# Patient Record
Sex: MALE | Race: WHITE | Employment: OTHER | URBAN - METROPOLITAN AREA
[De-identification: names, ages, dates, MRNs, and addresses within clinical notes are randomized per-mention and may not be internally consistent; named-entity substitution may affect disease eponyms.]

---

## 2017-01-03 ENCOUNTER — TELEPHONE (OUTPATIENT)
Dept: ORTHOPEDIC SURGERY | Age: 67
End: 2017-01-03

## 2017-01-03 DIAGNOSIS — M51.36 BULGE OF LUMBAR DISC WITHOUT MYELOPATHY: Primary | ICD-10-CM

## 2017-01-03 DIAGNOSIS — M79.2 NEURITIS: ICD-10-CM

## 2017-01-03 DIAGNOSIS — M47.816 LUMBAR FACET ARTHROPATHY: ICD-10-CM

## 2017-01-03 NOTE — TELEPHONE ENCOUNTER
Patient came by to get a new PT order to take to Dr. Herminia Yañez PT and order placed per Dr. Feliciana Spatz. Eval/treat Lumbar, modalities, therpuetic exercise as needed, electrical stim, massage. 2-3 x week for 8-12 treatments. Order placed and patient received a copy.

## 2017-01-10 ENCOUNTER — OFFICE VISIT (OUTPATIENT)
Dept: ORTHOPEDIC SURGERY | Age: 67
End: 2017-01-10

## 2017-01-10 VITALS
HEART RATE: 77 BPM | BODY MASS INDEX: 32.76 KG/M2 | DIASTOLIC BLOOD PRESSURE: 70 MMHG | SYSTOLIC BLOOD PRESSURE: 156 MMHG | WEIGHT: 234 LBS | HEIGHT: 71 IN | TEMPERATURE: 98.6 F | OXYGEN SATURATION: 98 % | RESPIRATION RATE: 18 BRPM

## 2017-01-10 DIAGNOSIS — Z98.1 S/P LUMBAR FUSION: ICD-10-CM

## 2017-01-10 DIAGNOSIS — R20.2 PARESTHESIA: ICD-10-CM

## 2017-01-10 DIAGNOSIS — M48.02 CERVICAL SPINAL STENOSIS: ICD-10-CM

## 2017-01-10 DIAGNOSIS — G89.4 CHRONIC PAIN SYNDROME: ICD-10-CM

## 2017-01-10 DIAGNOSIS — M48.061 LUMBAR SPINAL STENOSIS: Primary | ICD-10-CM

## 2017-01-10 DIAGNOSIS — M47.816 LUMBAR FACET ARTHROPATHY: ICD-10-CM

## 2017-01-10 DIAGNOSIS — R53.1 WEAKNESS: ICD-10-CM

## 2017-01-10 PROBLEM — M96.1 LUMBAR POST-LAMINECTOMY SYNDROME: Status: ACTIVE | Noted: 2017-01-10

## 2017-01-10 RX ORDER — OMEPRAZOLE 20 MG/1
20 CAPSULE, DELAYED RELEASE ORAL DAILY
Refills: 11 | COMMUNITY
Start: 2016-12-09 | End: 2017-08-28 | Stop reason: SDUPTHER

## 2017-01-10 NOTE — PROGRESS NOTES
[address]  [address], [address]  [place], [address] [place] [address] Street  Phone: [phone]  Fax: [phone]  PROGRESS NOTE  Patient: Tawnya Bonilla                MRN: [id number]       SSN: [id number]  YOB: 1950        AGE: 73 y.o. SEX: male  Body mass index is 32.64 kg/(m^2). PCP: Hawa Campos MD  01/10/17    Chief Complaint   Patient presents with    Back Pain     lower abck pain    Neck Pain    Referral / Consult     referred by Dr. Corey Garcia, RADIOGRAPHS, and PLAN:         HISTORY:  Mr. Prabhakar Koenig returns today. He is almost one year out from his lumbar fusion. He did fantastically well for about the first six months. About seven or eight months out form surgery, he began to have a return of leg pain, not the back pain he had prior to surgery, but leg pain. The leg pain began bilaterally, almost a sciatica. He also has had chronic neck pain, which is another issue, but he comes in today complaining of back pain and bilateral dysesthetic numbness and tingling. He says it is severe and unresponsive to interventions other than one set of selective blocks, which he said it has helped him for a few days. He comes in demanding something be done for it. RADIOGRAPHS:  He has had a repeat MRI, and this demonstrates an L4-5 decompression and instrumented fusion with ongoing foraminal lateral recess stenosis at the other unoperated levels and his L4-5 decompression and fusion. X-rays demonstrate what appears to be an L4-5 fusion without issue. There is no evidence of instability. There is possibly some haloing around the instrumentation but no motion on flexion or extension. He is using an assistive device. He says his back pain is pretty much gone since the surgery. He says the surgery helped him, but he is having this leg pain. He was a smoker prior to surgery.   He has not been smoking since surgery. I have explained to him that I am uncertain of the etiology of this pain. The instrumentation appears to be well placed. I do not see any new stenosis or instability. It does not quite make sense to me that he is already having junctional issues. The junctional stenosis he has appears to be stable from what it was prior to surgery, and it was not purely symptomatic prior to surgery. PLAN:  At this point, I would like to obtain a current EMG, vascular studies, and a CT scan to see the status of his fusion and see if he has any evidence of neuropathy, radiculopathy, or vascular issue. With that in hand, we will try to decide a treatment plan, whether it would make sense to consider revising his spine from a surgical standpoint or whether we have to consider this is something that requires another approach, such as spinal cord stimulation. He does appear to be in quite a bit of distress. With regards to his cervical spine, he does have cervical spondylosis with some relative stenosis, but that is a minor issue for him at this point. I will see him back following his further studies. cc: Judith Argueta. Konstantin Medley M.D.            Past Medical History   Diagnosis Date    Bilateral hip pain     Chronic pain      back    Depression     Diabetes (HCC)      borderline, no meds-trying to control with diet    DJD (degenerative joint disease)     GERD (gastroesophageal reflux disease)     Hepatitis C January, 2015    Hypertension     Kidney stones     Lymphadenopathy, generalized 12/05/2015     neg bx    Numbness of foot left     resolved per patient 1/29/16    S/P lumbar fusion 2/9/2016     L4/5 LAMINECTOMY/FUSION/TRANSFORAMINAL LUMBAR INTERBODY FUSION (TLIF) by Dr. Tasha Gallardo on 2/8/16     Unspecified sleep apnea      no cpap machine-pt denies       Family History   Problem Relation Age of Onset    Diabetes Sister     SLE Sister     Arthritis-osteo Sister     Heart Disease Sister Current Outpatient Prescriptions   Medication Sig Dispense Refill    omeprazole (PRILOSEC) 20 mg capsule Take 20 mg by mouth daily. 11    gabapentin (NEURONTIN) 300 mg capsule 1 in the am and 2 in the evening as directed 90 Cap 1    terbinafine HCl (LAMISIL) 250 mg tablet 250 mg.      triamcinolone acetonide (KENALOG) 0.1 % topical cream Use 1 Application to affected area Twice Daily.  amLODIPine (NORVASC) 10 mg tablet Take 1 Tab by mouth daily. 90 Tab 1    ketoconazole (NIZORAL) 2 % shampoo One application to scalp 552 mL 3    Omeprazole delayed release (PRILOSEC D/R) 20 mg tablet Take 1 Tab by mouth daily. 30 Tab 11    lisinopril (PRINIVIL, ZESTRIL) 20 mg tablet Take 1 Tab by mouth daily. 90 Tab 1    traMADol (ULTRAM) 50 mg tablet Take 1 Tab by mouth every six (6) hours as needed for Pain. Max Daily Amount: 200 mg. 10 Tab 0    cyclobenzaprine (FLEXERIL) 5 mg tablet Take 1 Tab by mouth three (3) times daily as needed for Muscle Spasm(s). 12 Tab 0    acetaminophen-codeine (TYLENOL #3) 300-30 mg per tablet 1 po bid prn severe pain  Indications: PAIN 60 Tab 0       Allergies   Allergen Reactions    Nicotine Contact Dermatitis     Nicotine Patch reaction - Contact dermatitis with numbness and tingling also occuring in the left arm and denuding of the skin.     Thimerosal Other (comments)     Contact lens solution, made eyes red and irrated       Past Surgical History   Procedure Laterality Date    Hx tonsillectomy  as a child    Hx appendectomy  as a child    Hx orthopaedic       denies    Pr remove groin lymph nodes superf Right 3-10-16     Dr. Roman Marshall Hx colonoscopy  2015     negative    Hx lumbar fusion  2/2016    Hx back surgery  07/2015       Past Medical History   Diagnosis Date    Bilateral hip pain     Chronic pain      back    Depression     Diabetes ([address]      borderline, no meds-trying to control with diet    DJD (degenerative joint disease)     GERD (gastroesophageal reflux disease)     Hepatitis C January, 2015    Hypertension     Kidney stones     Lymphadenopathy, generalized 12/05/2015     neg bx    Numbness of foot left     resolved per patient 1/29/16    S/P lumbar fusion 2/9/2016     L4/5 LAMINECTOMY/FUSION/TRANSFORAMINAL LUMBAR INTERBODY FUSION (TLIF) by Dr. Mckenna Malone on 2/8/16     Unspecified sleep apnea      no cpap machine-pt denies       Social History     Social History    Marital status:      Spouse name: N/A    Number of children: N/A    Years of education: N/A     Occupational History    Not on file. Social History Main Topics    Smoking status: Light Tobacco Smoker     Packs/day: 0.25     Years: 40.00     Types: Cigarettes    Smokeless tobacco: Never Used    Alcohol use No    Drug use: Yes     Special: Marijuana      Comment: 1/27/16    Sexual activity: No     Other Topics Concern    Not on file     Social History Narrative        REVIEW OF SYSTEMS:   CONSTITUTIONAL SYMPTOMS:  Negative. EYES:  Negative. EARS, NOSE, THROAT AND MOUTH:  Negative. CARDIOVASCULAR:  Negative. RESPIRATORY:  Negative. GENITOURINARY: Negative. GASTROINTESTINAL:  Negative. INTEGUMENTARY (SKIN AND/OR BREAST):  Negative. MUSCULOSKELETAL: Per HPI.   ENDOCRINE/RHEUMATOLOGIC:  Negative. NEUROLOGICAL:  Per HPI. HEMATOLOGIC/LYMPHATIC:  Negative. ALLERGIC/IMMUNOLOGIC:  Negative. PSYCHIATRIC:  Negative. PHYSICAL EXAMINATION:   Visit Vitals    /70    Pulse 77    Temp 98.6 °F (37 °C) (Oral)    Resp 18    Ht 5' 11\" (1.803 m)    Wt 234 lb (106.1 kg)    SpO2 98%    BMI 32.64 kg/m2    PAIN SCALE: 5/10    CONSTITUTIONAL: The patient is in no apparent distress and is alert and oriented x 3. HEENT: Normocephalic. Hearing grossly intact. NECK: Supple and symmetric. no tenderness, or masses were felt. RESPIRATORY: No labored breathing. CARDIOVASCULAR: The carotid pulses were normal. Peripheral pulses were 2+.   CHEST: Normal AP diameter and normal contour without any kyphoscoliosis. LYMPHATIC: No lymphadenopathy was appreciated in the neck, axillae or groin. SKIN:  Negative for scars, rashes, lesions, or ulcers on the right upper, right lower, left upper, left lower and trunk. NEUROLOGICAL: Alert and oriented x 3. Ambulation with single point cane. FWB. EXTREMITIES:  See musculoskeletal.  MUSCULOSKELETAL:   Head and Neck: Neck pain. Negative for misalignment, asymmetry, crepitation, defects, tenderness masses or effusions.  Left Upper Extremity: Inspection, percussion and palpation preformed. Reyess sign is negative.  Right Upper Extremity: Inspection, percussion and palpation preformed. Reyess sign is negative.  Spine, Ribs and Pelvis: Intermittent pain. Bladder incontinence. Inspection, percussion and palpation preformed. Negative for misalignment, asymmetry, crepitation, defects, tenderness masses or effusions.  Right Lower Extremity: Pain. Weakness. Inspection, percussion and palpation preformed. Negative straight leg raise.  Left Lower Extremity: Weakness. Inspection, percussion and palpation preformed. Negative straight leg raise. SPINE EXAM:     Cervical spine: Neck is midline. Normal muscle tone. No focal atrophy is noted. Lumbar spine: No rash, ecchymosis, or gross obliquity. No focal atrophy is noted. ASSESSMENT    ICD-10-CM ICD-9-CM    1. Lumbar spinal stenosis M48.06 724.02 LOWER EXT ART PVR MULT LEVEL SEG PRESSURES   2. S/P lumbar fusion Z98.1 V45.4 AMB POC XRAY, SPINE, LUMBOSACRAL; 4+      EMG TWO EXTREMITIES LOWER      LOWER EXT ART PVR MULT LEVEL SEG PRESSURES   3. Cervical spinal stenosis M48.02 723.0    4. Chronic pain syndrome G89.4 338.4 AMB POC XRAY, SPINE, LUMBOSACRAL; 4+      EMG TWO EXTREMITIES LOWER      LOWER EXT ART PVR MULT LEVEL SEG PRESSURES   5.  Lumbar facet arthropathy (HCC) M12.88 721.3 AMB POC XRAY, SPINE, LUMBOSACRAL; 4+      EMG TWO EXTREMITIES LOWER LOWER EXT ART PVR MULT LEVEL SEG PRESSURES   6. Paresthesia R20.2 782.0 EMG TWO EXTREMITIES LOWER      LOWER EXT ART PVR MULT LEVEL SEG PRESSURES   7.  Weakness R53.1 780.79 EMG TWO EXTREMITIES LOWER      LOWER EXT ART PVR MULT LEVEL SEG PRESSURES       Written by Homero Iavn, as dictated by Sherley Headley MD.

## 2017-01-10 NOTE — PATIENT INSTRUCTIONS
Learning About How to Have a Healthy Back  What causes back pain? Back pain is often caused by overuse, strain, or injury. For example, people often hurt their backs playing sports or working in the yard, being jolted in a car accident, or lifting something too heavy. Aging plays a part too. Your bones and muscles tend to lose strength as you age, which makes injury more likely. The spongy discs between the bones of the spine (vertebrae) may suffer from wear and tear and no longer provide enough cushion between the bones. A disc that bulges or breaks open (herniated disc) can press on nerves, causing back pain. In some people, back pain is the result of arthritis, broken vertebrae caused by bone loss (osteoporosis), illness, or a spine problem. Although most people have back pain at one time or another, there are steps you can take to make it less likely. How can you have a healthy back? Reduce stress on your back through good posture  Slumping or slouching alone may not cause low back pain. But after the back has been strained or injured, bad posture can make pain worse. · Sleep in a position that maintains your back's normal curves and on a mattress that feels comfortable. Sleep on your side with a pillow between your knees, or sleep on your back with a pillow under your knees. These positions can reduce strain on your back. · Stand and sit up straight. \"Good posture\" generally means your ears, shoulders, and hips are in a straight line. · If you must stand for a long time, put one foot on a stool, ledge, or box. Switch feet every now and then. · Sit in a chair that is low enough to let you place both feet flat on the floor with both knees nearly level with your hips. If your chair or desk is too high, use a footrest to raise your knees. Place a small pillow, a rolled-up towel, or a lumbar roll in the curve of your back if you need extra support.   · Try a kneeling chair, which helps tilt your hips forward. This takes pressure off your lower back. · Try sitting on an exercise ball. It can rock from side to side, which helps keep your back loose. · When driving, keep your knees nearly level with your hips. Sit straight, and drive with both hands on the steering wheel. Your arms should be in a slightly bent position. Reduce stress on your back through careful lifting  · Squat down, bending at the hips and knees only. If you need to, put one knee to the floor and extend your other knee in front of you, bent at a right angle (half kneeling). · Press your chest straight forward. This helps keep your upper back straight while keeping a slight arch in your low back. · Hold the load as close to your body as possible, at the level of your belly button (navel). · Use your feet to change direction, taking small steps. · Lead with your hips as you change direction. Keep your shoulders in line with your hips as you move. · Set down your load carefully, squatting with your knees and hips only. Exercise and stretch your back  · Do some exercise on most days of the week, if your doctor says it is okay. You can walk, run, swim, or cycle. · Stretch your back muscles. Here are a few exercises to try:  Almas Maw on your back, and gently pull one bent knee to your chest. Put that foot back on the floor, and then pull the other knee to your chest.  ¨ Do pelvic tilts. Lie on your back with your knees bent. Tighten your stomach muscles. Pull your belly button (navel) in and up toward your ribs. You should feel like your back is pressing to the floor and your hips and pelvis are slightly lifting off the floor. Hold for 6 seconds while breathing smoothly. ¨ Sit with your back flat against a wall. · Keep your core muscles strong. The muscles of your back, belly (abdomen), and buttocks support your spine. ¨ Pull in your belly and imagine pulling your navel toward your spine. Hold this for 6 seconds, then relax.  Remember to keep breathing normally as you tense your muscles. ¨ Do curl-ups. Always do them with your knees bent. Keep your low back on the floor, and curl your shoulders toward your knees using a smooth, slow motion. Keep your arms folded across your chest. If this bothers your neck, try putting your hands behind your neck (not your head), with your elbows spread apart. ¨ Lie on your back with your knees bent and your feet flat on the floor. Tighten your belly muscles, and then push with your feet and raise your buttocks up a few inches. Hold this position 6 seconds as you continue to breathe normally, then lower yourself slowly to the floor. Repeat 8 to 12 times. ¨ If you like group exercise, try Pilates or yoga. These classes have poses that strengthen the core muscles. Lead a healthy lifestyle  · Stay at a healthy weight to avoid strain on your back. · Do not smoke. Smoking increases the risk of osteoporosis, which weakens the spine. If you need help quitting, talk to your doctor about stop-smoking programs and medicines. These can increase your chances of quitting for good. Where can you learn more? Go to http://gomez-jarvis.info/. Enter L315 in the search box to learn more about \"Learning About How to Have a Healthy Back. \"  Current as of: May 23, 2016  Content Version: 11.1  © 4713-8406 [employer]. Care instructions adapted under license by [employer] (which disclaims liability or warranty for this information). If you have questions about a medical condition or this instruction, always ask your healthcare professional. [place] [address] any warranty or liability for your use of this information.

## 2017-01-10 NOTE — MR AVS SNAPSHOT
Visit Information Date & Time Provider Department Dept. Phone Encounter #  
 1/10/2017  2:00 PM Lauren Beverly MD [facility] [phone] [id number] Follow-up Instructions Return in about 4 weeks (around 2/7/2017). Follow-up and Disposition History Your Appointments 2/22/2017 10:30 AM  
Follow Up with Maury Tiwari NP [facility] [place] [facility] ([address]) Appt Note: HCV  
 [address] [address] [facility] [address]  
[phone]  
  
   
 [address] [address]  
  
    
 2/28/2017  8:15 AM  
Follow Up with Lashae Jordan MD  
[address] [place] [address] [address] [facility]) Appt Note: 6 mo  
 [address] [place], [address] [place] [address]  
[phone]  
  
   
 [address] [place] [place] [address]  
  
    
  
 6/9/2017 10:45 AM  
Any with Sebastián Muñoz MD  
[facility] ([address]) Appt Note: Return in about 6 months (around 6/14/2017). [address] [place] [address]  
[address] [facility] 301 [facility] [address] [place] [address] Upcoming Health Maintenance Date Due ZOSTER VACCINE AGE 60> 8/18/2011 INFLUENZA AGE 9 TO ADULT 8/1/2016 GLAUCOMA SCREENING Q2Y 8/18/2016 Pneumococcal 65+ Low/Medium Risk (1 of 2 - PCV13) 8/18/2016 MEDICARE YEARLY EXAM 8/18/2016 FOBT Q 1 YEAR AGE 50-75 12/8/2016 DTaP/Tdap/Td series (2 - Td) 12/9/2025 Allergies as of 1/10/2017  Review Complete On: 1/10/2017 By: Lauren Beverly MD  
  
 Severity Noted Reaction Type Reactions Nicotine Medium 06/11/2015    Contact Dermatitis Nicotine Patch reaction - Contact dermatitis with numbness and tingling also occuring in the left arm and denuding of the skin. Thimerosal  07/21/2015    Other (comments) Contact lens solution, made eyes red and irrated Current Immunizations  Reviewed on 12/9/2015 Name Date Influenza Vaccine 10/29/2015 Influenza Vaccine PF 10/29/2014 Tdap 12/9/2015 Not reviewed this visit You Were Diagnosed With   
  
 Codes Comments Lumbar spinal stenosis    -  Primary ICD-10-CM: M48.06 
ICD-9-CM: 724.02 S/P lumbar fusion     ICD-10-CM: Z98.1 ICD-9-CM: V45.4 Cervical spinal stenosis     ICD-10-CM: M48.02 
ICD-9-CM: 723.0 Chronic pain syndrome     ICD-10-CM: G89.4 ICD-9-CM: 338.4 Lumbar facet arthropathy (HCC)     ICD-10-CM: M12.88 ICD-9-CM: 721.3 Paresthesia     ICD-10-CM: R20.2 ICD-9-CM: 782.0 Weakness     ICD-10-CM: R53.1 ICD-9-CM: 780.79 Vitals BP Pulse Temp Resp Height(growth percentile) Weight(growth percentile) 156/70 77 98.6 °F (37 °C) (Oral) 18 5' 11\" (1.803 m) 234 lb (106.1 kg) SpO2 BMI Smoking Status 98% 32.64 kg/m2 Light Tobacco Smoker BMI and BSA Data Body Mass Index Body Surface Area  
 32.64 kg/m 2 2.31 m 2 Preferred Pharmacy Pharmacy Name Phone [address] [place] [address], [address] [place] [address] [phone] Your Updated Medication List  
  
   
This list is accurate as of: 1/10/17  3:32 PM.  Always use your most recent med list.  
  
  
  
  
 acetaminophen-codeine 300-30 mg per tablet Commonly known as:  TYLENOL #3  
1 po bid prn severe pain  Indications: PAIN  
  
 amLODIPine 10 mg tablet Commonly known as:  [place] Royals Take 1 Tab by mouth daily. cyclobenzaprine 5 mg tablet Commonly known as:  FLEXERIL Take 1 Tab by mouth three (3) times daily as needed for Muscle Spasm(s). gabapentin 300 mg capsule Commonly known as:  NEURONTIN  
1 in the am and 2 in the evening as directed  
  
 ketoconazole 2 % shampoo Commonly known as:  NIZORAL One application to scalp  
  
 lisinopril 20 mg tablet Commonly known as:  Nayely Dipti Take 1 Tab by mouth daily. * Omeprazole delayed release 20 mg tablet Commonly known as:  PRILOSEC D/R Take 1 Tab by mouth daily. * omeprazole 20 mg capsule Commonly known as:  PRILOSEC Take 20 mg by mouth daily. terbinafine HCl 250 mg tablet Commonly known as:  LAMISIL  
250 mg.  
  
 traMADol 50 mg tablet Commonly known as:  ULTRAM  
Take 1 Tab by mouth every six (6) hours as needed for Pain. Max Daily Amount: 200 mg.  
  
 triamcinolone acetonide 0.1 % topical cream  
Commonly known as:  KENALOG Use 1 Application to affected area Twice Daily. * Notice: This list has 2 medication(s) that are the same as other medications prescribed for you. Read the directions carefully, and ask your doctor or other care provider to review them with you. We Performed the Following AMB POC XRAY, SPINE, LUMBOSACRAL; 4+ [id number] CPT(R)] Follow-up Instructions Return in about 4 weeks (around 2/7/2017). To-Do List   
 01/10/2017 Imaging:  CT SPINE LUMB WO CONT   
  
 01/10/2017 Neurology:  EMG TWO EXTREMITIES LOWER   
  
 01/10/2017 Imaging:  LOWER EXT ART PVR MULT LEVEL SEG PRESSURES Patient Instructions Learning About How to Have a Healthy Back What causes back pain? Back pain is often caused by overuse, strain, or injury. For example, people often hurt their backs playing sports or working in the yard, being jolted in a car accident, or lifting something too heavy. Aging plays a part too. Your bones and muscles tend to lose strength as you age, which makes injury more likely. The spongy discs between the bones of the spine (vertebrae) may suffer from wear and tear and no longer provide enough cushion between the bones. A disc that bulges or breaks open (herniated disc) can press on nerves, causing back pain. In some people, back pain is the result of arthritis, broken vertebrae caused by bone loss (osteoporosis), illness, or a spine problem. Although most people have back pain at one time or another, there are steps you can take to make it less likely. How can you have a healthy back? Reduce stress on your back through good posture Slumping or slouching alone may not cause low back pain. But after the back has been strained or injured, bad posture can make pain worse. · Sleep in a position that maintains your back's normal curves and on a mattress that feels comfortable. Sleep on your side with a pillow between your knees, or sleep on your back with a pillow under your knees. These positions can reduce strain on your back. · Stand and sit up straight. \"Good posture\" generally means your ears, shoulders, and hips are in a straight line. · If you must stand for a long time, put one foot on a stool, ledge, or box. Switch feet every now and then. · Sit in a chair that is low enough to let you place both feet flat on the floor with both knees nearly level with your hips. If your chair or desk is too high, use a footrest to raise your knees. Place a small pillow, a rolled-up towel, or a lumbar roll in the curve of your back if you need extra support. · Try a kneeling chair, which helps tilt your hips forward. This takes pressure off your lower back. · Try sitting on an exercise ball. It can rock from side to side, which helps keep your back loose. · When driving, keep your knees nearly level with your hips. Sit straight, and drive with both hands on the steering wheel. Your arms should be in a slightly bent position. Reduce stress on your back through careful lifting · Squat down, bending at the hips and knees only. If you need to, put one knee to the floor and extend your other knee in front of you, bent at a right angle (half kneeling). · Press your chest straight forward. This helps keep your upper back straight while keeping a slight arch in your low back.  
· Hold the load as close to your body as possible, at the level of your belly button (navel). · Use your feet to change direction, taking small steps. · Lead with your hips as you change direction. Keep your shoulders in line with your hips as you move. · Set down your load carefully, squatting with your knees and hips only. Exercise and stretch your back · Do some exercise on most days of the week, if your doctor says it is okay. You can walk, run, swim, or cycle. · Stretch your back muscles. Here are a few exercises to try: ¨ Lie on your back, and gently pull one bent knee to your chest. Put that foot back on the floor, and then pull the other knee to your chest. 
¨ Do pelvic tilts. Lie on your back with your knees bent. Tighten your stomach muscles. Pull your belly button (navel) in and up toward your ribs. You should feel like your back is pressing to the floor and your hips and pelvis are slightly lifting off the floor. Hold for 6 seconds while breathing smoothly. ¨ Sit with your back flat against a wall. · Keep your core muscles strong. The muscles of your back, belly (abdomen), and buttocks support your spine. ¨ Pull in your belly and imagine pulling your navel toward your spine. Hold this for 6 seconds, then relax. Remember to keep breathing normally as you tense your muscles. ¨ Do curl-ups. Always do them with your knees bent. Keep your low back on the floor, and curl your shoulders toward your knees using a smooth, slow motion. Keep your arms folded across your chest. If this bothers your neck, try putting your hands behind your neck (not your head), with your elbows spread apart. ¨ Lie on your back with your knees bent and your feet flat on the floor. Tighten your belly muscles, and then push with your feet and raise your buttocks up a few inches. Hold this position 6 seconds as you continue to breathe normally, then lower yourself slowly to the floor. Repeat 8 to 12 times. ¨ If you like group exercise, try Pilates or yoga.  These classes have poses that strengthen the core muscles. Lead a healthy lifestyle · Stay at a healthy weight to avoid strain on your back. · Do not smoke. Smoking increases the risk of osteoporosis, which weakens the spine. If you need help quitting, talk to your doctor about stop-smoking programs and medicines. These can increase your chances of quitting for good. Where can you learn more? Go to http://gomez-jarvis.info/. Enter L315 in the search box to learn more about \"Learning About How to Have a Healthy Back. \" Current as of: May 23, 2016 Content Version: 11.1 © 4621-3284 Ofuz. Care instructions adapted under license by Foneshow (which disclaims liability or warranty for this information). If you have questions about a medical condition or this instruction, always ask your healthcare professional. [address] any warranty or liability for your use of this information. Patient Instructions History Introducing hospitals & HEALTH SERVICES! Dear Lawdutch Muñoz: Thank you for requesting a ZoomSafer account. Our records indicate that you already have an active ZoomSafer account. You can access your account anytime at [email or web address] Did you know that you can access your hospital and ER discharge instructions at any time in ZoomSafer? You can also review all of your test results from your hospital stay or ER visit. Additional Information If you have questions, please visit the Frequently Asked Questions section of the ZoomSafer website at [email or web address] Remember, ZoomSafer is NOT to be used for urgent needs. For medical emergencies, dial 911. Now available from your iPhone and Android! Please provide this summary of care documentation to your next provider. Your primary care clinician is listed as Carlos Henley. If you have any questions after today's visit, please call [phone].

## 2017-01-16 ENCOUNTER — OFFICE VISIT (OUTPATIENT)
Dept: INTERNAL MEDICINE CLINIC | Age: 67
End: 2017-01-16

## 2017-01-16 VITALS
HEART RATE: 90 BPM | OXYGEN SATURATION: 96 % | WEIGHT: 222 LBS | TEMPERATURE: 99.7 F | SYSTOLIC BLOOD PRESSURE: 138 MMHG | HEIGHT: 71 IN | RESPIRATION RATE: 16 BRPM | DIASTOLIC BLOOD PRESSURE: 80 MMHG | BODY MASS INDEX: 31.08 KG/M2

## 2017-01-16 DIAGNOSIS — R05.9 COUGH: ICD-10-CM

## 2017-01-16 DIAGNOSIS — J06.9 ACUTE URI: Primary | ICD-10-CM

## 2017-01-16 RX ORDER — PREDNISONE 10 MG/1
TABLET ORAL
Qty: 39 TAB | Refills: 0 | Status: SHIPPED | OUTPATIENT
Start: 2017-01-16 | End: 2017-02-28

## 2017-01-16 RX ORDER — AMOXICILLIN AND CLAVULANATE POTASSIUM 875; 125 MG/1; MG/1
TABLET, FILM COATED ORAL
Qty: 20 TAB | Refills: 0 | Status: SHIPPED | OUTPATIENT
Start: 2017-01-16 | End: 2017-02-28

## 2017-01-16 NOTE — MR AVS SNAPSHOT
Visit Information Date & Time Provider Department Dept. Phone Encounter #  
 1/16/2017  2:30 PM Asif Gallagher MD [facility] [phone] [id number] Follow-up Instructions Return if symptoms worsen or fail to improve. Follow-up and Disposition History Your Appointments 2/10/2017  3:00 PM  
DIAG TEST F/U with Bud Field MD  
[address] [facility], [address] [facility] Appt Note: emg dr. Eleuterio Qureshi  lower ext art pvr mult level  ct scan this time + date per [facility] [address] [facility] [address]  
[phone]  
  
   
 [address] [facility],[address]  
  
    
 2/22/2017 10:30 AM  
Follow Up with Alicia Osborne NP [facility] [place] [facility] ([facility]) Appt Note: HCV  
 [address] [address] [facility] [address]  
[phone]  
  
   
 [address] Vernell [address] [facility]  
  
    
 2/28/2017  8:15 AM  
Follow Up with Asif Gallagher MD  
[address] [facility]) Appt Note: 6 mo  
 [address] [facility], [address] [place] [address] [phone]  
  
   
 [address] [facility], [address] [place] [address]  
  
    
  
 6/9/2017 10:45 AM  
Any with Geno Pool MD  
[facility] ([facility]) Appt Note: Return in about 6 months (around 6/14/2017). [address] [place] [address]  
[address] Sisi Zhang [facility] [address] [place] [address] Upcoming Health Maintenance Date Due ZOSTER VACCINE AGE 60> 8/18/2011 GLAUCOMA SCREENING Q2Y 8/18/2016 Pneumococcal 65+ Low/Medium Risk (1 of 2 - PCV13) 8/18/2016 MEDICARE YEARLY EXAM 8/18/2016 FOBT Q 1 YEAR AGE 50-75 12/8/2016 DTaP/Tdap/Td series (2 - Td) 12/9/2025 Allergies as of 1/16/2017  Review Complete On: 1/16/2017 By: Asif Gallagher MD  
  
 Severity Noted Reaction Type Reactions Nicotine Medium 06/11/2015    Contact Dermatitis Nicotine Patch reaction - Contact dermatitis with numbness and tingling also occuring in the left arm and denuding of the skin. Thimerosal  07/21/2015    Other (comments) Contact lens solution, made eyes red and irrated Current Immunizations  Reviewed on 12/9/2015 Name Date Influenza Vaccine 10/1/2016, 10/29/2015 Influenza Vaccine PF 10/29/2014 Tdap 12/9/2015 Not reviewed this visit You Were Diagnosed With   
  
 Codes Comments Acute URI    -  Primary ICD-10-CM: J06.9 ICD-9-CM: 465.9 Cough     ICD-10-CM: R05 ICD-9-CM: 086. 2 Vitals BP Pulse Temp Resp Height(growth percentile) Weight(growth percentile) 138/80 90 99.7 °F (37.6 °C) (Tympanic) 16 5' 11\" (1.803 m) 222 lb (100.7 kg) SpO2 BMI Smoking Status 96% 30.96 kg/m2 Light Tobacco Smoker Vitals History BMI and BSA Data Body Mass Index Body Surface Area 30.96 kg/m 2 2.25 m 2 Preferred Pharmacy Pharmacy Name Phone [address] [place] [address], [address] [place] [address] [phone] Your Updated Medication List  
  
   
This list is accurate as of: 1/16/17  2:36 PM.  Always use your most recent med list. amLODIPine 10 mg tablet Commonly known as:  Elspeth Bakes Take 1 Tab by mouth daily. amoxicillin-clavulanate 875-125 mg per tablet Commonly known as:  AUGMENTIN  
1 bid with food for 10 days  
  
 ketoconazole 2 % shampoo Commonly known as:  NIZORAL One application to scalp  
  
 lisinopril 20 mg tablet Commonly known as:  Sharita Primmer Take 1 Tab by mouth daily. omeprazole 20 mg capsule Commonly known as:  PRILOSEC Take 20 mg by mouth daily. predniSONE 10 mg tablet Commonly known as:  DELTASONE  
60 mg po now and then decrease by 5 mg every day until done  
  
 terbinafine HCl 250 mg tablet Commonly known as:  LAMISIL  
250 mg.  
  
 triamcinolone acetonide 0.1 % topical cream  
Commonly known as:  KENALOG Use 1 Application to affected area Twice Daily. Prescriptions Sent to Pharmacy Refills  
 predniSONE (DELTASONE) 10 mg tablet 0 Si mg po now and then decrease by 5 mg every day until done Class: Normal  
 Pharmacy: [id number] [facility], [address] [facility] Ph #: [phone]  
 amoxicillin-clavulanate (AUGMENTIN) 875-125 mg per tablet 0 Si bid with food for 10 days Class: Normal  
 Pharmacy: [id number] [facility], [address] [facility] Ph #: [phone] Follow-up Instructions Return if symptoms worsen or fail to improve. To-Do List   
 2017 10:00 AM  
  Appointment with [facility] VAS TECH 1; [facility] VAS LAB RM 2 at [address] [place] [address] ([phone]) All patients under the age of 15 should be referred to [address] [place] [address]. There are no restrictions for this study. The patient can eat breakfast and take their medications. Patient may hand-carry an order or the physician can fax a written prescription to Central Scheduling @ [phone].  
  
 2017 11:30 AM  
  Appointment with [facility] CT RM 2 at [facility] RAD [address] ([phone]) GENERAL INSTRUCTIONS  This study does not require you to drink contrast prior to your study. RELATED STUDY INFORMATION  Bring any films, CDs, and reports related with you on the day of your exam.  This only includes studies done outside of [facility], [facility], and [facility]. QUESTIONS  Notify the CT Department if you have any questions concerning your study. [facility]a Golden - [phone] [facility] - [phone] Patient Instructions Upper Respiratory Infection (Cold): Care Instructions Your Care Instructions An upper respiratory infection, or URI, is an infection of the nose, sinuses, or throat. URIs are spread by coughs, sneezes, and direct contact. The common cold is the most frequent kind of URI. The flu and sinus infections are other kinds of URIs. Almost all URIs are caused by viruses. Antibiotics won't cure them. But you can treat most infections with home care. This may include drinking lots of fluids and taking over-the-counter pain medicine. You will probably feel better in 4 to 10 days. The doctor has checked you carefully, but problems can develop later. If you notice any problems or new symptoms, get medical treatment right away. Follow-up care is a key part of your treatment and safety. Be sure to make and go to all appointments, and call your doctor if you are having problems. It's also a good idea to know your test results and keep a list of the medicines you take. How can you care for yourself at home? · To prevent dehydration, drink plenty of fluids, enough so that your urine is light yellow or clear like water. Choose water and other caffeine-free clear liquids until you feel better. If you have kidney, heart, or liver disease and have to limit fluids, talk with your doctor before you increase the amount of fluids you drink. · Take an over-the-counter pain medicine, such as acetaminophen (Tylenol), ibuprofen (Advil, Motrin), or naproxen (Aleve). Read and follow all instructions on the label. · Before you use cough and cold medicines, check the label. These medicines may not be safe for young children or for people with certain health problems. · Be careful when taking over-the-counter cold or flu medicines and Tylenol at the same time. Many of these medicines have acetaminophen, which is Tylenol. Read the labels to make sure that you are not taking more than the recommended dose. Too much acetaminophen (Tylenol) can be harmful. · Get plenty of rest. 
· Do not smoke or allow others to smoke around you.  If you need help quitting, talk to your doctor about stop-smoking programs and medicines. These can increase your chances of quitting for good. When should you call for help? Call 911 anytime you think you may need emergency care. For example, call if: 
· You have severe trouble breathing. Call your doctor now or seek immediate medical care if: 
· You seem to be getting much sicker. · You have new or worse trouble breathing. · You have a new or higher fever. · You have a new rash. Watch closely for changes in your health, and be sure to contact your doctor if: 
· You have a new symptom, such as a sore throat, an earache, or sinus pain. · You cough more deeply or more often, especially if you notice more mucus or a change in the color of your mucus. · You do not get better as expected. Where can you learn more? Go to http://gomez-jarvis.info/. Enter L223 in the search box to learn more about \"Upper Respiratory Infection (Cold): Care Instructions. \" Current as of: June 30, 2016 Content Version: 11.1 © 3375-0522 [employer]. Care instructions adapted under license by Radius App (which disclaims liability or warranty for this information). If you have questions about a medical condition or this instruction, always ask your healthcare professional. [address] any warranty or liability for your use of this information. Introducing [facility] & HEALTH SERVICES! Dear Arnol Rodgers: Thank you for requesting a Are You a Human account. Our records indicate that you already have an active Are You a Human account. You can access your account anytime at [email or web address]. [email or web address] [email or web address]." Did you know that you can access your hospital and ER discharge instructions at any time in Are You a Human? You can also review all of your test results from your hospital stay or ER visit. Additional Information If you have questions, please visit the Frequently Asked Questions section of the Entreda website at [email or web address] [place] Self Representation Document Preparation. [email or web address] Remember, Entreda is NOT to be used for urgent needs. For medical emergencies, dial 911. Now available from your iPhone and Android! Please provide this summary of care documentation to your next provider. Your primary care clinician is listed as Ana Cerda. If you have any questions after today's visit, please call [phone].

## 2017-01-16 NOTE — PATIENT INSTRUCTIONS

## 2017-01-16 NOTE — PROGRESS NOTES
1. Have you been to the ER, urgent care clinic since your last visit? Hospitalized since your last visit? Yes When: dec Where: [facility] Reason for visit: back pain    2. Have you seen or consulted any other health care providers outside of the [address] [place] [address] since your last visit? Include any pap smears or colon screening.  No

## 2017-01-16 NOTE — PROGRESS NOTES
HPI:   7 days of head congestion, facial discomfort, NP cough w/o fever, dyspnea, CP, or N; +wheezing      ROS is otherwise negative. Past Medical History   Diagnosis Date    Bilateral hip pain     Chronic pain      back; hx of opiod addiction    Depression     Diabetes (HCC)      borderline, no meds-trying to control with diet    DJD (degenerative joint disease)     GERD (gastroesophageal reflux disease)     Hepatitis C January, 2015    Hypertension     Kidney stones     Lymphadenopathy, generalized 12/05/2015     neg bx    Numbness of foot left     resolved per patient 1/29/16    S/P lumbar fusion 2/9/2016     L4/5 LAMINECTOMY/FUSION/TRANSFORAMINAL LUMBAR INTERBODY FUSION (TLIF) by Dr. Aurea Dumont on 2/8/16     Unspecified sleep apnea      no cpap machine-pt denies       Past Surgical History   Procedure Laterality Date    Hx tonsillectomy  as a child    Hx appendectomy  as a child    Hx orthopaedic       denies    Pr remove groin lymph nodes superf Right 3-10-16     Dr. Hernesto Crawley Hx colonoscopy  2015     negative    Hx lumbar fusion  2/2016    Hx back surgery  07/2015       Social History     Social History    Marital status:      Spouse name: N/A    Number of children: N/A    Years of education: N/A     Occupational History    Not on file. Social History Main Topics    Smoking status: Light Tobacco Smoker     Packs/day: 0.25     Years: 40.00     Types: Cigarettes    Smokeless tobacco: Never Used    Alcohol use No    Drug use: Yes     Special: Marijuana      Comment: 1/27/16    Sexual activity: No     Other Topics Concern    Not on file     Social History Narrative       Allergies   Allergen Reactions    Nicotine Contact Dermatitis     Nicotine Patch reaction - Contact dermatitis with numbness and tingling also occuring in the left arm and denuding of the skin.     Thimerosal Other (comments)     Contact lens solution, made eyes red and irrated       Family History Problem Relation Age of Onset    Diabetes Sister     SLE Sister     Arthritis-osteo Sister     Heart Disease Sister        Current Outpatient Prescriptions   Medication Sig Dispense Refill    omeprazole (PRILOSEC) 20 mg capsule Take 20 mg by mouth daily. 11    terbinafine HCl (LAMISIL) 250 mg tablet 250 mg.      triamcinolone acetonide (KENALOG) 0.1 % topical cream Use 1 Application to affected area Twice Daily.  amLODIPine (NORVASC) 10 mg tablet Take 1 Tab by mouth daily. 90 Tab 1    ketoconazole (NIZORAL) 2 % shampoo One application to scalp 822 mL 3    lisinopril (PRINIVIL, ZESTRIL) 20 mg tablet Take 1 Tab by mouth daily. 90 Tab 1           Visit Vitals    /80    Pulse 90    Temp 99.7 °F (37.6 °C) (Tympanic)    Resp 16    Ht 5' 11\" (1.803 m)    Wt 222 lb (100.7 kg)    SpO2 96%    BMI 30.96 kg/m2       PE  Well nourished in NAD  HEENT:  OP: clear. TMS: clear  Neck: supple w/o mass or bruits. Chest: scattered expiratory wheezing  CV: RRR w/o m,r,g; pulses intact. Abd: soft, NT, w/o HSM or mass. Ext: w/o edema. Neuro: NF. Assessment and Plan    Encounter Diagnoses   Name Primary?  Acute URI Yes    Cough    Sinusitis/cough  augmentin 875 bid with food for 10 days  Prednisone taper  F/U worsening or unimproved 7days  The patient was counseled on the dangers of tobacco use, and was advised to quit. Reviewed strategies to maximize success, including removing cigarettes and smoking materials from environment. I have reviewed/discussed the above normal BMI with the patient. I have recommended the following interventions: dietary management education, guidance, and counseling . The plan is as follows: I have counseled this patient on diet and exercise regimens.  .    OV 6 mos or prn  I have explained plan to patient and the patient verbalizes understanding

## 2017-01-18 DIAGNOSIS — I10 ESSENTIAL HYPERTENSION WITH GOAL BLOOD PRESSURE LESS THAN 140/90: ICD-10-CM

## 2017-01-18 RX ORDER — AMLODIPINE BESYLATE 10 MG/1
10 TABLET ORAL DAILY
Qty: 90 TAB | Refills: 1 | Status: SHIPPED | OUTPATIENT
Start: 2017-01-18 | End: 2017-08-28 | Stop reason: SDUPTHER

## 2017-01-19 ENCOUNTER — HOSPITAL ENCOUNTER (OUTPATIENT)
Dept: VASCULAR SURGERY | Age: 67
Discharge: HOME | End: 2017-01-19
Attending: ORTHOPAEDIC SURGERY
Payer: MEDICARE

## 2017-01-19 ENCOUNTER — HOSPITAL ENCOUNTER (OUTPATIENT)
Dept: CT IMAGING | Age: 67
Discharge: HOME | End: 2017-01-19
Attending: ORTHOPAEDIC SURGERY
Payer: MEDICARE

## 2017-01-19 DIAGNOSIS — M48.061 LUMBAR SPINAL STENOSIS: ICD-10-CM

## 2017-01-19 DIAGNOSIS — M48.02 CERVICAL SPINAL STENOSIS: ICD-10-CM

## 2017-01-19 DIAGNOSIS — M47.816 LUMBAR FACET ARTHROPATHY: ICD-10-CM

## 2017-01-19 DIAGNOSIS — R20.2 PARESTHESIA: ICD-10-CM

## 2017-01-19 DIAGNOSIS — G89.4 CHRONIC PAIN SYNDROME: ICD-10-CM

## 2017-01-19 DIAGNOSIS — R53.1 WEAKNESS: ICD-10-CM

## 2017-01-19 DIAGNOSIS — Z98.1 S/P LUMBAR FUSION: ICD-10-CM

## 2017-01-19 PROCEDURE — 72131 CT LUMBAR SPINE W/O DYE: CPT

## 2017-01-19 PROCEDURE — 93923 UPR/LXTR ART STDY 3+ LVLS: CPT

## 2017-01-19 NOTE — PROCEDURES
[facility]  *** FINAL REPORT ***    Name: Isidra Guardado  MRN: [id number]    Outpatient  : 18 Aug 1950  HIS Order #: [id number]  [address] [facility] Visit #: [id number]  Date: 2017    TYPE OF TEST: Peripheral Arterial Testing    REASON FOR TEST    Right Leg  Segmentals: Normal                     mmHg  Brachial         146  High thigh  Low thigh        196  Calf             189  Posterior tibial 207  Dorsalis pedis   193  Peroneal  Metatarsal       159  Toe pressure     159  Doppler:  Ankle/Brachial: 1.42    Left Leg  Segmentals: Normal                     mmHg  Brachial         144  High thigh  Low thigh        211  Calf             189  Posterior tibial 197  Dorsalis pedis   185  Peroneal  Metatarsal       142  Toe pressure     142  Doppler:  Ankle/Brachial: 1.35    INTERPRETATION/FINDINGS  Physiologic testing was performed using continuous wave doppler and  segmental pressures. 1. By waveform analysis, there is no evidence of significant  peripheral arterial disease at rest in the right leg. 2.  By waveform analysis, there is no evidence of significant  peripheral arterial disease at rest in the left leg. 3 The bilateral ankle/brachial indicese are falsely elevated due to  vessel calcification, therefore, they are unreliable predictors of  distal arterial perfusion. 4. The right digital/brachial index is 1.09 and the left  digital/brachial index is 0.97. ADDITIONAL COMMENTS  No previous study for comparison. I have personally reviewed the data relevant to the interpretation of  this  study. TECHNOLOGIST: Navneet Guaman RVT  Signed: 2017 12:32 PM    PHYSICIAN: Estella Lundborg, D.O.   Signed: 2017 01:16 PM

## 2017-02-10 ENCOUNTER — OFFICE VISIT (OUTPATIENT)
Dept: ORTHOPEDIC SURGERY | Age: 67
End: 2017-02-10

## 2017-02-10 VITALS
WEIGHT: 230.6 LBS | OXYGEN SATURATION: 98 % | HEART RATE: 81 BPM | DIASTOLIC BLOOD PRESSURE: 80 MMHG | BODY MASS INDEX: 32.28 KG/M2 | TEMPERATURE: 98.4 F | RESPIRATION RATE: 18 BRPM | SYSTOLIC BLOOD PRESSURE: 123 MMHG | HEIGHT: 71 IN

## 2017-02-10 DIAGNOSIS — Z98.1 S/P LUMBAR FUSION: ICD-10-CM

## 2017-02-10 DIAGNOSIS — M48.02 CERVICAL SPINAL STENOSIS: ICD-10-CM

## 2017-02-10 DIAGNOSIS — M48.061 LUMBAR SPINAL STENOSIS: ICD-10-CM

## 2017-02-10 DIAGNOSIS — G89.4 CHRONIC PAIN SYNDROME: ICD-10-CM

## 2017-02-10 RX ORDER — RANITIDINE 150 MG/1
150 TABLET, FILM COATED ORAL
COMMUNITY
End: 2017-02-28

## 2017-02-10 RX ORDER — OXYCODONE AND ACETAMINOPHEN 5; 325 MG/1; MG/1
TABLET ORAL
COMMUNITY
End: 2017-02-28

## 2017-02-10 RX ORDER — HYDROXYZINE 25 MG/1
25 TABLET, FILM COATED ORAL
COMMUNITY
End: 2017-02-28

## 2017-02-10 RX ORDER — OXYCODONE HYDROCHLORIDE 5 MG/1
5 TABLET ORAL
Qty: 90 TAB | Refills: 0 | Status: CANCELLED | OUTPATIENT
Start: 2017-02-10

## 2017-02-10 RX ORDER — HYDROCODONE BITARTRATE AND ACETAMINOPHEN 10; 325 MG/1; MG/1
1 TABLET ORAL
Qty: 90 TAB | Refills: 0 | Status: SHIPPED | OUTPATIENT
Start: 2017-02-10 | End: 2017-04-04 | Stop reason: SDUPTHER

## 2017-02-10 RX ORDER — OXYCODONE AND ACETAMINOPHEN 5; 325 MG/1; MG/1
1 TABLET ORAL
Qty: 90 TAB | Refills: 0 | Status: CANCELLED | OUTPATIENT
Start: 2017-02-10

## 2017-02-10 RX ORDER — CITALOPRAM 10 MG/1
10 TABLET ORAL
COMMUNITY
End: 2017-02-28

## 2017-02-10 NOTE — MR AVS SNAPSHOT
Visit Information Date & Time Provider Department Dept. Phone Encounter #  
 2/10/2017  3:00 PM Rekha Rojas MD [facility] [phone] [id number] Follow-up Instructions Follow-up and Disposition History Your Appointments 2/22/2017 10:30 AM  
Follow Up with Tee Blevins NP [facility] ([facility]) Appt Note: HCV  
 [address] [facility] [address]  
[phone]  
  
   
 [address] [facility] [address] [place] [address]  
  
    
 2/28/2017  8:15 AM  
Follow Up with Makenna Davidson MD  
[facility]) Appt Note: 6 mo  
 [address], [address] [place] [address]  
[phone]  
  
   
 [address], [address] [place] [address]  
  
    
  
 6/9/2017 10:45 AM  
Any with Carlos Johnson MD  
[facility] ([facility]) Appt Note: Return in about 6 months (around 6/14/2017). [address] [place] [address]  
[address] [facility] [address] [facility] [address] [place] [address] Upcoming Health Maintenance Date Due ZOSTER VACCINE AGE 60> 8/18/2011 GLAUCOMA SCREENING Q2Y 8/18/2016 Pneumococcal 65+ Low/Medium Risk (1 of 2 - PCV13) 8/18/2016 MEDICARE YEARLY EXAM 8/18/2016 FOBT Q 1 YEAR AGE 50-75 12/8/2016 DTaP/Tdap/Td series (2 - Td) 12/9/2025 Allergies as of 2/10/2017  Review Complete On: 2/10/2017 By: Chucho Pimetnel Severity Noted Reaction Type Reactions Nicotine Medium 06/11/2015    Contact Dermatitis Nicotine Patch reaction - Contact dermatitis with numbness and tingling also occuring in the left arm and denuding of the skin. Thimerosal  07/21/2015    Other (comments) Contact lens solution, made eyes red and irrated Current Immunizations  Reviewed on 12/9/2015 Name Date Influenza Vaccine 10/1/2016, 10/29/2015 Influenza Vaccine PF 10/29/2014 Tdap 12/9/2015 Not reviewed this visit You Were Diagnosed With   
  
 Codes Comments Lumbar spinal stenosis     ICD-10-CM: M48.06 
ICD-9-CM: 724.02 Cervical spinal stenosis     ICD-10-CM: M48.02 
ICD-9-CM: 723.0 S/P lumbar fusion     ICD-10-CM: Z98.1 ICD-9-CM: V45.4 Chronic pain syndrome     ICD-10-CM: G89.4 ICD-9-CM: 338. 4 Vitals BP Pulse Temp Resp Height(growth percentile) Weight(growth percentile) 123/80 81 98.4 °F (36.9 °C) (Oral) 18 5' 11\" (1.803 m) 230 lb 9.6 oz (104.6 kg) SpO2 BMI Smoking Status 98% 32.16 kg/m2 Light Tobacco Smoker BMI and BSA Data Body Mass Index Body Surface Area  
 32.16 kg/m 2 2.29 m 2 Preferred Pharmacy Pharmacy Name Phone [address] [place] [address], [address] [place] [address] [phone] Your Updated Medication List  
  
   
This list is accurate as of: 2/10/17 11:59 PM.  Always use your most recent med list. amLODIPine 10 mg tablet Commonly known as:  Elyn Coffer Take 1 Tab by mouth daily. amoxicillin-clavulanate 875-125 mg per tablet Commonly known as:  AUGMENTIN  
1 bid with food for 10 days  
  
 citalopram 10 mg tablet Commonly known as:  Terence Flatter 10 mg. HYDROcodone-acetaminophen  mg tablet Commonly known as:  Birda Colusa Take 1 Tab by mouth every eight (8) hours as needed for Pain. Max Daily Amount: 3 Tabs. hydrOXYzine HCl 25 mg tablet Commonly known as:  ATARAX 25 mg.  
  
 ketoconazole 2 % shampoo Commonly known as:  NIZORAL One application to scalp  
  
 lisinopril 20 mg tablet Commonly known as:  Robertha Roup Take 1 Tab by mouth daily. omeprazole 20 mg capsule Commonly known as:  PRILOSEC Take 20 mg by mouth daily. oxyCODONE-acetaminophen 5-325 mg per tablet Commonly known as:  PERCOCET Take 1 Tab by Mouth Every 4 Hours As Needed. predniSONE 10 mg tablet Commonly known as:  DELTASONE  
60 mg po now and then decrease by 5 mg every day until done  
  
 raNITIdine 150 mg tablet Commonly known as:  ZANTAC  
150 mg.  
  
 terbinafine HCl 250 mg tablet Commonly known as:  LAMISIL  
250 mg.  
  
 triamcinolone acetonide 0.1 % topical cream  
Commonly known as:  KENALOG Use 1 Application to affected area Twice Daily. Prescriptions Printed Refills HYDROcodone-acetaminophen (NORCO)  mg tablet 0 Sig: Take 1 Tab by mouth every eight (8) hours as needed for Pain. Max Daily Amount: 3 Tabs. Class: Print Route: Oral  
  
Introducing [facility] SERVICES! Dear Esau Virk: Thank you for requesting a [employer] account. Our records indicate that you already have an active [employer] account. You can access your account anytime at [email or web address] Did you know that you can access your hospital and ER discharge instructions at any time in [employer]? You can also review all of your test results from your hospital stay or ER visit. Additional Information If you have questions, please visit the Frequently Asked Questions section of the [employer] website at [email or web address] Remember, [employer] is NOT to be used for urgent needs. For medical emergencies, dial 911. Now available from your iPhone and Android! Please provide this summary of care documentation to your next provider. Your primary care clinician is listed as Dilia Bragg. If you have any questions after today's visit, please call [phone].

## 2017-02-10 NOTE — PROGRESS NOTES
[address], [address]  [place]  Phone: [phone]  Fax: [phone]  PROGRESS NOTE  Patient: Tanika Kimball                MRN: [id number]       SSN: [id number]  YOB: 1950        AGE: 73 y.o. SEX: male  Body mass index is 32.16 kg/(m^2). PCP: Estephania Bang MD  02/10/17    Chief Complaint   Patient presents with    Back Pain     EMG follow up       [facility], RADIOGRAPHS, and PLAN:     HISTORY:  Mr. Molly Leal returns today. He is still having severe pain, primarily neuropathic pain in his buttock and legs with occasional sharp back pain. I reviewed his studies. His vascular studies came back normal.  His CT scan demonstrates what appears to be a fusion of his lumbar spine with stable instrumentation, and no specific stenosis. 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 ASSESSMENT/PLAN: At this point, I see nothing I would call surgically amenable issues. His back pain is dramatically improved since his surgery. We resolved his spinal stenotic symptoms. What he has left is significant neuropathy in his lower extremities that has progressed over the past several months. I have no surgical intervention to offer him. I see no evidence of any psychiatric issue or psychological overlay. He has been to a pain seminar where they discussed with him spinal cord stimulation. He inquired about it, and I think he would actually be a reasonable candidate for it. He is desperate to do something. He would like to get back into gainful employment and does not feel he can work or function with his current levels of pain and wishes to avoid medication utilization because of its side effects and because of its lack of effectiveness.   We will be in the process of having him evaluated for a trial stimulator. I believe he is a good candidate. We will see what his insurer requires to allow us to proceed with a trial.     cc: Gage Centeno M.D. Past Medical History   Diagnosis Date    Bilateral hip pain     Chronic pain      back; hx of opiod addiction    Depression     Diabetes (HCC)      borderline, no meds-trying to control with diet    DJD (degenerative joint disease)     GERD (gastroesophageal reflux disease)     Hepatitis C January, 2015    Hypertension     Kidney stones     Lymphadenopathy, generalized 12/05/2015     neg bx    Numbness of foot left     resolved per patient 1/29/16    S/P lumbar fusion 2/9/2016     L4/5 LAMINECTOMY/FUSION/TRANSFORAMINAL LUMBAR INTERBODY FUSION (TLIF) by Dr. Godfrey Peña on 2/8/16     Unspecified sleep apnea      no cpap machine-pt denies       Family History   Problem Relation Age of Onset    Diabetes Sister     SLE Sister     Arthritis-osteo Sister     Heart Disease Sister        Current Outpatient Prescriptions   Medication Sig Dispense Refill    amLODIPine (NORVASC) 10 mg tablet Take 1 Tab by mouth daily. 90 Tab 1    omeprazole (PRILOSEC) 20 mg capsule Take 20 mg by mouth daily. 11    lisinopril (PRINIVIL, ZESTRIL) 20 mg tablet Take 1 Tab by mouth daily. 90 Tab 1    citalopram (CELEXA) 10 mg tablet 10 mg.      hydrOXYzine HCl (ATARAX) 25 mg tablet 25 mg.      oxyCODONE-acetaminophen (PERCOCET) 5-325 mg per tablet Take 1 Tab by Mouth Every 4 Hours As Needed.  raNITIdine (ZANTAC) 150 mg tablet 150 mg.  predniSONE (DELTASONE) 10 mg tablet 60 mg po now and then decrease by 5 mg every day until done 39 Tab 0    amoxicillin-clavulanate (AUGMENTIN) 875-125 mg per tablet 1 bid with food for 10 days 20 Tab 0    terbinafine HCl (LAMISIL) 250 mg tablet 250 mg.      triamcinolone acetonide (KENALOG) 0.1 % topical cream Use 1 Application to affected area Twice Daily.       ketoconazole (NIZORAL) 2 % shampoo One application to scalp 120 mL 3       Allergies   Allergen Reactions    Nicotine Contact Dermatitis     Nicotine Patch reaction - Contact dermatitis with numbness and tingling also occuring in the left arm and denuding of the skin.  Thimerosal Other (comments)     Contact lens solution, made eyes red and irrated       Past Surgical History   Procedure Laterality Date    Hx tonsillectomy  as a child    Hx appendectomy  as a child    Hx orthopaedic       denies    Pr remove groin lymph nodes superf Right 3-10-16     Dr. Jennifer Andrew Hx colonoscopy  2015     negative    Hx lumbar fusion  2/2016    Hx back surgery  07/2015       Past Medical History   Diagnosis Date    Bilateral hip pain     Chronic pain      back; hx of opiod addiction    Depression     Diabetes ([facility] [address]      borderline, no meds-trying to control with diet    DJD (degenerative joint disease)     GERD (gastroesophageal reflux disease)     Hepatitis C January, 2015    Hypertension     Kidney stones     Lymphadenopathy, generalized 12/05/2015     neg bx    Numbness of foot left     resolved per patient 1/29/16    S/P lumbar fusion 2/9/2016     L4/5 LAMINECTOMY/FUSION/TRANSFORAMINAL LUMBAR INTERBODY FUSION (TLIF) by Dr. Arline Aquino on 2/8/16     Unspecified sleep apnea      no cpap machine-pt denies       Social History     Social History    Marital status:      Spouse name: N/A    Number of children: N/A    Years of education: N/A     Occupational History    Not on file. Social History Main Topics    Smoking status: Light Tobacco Smoker     Packs/day: 0.25     Years: 40.00     Types: Cigarettes    Smokeless tobacco: Never Used    Alcohol use No    Drug use: Yes     Special: Marijuana      Comment: 1/27/16    Sexual activity: No     Other Topics Concern    Not on file     Social History Narrative       REVIEW OF SYSTEMS:   CONSTITUTIONAL SYMPTOMS:  Negative. EYES:  Negative. EARS, NOSE, THROAT AND MOUTH:  Negative.    CARDIOVASCULAR: Negative. RESPIRATORY:  Negative. GENITOURINARY: Negative. GASTROINTESTINAL:  Negative. INTEGUMENTARY (SKIN AND/OR BREAST):  Negative. MUSCULOSKELETAL: Per HPI.   ENDOCRINE/RHEUMATOLOGIC:  Negative. NEUROLOGICAL:  Per HPI. HEMATOLOGIC/LYMPHATIC:  Negative. ALLERGIC/IMMUNOLOGIC:  Negative. PSYCHIATRIC:  Negative. PHYSICAL EXAMINATION:   Visit Vitals    /80    Pulse 81    Temp 98.4 °F (36.9 °C) (Oral)    Resp 18    Ht 5' 11\" (1.803 m)    Wt 230 lb 9.6 oz (104.6 kg)    SpO2 98%    BMI 32.16 kg/m2    PAIN SCALE: 5/10    CONSTITUTIONAL: The patient is in no apparent distress and is alert and oriented x 3. HEENT: Normocephalic. Hearing grossly intact. NECK: Supple and symmetric. no tenderness, or masses were felt. RESPIRATORY: No labored breathing. CARDIOVASCULAR: The carotid pulses were normal. Peripheral pulses were 2+. CHEST: Normal AP diameter and normal contour without any kyphoscoliosis. LYMPHATIC: No lymphadenopathy was appreciated in the neck, axillae or groin. SKIN:  Negative for scars, rashes, lesions, or ulcers on the right upper, right lower, left upper, left lower and trunk. NEUROLOGICAL: Alert and oriented x 3. Ambulation without assistive device. FWB. EXTREMITIES: See musculoskeletal:  MUSCULOSKELETAL:  · Head and Neck: Neck pain. Negative for misalignment, asymmetry, crepitation, defects, tenderness masses or effusions. · Left Upper Extremity: Inspection, percussion and palpation preformed. Reyess sign is negative. · Right Upper Extremity: Inspection, percussion and palpation preformed. Reyess sign is negative. · Spine, Ribs and Pelvis: Sharp stabbing pain across back that goes into leg. Inspection, percussion and palpation preformed. Negative for misalignment, asymmetry, crepitation, defects, tenderness masses or effusions. · Right Lower Extremity: Pain. Weakness. Inspection, percussion and palpation preformed.  Negative straight leg raise.  · Left Lower Extremity: Weakness. Inspection, percussion and palpation preformed. Negative straight leg raise. SPINE EXAM:     Cervical spine: Neck is midline. Normal muscle tone. No focal atrophy is noted. Lumbar spine: No rash, ecchymosis, or gross obliquity. No focal atrophy is noted. ASSESSMENT    ICD-10-CM ICD-9-CM    1. Lumbar spinal stenosis M48.06 724.02    2. Cervical spinal stenosis M48.02 723.0    3. S/P lumbar fusion Z98.1 V45.4    4.  Chronic pain syndrome G89.4 338.4        Written by Marie Espinal, as dictated by Shai Kern MD.

## 2017-02-10 NOTE — PROGRESS NOTES
[address] [facility]   Pre-Surgical Worksheet    Patient: Michael Rohades                         MRN: [id number]     Age:  73 y.o.,      Sex: male    YOB: 1950           RODOLFO: February 10, 2017  PCP: Zakiya Walker MD    Allergies   Allergen Reactions    Nicotine Contact Dermatitis     Nicotine Patch reaction - Contact dermatitis with numbness and tingling also occuring in the left arm and denuding of the skin.  Thimerosal Other (comments)     Contact lens solution, made eyes red and irrated         ICD-10-CM ICD-9-CM    1. Lumbar spinal stenosis M48.06 724.02    2. Cervical spinal stenosis M48.02 723.0    3. S/P lumbar fusion Z98.1 V45.4    4. Chronic pain syndrome G89.4 338.4        Surgery: SCS trial    Pain Assessment   Pain Assessment  2/10/2017   Location of Pain Back   Location Modifiers -   Severity of Pain 5   Quality of Pain Dull;Aching   Quality of Pain Comment numbness, tingling   Duration of Pain -   Frequency of Pain Constant   Aggravating Factors Standing   Aggravating Factors Comment sitting too long   Limiting Behavior -   Relieving Factors Nothing   Relieving Factors Comment -   Result of Injury -       Visit Vitals    /80    Pulse 81    Temp 98.4 °F (36.9 °C) (Oral)    Resp 18    Ht 5' 11\" (1.803 m)    Wt 230 lb 9.6 oz (104.6 kg)    SpO2 98%    BMI 32.16 kg/m2       ADL Limits: Bathing and Dressing:  Patient has difficulty with walking, standing, bending. Spine Surgery?: Yes When 2016. Where [place]. Spinal Injections?: No:   When . Where. Physical Therapy?: Yes  When 2016/2017. [facility] [place]. NSAID's?: Yes    Pain Medications?: Yes  Type: Flexeril, tramadol, norco.    In Pain Management: NO, Where:   Pain level at its worse is a 10/10. Current Outpatient Prescriptions   Medication Sig    amLODIPine (NORVASC) 10 mg tablet Take 1 Tab by mouth daily.     omeprazole (PRILOSEC) 20 mg capsule Take 20 mg by mouth daily.    lisinopril (PRINIVIL, ZESTRIL) 20 mg tablet Take 1 Tab by mouth daily.  citalopram (CELEXA) 10 mg tablet 10 mg.    hydrOXYzine HCl (ATARAX) 25 mg tablet 25 mg.    oxyCODONE-acetaminophen (PERCOCET) 5-325 mg per tablet Take 1 Tab by Mouth Every 4 Hours As Needed.  raNITIdine (ZANTAC) 150 mg tablet 150 mg.  predniSONE (DELTASONE) 10 mg tablet 60 mg po now and then decrease by 5 mg every day until done    amoxicillin-clavulanate (AUGMENTIN) 875-125 mg per tablet 1 bid with food for 10 days    terbinafine HCl (LAMISIL) 250 mg tablet 250 mg.    triamcinolone acetonide (KENALOG) 0.1 % topical cream Use 1 Application to affected area Twice Daily.  ketoconazole (NIZORAL) 2 % shampoo One application to scalp     No current facility-administered medications for this visit.         Past Medical History   Diagnosis Date    Bilateral hip pain     Chronic pain      back; hx of opiod addiction    Depression     Diabetes (HCC)      borderline, no meds-trying to control with diet    DJD (degenerative joint disease)     GERD (gastroesophageal reflux disease)     Hepatitis C January, 2015    Hypertension     Kidney stones     Lymphadenopathy, generalized 12/05/2015     neg bx    Numbness of foot left     resolved per patient 1/29/16    S/P lumbar fusion 2/9/2016     L4/5 LAMINECTOMY/FUSION/TRANSFORAMINAL LUMBAR INTERBODY FUSION (TLIF) by Dr. Godfrey Peña on 2/8/16     Unspecified sleep apnea      no cpap machine-pt denies       Past Surgical History   Procedure Laterality Date    Hx tonsillectomy  as a child    Hx appendectomy  as a child    Hx orthopaedic       denies    Pr remove groin lymph nodes superf Right 3-10-16     Dr. Ghanshyam Meyers Hx colonoscopy  2015     negative    Hx lumbar fusion  2/2016    Hx back surgery  07/2015       Social History     Social History    Marital status:      Spouse name: N/A    Number of children: N/A    Years of education: N/A     Social History Main Topics    Smoking status: Light Tobacco Smoker     Packs/day: 0.25     Years: 40.00     Types: Cigarettes    Smokeless tobacco: Never Used    Alcohol use No    Drug use: Yes     Special: Marijuana      Comment: 1/27/16    Sexual activity: No     Other Topics Concern    Not on file     Social History Narrative

## 2017-02-22 ENCOUNTER — HOSPITAL ENCOUNTER (OUTPATIENT)
Dept: LAB | Age: 67
Discharge: HOME | End: 2017-02-22

## 2017-02-22 ENCOUNTER — OFFICE VISIT (OUTPATIENT)
Dept: HEMATOLOGY | Age: 67
End: 2017-02-22

## 2017-02-22 VITALS
HEART RATE: 87 BPM | OXYGEN SATURATION: 96 % | BODY MASS INDEX: 31.36 KG/M2 | DIASTOLIC BLOOD PRESSURE: 76 MMHG | WEIGHT: 224 LBS | SYSTOLIC BLOOD PRESSURE: 140 MMHG | HEIGHT: 71 IN | TEMPERATURE: 98.8 F | RESPIRATION RATE: 18 BRPM

## 2017-02-22 DIAGNOSIS — K74.69 COMPENSATED HCV CIRRHOSIS (HCC): Primary | ICD-10-CM

## 2017-02-22 DIAGNOSIS — B19.20 COMPENSATED HCV CIRRHOSIS (HCC): Primary | ICD-10-CM

## 2017-02-22 PROCEDURE — 99001 SPECIMEN HANDLING PT-LAB: CPT | Performed by: NURSE PRACTITIONER

## 2017-02-22 NOTE — PROGRESS NOTES
[address], MD, FACP, [facility]     April HAILEY Fajardo PA-C Keller Richmond, MD, Alroy Pew, MD Francoise Patter, NP Delphine Felty, NP        [address] [facility], [address] [address]     [phone]     FAX: [address] [facility], [address] [place] [address], [address] [facility] [address]     [phone]     FAX: [phone]         Patient Care Team:  Gretta Monet MD as PCP - General (Internal Medicine)  Tiffanie Asencio MD as Physician (Urology)  Anthony Matson MD (General Surgery)      Problem List  Date Reviewed: 1/16/2017          Codes Class Noted    Cervical spinal stenosis ICD-10-CM: M48.02  ICD-9-CM: 723.0  11/29/2016        Bulge of lumbar disc without myelopathy ICD-10-CM: M51.26  ICD-9-CM: 722.10  11/29/2016        Lumbar facet arthropathy ([facility] [address] ICD-10-CM: M12.88  ICD-9-CM: 721.3  11/15/2016        Chronic pain ICD-10-CM: G89.29  ICD-9-CM: 338.29  11/15/2016        Neuritis ICD-10-CM: M79.2  ICD-9-CM: 729.2  11/15/2016        Kidney stones ICD-10-CM: N20.0  ICD-9-CM: 592.0  5/10/2016        S/P lumbar fusion ICD-10-CM: Z98.1  ICD-9-CM: V45.4  2/8/2016    Overview Signed 2/9/2016  1:55 PM by Deshawn Quiñones     L4/5 LAMINECTOMY/FUSION/TRANSFORAMINAL LUMBAR INTERBODY FUSION (TLIF) by Dr. Phyllis Vogel on 2/8/16.               Onychomycosis of toenail ICD-10-CM: B35.1  ICD-9-CM: 110.1  11/25/2015        GERD (gastroesophageal reflux disease) ICD-10-CM: K21.9  ICD-9-CM: 530.81  5/1/2014        Chronic hepatitis C without hepatic coma (HCC) ICD-10-CM: B18.2  ICD-9-CM: 070.54  4/3/2014        HTN (hypertension) ICD-10-CM: I10  ICD-9-CM: 401.9  4/2/2014        Back pain ICD-10-CM: M54.9  ICD-9-CM: 724.5  4/2/2014        Prediabetes ICD-10-CM: R73.03  ICD-9-CM: 790.29  4/2/2014                Mr. Tanya Dumont returns to the [facility] [place] [facility] [place] regarding chronic HCV and its management. His active problem list, all pertinent past medical history, medications, liver histology, endoscopic studies, radiologic findings and laboratory findings related to the liver disorder were reviewed with him. The patient is a 73 y.o.  male who was noted to have abnormalities in liver chemistries and subsequently tested positive for chronic HCV in 1/2016. Risk factors for acquiring HCV are IV drug use in 1970s. There was no history of acute incteric hepatitis at the time of these risk factors. Ultrasound and CT scan of the liver was performed in 5/2016. The results of the imaging suggested chronic liver disease. Ultrasound with electrography was performed in May 2016 and was consistent with F3 and possibly F4 disease. A liver biopsy has not been performed. The patient was found to have an inguinal lymph node. This was surgically removed. There was no evidence of malignancy. The most recent laboratory studies indicate that the liver transaminases are normal, tests of hepatic synthetic and metabolic function are normal, and the platelet count is normal.      The patient has no symptoms which can be attributed to the liver disorder. He completed 12 weeks of treatment with Constanza Penta with good toleration three months ago. He tolerated treatment well and denies missing any medication. The patient has not experienced problems concentrating, swelling of the abdomen, swelling of the lower extremities, hematemesis, hematochezia. The patient has limitations in functional activities which can be attributed to other medical problems that are not related to the liver disease. ALLERGIES  Allergies   Allergen Reactions    Nicotine Contact Dermatitis     Nicotine Patch reaction - Contact dermatitis with numbness and tingling also occuring in the left arm and denuding of the skin.     Thimerosal Other (comments)     Contact lens solution, made eyes red and irrated       MEDICATIONS  Current Outpatient Prescriptions   Medication Sig    oxyCODONE-acetaminophen (PERCOCET) 5-325 mg per tablet Take 1 Tab by Mouth Every 4 Hours As Needed.  HYDROcodone-acetaminophen (NORCO)  mg tablet Take 1 Tab by mouth every eight (8) hours as needed for Pain. Max Daily Amount: 3 Tabs.  amLODIPine (NORVASC) 10 mg tablet Take 1 Tab by mouth daily.  omeprazole (PRILOSEC) 20 mg capsule Take 20 mg by mouth daily.  ketoconazole (NIZORAL) 2 % shampoo One application to scalp    lisinopril (PRINIVIL, ZESTRIL) 20 mg tablet Take 1 Tab by mouth daily.  citalopram (CELEXA) 10 mg tablet 10 mg.    hydrOXYzine HCl (ATARAX) 25 mg tablet 25 mg.    raNITIdine (ZANTAC) 150 mg tablet 150 mg.  predniSONE (DELTASONE) 10 mg tablet 60 mg po now and then decrease by 5 mg every day until done    amoxicillin-clavulanate (AUGMENTIN) 875-125 mg per tablet 1 bid with food for 10 days    terbinafine HCl (LAMISIL) 250 mg tablet 250 mg.    triamcinolone acetonide (KENALOG) 0.1 % topical cream Use 1 Application to affected area Twice Daily. No current facility-administered medications for this visit. SYSTEM REVIEW NOT RELATED TO LIVER DISEASE OR REVIEWED ABOVE:  Constitution systems: Negative for fever, chills, weight gain, weight loss. Eyes: Negative for visual changes. ENT: Negative for sore throat, painful swallowing. Respiratory: Negative for cough, hemoptysis, SOB. Cardiology: Negative for chest pain, palpitations. GI:  Negative for constipation or diarrhea. : Negative for urinary frequency, dysuria, hematuria, nocturia. Skin: Negative for rash. Hematology: Negative for easy bruising, blood clots. Musculo-skelatal: Chronic back pain. Neurologic: Negative for headaches, dizziness, vertigo, memory problems not related to HE. Psychology: Negative for anxiety, depression.      FAMILY HISTORY:  The father  of MVA. The mother  of unknown cause in her [de-identified]. There is no family history of liver disease. SOCIAL HISTORY:  The patient is . The patient has no children. The patient stopped using tobacco products in 2016. The patient has never consumed significant amounts of alcohol. The patient used to work as a . The patient has not worked since . PHYSICAL EXAMINATION:  Visit Vitals    /76 (BP 1 Location: Left arm, BP Patient Position: Sitting)    Pulse 87    Temp 98.8 °F (37.1 °C) (Tympanic)    Resp 18    Ht 5' 11\" (1.803 m)    Wt 224 lb (101.6 kg)    SpO2 96%    BMI 31.24 kg/m2     General: No acute distress. Eyes: Sclera anicteric. ENT: No oral lesions. Thyroid normal.  Nodes: No adenopathy. Skin: No spider angiomata. No jaundice. No palmar erythema. Respiratory: Lungs clear to auscultation. Cardiovascular: Regular heart rate. No murmurs. No JVD. Abdomen: Soft non-tender. Liver size normal to percussion/palpation. Spleen not palpable. No obvious ascites. Extremities: No edema. No muscle wasting. No gross arthritic changes. Neurologic: Alert and oriented. Cranial nerves grossly intact. No asterixis.     LABORATORY STUDIES:    [facility] Madeline [address] 2016 10/27/2016   WBC 3.4 - 10.8 x10E3/uL 7.4    ANC 1.4 - 7.0 x10E3/uL 6.1    HGB 12.6 - 17.7 g/dL 15.8     - 379 x10E3/uL 145 (L)    INR 0.8 - 1.2     AST 0 - 40 IU/L 16    ALT 0 - 44 IU/L 16    Alk Phos 39 - 117 IU/L 68    Bili, Total 0.0 - 1.2 mg/dL 0.4    Bili, Direct 0.00 - 0.40 mg/dL 0.12    Albumin 3.6 - 4.8 g/dL 4.4    BUN 8 - 27 mg/dL 22    Creat 0.76 - 1.27 mg/dL 0.97    Creat (iSTAT) 0.6 - 1.3 MG/DL  1.1   Na 136 - 144 mmol/L 136    K 3.5 - 5.2 mmol/L 4.4    Cl 97 - 106 mmol/L 97    CO2 18 - 29 mmol/L 23    Glucose 65 - 99 mg/dL 130 (H)      [facility] Latest Ref Rng & Units 2016   WBC 3.4 - 10.8 x10E3/uL 7.6 6.7   ANC 1.4 - 7.0 x10E3/uL 3.6 4.0   HGB 12.6 - 17.7 g/dL 16.1 15.6    - 379 x10E3/uL 166 132 (L)   INR 0.8 - 1.2 1.0 1.0   AST 0 - 40 IU/L 21 62 (H)   ALT 0 - 44 IU/L 17 88 (H)   Alk Phos 39 - 117 IU/L 71 84   Bili, Total 0.0 - 1.2 mg/dL 0.5 0.5   Bili, Direct 0.00 - 0.40 mg/dL 0.15 0.20   Albumin 3.6 - 4.8 g/dL 4.2 3.9   BUN 8 - 27 mg/dL 17 18   Creat 0.76 - 1.27 mg/dL 1.08 0.92   Creat (iSTAT) 0.6 - 1.3 MG/DL     Na 136 - 144 mmol/L 138 136   K 3.5 - 5.2 mmol/L 4.4 4.6   Cl 97 - 106 mmol/L 99 100   CO2 18 - 29 mmol/L 23 19   Glucose 65 - 99 mg/dL 97 131 (H)       SEROLOGIES:  1/2016. Anti-HCV positive, EMILIA negative  Serologies Latest Ref Rng 5/10/2016   Hep A Ab, Total Negative Negative   Hep B Surface Ag Negative Negative   Hep B Core Ab, Total Negative Negative   Hep B Surface AB QL  Non Reactive   Hep C Ab 0.0 - 0.9 s/co ratio    Hep C Genotype  1a   HCV RT-PCR, Quant  [id number]   Ferritin 30 - 400 ng/mL 201   Iron % Saturation 15 - 55 % 28       LIVER HISTOLOGY:  Not available or performed    ENDOSCOPIC PROCEDURES:  Not available or performed    RADIOLOGY:  10/2015. Ultrasound of liver. Echogenic consistent with chronic liver disease. No liver mass lesions. No dilated bile ducts. No ascites. 12/2015. CT scan abdomen without IV contrast.  Normal appearing liver. No liver mass lesions. Normal spleen. No ascites. 5/2016: Ultrasound of the liver. Liver is borderline in size, largest transverse dimension of the right  hepatic lobe measuring 18.3 cm.  Diffusely heterogeneous echotexture noted.  No focal mass.  Normal direction of blood flow in the portal vein.  Portal vein measures 1.2 cm.  11/2016:  Ultrasound of liver. Echogenic consistent with chronic liver disease. No liver mass lesions. No dilated bile ducts. No ascites. OTHER TESTING:  Not available or performed    ASSESSMENT AND PLAN:    1. Chronic HCV, genotype 1 A.   May have advanced liver disease or cirrhosis based on recent imaging and lab. 2.  HCV treatment. He completed 12 weeks of treatment with Nancie Unger with good toleration three months ago. CBC, BMP, hepatic panel and HCV RNA ordered today. 3. The patient was directed to continue all current medications at the current dosages. There are no contraindications for the patient to take any medications that are necessary for treatment of other medical issues. He may now take Lamisil. 4. The patient was counseled regarding alcohol consumption. 5. Vaccination for viral hepatitis A and B is recommended since the patient has no serologic evidence of previous exposure or vaccination with immunity. 6. [address] screening has recently been performed and does not suggest [address] The next liver imaging study will be performed in 5/2017. AFP and repeat abdominal ultrasound ordered today. 7. Thrombocytopenia is probably secondary to cirrhosis from chronic HCV. No treatment necessary. Platelet count is adequate for patient to be treated for HCV. 8. All of the above issues were discussed with the patient. All questions were answered. The patient expressed a clear understanding of the above. [address] [facility] 87 in 6 months.      [address],   [facility] [place] [facility], [address]  [address]  [phone]

## 2017-02-22 NOTE — MR AVS SNAPSHOT
Visit Information Date & Time Provider Department Dept. Phone Encounter #  
 2/22/2017 10:30 AM Radha Field NP [facility] [place] [facility] [id number] Follow-up Instructions Return in about 6 months (around 8/22/2017). Follow-up and Disposition History Your Appointments 2/28/2017  8:15 AM  
Follow Up with Hari Valdez MD  
[address] [place] [address] [address]) Appt Note: 6 mo  
 [address], [address] [facility] [address]  
[phone]  
  
   
 [address], [address] [facility] [address]  
  
    
  
 6/9/2017 10:45 AM  
Any with Andrey Rodriguez MD  
[facility] ([address]) Appt Note: Return in about 6 months (around 6/14/2017). [address] [place] [id number]  
[address] [facility] [address] [facility] [address] Upcoming Health Maintenance Date Due ZOSTER VACCINE AGE 60> 8/18/2011 GLAUCOMA SCREENING Q2Y 8/18/2016 Pneumococcal 65+ Low/Medium Risk (1 of 2 - PCV13) 8/18/2016 MEDICARE YEARLY EXAM 8/18/2016 FOBT Q 1 YEAR AGE 50-75 12/8/2016 DTaP/Tdap/Td series (2 - Td) 12/9/2025 Allergies as of 2/22/2017  Review Complete On: 2/22/2017 By: Charleen Vaughn NP Severity Noted Reaction Type Reactions Nicotine Medium 06/11/2015    Contact Dermatitis Nicotine Patch reaction - Contact dermatitis with numbness and tingling also occuring in the left arm and denuding of the skin. Thimerosal  07/21/2015    Other (comments) Contact lens solution, made eyes red and irrated Current Immunizations  Reviewed on 12/9/2015 Name Date Influenza Vaccine 10/1/2016, 10/29/2015 Influenza Vaccine PF 10/29/2014 Tdap 12/9/2015 Not reviewed this visit You Were Diagnosed With   
  
 Codes Comments Compensated HCV cirrhosis ([facility] [address]    -  Primary ICD-10-CM: B19.20, F12.11 ICD-9-CM: 070.54, 571.5 Vitals BP  
  
  
  
  
  
 140/76 (BP 1 Location: Left arm, BP Patient Position: Sitting) Vitals History BMI and BSA Data Body Mass Index Body Surface Area  
 31.24 kg/m 2 2.26 m 2 Preferred Pharmacy Pharmacy Name Phone [address] [place], [facility] [phone] Your Updated Medication List  
  
   
This list is accurate as of: 2/22/17 10:42 AM.  Always use your most recent med list. amLODIPine 10 mg tablet Commonly known as:  Dav Presser Take 1 Tab by mouth daily. amoxicillin-clavulanate 875-125 mg per tablet Commonly known as:  AUGMENTIN  
1 bid with food for 10 days  
  
 citalopram 10 mg tablet Commonly known as:  eNeta Everette 10 mg. HYDROcodone-acetaminophen  mg tablet Commonly known as:  Lynnetta Rudd Take 1 Tab by mouth every eight (8) hours as needed for Pain. Max Daily Amount: 3 Tabs. hydrOXYzine HCl 25 mg tablet Commonly known as:  ATARAX 25 mg.  
  
 ketoconazole 2 % shampoo Commonly known as:  NIZORAL One application to scalp  
  
 lisinopril 20 mg tablet Commonly known as:  Jacksultana Pares Take 1 Tab by mouth daily. omeprazole 20 mg capsule Commonly known as:  PRILOSEC Take 20 mg by mouth daily. oxyCODONE-acetaminophen 5-325 mg per tablet Commonly known as:  PERCOCET Take 1 Tab by Mouth Every 4 Hours As Needed. predniSONE 10 mg tablet Commonly known as:  DELTASONE  
60 mg po now and then decrease by 5 mg every day until done  
  
 raNITIdine 150 mg tablet Commonly known as:  ZANTAC  
150 mg.  
  
 terbinafine HCl 250 mg tablet Commonly known as:  LAMISIL  
250 mg.  
  
 triamcinolone acetonide 0.1 % topical cream  
Commonly known as:  KENALOG Use 1 Application to affected area Twice Daily. Follow-up Instructions Return in about 6 months (around 8/22/2017). To-Do List   
 02/22/2017 Lab:  CBC WITH AUTOMATED DIFF   
  
 02/22/2017 Lab:  HCV RNA BY AMIRA QL,RFLX TO QT   
  
 02/22/2017 Lab:  HEPATIC FUNCTION PANEL   
  
 02/22/2017 Lab:  METABOLIC PANEL, COMPREHENSIVE   
  
 02/22/2017 Lab:  PROTHROMBIN TIME + INR   
  
 02/22/2017 Imaging:  [facility] SERVICES! Dear Sabrina Serrano: Thank you for requesting a [employer] account. Our records indicate that you already have an active [employer] account. You can access your account anytime at [email or web address] Did you know that you can access your hospital and ER discharge instructions at any time in [employer]? You can also review all of your test results from your hospital stay or ER visit. Additional Information If you have questions, please visit the Frequently Asked Questions section of the [employer] website at [email or web address] Remember, [employer] is NOT to be used for urgent needs. For medical emergencies, dial 911. Now available from your iPhone and Android! Please provide this summary of care documentation to your next provider. Your primary care clinician is listed as Richard Khan. If you have any questions after today's visit, please call [phone].

## 2017-02-24 LAB
ALBUMIN SERPL-MCNC: 4.8 G/DL (ref 3.6–4.8)
ALBUMIN/GLOB SERPL: 1.9 {RATIO} (ref 1.1–2.5)
ALP SERPL-CCNC: 85 IU/L (ref 39–117)
ALT SERPL-CCNC: 10 IU/L (ref 0–44)
AST SERPL-CCNC: 19 IU/L (ref 0–40)
BASOPHILS # BLD AUTO: 0 X10E3/UL (ref 0–0.2)
BASOPHILS NFR BLD AUTO: 1 %
BILIRUB DIRECT SERPL-MCNC: 0.12 MG/DL (ref 0–0.4)
BILIRUB SERPL-MCNC: 0.6 MG/DL (ref 0–1.2)
BUN SERPL-MCNC: 12 MG/DL (ref 8–27)
BUN/CREAT SERPL: 12 (ref 10–22)
CALCIUM SERPL-MCNC: 9.3 MG/DL (ref 8.6–10.2)
CHLORIDE SERPL-SCNC: 96 MMOL/L (ref 96–106)
CO2 SERPL-SCNC: 19 MMOL/L (ref 18–29)
CREAT SERPL-MCNC: 1.03 MG/DL (ref 0.76–1.27)
EOSINOPHIL # BLD AUTO: 0.1 X10E3/UL (ref 0–0.4)
EOSINOPHIL NFR BLD AUTO: 1 %
ERYTHROCYTE [DISTWIDTH] IN BLOOD BY AUTOMATED COUNT: 13.9 % (ref 12.3–15.4)
GLOBULIN SER CALC-MCNC: 2.5 G/DL (ref 1.5–4.5)
GLUCOSE SERPL-MCNC: 111 MG/DL (ref 65–99)
HCT VFR BLD AUTO: 48.5 % (ref 37.5–51)
HCV RNA SERPL QL NAA+PROBE: NEGATIVE
HGB BLD-MCNC: 17 G/DL (ref 12.6–17.7)
IMM GRANULOCYTES # BLD: 0 X10E3/UL (ref 0–0.1)
IMM GRANULOCYTES NFR BLD: 0 %
INR PPP: 1 (ref 0.8–1.2)
LYMPHOCYTES # BLD AUTO: 2.1 X10E3/UL (ref 0.7–3.1)
LYMPHOCYTES NFR BLD AUTO: 32 %
MCH RBC QN AUTO: 32.7 PG (ref 26.6–33)
MCHC RBC AUTO-ENTMCNC: 35.1 G/DL (ref 31.5–35.7)
MCV RBC AUTO: 93 FL (ref 79–97)
MONOCYTES # BLD AUTO: 0.9 X10E3/UL (ref 0.1–0.9)
MONOCYTES NFR BLD AUTO: 14 %
NEUTROPHILS # BLD AUTO: 3.3 X10E3/UL (ref 1.4–7)
NEUTROPHILS NFR BLD AUTO: 52 %
PLATELET # BLD AUTO: 139 X10E3/UL (ref 150–379)
POTASSIUM SERPL-SCNC: 4.1 MMOL/L (ref 3.5–5.2)
PROT SERPL-MCNC: 7.3 G/DL (ref 6–8.5)
PROTHROMBIN TIME: 10.7 SEC (ref 9.1–12)
RBC # BLD AUTO: 5.2 X10E6/UL (ref 4.14–5.8)
SODIUM SERPL-SCNC: 137 MMOL/L (ref 134–144)
WBC # BLD AUTO: 6.4 X10E3/UL (ref 3.4–10.8)

## 2017-02-28 ENCOUNTER — OFFICE VISIT (OUTPATIENT)
Dept: INTERNAL MEDICINE CLINIC | Age: 67
End: 2017-02-28

## 2017-02-28 VITALS
DIASTOLIC BLOOD PRESSURE: 80 MMHG | SYSTOLIC BLOOD PRESSURE: 138 MMHG | BODY MASS INDEX: 33.18 KG/M2 | RESPIRATION RATE: 16 BRPM | TEMPERATURE: 97.7 F | OXYGEN SATURATION: 97 % | WEIGHT: 237 LBS | HEART RATE: 83 BPM | HEIGHT: 71 IN

## 2017-02-28 DIAGNOSIS — I10 ESSENTIAL HYPERTENSION: Primary | ICD-10-CM

## 2017-02-28 DIAGNOSIS — R73.03 PREDIABETES: ICD-10-CM

## 2017-02-28 NOTE — PATIENT INSTRUCTIONS

## 2017-02-28 NOTE — MR AVS SNAPSHOT
Visit Information Date & Time Provider Department Dept. Phone Encounter #  
 2/28/2017  8:15 AM Jn Mosquera MD [facility] [place] [phone] [id number] Follow-up Instructions Return if symptoms worsen or fail to improve. Your Appointments 8/22/2017 10:30 AM  
Follow Up with [address],  [place] [facility] ([address]) Appt Note: HCV  
 [id number] [place] [address] [place] [address]  
  
   
 [address] [address]  
  
    
 8/28/2017  8:15 AM  
Follow Up with Jn Mosquera MD  
[facility] OF [place] [address] [facility]) Appt Note: 6 mos [address] [facility] [facility] [place] [address]  
[phone]  
  
   
 [address] [facility], [address] [place] [address]  
  
    
  
 6/9/2017 10:45 AM  
Any with Radha Martin MD  
[facility] ([address]) Appt Note: Return in about 6 months (around 6/14/2017). [address] [place] [address]  
[address] [facility] [address] [facility] [address] [place] [address] Upcoming Health Maintenance Date Due ZOSTER VACCINE AGE 60> 8/18/2011 GLAUCOMA SCREENING Q2Y 8/18/2016 Pneumococcal 65+ Low/Medium Risk (1 of 2 - PCV13) 8/18/2016 MEDICARE YEARLY EXAM 8/18/2016 FOBT Q 1 YEAR AGE 50-75 12/8/2016 DTaP/Tdap/Td series (2 - Td) 12/9/2025 Allergies as of 2/28/2017  Review Complete On: 2/28/2017 By: Jn Mosquera MD  
  
 Severity Noted Reaction Type Reactions Nicotine Medium 06/11/2015    Contact Dermatitis Nicotine Patch reaction - Contact dermatitis with numbness and tingling also occuring in the left arm and denuding of the skin. Thimerosal  07/21/2015    Other (comments) Contact lens solution, made eyes red and irrated Current Immunizations  Reviewed on 12/9/2015 Name Date Influenza Vaccine 10/1/2016, 10/29/2015 Influenza Vaccine PF 10/29/2014 Tdap 12/9/2015 Not reviewed this visit You Were Diagnosed With   
  
 Codes Comments Essential hypertension    -  Primary ICD-10-CM: I10 
ICD-9-CM: 401.9 Prediabetes     ICD-10-CM: R73.03 
ICD-9-CM: 790.29 Vitals BP  
  
  
  
  
  
 138/80 (BP 1 Location: Left arm, BP Patient Position: Sitting) BMI and BSA Data Body Mass Index Body Surface Area 33.05 kg/m 2 2.32 m 2 Preferred Pharmacy Pharmacy Name Phone [address] [place] [address], [address] [place] [address] [phone] Your Updated Medication List  
  
   
This list is accurate as of: 2/28/17  8:20 AM.  Always use your most recent med list. amLODIPine 10 mg tablet Commonly known as:  Aldrich Del Take 1 Tab by mouth daily. HYDROcodone-acetaminophen  mg tablet Commonly known as:  Juanetta Rasp Take 1 Tab by mouth every eight (8) hours as needed for Pain. Max Daily Amount: 3 Tabs.  
  
 ketoconazole 2 % shampoo Commonly known as:  NIZORAL One application to scalp  
  
 lisinopril 20 mg tablet Commonly known as:  Rosa Kaylyn Take 1 Tab by mouth daily. omeprazole 20 mg capsule Commonly known as:  PRILOSEC Take 20 mg by mouth daily. terbinafine HCl 250 mg tablet Commonly known as:  LAMISIL  
250 mg.  
  
 triamcinolone acetonide 0.1 % topical cream  
Commonly known as:  KENALOG Use 1 Application to affected area Twice Daily. Follow-up Instructions Return if symptoms worsen or fail to improve. To-Do List   
 02/28/2017 Lab:  LIPID PANEL   
  
 02/28/2017 Lab:  METABOLIC PANEL, BASIC   
  
 04/25/2017 7:30 AM  
  Appointment with [address] [place] [address] 1 at [address] [place] [address] ([phone]) OUTSIDE FILMS  - Any outside films related to the study being scheduled should be brought with you on the day of the exam.  If this cannot be done there may be a delay in the reading of the study. NPO -Patient must be NPO (no food or drink) 8 hours prior to the exam.  MEDICATIONS  - Patient must bring a complete list of all medications currently taking to include prescriptions, over-the-counter meds, herbals, vitamins & any dietary supplements Patient Instructions DASH Diet: Care Instructions Your Care Instructions The DASH diet is an eating plan that can help lower your blood pressure. DASH stands for Dietary Approaches to Stop Hypertension. Hypertension is high blood pressure. The DASH diet focuses on eating foods that are high in calcium, potassium, and magnesium. These nutrients can lower blood pressure. The foods that are highest in these nutrients are fruits, vegetables, low-fat dairy products, nuts, seeds, and legumes. But taking calcium, potassium, and magnesium supplements instead of eating foods that are high in those nutrients does not have the same effect. The DASH diet also includes whole grains, fish, and poultry. The DASH diet is one of several lifestyle changes your doctor may recommend to lower your high blood pressure. Your doctor may also want you to decrease the amount of sodium in your diet. Lowering sodium while following the DASH diet can lower blood pressure even further than just the DASH diet alone. Follow-up care is a key part of your treatment and safety. Be sure to make and go to all appointments, and call your doctor if you are having problems. It's also a good idea to know your test results and keep a list of the medicines you take. How can you care for yourself at home? Following the DASH diet · Eat 4 to 5 servings of fruit each day. A serving is 1 medium-sized piece of fruit, ½ cup chopped or canned fruit, 1/4 cup dried fruit, or 4 ounces (½ cup) of fruit juice. Choose fruit more often than fruit juice. · Eat 4 to 5 servings of vegetables each day.  A serving is 1 cup of lettuce or raw leafy vegetables, ½ cup of chopped or cooked vegetables, or 4 ounces (½ cup) of vegetable juice. Choose vegetables more often than vegetable juice. · Get 2 to 3 servings of low-fat and fat-free dairy each day. A serving is 8 ounces of milk, 1 cup of yogurt, or 1 ½ ounces of cheese. · Eat 6 to 8 servings of grains each day. A serving is 1 slice of bread, 1 ounce of dry cereal, or ½ cup of cooked rice, pasta, or cooked cereal. Try to choose whole-grain products as much as possible. · Limit lean meat, poultry, and fish to 2 servings each day. A serving is 3 ounces, about the size of a deck of cards. · Eat 4 to 5 servings of nuts, seeds, and legumes (cooked dried beans, lentils, and split peas) each week. A serving is 1/3 cup of nuts, 2 tablespoons of seeds, or ½ cup of cooked beans or peas. · Limit fats and oils to 2 to 3 servings each day. A serving is 1 teaspoon of vegetable oil or 2 tablespoons of salad dressing. · Limit sweets and added sugars to 5 servings or less a week. A serving is 1 tablespoon jelly or jam, ½ cup sorbet, or 1 cup of lemonade. · Eat less than 2,300 milligrams (mg) of sodium a day. If you limit your sodium to 1,500 mg a day, you can lower your blood pressure even more. Tips for success · Start small. Do not try to make dramatic changes to your diet all at once. You might feel that you are missing out on your favorite foods and then be more likely to not follow the plan. Make small changes, and stick with them. Once those changes become habit, add a few more changes. · Try some of the following: ¨ Make it a goal to eat a fruit or vegetable at every meal and at snacks. This will make it easy to get the recommended amount of fruits and vegetables each day. ¨ Try yogurt topped with fruit and nuts for a snack or healthy dessert. ¨ Add lettuce, tomato, cucumber, and onion to sandwiches.  
¨ Combine a ready-made pizza crust with low-fat mozzarella cheese and lots of vegetable toppings. Try using tomatoes, squash, spinach, broccoli, carrots, cauliflower, and onions. ¨ Have a variety of cut-up vegetables with a low-fat dip as an appetizer instead of chips and dip. ¨ Sprinkle sunflower seeds or chopped almonds over salads. Or try adding chopped walnuts or almonds to cooked vegetables. ¨ Try some vegetarian meals using beans and peas. Add garbanzo or kidney beans to salads. Make burritos and tacos with mashed le beans or black beans. Where can you learn more? Go to http://gomez-jarvis.info/. Enter F912 in the search box to learn more about \"DASH Diet: Care Instructions. \" Current as of: March 23, 2016 Content Version: 11.1 © 3527-1071 [employer]. Care instructions adapted under license by [employer] (which disclaims liability or warranty for this information). If you have questions about a medical condition or this instruction, always ask your healthcare professional. [address] any warranty or liability for your use of this information. Introducing [facility]! Dear Juan Bradley: Thank you for requesting a Trapster account. Our records indicate that you already have an active Trapster account. You can access your account anytime at [email or web address] Did you know that you can access your hospital and ER discharge instructions at any time in Trapster? You can also review all of your test results from your hospital stay or ER visit. Additional Information If you have questions, please visit the Frequently Asked Questions section of the Trapster website at [email or web address] Remember, Trapster is NOT to be used for urgent needs. For medical emergencies, dial 911. Now available from your iPhone and Android! Please provide this summary of care documentation to your next provider. Your primary care clinician is listed as Richy Arthur. If you have any questions after today's visit, please call [phone].

## 2017-02-28 NOTE — PROGRESS NOTES
1. Have you been to the ER, urgent care clinic since your last visit? Hospitalized since your last visit? No    2. Have you seen or consulted any other health care providers outside of the [address] [place] [address] since your last visit? Include any pap smears or colon screening.  No

## 2017-02-28 NOTE — PROGRESS NOTES
HPI:   Routine f/u of HTN, IGT  w/o chest pain/abd. discomfort; no dyspnea, cough or pedal edema; denies constitutional complaints of fever, night sweats or wt loss; no evidence of GI/ hemorrhage; no polyuria/polydipsia. Activity is sedentary d/t chronic back pain    ROS is otherwise negative. Past Medical History:   Diagnosis Date    Bilateral hip pain     Chronic pain     back; hx of opiod addiction    Depression     Diabetes (HCC)     borderline, no meds-trying to control with diet    DJD (degenerative joint disease)     GERD (gastroesophageal reflux disease)     Hepatitis C January, 2015    Hypertension     Kidney stones     Lymphadenopathy, generalized 12/05/2015    neg bx    Numbness of foot left    resolved per patient 1/29/16    S/P lumbar fusion 2/9/2016    L4/5 LAMINECTOMY/FUSION/TRANSFORAMINAL LUMBAR INTERBODY FUSION (TLIF) by Dr. Toño Rosales on 2/8/16     Unspecified sleep apnea     no cpap machine-pt denies       Past Surgical History:   Procedure Laterality Date    HX APPENDECTOMY  as a child    HX BACK SURGERY  07/2015    HX COLONOSCOPY  2015    negative    HX LUMBAR FUSION  2/2016    HX ORTHOPAEDIC      denies    HX TONSILLECTOMY  as a child    [facility]    Dr. Santos Hazard History     Social History    Marital status:      Spouse name: N/A    Number of children: N/A    Years of education: N/A     Occupational History    Not on file.      Social History Main Topics    Smoking status: Light Tobacco Smoker     Packs/day: 0.25     Years: 40.00     Types: Cigarettes    Smokeless tobacco: Never Used    Alcohol use No    Drug use: Yes     Special: Marijuana      Comment: 1/27/16    Sexual activity: No     Other Topics Concern    Not on file     Social History Narrative       Allergies   Allergen Reactions    Nicotine Contact Dermatitis     Nicotine Patch reaction - Contact dermatitis with numbness and tingling also occuring in the left arm and denuding of the skin.  Thimerosal Other (comments)     Contact lens solution, made eyes red and irrated       Family History   Problem Relation Age of Onset    Diabetes Sister     SLE Sister     Arthritis-osteo Sister     Heart Disease Sister        Current Outpatient Prescriptions   Medication Sig Dispense Refill    HYDROcodone-acetaminophen (NORCO)  mg tablet Take 1 Tab by mouth every eight (8) hours as needed for Pain. Max Daily Amount: 3 Tabs. 90 Tab 0    amLODIPine (NORVASC) 10 mg tablet Take 1 Tab by mouth daily. 90 Tab 1    omeprazole (PRILOSEC) 20 mg capsule Take 20 mg by mouth daily. 11    terbinafine HCl (LAMISIL) 250 mg tablet 250 mg.      triamcinolone acetonide (KENALOG) 0.1 % topical cream Use 1 Application to affected area Twice Daily.  ketoconazole (NIZORAL) 2 % shampoo One application to scalp 788 mL 3    lisinopril (PRINIVIL, ZESTRIL) 20 mg tablet Take 1 Tab by mouth daily. 90 Tab 1           Visit Vitals    /80 (BP 1 Location: Left arm, BP Patient Position: Sitting)    Pulse 83    Temp 97.7 °F (36.5 °C) (Tympanic)    Resp 16    Ht 5' 11\" (1.803 m)    Wt 237 lb (107.5 kg)    SpO2 97%    BMI 33.05 kg/m2       PE  Well nourished in NAD  HEENT:  OP: clear. Neck: supple w/o mass or bruits. Chest: clear. CV: RRR w/o m,r,g; pulses intact. Abd: soft, NT, w/o HSM or mass. Ext: w/o edema. Neuro: NF. Assessment and Plan    Encounter Diagnoses   Name Primary?  Essential hypertension Yes    Prediabetes    HTN - controlled  IGT: recent   prevnar advised  I have reviewed/discussed the above normal BMI with the patient. I have recommended the following interventions: dietary management education, guidance, and counseling . The plan is as follows: I have counseled this patient on diet and exercise regimens. .   The patient was counseled on the dangers of tobacco use, and was advised to quit.   Reviewed strategies to maximize success, including removing cigarettes and smoking materials from environment.   No change in rx  OV 6 mos or prn  I have explained plan to patient and the patient verbalizes understanding

## 2017-03-24 ENCOUNTER — HOSPITAL ENCOUNTER (OUTPATIENT)
Dept: MRI IMAGING | Age: 67
Discharge: HOME | End: 2017-03-24
Attending: ORTHOPAEDIC SURGERY
Payer: MEDICARE

## 2017-03-24 DIAGNOSIS — M48.02 CERVICAL SPINAL STENOSIS: ICD-10-CM

## 2017-03-24 PROCEDURE — 72141 MRI NECK SPINE W/O DYE: CPT

## 2017-04-04 ENCOUNTER — OFFICE VISIT (OUTPATIENT)
Dept: ORTHOPEDIC SURGERY | Age: 67
End: 2017-04-04

## 2017-04-04 DIAGNOSIS — M48.02 CERVICAL SPINAL STENOSIS: Primary | ICD-10-CM

## 2017-04-04 DIAGNOSIS — G89.4 CHRONIC PAIN SYNDROME: ICD-10-CM

## 2017-04-04 DIAGNOSIS — M25.511 ACUTE PAIN OF RIGHT SHOULDER: ICD-10-CM

## 2017-04-04 RX ORDER — BETAMETHASONE SODIUM PHOSPHATE AND BETAMETHASONE ACETATE 3; 3 MG/ML; MG/ML
12 INJECTION, SUSPENSION INTRA-ARTICULAR; INTRALESIONAL; INTRAMUSCULAR; SOFT TISSUE ONCE
Qty: 2 ML | Refills: 0
Start: 2017-04-04 | End: 2017-04-04

## 2017-04-04 RX ORDER — BUPIVACAINE HYDROCHLORIDE 2.5 MG/ML
25 INJECTION, SOLUTION INFILTRATION; PERINEURAL ONCE
Qty: 10 ML | Refills: 0
Start: 2017-04-04 | End: 2017-04-04

## 2017-04-04 RX ORDER — HYDROCODONE BITARTRATE AND ACETAMINOPHEN 10; 325 MG/1; MG/1
1 TABLET ORAL
Qty: 90 TAB | Refills: 0 | Status: SHIPPED | OUTPATIENT
Start: 2017-04-04 | End: 2017-05-16

## 2017-04-04 RX ORDER — TRAMADOL HYDROCHLORIDE 50 MG/1
TABLET ORAL
COMMUNITY
Start: 2016-12-29 | End: 2017-05-16

## 2017-04-04 RX ORDER — LIDOCAINE HYDROCHLORIDE 20 MG/ML
4 INJECTION, SOLUTION EPIDURAL; INFILTRATION; INTRACAUDAL; PERINEURAL ONCE
Qty: 4 ML | Refills: 0
Start: 2017-04-04 | End: 2017-04-04

## 2017-04-04 RX ORDER — PREDNISONE 10 MG/1
TABLET ORAL
Refills: 0 | COMMUNITY
Start: 2017-01-16 | End: 2017-05-16

## 2017-04-04 RX ORDER — AMOXICILLIN AND CLAVULANATE POTASSIUM 875; 125 MG/1; MG/1
TABLET, FILM COATED ORAL
Refills: 0 | COMMUNITY
Start: 2017-01-16 | End: 2017-05-16

## 2017-04-04 NOTE — MR AVS SNAPSHOT
Visit Information Date & Time Provider Department Dept. Phone Encounter #  
 4/4/2017  7:50 AM Oh Santoyo  [address], [address] [facility] [phone] [id number] Your Appointments 8/22/2017 10:30 AM  
Follow Up with Doreen Ferrera NP [facility] ([facility]) Appt Note: HCV  
 [address] [place] [address] [facility]  
  
   
 [address] [address] [facility]  
  
    
 8/28/2017  8:15 AM  
Follow Up with Johnnie Devine MD  
[facility]) Appt Note: 6 mos [address] [facility] [address] [place] [id number] [phone]  
  
   
 [address] [facility], [address] [place] [address]  
  
    
  
 6/9/2017 10:45 AM  
Any with Erlin Yousif MD  
[facility] ([facility]) Appt Note: Return in about 6 months (around 6/14/2017). [address] [place] [address]  
[address] [facility] [address] [place] [address] Upcoming Health Maintenance Date Due ZOSTER VACCINE AGE 60> 8/18/2011 GLAUCOMA SCREENING Q2Y 8/18/2016 Pneumococcal 65+ Low/Medium Risk (1 of 2 - PCV13) 8/18/2016 MEDICARE YEARLY EXAM 8/18/2016 FOBT Q 1 YEAR AGE 50-75 12/8/2016 DTaP/Tdap/Td series (2 - Td) 12/9/2025 Allergies as of 4/4/2017  Review Complete On: 4/4/2017 By: Oh Santoyo MD  
  
 Severity Noted Reaction Type Reactions Nicotine Medium 06/11/2015    Contact Dermatitis Nicotine Patch reaction - Contact dermatitis with numbness and tingling also occuring in the left arm and denuding of the skin. Thimerosal  07/21/2015    Other (comments) Contact lens solution, made eyes red and irrated Current Immunizations  Reviewed on 12/9/2015 Name Date Influenza Vaccine 10/1/2016, 10/29/2015 Influenza Vaccine PF 10/29/2014 Tdap 12/9/2015 Not reviewed this visit You Were Diagnosed With   
  
 Codes Comments Cervical spinal stenosis    -  Primary ICD-10-CM: M48.02 
ICD-9-CM: 723.0 Acute pain of right shoulder     ICD-10-CM: M25.511 ICD-9-CM: 719.41 Chronic pain syndrome     ICD-10-CM: G89.4 ICD-9-CM: 338. 4 Vitals Smoking Status Light Tobacco Smoker Preferred Pharmacy Pharmacy Name Phone [address], [address] [place] [address] [phone] Your Updated Medication List  
  
   
This list is accurate as of: 4/4/17  8:46 AM.  Always use your most recent med list. amLODIPine 10 mg tablet Commonly known as:  Renée Pratt Take 1 Tab by mouth daily. amoxicillin-clavulanate 875-125 mg per tablet Commonly known as:  AUGMENTIN  
  
 betamethasone 6 mg/mL injection Commonly known as:  CELESTONE SOLUSPAN  
2 mL by IntraMUSCular route once for 1 dose. bupivacaine 0.25 % (2.5 mg/mL) Soln injection Commonly known as:  MARCAINE 10 mL by SubCUTAneous route once for 1 dose. HYDROcodone-acetaminophen  mg tablet Commonly known as:  Celeste Rook Take 1 Tab by mouth every eight (8) hours as needed for Pain. Max Daily Amount: 3 Tabs.  
  
 ketoconazole 2 % shampoo Commonly known as:  NIZORAL One application to scalp  
  
 lidocaine (PF) 20 mg/mL (2 %) injection Commonly known as:  XYLOCAINE  
4 mL by Other route once for 1 dose. lisinopril 20 mg tablet Commonly known as:  Irineo Daft Take 1 Tab by mouth daily. omeprazole 20 mg capsule Commonly known as:  PRILOSEC Take 20 mg by mouth daily. predniSONE 10 mg tablet Commonly known as:  DELTASONE  
  
 terbinafine HCl 250 mg tablet Commonly known as:  LAMISIL  
250 mg.  
  
 traMADol 50 mg tablet Commonly known as:  ULTRAM  
  
 triamcinolone acetonide 0.1 % topical cream  
Commonly known as:  KENALOG Use 1 Application to affected area Twice Daily. Prescriptions Printed Refills HYDROcodone-acetaminophen (NORCO)  mg tablet 0 Sig: Take 1 Tab by mouth every eight (8) hours as needed for Pain. Max Daily Amount: 3 Tabs. Class: Print Route: Oral  
  
We Performed the Following BETAMETHASONE ACETATE & SODIUM PHOSPHATE INJECTION 3 MG EA. [ [facility]] INJECTION, BUPIVICAINE HYDRO [ [facility]] LIDOCAINE INJECTION [ [facility]] MT DRAIN/INJECT LARGE JOINT/BURSA [id number] CPT(R)] To-Do List   
 04/04/2017 Imaging:  MRI SHOULDER RT WO CONT   
  
 04/25/2017 7:30 AM  
  Appointment with [address] [facility] [address] at [address] [place] [address] ([phone]) OUTSIDE FILMS  - Any outside films related to the study being scheduled should be brought with you on the day of the exam.  If this cannot be done there may be a delay in the reading of the study. NPO -Patient must be NPO (no food or drink) 8 hours prior to the exam.  MEDICATIONS  - Patient must bring a complete list of all medications currently taking to include prescriptions, over-the-counter meds, herbals, vitamins & any dietary supplements Patient Instructions Spinal Stenosis: Care Instructions Your Care Instructions Spinal stenosis is a narrowing of the canal that surrounds the spinal cord and nerve roots. The spine is made up of bones, or vertebrae. The spinal cord runs through an opening in the bones called the spinal canal. Sometimes, bone and ligament and disc tissue grow into this canal and press on the nerves that branch out from the spinal cord. This causes pain, numbness, or weaknessmost often in the arms, legs, feet, and rear end (buttocks). Spinal stenosis can happen as you age. It can occur in the lower back or the neck. You may be able to treat spinal stenosis with pain medicine and exercises to keep your spine strong and flexible.  Your doctor may suggest physical therapy. Some people try cortisone (corticosteroid) shots to reduce swelling. However, you may need surgery if your pain and numbness are so bad that you cannot do normal activities. Follow-up care is a key part of your treatment and safety. Be sure to make and go to all appointments, and call your doctor if you are having problems. It's also a good idea to know your test results and keep a list of the medicines you take. How can you care for yourself at home? · Ask your doctor if you can take an over-the-counter pain medicine, such as acetaminophen (Tylenol), ibuprofen (Advil, Motrin), or naproxen (Aleve). Be safe with medicines. Read and follow all instructions on the label. · Reach and stay at a healthy weight. Too much weight is hard on your spine. · Change positions when sitting to ease pain. For example, lean forward. This may reduce pressure on the spinal cord and its nerves. · Stretch your back muscles as your doctor or physical therapist recommends. Here are a few exercises to try if your doctor says it is okay. ¨ Lie on your back and gently pull one bent knee to your chest. Put that foot back on the floor and then pull the other knee to your chest. 
¨ Do pelvic tilts. Lie on your back with your knees bent. Tighten your stomach muscles. Pull your belly button (navel) in and up toward your ribs. You should feel like your back is pressing to the floor and your hips and pelvis are slightly lifting off the floor. Hold for 6 seconds while breathing smoothly. ¨ Press your back flat against a wall and slide down into a half squat. Hold for 6 seconds while breathing normally. When should you call for help? Call 911 anytime you think you may need emergency care. For example, call if: 
· You are unable to move a leg at all. Call your doctor now or seek immediate medical care if: 
· You have new or worse symptoms in your arms, legs, chest, belly, or buttocks. Symptoms may include: ¨ Numbness or tingling. ¨ Weakness. ¨ Pain. · You lose bladder or bowel control. Watch closely for changes in your health, and be sure to contact your doctor if: 
· You are not getting better as expected. Where can you learn more? Go to http://gomez-jarvis.info/. Enter  in the search box to learn more about \"Spinal Stenosis: Care Instructions. \" Current as of: May 23, 2016 Content Version: 11.2 © 2673-2232 [employer]. Care instructions adapted under license by B[employer]e (which disclaims liability or warranty for this information). If you have questions about a medical condition or this instruction, always ask your healthcare professional. [place] [address] any warranty or liability for your use of this information. Stopping Smoking: Care Instructions Your Care Instructions Cigarette smokers crave the nicotine in cigarettes. Giving it up is much harder than simply changing a habit. Your body has to stop craving the nicotine. It is hard to quit, but you can do it. There are many tools that people use to quit smoking. You may find that combining tools works best for you. There are several steps to quitting. First you get ready to quit. Then you get support to help you. After that, you learn new skills and behaviors to become a nonsmoker. For many people, a necessary step is getting and using medicine. Your doctor will help you set up the plan that best meets your needs. You may want to attend a smoking cessation program to help you quit smoking. When you choose a program, look for one that has proven success. Ask your doctor for ideas. You will greatly increase your chances of success if you take medicine as well as get counseling or join a cessation program. 
Some of the changes you feel when you first quit tobacco are uncomfortable.  Your body will miss the nicotine at first, and you may feel short-tempered and grumpy. You may have trouble sleeping or concentrating. Medicine can help you deal with these symptoms. You may struggle with changing your smoking habits and rituals. The last step is the tricky one: Be prepared for the smoking urge to continue for a time. This is a lot to deal with, but keep at it. You will feel better. Follow-up care is a key part of your treatment and safety. Be sure to make and go to all appointments, and call your doctor if you are having problems. Its also a good idea to know your test results and keep a list of the medicines you take. How can you care for yourself at home? · Ask your family, friends, and coworkers for support. You have a better chance of quitting if you have help and support. · Join a support group, such as Nicotine Anonymous, for people who are trying to quit smoking. · Consider signing up for a smoking cessation program, such as the American Lung Association's Freedom from Smoking program. 
· Set a quit date. Pick your date carefully so that it is not right in the middle of a big deadline or stressful time. Once you quit, do not even take a puff. Get rid of all ashtrays and lighters after your last cigarette. Clean your house and your clothes so that they do not smell of smoke. · Learn how to be a nonsmoker. Think about ways you can avoid those things that make you reach for a cigarette. ¨ Avoid situations that put you at greatest risk for smoking. For some people, it is hard to have a drink with friends without smoking. For others, they might skip a coffee break with coworkers who smoke. ¨ Change your daily routine. Take a different route to work or eat a meal in a different place. · Cut down on stress. Calm yourself or release tension by doing an activity you enjoy, such as reading a book, taking a hot bath, or gardening.  
· Talk to your doctor or pharmacist about nicotine replacement therapy, which replaces the nicotine in your body. You still get nicotine but you do not use tobacco. Nicotine replacement products help you slowly reduce the amount of nicotine you need. These products come in several forms, many of them available over-the-counter: ¨ Nicotine patches ¨ Nicotine gum and lozenges ¨ Nicotine inhaler · Ask your doctor about bupropion (Wellbutrin) or varenicline (Chantix), which are prescription medicines. They do not contain nicotine. They help you by reducing withdrawal symptoms, such as stress and anxiety. · Some people find hypnosis, acupuncture, and massage helpful for ending the smoking habit. · Eat a healthy diet and get regular exercise. Having healthy habits will help your body move past its craving for nicotine. · Be prepared to keep trying. Most people are not successful the first few times they try to quit. Do not get mad at yourself if you smoke again. Make a list of things you learned and think about when you want to try again, such as next week, next month, or next year. Where can you learn more? Go to http://gomez-jarvis.info/. Enter D501 in the search box to learn more about \"Stopping Smoking: Care Instructions. \" Current as of: May 26, 2016 Content Version: 11.2 © 4001-8172 AFIN[employer], TensorComm. Care instructions adapted under license by [employer]ova (which disclaims liability or warranty for this information). If you have questions about a medical condition or this instruction, always ask your healthcare professional. [address] any warranty or liability for your use of this information. Introducing [facility]! Dear Clemente Rai: Thank you for requesting a GuÃ­a Local account. Our records indicate that you already have an active GuÃ­a Local account. You can access your account anytime at [email or web address]. [email or web address] Did you know that you can access your hospital and ER discharge instructions at any time in Optensity? You can also review all of your test results from your hospital stay or ER visit. Additional Information If you have questions, please visit the Frequently Asked Questions section of the Optensity website at [email or web address] Remember, Optensity is NOT to be used for urgent needs. For medical emergencies, dial 911. Now available from your iPhone and Android! Please provide this summary of care documentation to your next provider. Your primary care clinician is listed as Jocelyn Inman. If you have any questions after today's visit, please call [phone].

## 2017-04-04 NOTE — PROGRESS NOTES
[address]  [facility], [address]  Phone: [phone]  Fax: [phone]  PROGRESS NOTE  Patient: Rei Benjamin                MRN: [id number]       SSN: [id number]  YOB: 1950        AGE: 73 y.o. SEX: male  There is no height or weight on file to calculate BMI. PCP: Amisha Gordon MD  04/04/17    No chief complaint on file. HISTORY OF PRESENT ILLNESS, RADIOGRAPHS, and PLAN:     HISTORY:  Mr. Michael Rodgers returns today. He had a progression of some chronic neck and right arm pain that he wanted to have evaluated prior to consideration of spinal cord stimulation. We obtained an MRI of his neck. This demonstrates some significant stenosis at C5-6. It has been a shoulder problem that we have investigated previously with Dr. Chiara Mendez. It is a combination of subacromial pathology, as well as radiculopathy in the right arm. He has had injections previously that were not particularly effective. He says for the past month or two, the pain has become more persistent in his right arm with tingling in a C6 distribution. It goes down to his thumb on the right. It makes it worse with neck motion. His exam is also significantly positive for a positive impingement sign. It is difficult for me to state which aspect of his pain is more dramatic, the impingement pain with his deltoid and upper arm pain or the dysesthetic pain going down his arm. RADIOGRAPHS:  The MRI as being read by radiology is significant for foraminal stenosis, but I think C5-6 is global stenosis with no space available to the cord, no CSF around the cord, no global stenosis at C5-6. The canal at that level measures 7 mm. The radiologist describes it as severe central stenosis. There is no signal change.      ASSESSMENT/PLAN:   At this point, I would certainly would put placing a spinal cord stimulator for his lumbar disease on hiatus until we resolve his neck issues. He has no long tract signs on his exam, no gait disturbance. This is a combination of radicular pain and shoulder problems. I would start off by obtaining a subacromial injection on him. We will order an MRI of his right shoulder and try a subacromial injection. Depending on the results of those studies, I may have him see a shoulder specialist.  I think it is important to resolve the anatomic issues with his shoulder and decide whether or not we need to address his neck surgically or his shoulder surgically before proceeding with spinal cord stimulation for his known peripheral neuropathy in his legs and chronic back pain. I discussed the matter at length with him, and he wishes to proceed with this plan. cc: Marya Chavez M.D. Past Medical History:   Diagnosis Date    Bilateral hip pain     Chronic pain     back; hx of opiod addiction    Depression     Diabetes (HCC)     borderline, no meds-trying to control with diet    DJD (degenerative joint disease)     GERD (gastroesophageal reflux disease)     Hepatitis C January, 2015    Hypertension     Kidney stones     Lymphadenopathy, generalized 12/05/2015    neg bx    Numbness of foot left    resolved per patient 1/29/16    S/P lumbar fusion 2/9/2016    L4/5 LAMINECTOMY/FUSION/TRANSFORAMINAL LUMBAR INTERBODY FUSION (TLIF) by Dr. Jazmin Mabry on 2/8/16     Unspecified sleep apnea     no cpap machine-pt denies       Family History   Problem Relation Age of Onset    Diabetes Sister     SLE Sister     Arthritis-osteo Sister     Heart Disease Sister        Current Outpatient Prescriptions   Medication Sig Dispense Refill    bupivacaine (MARCAINE) 0.25 % (2.5 mg/mL) soln injection 10 mL by SubCUTAneous route once for 1 dose. 10 mL 0    lidocaine, PF, (XYLOCAINE) 20 mg/mL (2 %) injection 4 mL by Other route once for 1 dose.  4 mL 0    betamethasone (CELESTONE SOLUSPAN) 6 mg/mL injection 2 mL by IntraMUSCular route once for 1 dose. 2 mL 0    amLODIPine (NORVASC) 10 mg tablet Take 1 Tab by mouth daily. 90 Tab 1    omeprazole (PRILOSEC) 20 mg capsule Take 20 mg by mouth daily. 11    terbinafine HCl (LAMISIL) 250 mg tablet 250 mg.      triamcinolone acetonide (KENALOG) 0.1 % topical cream Use 1 Application to affected area Twice Daily.  lisinopril (PRINIVIL, ZESTRIL) 20 mg tablet Take 1 Tab by mouth daily. 90 Tab 1    amoxicillin-clavulanate (AUGMENTIN) 875-125 mg per tablet   0    predniSONE (DELTASONE) 10 mg tablet   0    traMADol (ULTRAM) 50 mg tablet       HYDROcodone-acetaminophen (NORCO)  mg tablet Take 1 Tab by mouth every eight (8) hours as needed for Pain. Max Daily Amount: 3 Tabs. 90 Tab 0    ketoconazole (NIZORAL) 2 % shampoo One application to scalp 746 mL 3       Allergies   Allergen Reactions    Nicotine Contact Dermatitis     Nicotine Patch reaction - Contact dermatitis with numbness and tingling also occuring in the left arm and denuding of the skin.     Thimerosal Other (comments)     Contact lens solution, made eyes red and irrated       Past Surgical History:   Procedure Laterality Date    HX APPENDECTOMY  as a child    HX BACK SURGERY  07/2015    HX COLONOSCOPY  2015    negative    HX LUMBAR FUSION  2/2016    HX ORTHOPAEDIC      denies    HX TONSILLECTOMY  as a child   [facility] 3-10-16    Dr. Darlene Vaughn       Past Medical History:   Diagnosis Date    Bilateral hip pain     Chronic pain     back; hx of opiod addiction    Depression     Diabetes ([address]     borderline, no meds-trying to control with diet    DJD (degenerative joint disease)     GERD (gastroesophageal reflux disease)     Hepatitis C January, 2015    Hypertension     Kidney stones     Lymphadenopathy, generalized 12/05/2015    neg bx    Numbness of foot left    resolved per patient 1/29/16    S/P lumbar fusion 2/9/2016    L4/5 LAMINECTOMY/FUSION/TRANSFORAMINAL LUMBAR INTERBODY FUSION (TLIF) by Dr. Ilene Pagan on 2/8/16     Unspecified sleep apnea     no cpap machine-pt denies       Social History     Social History    Marital status:      Spouse name: N/A    Number of children: N/A    Years of education: N/A     Occupational History    Not on file. Social History Main Topics    Smoking status: Light Tobacco Smoker     Packs/day: 0.25     Years: 40.00     Types: Cigarettes    Smokeless tobacco: Never Used    Alcohol use No    Drug use: Yes     Special: Marijuana      Comment: 1/27/16    Sexual activity: No     Other Topics Concern    Not on file     Social History Narrative       REVIEW OF SYSTEMS:   CONSTITUTIONAL SYMPTOMS:  Negative. EYES:  Negative. EARS, NOSE, THROAT AND MOUTH:  Negative. CARDIOVASCULAR:  Negative. RESPIRATORY:  Negative. GENITOURINARY: Negative. GASTROINTESTINAL:  Negative. INTEGUMENTARY (SKIN AND/OR BREAST):  Negative. MUSCULOSKELETAL: Per HPI.   ENDOCRINE/RHEUMATOLOGIC:  Negative. NEUROLOGICAL:  Per HPI. HEMATOLOGIC/LYMPHATIC:  Negative. ALLERGIC/IMMUNOLOGIC:  Negative. PSYCHIATRIC:  Negative. PHYSICAL EXAMINATION:   There were no vitals taken for this visit. CONSTITUTIONAL: The patient is in no apparent distress and is alert and oriented x 3. HEENT: Normocephalic. Hearing grossly intact. NECK: Supple and symmetric. no tenderness, or masses were felt. RESPIRATORY: No labored breathing. CARDIOVASCULAR: The carotid pulses were normal. Peripheral pulses were 2+. CHEST: Normal AP diameter and normal contour without any kyphoscoliosis. LYMPHATIC: No lymphadenopathy was appreciated in the neck, axillae or groin. SKIN:  Negative for scars, rashes, lesions, or ulcers on the right upper, right lower, left upper, left lower and trunk. NEUROLOGICAL: Alert and oriented x 3. Ambulation without assistive device. FWB. EXTREMITIES: See musculoskeletal.  MUSCULOSKELETAL:   Head and Neck: Neck pain. Headaches. Antalgic ROM. Negative for misalignment, asymmetry, crepitation, defects, tenderness masses or effusions.  Left Upper Extremity: Inspection, percussion and palpation preformed. Reyess sign is negative.  Right Upper Extremity: Paresthesia. Radiating Pain. Painful shoulder ROM. Inspection, percussion and palpation preformed. Reyess sign is negative.  Spine, Ribs and Pelvis: Inspection, percussion and palpation preformed. Negative for misalignment, asymmetry, crepitation, defects, tenderness masses or effusions.  Left Lower Extremity: Pain. Weakness. Inspection, percussion and palpation preformed. Negative straight leg raise.  Right Lower Extremity: Pain. Weakness. Inspection, percussion and palpation preformed. Negative straight leg raise. SPINE EXAM:     Cervical spine: Neck is midline. Normal muscle tone. No focal atrophy is noted. ASSESSMENT    ICD-10-CM ICD-9-CM    1. Cervical spinal stenosis M48.02 723.0 bupivacaine (MARCAINE) 0.25 % (2.5 mg/mL) soln injection      INJECTION, BUPIVICAINE HYDRO      LIDOCAINE INJECTION      lidocaine, PF, (XYLOCAINE) 20 mg/mL (2 %) injection      betamethasone (CELESTONE SOLUSPAN) 6 mg/mL injection      BETAMETHASONE ACETATE & SODIUM PHOSPHATE INJECTION 3 MG EA.      LA DRAIN/INJECT LARGE JOINT/BURSA   2. Acute pain of right shoulder M25.511 719.41 bupivacaine (MARCAINE) 0.25 % (2.5 mg/mL) soln injection      INJECTION, BUPIVICAINE HYDRO      LIDOCAINE INJECTION      lidocaine, PF, (XYLOCAINE) 20 mg/mL (2 %) injection      betamethasone (CELESTONE SOLUSPAN) 6 mg/mL injection      BETAMETHASONE ACETATE & SODIUM PHOSPHATE INJECTION 3 MG EA.      LA DRAIN/INJECT LARGE JOINT/BURSA      MRI SHOULDER RT WO CONT   3.  Chronic pain syndrome G89.4 338.4 bupivacaine (MARCAINE) 0.25 % (2.5 mg/mL) soln injection      INJECTION, BUPIVICAINE HYDRO      LIDOCAINE INJECTION      lidocaine, PF, (XYLOCAINE) 20 mg/mL (2 %) injection      betamethasone (CELESTONE SOLUSPAN) 6 mg/mL injection      BETAMETHASONE ACETATE & SODIUM PHOSPHATE INJECTION 3 MG EA.      LA DRAIN/INJECT LARGE JOINT/BURSA       Written by Len Maddox, as dictated by Gareth Lantigua MD.    I, Dr. Gareth Lantigua MD, confirm that all documentation is accurate.

## 2017-04-04 NOTE — PATIENT INSTRUCTIONS
Spinal Stenosis: Care Instructions  Your Care Instructions    Spinal stenosis is a narrowing of the canal that surrounds the spinal cord and nerve roots. The spine is made up of bones, or vertebrae. The spinal cord runs through an opening in the bones called the spinal canal. Sometimes, bone and ligament and disc tissue grow into this canal and press on the nerves that branch out from the spinal cord. This causes pain, numbness, or weaknessmost often in the arms, legs, feet, and rear end (buttocks). Spinal stenosis can happen as you age. It can occur in the lower back or the neck. You may be able to treat spinal stenosis with pain medicine and exercises to keep your spine strong and flexible. Your doctor may suggest physical therapy. Some people try cortisone (corticosteroid) shots to reduce swelling. However, you may need surgery if your pain and numbness are so bad that you cannot do normal activities. Follow-up care is a key part of your treatment and safety. Be sure to make and go to all appointments, and call your doctor if you are having problems. It's also a good idea to know your test results and keep a list of the medicines you take. How can you care for yourself at home? · Ask your doctor if you can take an over-the-counter pain medicine, such as acetaminophen (Tylenol), ibuprofen (Advil, Motrin), or naproxen (Aleve). Be safe with medicines. Read and follow all instructions on the label. · Reach and stay at a healthy weight. Too much weight is hard on your spine. · Change positions when sitting to ease pain. For example, lean forward. This may reduce pressure on the spinal cord and its nerves. · Stretch your back muscles as your doctor or physical therapist recommends. Here are a few exercises to try if your doctor says it is okay. ¨ Lie on your back and gently pull one bent knee to your chest. Put that foot back on the floor and then pull the other knee to your chest.  ¨ Do pelvic tilts.  Gerald Prado on your back with your knees bent. Tighten your stomach muscles. Pull your belly button (navel) in and up toward your ribs. You should feel like your back is pressing to the floor and your hips and pelvis are slightly lifting off the floor. Hold for 6 seconds while breathing smoothly. ¨ Press your back flat against a wall and slide down into a half squat. Hold for 6 seconds while breathing normally. When should you call for help? Call 911 anytime you think you may need emergency care. For example, call if:  · You are unable to move a leg at all. Call your doctor now or seek immediate medical care if:  · You have new or worse symptoms in your arms, legs, chest, belly, or buttocks. Symptoms may include:  ¨ Numbness or tingling. ¨ Weakness. ¨ Pain. · You lose bladder or bowel control. Watch closely for changes in your health, and be sure to contact your doctor if:  · You are not getting better as expected. Where can you learn more? Go to http://gomez-jarvis.info/. Enter  in the search box to learn more about \"Spinal Stenosis: Care Instructions. \"  Current as of: May 23, 2016  Content Version: 11.2  © 0856-6249 [employer]. Care instructions adapted under license by [employer] (which disclaims liability or warranty for this information). If you have questions about a medical condition or this instruction, always ask your healthcare professional. [place] [address] any warranty or liability for your use of this information. Stopping Smoking: Care Instructions  Your Care Instructions  Cigarette smokers crave the nicotine in cigarettes. Giving it up is much harder than simply changing a habit. Your body has to stop craving the nicotine. It is hard to quit, but you can do it. There are many tools that people use to quit smoking. You may find that combining tools works best for you. There are several steps to quitting.  First you get ready to quit. Then you get support to help you. After that, you learn new skills and behaviors to become a nonsmoker. For many people, a necessary step is getting and using medicine. Your doctor will help you set up the plan that best meets your needs. You may want to attend a smoking cessation program to help you quit smoking. When you choose a program, look for one that has proven success. Ask your doctor for ideas. You will greatly increase your chances of success if you take medicine as well as get counseling or join a cessation program.  Some of the changes you feel when you first quit tobacco are uncomfortable. Your body will miss the nicotine at first, and you may feel short-tempered and grumpy. You may have trouble sleeping or concentrating. Medicine can help you deal with these symptoms. You may struggle with changing your smoking habits and rituals. The last step is the tricky one: Be prepared for the smoking urge to continue for a time. This is a lot to deal with, but keep at it. You will feel better. Follow-up care is a key part of your treatment and safety. Be sure to make and go to all appointments, and call your doctor if you are having problems. Its also a good idea to know your test results and keep a list of the medicines you take. How can you care for yourself at home? · Ask your family, friends, and coworkers for support. You have a better chance of quitting if you have help and support. · Join a support group, such as Nicotine Anonymous, for people who are trying to quit smoking. · Consider signing up for a smoking cessation program, such as the American Lung Association's Freedom from Smoking program.  · Set a quit date. Pick your date carefully so that it is not right in the middle of a big deadline or stressful time. Once you quit, do not even take a puff. Get rid of all ashtrays and lighters after your last cigarette.  Clean your house and your clothes so that they do not smell of smoke.  · Learn how to be a nonsmoker. Think about ways you can avoid those things that make you reach for a cigarette. ¨ Avoid situations that put you at greatest risk for smoking. For some people, it is hard to have a drink with friends without smoking. For others, they might skip a coffee break with coworkers who smoke. ¨ Change your daily routine. Take a different route to work or eat a meal in a different place. · Cut down on stress. Calm yourself or release tension by doing an activity you enjoy, such as reading a book, taking a hot bath, or gardening. · Talk to your doctor or pharmacist about nicotine replacement therapy, which replaces the nicotine in your body. You still get nicotine but you do not use tobacco. Nicotine replacement products help you slowly reduce the amount of nicotine you need. These products come in several forms, many of them available over-the-counter:  ¨ Nicotine patches  ¨ Nicotine gum and lozenges  ¨ Nicotine inhaler  · Ask your doctor about bupropion (Wellbutrin) or varenicline (Chantix), which are prescription medicines. They do not contain nicotine. They help you by reducing withdrawal symptoms, such as stress and anxiety. · Some people find hypnosis, acupuncture, and massage helpful for ending the smoking habit. · Eat a healthy diet and get regular exercise. Having healthy habits will help your body move past its craving for nicotine. · Be prepared to keep trying. Most people are not successful the first few times they try to quit. Do not get mad at yourself if you smoke again. Make a list of things you learned and think about when you want to try again, such as next week, next month, or next year. Where can you learn more? Go to http://gomez-jarvis.info/. Enter T067 in the search box to learn more about \"Stopping Smoking: Care Instructions. \"  Current as of: May 26, 2016  Content Version: 11.2  © 0249-2307 [employer].  Care instructions adapted under license by [employer] (which disclaims liability or warranty for this information). If you have questions about a medical condition or this instruction, always ask your healthcare professional. [address] any warranty or liability for your use of this information.

## 2017-04-25 ENCOUNTER — HOSPITAL ENCOUNTER (OUTPATIENT)
Dept: MRI IMAGING | Age: 67
Discharge: HOME | End: 2017-04-25
Attending: ORTHOPAEDIC SURGERY
Payer: MEDICARE

## 2017-04-25 ENCOUNTER — HOSPITAL ENCOUNTER (OUTPATIENT)
Dept: ULTRASOUND IMAGING | Age: 67
Discharge: HOME | End: 2017-04-25
Attending: NURSE PRACTITIONER
Payer: MEDICARE

## 2017-04-25 DIAGNOSIS — B19.20 COMPENSATED HCV CIRRHOSIS (HCC): ICD-10-CM

## 2017-04-25 DIAGNOSIS — K74.69 COMPENSATED HCV CIRRHOSIS (HCC): ICD-10-CM

## 2017-04-25 DIAGNOSIS — M25.511 ACUTE PAIN OF RIGHT SHOULDER: ICD-10-CM

## 2017-04-25 PROCEDURE — 73221 MRI JOINT UPR EXTREM W/O DYE: CPT

## 2017-05-09 ENCOUNTER — OFFICE VISIT (OUTPATIENT)
Dept: ORTHOPEDIC SURGERY | Age: 67
End: 2017-05-09

## 2017-05-09 VITALS
OXYGEN SATURATION: 95 % | WEIGHT: 229 LBS | RESPIRATION RATE: 18 BRPM | BODY MASS INDEX: 32.06 KG/M2 | DIASTOLIC BLOOD PRESSURE: 79 MMHG | TEMPERATURE: 99 F | SYSTOLIC BLOOD PRESSURE: 147 MMHG | HEIGHT: 71 IN | HEART RATE: 89 BPM

## 2017-05-09 DIAGNOSIS — M25.511 ACUTE PAIN OF RIGHT SHOULDER: ICD-10-CM

## 2017-05-09 DIAGNOSIS — M48.02 CERVICAL SPINAL STENOSIS: Primary | ICD-10-CM

## 2017-05-09 NOTE — PROGRESS NOTES
[facility] Specialist   Pre-Surgical Worksheet    Patient: Jenny Gar                         MRN: [id number]     Age:  73 y.o.,      Sex: male    YOB: 1950           RODOLFO: May 9, 2017  PCP: Uvaldo Hilario MD    Allergies   Allergen Reactions    Nicotine Contact Dermatitis     Nicotine Patch reaction - Contact dermatitis with numbness and tingling also occuring in the left arm and denuding of the skin.  Thimerosal Other (comments)     Contact lens solution, made eyes red and irrated         ICD-10-CM ICD-9-CM    1. Cervical spinal stenosis M48.02 723.0    2. Acute pain of right shoulder M25.511 719.41        Surgery: ACDF C5/6 C6/7. Pain Assessment   Pain Assessment  5/9/2017   Location of Pain Back   Severity of Pain 8   Quality of Pain Aching   Quality of Pain Comment numbness, tingling   Duration of Pain -   Frequency of Pain Constant   Aggravating Factors Walking;Standing;Squatting;Kneeling;Exercise;Straightening;Stretching;Bending   Aggravating Factors Comment -   Limiting Behavior -   Relieving Factors (No Data)   Relieving Factors Comment pain med   Result of Injury -       Visit Vitals    /79    Pulse 89    Temp 99 °F (37.2 °C) (Oral)    Resp 18    Ht 5' 11\" (1.803 m)    Wt 229 lb (103.9 kg)    SpO2 95%    BMI 31.94 kg/m2       ADL Limits: Patient states that pain at its worse is a 10. And it makes it very difficult and almost impossible to do his job    Spine Surgery?: Yes When 2015, 2016. Where [place]. Spinal Injections?: Yes  When 2017. Where? .    Physical Therapy?: Yes  When ? [facility] Where? .    NSAID's?: Yes    Pain Medications?: Yes  Type: Norco 10/325mg. In Pain Management: NO, Where: ?    Current Outpatient Prescriptions   Medication Sig    amoxicillin-clavulanate (AUGMENTIN) 875-125 mg per tablet     HYDROcodone-acetaminophen (NORCO)  mg tablet Take 1 Tab by mouth every eight (8) hours as needed for Pain.  Max Daily Amount: 3 Tabs.  amLODIPine (NORVASC) 10 mg tablet Take 1 Tab by mouth daily.  omeprazole (PRILOSEC) 20 mg capsule Take 20 mg by mouth daily.  terbinafine HCl (LAMISIL) 250 mg tablet 250 mg.    lisinopril (PRINIVIL, ZESTRIL) 20 mg tablet Take 1 Tab by mouth daily.  predniSONE (DELTASONE) 10 mg tablet     traMADol (ULTRAM) 50 mg tablet     triamcinolone acetonide (KENALOG) 0.1 % topical cream Use 1 Application to affected area Twice Daily.  ketoconazole (NIZORAL) 2 % shampoo One application to scalp     No current facility-administered medications for this visit.         Past Medical History:   Diagnosis Date    Bilateral hip pain     Chronic pain     back; hx of opiod addiction    Depression     Diabetes (HCC)     borderline, no meds-trying to control with diet    DJD (degenerative joint disease)     GERD (gastroesophageal reflux disease)     Hepatitis C January, 2015    Hypertension     Kidney stones     Lymphadenopathy, generalized 12/05/2015    neg bx    Numbness of foot left    resolved per patient 1/29/16    S/P lumbar fusion 2/9/2016    L4/5 LAMINECTOMY/FUSION/TRANSFORAMINAL LUMBAR INTERBODY FUSION (TLIF) by Dr. Teresa Maier on 2/8/16     Unspecified sleep apnea     no cpap machine-pt denies       Past Surgical History:   Procedure Laterality Date    HX APPENDECTOMY  as a child    HX BACK SURGERY  07/2015    HX COLONOSCOPY  2015    negative    HX LUMBAR FUSION  2/2016    HX ORTHOPAEDIC      denies    HX TONSILLECTOMY  as a child    REMOVE [facility] Right 3-10-16    Dr. Elin Spring History     Social History    Marital status:      Spouse name: N/A    Number of children: N/A    Years of education: N/A     Social History Main Topics    Smoking status: Light Tobacco Smoker     Packs/day: 0.25     Years: 40.00     Types: Cigarettes    Smokeless tobacco: Never Used    Alcohol use No    Drug use: Yes     Special: Marijuana      Comment: 1/27/16    Sexual activity: No     Other Topics Concern    None     Social History Narrative

## 2017-05-09 NOTE — PROGRESS NOTES
[facility] [address]  [address]  Phone: [phone]  Fax: [phone]  PROGRESS NOTE  Patient: Cherylene Mimes                MRN: [id number]       SSN: [id number]  YOB: 1950        AGE: 73 y.o. SEX: male  Body mass index is 31.94 kg/(m^2). PCP: Carlos Jimenez MD  05/09/17    Chief Complaint   Patient presents with    Back Pain     MRI follow up       HISTORY OF PRESENT ILLNESS, RADIOGRAPHS, and PLAN:     HISTORY:  Mr. Crenshaw Query returns today. The injection I gave him subacromially helped him for a day and his pain returned. Now he is complaining of neck pain, shoulder pain, and bilateral arm pain, numbness, and tingling. We have a gentleman who has low back issues. An MRI of his shoulder demonstrates subacromial pathology with multiple rotator cuff tendinitis, as well as some impingement, but he is having neck pain, as well as bilateral arm numbness and tingling and some gait disturbance. In the realm of things, he is most concerned about his neck. Given he does have significant stenosis at C5-6 and C6-7, I think we have to focus on his neck first and then see how that does and then concern ourselves with his right shoulder and other issues. ASSESSMENT/PLAN: I discussed the matter at length with him. He would like to address things surgically at this time and try to be more aggressive in our treatment actions. We have been through injections, medications, and therapy. Surgery for his neck would be a cervical decompression and fusion of C5-6 and C6-7. We discussed the risks, benefits, complications, and alternatives to surgery. He would like to proceed. Following that, we will just see how he does in the recovery period. It is not unlikely, in my opinion, he will need to be evaluated for possible subacromial decompression of his right shoulder.       We will proceed with surgery once the appropriate approvals and clearances take place. cc: Melecio Sandoval M.D. Past Medical History:   Diagnosis Date    Bilateral hip pain     Chronic pain     back; hx of opiod addiction    Depression     Diabetes (HCC)     borderline, no meds-trying to control with diet    DJD (degenerative joint disease)     GERD (gastroesophageal reflux disease)     Hepatitis C January, 2015    Hypertension     Kidney stones     Lymphadenopathy, generalized 12/05/2015    neg bx    Numbness of foot left    resolved per patient 1/29/16    S/P lumbar fusion 2/9/2016    L4/5 LAMINECTOMY/FUSION/TRANSFORAMINAL LUMBAR INTERBODY FUSION (TLIF) by Dr. Barb English on 2/8/16     Unspecified sleep apnea     no cpap machine-pt denies       Family History   Problem Relation Age of Onset    Diabetes Sister     SLE Sister     Arthritis-osteo Sister     Heart Disease Sister        Current Outpatient Prescriptions   Medication Sig Dispense Refill    amoxicillin-clavulanate (AUGMENTIN) 875-125 mg per tablet   0    HYDROcodone-acetaminophen (NORCO)  mg tablet Take 1 Tab by mouth every eight (8) hours as needed for Pain. Max Daily Amount: 3 Tabs. 90 Tab 0    amLODIPine (NORVASC) 10 mg tablet Take 1 Tab by mouth daily. 90 Tab 1    omeprazole (PRILOSEC) 20 mg capsule Take 20 mg by mouth daily. 11    terbinafine HCl (LAMISIL) 250 mg tablet 250 mg.      lisinopril (PRINIVIL, ZESTRIL) 20 mg tablet Take 1 Tab by mouth daily. 90 Tab 1    predniSONE (DELTASONE) 10 mg tablet   0    traMADol (ULTRAM) 50 mg tablet       triamcinolone acetonide (KENALOG) 0.1 % topical cream Use 1 Application to affected area Twice Daily.  ketoconazole (NIZORAL) 2 % shampoo One application to scalp 482 mL 3       Allergies   Allergen Reactions    Nicotine Contact Dermatitis     Nicotine Patch reaction - Contact dermatitis with numbness and tingling also occuring in the left arm and denuding of the skin.     Thimerosal Other (comments) Contact lens solution, made eyes red and irrated       Past Surgical History:   Procedure Laterality Date    HX APPENDECTOMY  as a child    HX BACK SURGERY  07/2015    HX COLONOSCOPY  2015    negative    HX LUMBAR FUSION  2/2016    HX ORTHOPAEDIC      denies    HX TONSILLECTOMY  as a child   [facility] Right 3-10-16    Dr. Lakshmi Stern       Past Medical History:   Diagnosis Date    Bilateral hip pain     Chronic pain     back; hx of opiod addiction    Depression     Diabetes ([address]     borderline, no meds-trying to control with diet    DJD (degenerative joint disease)     GERD (gastroesophageal reflux disease)     Hepatitis C January, 2015    Hypertension     Kidney stones     Lymphadenopathy, generalized 12/05/2015    neg bx    Numbness of foot left    resolved per patient 1/29/16    S/P lumbar fusion 2/9/2016    L4/5 LAMINECTOMY/FUSION/TRANSFORAMINAL LUMBAR INTERBODY FUSION (TLIF) by Dr. London Reagan on 2/8/16     Unspecified sleep apnea     no cpap machine-pt denies       Social History     Social History    Marital status:      Spouse name: N/A    Number of children: N/A    Years of education: N/A     Occupational History    Not on file. Social History Main Topics    Smoking status: Light Tobacco Smoker     Packs/day: 0.25     Years: 40.00     Types: Cigarettes    Smokeless tobacco: Never Used    Alcohol use No    Drug use: Yes     Special: Marijuana      Comment: 1/27/16    Sexual activity: No     Other Topics Concern    Not on file     Social History Narrative       REVIEW OF SYSTEMS:   CONSTITUTIONAL SYMPTOMS:  Negative. EYES:  Negative. EARS, NOSE, THROAT AND MOUTH:  Negative. CARDIOVASCULAR:  Negative. RESPIRATORY:  Negative. GENITOURINARY: Negative. GASTROINTESTINAL:  Negative. INTEGUMENTARY (SKIN AND/OR BREAST):  Negative. MUSCULOSKELETAL: Per HPI.   ENDOCRINE/RHEUMATOLOGIC:  Negative.    NEUROLOGICAL:  Per HPI.   HEMATOLOGIC/LYMPHATIC:  Negative. ALLERGIC/IMMUNOLOGIC:  Negative. PSYCHIATRIC:  Negative. PHYSICAL EXAMINATION:   Visit Vitals    /79    Pulse 89    Temp 99 °F (37.2 °C) (Oral)    Resp 18    Ht 5' 11\" (1.803 m)    Wt 229 lb (103.9 kg)    SpO2 95%    BMI 31.94 kg/m2    PAIN SCALE: 7/10    CONSTITUTIONAL: The patient is in no apparent distress and is alert and oriented x 3. HEENT: Normocephalic. Hearing grossly intact. NECK: Supple and symmetric. no tenderness, or masses were felt. RESPIRATORY: No labored breathing. CARDIOVASCULAR: The carotid pulses were normal. Peripheral pulses were 2+. CHEST: Normal AP diameter and normal contour without any kyphoscoliosis. LYMPHATIC: No lymphadenopathy was appreciated in the neck, axillae or groin. SKIN: Negative for scars, rashes, lesions, or ulcers on the right upper, right lower, left upper, left lower and trunk. NEUROLOGICAL: Alert and oriented x 3. Ambulation without assistive device. FWB. EXTREMITIES: See musculoskeletal.  MUSCULOSKELETAL:   Head and Neck: Neck pain that radiates into LT upper trapezius. Negative for misalignment, asymmetry, crepitation, defects, tenderness masses or effusions.  Left Upper Extremity: Paresthesia. Inspection, percussion and palpation preformed. Reyess sign is negative.  Right Upper Extremity: Shoulder pain. Paresthesia. Inspection, percussion and palpation preformed. Reyess sign is negative.  Spine, Ribs and Pelvis: Inspection, percussion and palpation preformed. Negative for misalignment, asymmetry, crepitation, defects, tenderness masses or effusions.  Left Lower Extremity: Inspection, percussion and palpation preformed. Negative straight leg raise.  Right Lower Extremity: Inspection, percussion and palpation preformed. Negative straight leg raise. SPINE EXAM:     Cervical spine: Neck is midline. Normal muscle tone. No focal atrophy is noted.         ASSESSMENT ICD-10-CM ICD-9-CM    1. Cervical spinal stenosis M48.02 723.0    2. Acute pain of right shoulder M25.511 719.41        Written by Codey Paiz, as dictated by Teresa Maier MD.    I, Dr. Teresa Maier MD, confirm that all documentation is accurate.

## 2017-05-16 ENCOUNTER — HOSPITAL ENCOUNTER (OUTPATIENT)
Dept: PREADMISSION TESTING | Age: 67
Discharge: HOME | DRG: 473 | End: 2017-05-16
Payer: MEDICARE

## 2017-05-16 DIAGNOSIS — M48.02 CERVICAL SPINAL STENOSIS: ICD-10-CM

## 2017-05-16 LAB
ALBUMIN SERPL BCP-MCNC: 4 G/DL (ref 3.4–5)
ALBUMIN/GLOB SERPL: 1.3 {RATIO} (ref 0.8–1.7)
ALP SERPL-CCNC: 91 U/L (ref 45–117)
ALT SERPL-CCNC: 21 U/L (ref 16–61)
ANION GAP BLD CALC-SCNC: 8 MMOL/L (ref 3–18)
AST SERPL W P-5'-P-CCNC: 16 U/L (ref 15–37)
BILIRUB SERPL-MCNC: 0.7 MG/DL (ref 0.2–1)
BUN SERPL-MCNC: 19 MG/DL (ref 7–18)
BUN/CREAT SERPL: 17 (ref 12–20)
CALCIUM SERPL-MCNC: 9 MG/DL (ref 8.5–10.1)
CHLORIDE SERPL-SCNC: 103 MMOL/L (ref 100–108)
CO2 SERPL-SCNC: 26 MMOL/L (ref 21–32)
CREAT SERPL-MCNC: 1.12 MG/DL (ref 0.6–1.3)
ERYTHROCYTE [DISTWIDTH] IN BLOOD BY AUTOMATED COUNT: 13.2 % (ref 11.6–14.5)
GLOBULIN SER CALC-MCNC: 3.1 G/DL (ref 2–4)
GLUCOSE SERPL-MCNC: 132 MG/DL (ref 74–99)
HCT VFR BLD AUTO: 46.7 % (ref 36–48)
HGB BLD-MCNC: 16.5 G/DL (ref 13–16)
MCH RBC QN AUTO: 32.9 PG (ref 24–34)
MCHC RBC AUTO-ENTMCNC: 35.3 G/DL (ref 31–37)
MCV RBC AUTO: 93.2 FL (ref 74–97)
PLATELET # BLD AUTO: 147 K/UL (ref 135–420)
PMV BLD AUTO: 10.8 FL (ref 9.2–11.8)
POTASSIUM SERPL-SCNC: 4.1 MMOL/L (ref 3.5–5.5)
PROT SERPL-MCNC: 7.1 G/DL (ref 6.4–8.2)
RBC # BLD AUTO: 5.01 M/UL (ref 4.7–5.5)
SODIUM SERPL-SCNC: 137 MMOL/L (ref 136–145)
WBC # BLD AUTO: 7 K/UL (ref 4.6–13.2)

## 2017-05-16 NOTE — H&P
Pre-Admission History and Physical    Patient: Jamar Reyes   MRN: [id number]   SSN: [id number]   YOB: 1950   Age: 73 y.o. Sex: male     Patient scheduled for: ACDF C5/6 C 6/7. Date of surgery: 5/18/2017 . [facility] Surgeon: Karen Esquivel MD    HPI:  Jamar Reyes is a 73 y.o. male with neck pain, as well as bilateral arm numbness and tingling and some gait disturbance. He reports a pain level of 8/10. MRI demonstrates severe central stenosis. This patient has failed the presurgical conservative treatments  including physical therapy, spinal block injections and medications. Pain has impacted the patient's functional ability to perform daily activities and his job. He is being admitted for surgical intervention.          Past Medical History:   Diagnosis Date    Bilateral hip pain     Chronic pain     back; hx of opiod addiction    Depression     Diabetes (HCC)     borderline, no meds-trying to control with diet    DJD (degenerative joint disease)     GERD (gastroesophageal reflux disease)     Hepatitis C January, 2015    Hypertension     Kidney stones     Lymphadenopathy, generalized 12/05/2015    neg bx    Numbness of foot left    resolved per patient 1/29/16    S/P lumbar fusion 2/9/2016    L4/5 LAMINECTOMY/FUSION/TRANSFORAMINAL LUMBAR INTERBODY FUSION (TLIF) by Dr. Malik Demarco on 2/8/16     Unspecified sleep apnea     no cpap machine-pt denies     Social History     Social History    Marital status:      Spouse name: N/A    Number of children: N/A    Years of education: N/A     Social History Main Topics    Smoking status: Former Smoker     Packs/day: 0.25     Years: 40.00     Types: Cigarettes     Quit date: 5/13/2017    Smokeless tobacco: Never Used    Alcohol use No    Drug use: Yes     Special: Marijuana, Cocaine      Comment: stopped cocaine 2007, marijuana 5/14/17    Sexual activity: No Other Topics Concern    None     Social History Narrative     Past Surgical History:   Procedure Laterality Date    HX APPENDECTOMY  as a child    HX BACK SURGERY  07/2015    HX COLONOSCOPY  2015    negative    HX LUMBAR FUSION  2/2016    HX ORTHOPAEDIC      denies    HX TONSILLECTOMY  as a child    REMOVE [facility] Right 3-10-16    Dr. Prieto Lowery     Family History   Problem Relation Age of Onset    Diabetes Sister     SLE Sister    [facility] Arthritis-osteo Sister     Heart Disease Sister      Allergies   Allergen Reactions    Nicotine Contact Dermatitis     Nicotine Patch reaction - Contact dermatitis with numbness and tingling also occuring in the left arm and denuding of the skin.  Thimerosal Other (comments)     Contact lens solution, made eyes red and irrated     Current Outpatient Prescriptions   Medication Sig Dispense Refill    amLODIPine (NORVASC) 10 mg tablet Take 1 Tab by mouth daily. 90 Tab 1    omeprazole (PRILOSEC) 20 mg capsule Take 20 mg by mouth daily. 11    terbinafine HCl (LAMISIL) 250 mg tablet 250 mg. Takes 7 pills every other month      lisinopril (PRINIVIL, ZESTRIL) 20 mg tablet Take 1 Tab by mouth daily. 90 Tab 1       ROS:  Denies chills, fever,night sweats,  bowel or bladder dysfunction, unexplained weight loss/weight gain, chest pain, sob or anxiety. Physical Examination    Gen: Well developed, well nourished 73 y.o. male   Visit Vitals    /79    Pulse 89    Temp 99 °F (37.2 °C) (Oral)    Resp 18    Ht 5' 11\" (1.803 m)    Wt 229 lb (103.9 kg)    SpO2 95%    BMI 31.94 kg/m2    PAIN SCALE: 7/10     CONSTITUTIONAL: The patient is in no apparent distress and is alert and oriented x 3. HEENT: Normocephalic. Hearing grossly intact. NECK: Supple and symmetric. no tenderness, or masses were felt. RESPIRATORY: No labored breathing. CARDIOVASCULAR: The carotid pulses were normal. Peripheral pulses were 2+.   CHEST: Normal AP diameter and normal contour without any kyphoscoliosis. LYMPHATIC: No lymphadenopathy was appreciated in the neck, axillae or groin. SKIN: Negative for scars, rashes, lesions, or ulcers on the right upper, right lower, left upper, left lower and trunk. NEUROLOGICAL: Alert and oriented x 3. Ambulation without assistive device. FWB. EXTREMITIES: See musculoskeletal.  MUSCULOSKELETAL:  · Head and Neck: Neck pain that radiates into LT upper trapezius. Negative for misalignment, asymmetry, crepitation, defects, tenderness masses or effusions. · Left Upper Extremity: Paresthesia. Inspection, percussion and palpation preformed. Reyess sign is negative. · Right Upper Extremity: Shoulder pain. Paresthesia. Inspection, percussion and palpation preformed. Reyess sign is negative. · Spine, Ribs and Pelvis: Inspection, percussion and palpation preformed. Negative for misalignment, asymmetry, crepitation, defects, tenderness masses or effusions. · Left Lower Extremity: Inspection, percussion and palpation preformed. Negative straight leg raise. · Right Lower Extremity: Inspection, percussion and palpation preformed. Negative straight leg raise.           SPINE EXAM:      Cervical spine: Neck is midline. Normal muscle tone. No focal atrophy is noted.           Assessment and Plan    Due to the pt's persistent symptoms unrelieved by conservative measure Ronald Hendrix is being admitted to [facility] to undergo surgical intervention. The post-operative plan of care consists of physical therapy, home health and a 2 week f/u office visit. We are pending medical clearance by Dr. Talita Matos. The risks, benefits, complications and alternatives to surgery have been discussed in detail with the patient. The patient understands and agrees to proceed.      Tommy Masters NP-C for John Huber MD

## 2017-05-17 ENCOUNTER — OFFICE VISIT (OUTPATIENT)
Dept: INTERNAL MEDICINE CLINIC | Age: 67
End: 2017-05-17

## 2017-05-17 ENCOUNTER — ANESTHESIA EVENT (OUTPATIENT)
Dept: SURGERY | Age: 67
DRG: 473 | End: 2017-05-17
Payer: MEDICARE

## 2017-05-17 VITALS
WEIGHT: 222 LBS | BODY MASS INDEX: 31.08 KG/M2 | TEMPERATURE: 98.7 F | OXYGEN SATURATION: 98 % | HEIGHT: 71 IN | SYSTOLIC BLOOD PRESSURE: 120 MMHG | DIASTOLIC BLOOD PRESSURE: 70 MMHG | RESPIRATION RATE: 16 BRPM | HEART RATE: 92 BPM

## 2017-05-17 DIAGNOSIS — R73.02 IGT (IMPAIRED GLUCOSE TOLERANCE): ICD-10-CM

## 2017-05-17 DIAGNOSIS — Z01.818 PREOPERATIVE CLEARANCE: Primary | ICD-10-CM

## 2017-05-17 DIAGNOSIS — I10 ESSENTIAL HYPERTENSION: ICD-10-CM

## 2017-05-17 DIAGNOSIS — M48.02 SPINAL STENOSIS OF CERVICAL REGION: ICD-10-CM

## 2017-05-17 NOTE — PROGRESS NOTES
1. Have you been to the ER, urgent care clinic since your last visit? Hospitalized since your last visit? No    2. Have you seen or consulted any other health care providers outside of the [address] [place] [address] since your last visit? Include any pap smears or colon screening.  No

## 2017-05-17 NOTE — PATIENT INSTRUCTIONS

## 2017-05-17 NOTE — PROGRESS NOTES
HPI:   Needs medical clearance for spinal surgery (cervical spinal stenosis)  Hx notable for HTN, IGT  w/o chest pain/abd. discomfort; no dyspnea, cough or pedal edema; denies constitutional complaints of fever, night sweats or wt loss; no evidence of GI/ hemorrhage; no polyuria/polydipsia. ROS is otherwise negative. Past Medical History:   Diagnosis Date    Bilateral hip pain     Chronic pain     back; hx of opiod addiction    Depression     Diabetes (HCC)     borderline, no meds-trying to control with diet    DJD (degenerative joint disease)     GERD (gastroesophageal reflux disease)     Hepatitis C January, 2015    Hypertension     Kidney stones     Lymphadenopathy, generalized 12/05/2015    neg bx    Numbness of foot left    resolved per patient 1/29/16    S/P lumbar fusion 2/9/2016    L4/5 LAMINECTOMY/FUSION/TRANSFORAMINAL LUMBAR INTERBODY FUSION (TLIF) by Dr. Miko Page on 2/8/16     Unspecified sleep apnea     no cpap machine-pt denies       Past Surgical History:   Procedure Laterality Date    HX APPENDECTOMY  as a child    HX BACK SURGERY  07/2015    HX COLONOSCOPY  2015    negative    HX LUMBAR FUSION  2/2016    HX ORTHOPAEDIC      denies    HX TONSILLECTOMY  as a child    REMOVE [facility] Right 3-10-16    Dr. Emmanuel Harris History     Social History    Marital status:      Spouse name: N/A    Number of children: N/A    Years of education: N/A     Occupational History    Not on file.      Social History Main Topics    Smoking status: Former Smoker     Packs/day: 0.25     Years: 40.00     Types: Cigarettes     Quit date: 5/13/2017    Smokeless tobacco: Never Used    Alcohol use No    Drug use: Yes     Special: Marijuana, Cocaine      Comment: stopped cocaine 2007, marijuana 5/14/17    Sexual activity: No     Other Topics Concern    Not on file     Social History Narrative       Allergies   Allergen Reactions    Nicotine Contact Dermatitis     Nicotine Patch reaction - Contact dermatitis with numbness and tingling also occuring in the left arm and denuding of the skin.  Thimerosal Other (comments)     Contact lens solution, made eyes red and irrated       Family History   Problem Relation Age of Onset    Diabetes Sister     SLE Sister     Arthritis-osteo Sister     Heart Disease Sister        Current Outpatient Prescriptions   Medication Sig Dispense Refill    amLODIPine (NORVASC) 10 mg tablet Take 1 Tab by mouth daily. 90 Tab 1    omeprazole (PRILOSEC) 20 mg capsule Take 20 mg by mouth daily. 11    terbinafine HCl (LAMISIL) 250 mg tablet 250 mg. Takes 7 pills every other month      lisinopril (PRINIVIL, ZESTRIL) 20 mg tablet Take 1 Tab by mouth daily. 90 Tab 1           Visit Vitals    /70 (BP 1 Location: Left arm, BP Patient Position: Sitting)    Pulse 92    Temp 98.7 °F (37.1 °C) (Tympanic)    Resp 16    Ht 5' 11\" (1.803 m)    Wt 222 lb (100.7 kg)    SpO2 98%    BMI 30.96 kg/m2       PE  Well nourished in NAD  HEENT:   OP: clear. Neck: supple w/o mass or bruits. Chest: clear. CV: RRR w/o m,r,g; pulses intact. Abd: soft, NT, w/o HSM or mass. Ext: w/o edema. Neuro: NF.  Labs:   ECG: neg    Assessment and Plan    Encounter Diagnoses   Name Primary?  Preoperative clearance Yes    Spinal stenosis of cervical region     Essential hypertension     IGT (impaired glucose tolerance)    cervical spinal stenosis  No contraindications to planned spinal surgery  Medically cleared  HTN controlled  IGT - monitor BSs during perioperative period  I have reviewed/discussed the above normal BMI with the patient. I have recommended the following interventions: dietary management education, guidance, and counseling .  .    OV 3 mos or prn  I have explained plan to patient and the patient verbalizes understanding

## 2017-05-18 ENCOUNTER — APPOINTMENT (OUTPATIENT)
Dept: GENERAL RADIOLOGY | Age: 67
DRG: 473 | End: 2017-05-18
Attending: ORTHOPAEDIC SURGERY
Payer: MEDICARE

## 2017-05-18 ENCOUNTER — HOSPITAL ENCOUNTER (INPATIENT)
Age: 67
LOS: 1 days | Discharge: HOME HEALTH | DRG: 473 | End: 2017-05-19
Attending: ORTHOPAEDIC SURGERY | Admitting: ORTHOPAEDIC SURGERY
Payer: MEDICARE

## 2017-05-18 ENCOUNTER — ANESTHESIA (OUTPATIENT)
Dept: SURGERY | Age: 67
DRG: 473 | End: 2017-05-18
Payer: MEDICARE

## 2017-05-18 PROBLEM — G95.9 CERVICAL MYELOPATHY (HCC): Status: ACTIVE | Noted: 2017-05-18

## 2017-05-18 LAB
AMPHET UR QL SCN: NEGATIVE
BARBITURATES UR QL SCN: NEGATIVE
BENZODIAZ UR QL: NEGATIVE
CANNABINOIDS UR QL SCN: POSITIVE
COCAINE UR QL SCN: NEGATIVE
GLUCOSE BLD STRIP.AUTO-MCNC: 110 MG/DL (ref 70–110)
GLUCOSE BLD STRIP.AUTO-MCNC: 113 MG/DL (ref 70–110)
GLUCOSE BLD STRIP.AUTO-MCNC: 140 MG/DL (ref 70–110)
GLUCOSE BLD STRIP.AUTO-MCNC: 160 MG/DL (ref 70–110)
HDSCOM,HDSCOM: ABNORMAL
METHADONE UR QL: NEGATIVE
OPIATES UR QL: NEGATIVE
PCP UR QL: NEGATIVE

## 2017-05-18 PROCEDURE — 0RT30ZZ RESECTION OF CERVICAL VERTEBRAL DISC, OPEN APPROACH: ICD-10-PCS | Performed by: ORTHOPAEDIC SURGERY

## 2017-05-18 PROCEDURE — 74011250637 HC RX REV CODE- 250/637: Performed by: NURSE ANESTHETIST, CERTIFIED REGISTERED

## 2017-05-18 PROCEDURE — 74011000272 HC RX REV CODE- 272: Performed by: ORTHOPAEDIC SURGERY

## 2017-05-18 PROCEDURE — 77030012406 HC DRN WND PENRS BARD -A: Performed by: ORTHOPAEDIC SURGERY

## 2017-05-18 PROCEDURE — 74011250636 HC RX REV CODE- 250/636: Performed by: NURSE ANESTHETIST, CERTIFIED REGISTERED

## 2017-05-18 PROCEDURE — 74011000250 HC RX REV CODE- 250: Performed by: ORTHOPAEDIC SURGERY

## 2017-05-18 PROCEDURE — 77030034966 HC GRFT DBM PLS PST ALLOFUS 1CC ALLO- C: Performed by: ORTHOPAEDIC SURGERY

## 2017-05-18 PROCEDURE — 77030013567 HC DRN WND RESERV BARD -A: Performed by: ORTHOPAEDIC SURGERY

## 2017-05-18 PROCEDURE — 76210000016 HC OR PH I REC 1 TO 1.5 HR: Performed by: ORTHOPAEDIC SURGERY

## 2017-05-18 PROCEDURE — 77030002933 HC SUT MCRYL J&J -A: Performed by: ORTHOPAEDIC SURGERY

## 2017-05-18 PROCEDURE — 77030010507 HC ADH SKN DERMBND J&J -B: Performed by: ORTHOPAEDIC SURGERY

## 2017-05-18 PROCEDURE — 74011250636 HC RX REV CODE- 250/636

## 2017-05-18 PROCEDURE — 76060000034 HC ANESTHESIA 1.5 TO 2 HR: Performed by: ORTHOPAEDIC SURGERY

## 2017-05-18 PROCEDURE — 77030032490 HC SLV COMPR SCD KNE COVD -B: Performed by: ORTHOPAEDIC SURGERY

## 2017-05-18 PROCEDURE — 74011250636 HC RX REV CODE- 250/636: Performed by: NURSE PRACTITIONER

## 2017-05-18 PROCEDURE — 0RG20A0 FUSION OF 2 OR MORE CERVICAL VERTEBRAL JOINTS WITH INTERBODY FUSION DEVICE, ANTERIOR APPROACH, ANTERIOR COLUMN, OPEN APPROACH: ICD-10-PCS | Performed by: ORTHOPAEDIC SURGERY

## 2017-05-18 PROCEDURE — 77030016293 HC SPCR SPN ZEROP SYNT -I2: Performed by: ORTHOPAEDIC SURGERY

## 2017-05-18 PROCEDURE — 80307 DRUG TEST PRSMV CHEM ANLYZR: CPT | Performed by: NURSE ANESTHETIST, CERTIFIED REGISTERED

## 2017-05-18 PROCEDURE — 77030018836 HC SOL IRR NACL ICUM -A: Performed by: ORTHOPAEDIC SURGERY

## 2017-05-18 PROCEDURE — 77030019908 HC STETH ESOPH SIMS -A: Performed by: ANESTHESIOLOGY

## 2017-05-18 PROCEDURE — 77030011267 HC ELECTRD BLD COVD -A: Performed by: ORTHOPAEDIC SURGERY

## 2017-05-18 PROCEDURE — 77030011256 HC DRSG MEPILEX <16IN NO BORD MOLN -A: Performed by: ORTHOPAEDIC SURGERY

## 2017-05-18 PROCEDURE — 77030020782 HC GWN BAIR PAWS FLX 3M -B: Performed by: ORTHOPAEDIC SURGERY

## 2017-05-18 PROCEDURE — 77010033678 HC OXYGEN DAILY

## 2017-05-18 PROCEDURE — 76010000153 HC OR TIME 1.5 TO 2 HR: Performed by: ORTHOPAEDIC SURGERY

## 2017-05-18 PROCEDURE — L0120 CERV FLEX N/ADJ FOAM PRE OTS: HCPCS | Performed by: ORTHOPAEDIC SURGERY

## 2017-05-18 PROCEDURE — 77030004402 HC BUR NEUR STRY -C: Performed by: ORTHOPAEDIC SURGERY

## 2017-05-18 PROCEDURE — 77030013079 HC BLNKT BAIR HGGR 3M -A: Performed by: ANESTHESIOLOGY

## 2017-05-18 PROCEDURE — 77030027138 HC INCENT SPIROMETER -A

## 2017-05-18 PROCEDURE — 77030031139 HC SUT VCRL2 J&J -A: Performed by: ORTHOPAEDIC SURGERY

## 2017-05-18 PROCEDURE — 77030008683 HC TU ET CUF COVD -A: Performed by: ANESTHESIOLOGY

## 2017-05-18 PROCEDURE — 74011250637 HC RX REV CODE- 250/637: Performed by: ORTHOPAEDIC SURGERY

## 2017-05-18 PROCEDURE — C1713 ANCHOR/SCREW BN/BN,TIS/BN: HCPCS | Performed by: ORTHOPAEDIC SURGERY

## 2017-05-18 PROCEDURE — 82962 GLUCOSE BLOOD TEST: CPT

## 2017-05-18 PROCEDURE — 77030012893: Performed by: ORTHOPAEDIC SURGERY

## 2017-05-18 PROCEDURE — 65270000029 HC RM PRIVATE

## 2017-05-18 PROCEDURE — 77030031588 HC SPCR SPN ZEROP VA SYNT -I: Performed by: ORTHOPAEDIC SURGERY

## 2017-05-18 PROCEDURE — 77030011640 HC PAD GRND REM COVD -A: Performed by: ORTHOPAEDIC SURGERY

## 2017-05-18 PROCEDURE — 74011250636 HC RX REV CODE- 250/636: Performed by: ORTHOPAEDIC SURGERY

## 2017-05-18 PROCEDURE — 74011000250 HC RX REV CODE- 250

## 2017-05-18 DEVICE — SPACER SPNL ZERO-P VA IMPL 8MM LORDOTIC STERILE: Type: IMPLANTABLE DEVICE | Site: ANTERIOR CERVICAL | Status: FUNCTIONAL

## 2017-05-18 DEVICE — SPACER SPNL ZERO-P VA IMPL 9MM LORDOTIC STERILE: Type: IMPLANTABLE DEVICE | Site: ANTERIOR CERVICAL | Status: FUNCTIONAL

## 2017-05-18 DEVICE — SCREW SPNL L14MM DIA3.7MM G ANT CERV TI SELF DRL ZERO-P VA: Type: IMPLANTABLE DEVICE | Status: FUNCTIONAL

## 2017-05-18 DEVICE — GRAFT BONE PASTE DEMINERLIZED BONE MTRX 1CC ALLOFUSE +: Type: IMPLANTABLE DEVICE | Site: ANTERIOR CERVICAL | Status: FUNCTIONAL

## 2017-05-18 RX ORDER — CEFAZOLIN SODIUM 2 G/50ML
2 SOLUTION INTRAVENOUS ONCE
Status: COMPLETED | OUTPATIENT
Start: 2017-05-18 | End: 2017-05-18

## 2017-05-18 RX ORDER — SODIUM CHLORIDE 0.9 % (FLUSH) 0.9 %
5-10 SYRINGE (ML) INJECTION AS NEEDED
Status: DISCONTINUED | OUTPATIENT
Start: 2017-05-18 | End: 2017-05-18 | Stop reason: HOSPADM

## 2017-05-18 RX ORDER — HYDROMORPHONE HYDROCHLORIDE 1 MG/ML
INJECTION, SOLUTION INTRAMUSCULAR; INTRAVENOUS; SUBCUTANEOUS AS NEEDED
Status: DISCONTINUED | OUTPATIENT
Start: 2017-05-18 | End: 2017-05-18 | Stop reason: HOSPADM

## 2017-05-18 RX ORDER — ROCURONIUM BROMIDE 10 MG/ML
INJECTION, SOLUTION INTRAVENOUS AS NEEDED
Status: DISCONTINUED | OUTPATIENT
Start: 2017-05-18 | End: 2017-05-18 | Stop reason: HOSPADM

## 2017-05-18 RX ORDER — PANTOPRAZOLE SODIUM 40 MG/1
40 TABLET, DELAYED RELEASE ORAL
Status: DISCONTINUED | OUTPATIENT
Start: 2017-05-19 | End: 2017-05-19 | Stop reason: HOSPADM

## 2017-05-18 RX ORDER — DIPHENHYDRAMINE HYDROCHLORIDE 50 MG/ML
12.5 INJECTION, SOLUTION INTRAMUSCULAR; INTRAVENOUS
Status: DISCONTINUED | OUTPATIENT
Start: 2017-05-18 | End: 2017-05-18 | Stop reason: HOSPADM

## 2017-05-18 RX ORDER — ONDANSETRON 2 MG/ML
INJECTION INTRAMUSCULAR; INTRAVENOUS AS NEEDED
Status: DISCONTINUED | OUTPATIENT
Start: 2017-05-18 | End: 2017-05-18 | Stop reason: HOSPADM

## 2017-05-18 RX ORDER — DEXTROSE, SODIUM CHLORIDE, AND POTASSIUM CHLORIDE 5; .45; .15 G/100ML; G/100ML; G/100ML
25 INJECTION INTRAVENOUS CONTINUOUS
Status: DISCONTINUED | OUTPATIENT
Start: 2017-05-18 | End: 2017-05-19 | Stop reason: HOSPADM

## 2017-05-18 RX ORDER — DEXAMETHASONE SODIUM PHOSPHATE 4 MG/ML
INJECTION, SOLUTION INTRA-ARTICULAR; INTRALESIONAL; INTRAMUSCULAR; INTRAVENOUS; SOFT TISSUE AS NEEDED
Status: DISCONTINUED | OUTPATIENT
Start: 2017-05-18 | End: 2017-05-18 | Stop reason: HOSPADM

## 2017-05-18 RX ORDER — NALOXONE HYDROCHLORIDE 0.4 MG/ML
0.4 INJECTION, SOLUTION INTRAMUSCULAR; INTRAVENOUS; SUBCUTANEOUS AS NEEDED
Status: DISCONTINUED | OUTPATIENT
Start: 2017-05-18 | End: 2017-05-19 | Stop reason: HOSPADM

## 2017-05-18 RX ORDER — CEFAZOLIN SODIUM 2 G/50ML
2 SOLUTION INTRAVENOUS EVERY 8 HOURS
Status: DISCONTINUED | OUTPATIENT
Start: 2017-05-18 | End: 2017-05-19 | Stop reason: HOSPADM

## 2017-05-18 RX ORDER — LORAZEPAM 2 MG/ML
1 INJECTION INTRAMUSCULAR
Status: DISCONTINUED | OUTPATIENT
Start: 2017-05-18 | End: 2017-05-19 | Stop reason: HOSPADM

## 2017-05-18 RX ORDER — DIPHENHYDRAMINE HCL 25 MG
25 CAPSULE ORAL
Status: DISCONTINUED | OUTPATIENT
Start: 2017-05-18 | End: 2017-05-19 | Stop reason: HOSPADM

## 2017-05-18 RX ORDER — NALOXONE HYDROCHLORIDE 0.4 MG/ML
0.4 INJECTION, SOLUTION INTRAMUSCULAR; INTRAVENOUS; SUBCUTANEOUS AS NEEDED
Status: DISCONTINUED | OUTPATIENT
Start: 2017-05-18 | End: 2017-05-18 | Stop reason: HOSPADM

## 2017-05-18 RX ORDER — SODIUM CHLORIDE, SODIUM LACTATE, POTASSIUM CHLORIDE, CALCIUM CHLORIDE 600; 310; 30; 20 MG/100ML; MG/100ML; MG/100ML; MG/100ML
75 INJECTION, SOLUTION INTRAVENOUS CONTINUOUS
Status: DISCONTINUED | OUTPATIENT
Start: 2017-05-18 | End: 2017-05-18 | Stop reason: HOSPADM

## 2017-05-18 RX ORDER — INSULIN LISPRO 100 [IU]/ML
INJECTION, SOLUTION INTRAVENOUS; SUBCUTANEOUS ONCE
Status: DISCONTINUED | OUTPATIENT
Start: 2017-05-18 | End: 2017-05-18 | Stop reason: HOSPADM

## 2017-05-18 RX ORDER — LABETALOL HYDROCHLORIDE 5 MG/ML
INJECTION, SOLUTION INTRAVENOUS AS NEEDED
Status: DISCONTINUED | OUTPATIENT
Start: 2017-05-18 | End: 2017-05-18 | Stop reason: HOSPADM

## 2017-05-18 RX ORDER — LISINOPRIL 20 MG/1
20 TABLET ORAL DAILY
Status: DISCONTINUED | OUTPATIENT
Start: 2017-05-19 | End: 2017-05-19 | Stop reason: HOSPADM

## 2017-05-18 RX ORDER — SODIUM CHLORIDE 9 MG/ML
INJECTION, SOLUTION INTRAVENOUS
Status: DISCONTINUED | OUTPATIENT
Start: 2017-05-18 | End: 2017-05-18 | Stop reason: HOSPADM

## 2017-05-18 RX ORDER — DIAZEPAM 5 MG/1
5 TABLET ORAL
Status: DISCONTINUED | OUTPATIENT
Start: 2017-05-18 | End: 2017-05-19 | Stop reason: HOSPADM

## 2017-05-18 RX ORDER — MAGNESIUM SULFATE 100 %
4 CRYSTALS MISCELLANEOUS AS NEEDED
Status: DISCONTINUED | OUTPATIENT
Start: 2017-05-18 | End: 2017-05-18 | Stop reason: HOSPADM

## 2017-05-18 RX ORDER — MIDAZOLAM HYDROCHLORIDE 1 MG/ML
INJECTION, SOLUTION INTRAMUSCULAR; INTRAVENOUS AS NEEDED
Status: DISCONTINUED | OUTPATIENT
Start: 2017-05-18 | End: 2017-05-18 | Stop reason: HOSPADM

## 2017-05-18 RX ORDER — SODIUM CHLORIDE 0.9 % (FLUSH) 0.9 %
5-10 SYRINGE (ML) INJECTION EVERY 8 HOURS
Status: DISCONTINUED | OUTPATIENT
Start: 2017-05-18 | End: 2017-05-18 | Stop reason: HOSPADM

## 2017-05-18 RX ORDER — ALBUTEROL SULFATE 0.83 MG/ML
2.5 SOLUTION RESPIRATORY (INHALATION) AS NEEDED
Status: DISCONTINUED | OUTPATIENT
Start: 2017-05-18 | End: 2017-05-18 | Stop reason: HOSPADM

## 2017-05-18 RX ORDER — KETOROLAC TROMETHAMINE 30 MG/ML
INJECTION, SOLUTION INTRAMUSCULAR; INTRAVENOUS AS NEEDED
Status: DISCONTINUED | OUTPATIENT
Start: 2017-05-18 | End: 2017-05-18 | Stop reason: HOSPADM

## 2017-05-18 RX ORDER — SODIUM CHLORIDE 0.9 % (FLUSH) 0.9 %
5-10 SYRINGE (ML) INJECTION EVERY 8 HOURS
Status: DISCONTINUED | OUTPATIENT
Start: 2017-05-18 | End: 2017-05-19 | Stop reason: HOSPADM

## 2017-05-18 RX ORDER — SODIUM CHLORIDE, SODIUM LACTATE, POTASSIUM CHLORIDE, CALCIUM CHLORIDE 600; 310; 30; 20 MG/100ML; MG/100ML; MG/100ML; MG/100ML
50 INJECTION, SOLUTION INTRAVENOUS CONTINUOUS
Status: DISCONTINUED | OUTPATIENT
Start: 2017-05-18 | End: 2017-05-18 | Stop reason: HOSPADM

## 2017-05-18 RX ORDER — DIPHENHYDRAMINE HYDROCHLORIDE 50 MG/ML
12.5 INJECTION, SOLUTION INTRAMUSCULAR; INTRAVENOUS
Status: DISCONTINUED | OUTPATIENT
Start: 2017-05-18 | End: 2017-05-19 | Stop reason: HOSPADM

## 2017-05-18 RX ORDER — SUCCINYLCHOLINE CHLORIDE 20 MG/ML
INJECTION INTRAMUSCULAR; INTRAVENOUS AS NEEDED
Status: DISCONTINUED | OUTPATIENT
Start: 2017-05-18 | End: 2017-05-18 | Stop reason: HOSPADM

## 2017-05-18 RX ORDER — VANCOMYCIN HYDROCHLORIDE 1 G/20ML
INJECTION, POWDER, LYOPHILIZED, FOR SOLUTION INTRAVENOUS AS NEEDED
Status: DISCONTINUED | OUTPATIENT
Start: 2017-05-18 | End: 2017-05-18 | Stop reason: HOSPADM

## 2017-05-18 RX ORDER — NEOSTIGMINE METHYLSULFATE 1 MG/ML
INJECTION INTRAVENOUS AS NEEDED
Status: DISCONTINUED | OUTPATIENT
Start: 2017-05-18 | End: 2017-05-18 | Stop reason: HOSPADM

## 2017-05-18 RX ORDER — PROPOFOL 10 MG/ML
INJECTION, EMULSION INTRAVENOUS AS NEEDED
Status: DISCONTINUED | OUTPATIENT
Start: 2017-05-18 | End: 2017-05-18 | Stop reason: HOSPADM

## 2017-05-18 RX ORDER — GLYCOPYRROLATE 0.2 MG/ML
INJECTION INTRAMUSCULAR; INTRAVENOUS AS NEEDED
Status: DISCONTINUED | OUTPATIENT
Start: 2017-05-18 | End: 2017-05-18 | Stop reason: HOSPADM

## 2017-05-18 RX ORDER — PREGABALIN 50 MG/1
50 CAPSULE ORAL
Status: DISCONTINUED | OUTPATIENT
Start: 2017-05-18 | End: 2017-05-18 | Stop reason: HOSPADM

## 2017-05-18 RX ORDER — DEXTROSE 50 % IN WATER (D50W) INTRAVENOUS SYRINGE
25-50 AS NEEDED
Status: DISCONTINUED | OUTPATIENT
Start: 2017-05-18 | End: 2017-05-18 | Stop reason: HOSPADM

## 2017-05-18 RX ORDER — HYDROMORPHONE HYDROCHLORIDE 1 MG/ML
1 INJECTION, SOLUTION INTRAMUSCULAR; INTRAVENOUS; SUBCUTANEOUS
Status: DISCONTINUED | OUTPATIENT
Start: 2017-05-18 | End: 2017-05-19 | Stop reason: HOSPADM

## 2017-05-18 RX ORDER — CELECOXIB 400 MG/1
400 CAPSULE ORAL
Status: COMPLETED | OUTPATIENT
Start: 2017-05-18 | End: 2017-05-18

## 2017-05-18 RX ORDER — AMLODIPINE BESYLATE 10 MG/1
10 TABLET ORAL DAILY
Status: DISCONTINUED | OUTPATIENT
Start: 2017-05-19 | End: 2017-05-19 | Stop reason: HOSPADM

## 2017-05-18 RX ORDER — FENTANYL CITRATE 50 UG/ML
INJECTION, SOLUTION INTRAMUSCULAR; INTRAVENOUS AS NEEDED
Status: DISCONTINUED | OUTPATIENT
Start: 2017-05-18 | End: 2017-05-18 | Stop reason: HOSPADM

## 2017-05-18 RX ORDER — OXYCODONE AND ACETAMINOPHEN 10; 325 MG/1; MG/1
1 TABLET ORAL
Status: DISCONTINUED | OUTPATIENT
Start: 2017-05-18 | End: 2017-05-19 | Stop reason: HOSPADM

## 2017-05-18 RX ORDER — ONDANSETRON 2 MG/ML
4 INJECTION INTRAMUSCULAR; INTRAVENOUS ONCE
Status: COMPLETED | OUTPATIENT
Start: 2017-05-18 | End: 2017-05-18

## 2017-05-18 RX ORDER — ONDANSETRON 2 MG/ML
4 INJECTION INTRAMUSCULAR; INTRAVENOUS
Status: DISCONTINUED | OUTPATIENT
Start: 2017-05-18 | End: 2017-05-19 | Stop reason: HOSPADM

## 2017-05-18 RX ORDER — FAMOTIDINE 20 MG/1
20 TABLET, FILM COATED ORAL ONCE
Status: COMPLETED | OUTPATIENT
Start: 2017-05-18 | End: 2017-05-18

## 2017-05-18 RX ORDER — HYDROMORPHONE HYDROCHLORIDE 2 MG/ML
0.5 INJECTION, SOLUTION INTRAMUSCULAR; INTRAVENOUS; SUBCUTANEOUS
Status: DISCONTINUED | OUTPATIENT
Start: 2017-05-18 | End: 2017-05-18 | Stop reason: ALTCHOICE

## 2017-05-18 RX ORDER — LIDOCAINE HYDROCHLORIDE 20 MG/ML
INJECTION, SOLUTION EPIDURAL; INFILTRATION; INTRACAUDAL; PERINEURAL AS NEEDED
Status: DISCONTINUED | OUTPATIENT
Start: 2017-05-18 | End: 2017-05-18 | Stop reason: HOSPADM

## 2017-05-18 RX ORDER — FAMOTIDINE 10 MG/ML
20 INJECTION INTRAVENOUS EVERY 12 HOURS
Status: DISCONTINUED | OUTPATIENT
Start: 2017-05-18 | End: 2017-05-19 | Stop reason: HOSPADM

## 2017-05-18 RX ORDER — SODIUM CHLORIDE 0.9 % (FLUSH) 0.9 %
5-10 SYRINGE (ML) INJECTION AS NEEDED
Status: DISCONTINUED | OUTPATIENT
Start: 2017-05-18 | End: 2017-05-19 | Stop reason: HOSPADM

## 2017-05-18 RX ADMIN — MIDAZOLAM HYDROCHLORIDE 2 MG: 1 INJECTION, SOLUTION INTRAMUSCULAR; INTRAVENOUS at 10:49

## 2017-05-18 RX ADMIN — ROCURONIUM BROMIDE 5 MG: 10 INJECTION, SOLUTION INTRAVENOUS at 10:54

## 2017-05-18 RX ADMIN — DEXAMETHASONE SODIUM PHOSPHATE 10 MG: 4 INJECTION, SOLUTION INTRA-ARTICULAR; INTRALESIONAL; INTRAMUSCULAR; INTRAVENOUS; SOFT TISSUE at 10:54

## 2017-05-18 RX ADMIN — FENTANYL CITRATE 50 MCG: 50 INJECTION, SOLUTION INTRAMUSCULAR; INTRAVENOUS at 12:12

## 2017-05-18 RX ADMIN — PROPOFOL 150 MG: 10 INJECTION, EMULSION INTRAVENOUS at 10:54

## 2017-05-18 RX ADMIN — FENTANYL CITRATE 150 MCG: 50 INJECTION, SOLUTION INTRAMUSCULAR; INTRAVENOUS at 10:54

## 2017-05-18 RX ADMIN — ONDANSETRON 4 MG: 2 INJECTION INTRAMUSCULAR; INTRAVENOUS at 12:14

## 2017-05-18 RX ADMIN — NEOSTIGMINE METHYLSULFATE 4 MG: 1 INJECTION INTRAVENOUS at 12:20

## 2017-05-18 RX ADMIN — FENTANYL CITRATE 50 MCG: 50 INJECTION, SOLUTION INTRAMUSCULAR; INTRAVENOUS at 11:14

## 2017-05-18 RX ADMIN — SODIUM CHLORIDE, SODIUM LACTATE, POTASSIUM CHLORIDE, AND CALCIUM CHLORIDE 75 ML/HR: 600; 310; 30; 20 INJECTION, SOLUTION INTRAVENOUS at 09:35

## 2017-05-18 RX ADMIN — ONDANSETRON 4 MG: 2 INJECTION INTRAMUSCULAR; INTRAVENOUS at 10:49

## 2017-05-18 RX ADMIN — HYDROMORPHONE HYDROCHLORIDE 0.5 MG: 2 INJECTION, SOLUTION INTRAMUSCULAR; INTRAVENOUS; SUBCUTANEOUS at 13:23

## 2017-05-18 RX ADMIN — SUCCINYLCHOLINE CHLORIDE 140 MG: 20 INJECTION INTRAMUSCULAR; INTRAVENOUS at 10:54

## 2017-05-18 RX ADMIN — FENTANYL CITRATE 50 MCG: 50 INJECTION, SOLUTION INTRAMUSCULAR; INTRAVENOUS at 12:28

## 2017-05-18 RX ADMIN — LABETALOL HYDROCHLORIDE 5 MG: 5 INJECTION, SOLUTION INTRAVENOUS at 12:12

## 2017-05-18 RX ADMIN — FAMOTIDINE 20 MG: 10 INJECTION, SOLUTION INTRAVENOUS at 15:59

## 2017-05-18 RX ADMIN — ONDANSETRON 4 MG: 2 INJECTION INTRAMUSCULAR; INTRAVENOUS at 13:09

## 2017-05-18 RX ADMIN — LIDOCAINE HYDROCHLORIDE 60 MG: 20 INJECTION, SOLUTION EPIDURAL; INFILTRATION; INTRACAUDAL; PERINEURAL at 10:54

## 2017-05-18 RX ADMIN — SODIUM CHLORIDE, SODIUM LACTATE, POTASSIUM CHLORIDE, AND CALCIUM CHLORIDE: 600; 310; 30; 20 INJECTION, SOLUTION INTRAVENOUS at 10:49

## 2017-05-18 RX ADMIN — HYDROMORPHONE HYDROCHLORIDE 1 MG: 1 INJECTION, SOLUTION INTRAMUSCULAR; INTRAVENOUS; SUBCUTANEOUS at 11:14

## 2017-05-18 RX ADMIN — CELECOXIB 400 MG: 400 CAPSULE ORAL at 09:47

## 2017-05-18 RX ADMIN — Medication 10 ML: at 15:59

## 2017-05-18 RX ADMIN — OXYCODONE HYDROCHLORIDE AND ACETAMINOPHEN 1 TABLET: 10; 325 TABLET ORAL at 22:08

## 2017-05-18 RX ADMIN — FENTANYL CITRATE 50 MCG: 50 INJECTION, SOLUTION INTRAMUSCULAR; INTRAVENOUS at 12:37

## 2017-05-18 RX ADMIN — SODIUM CHLORIDE: 9 INJECTION, SOLUTION INTRAVENOUS at 11:00

## 2017-05-18 RX ADMIN — PREGABALIN 50 MG: 50 CAPSULE ORAL at 09:47

## 2017-05-18 RX ADMIN — GLYCOPYRROLATE 0.6 MG: 0.2 INJECTION INTRAMUSCULAR; INTRAVENOUS at 12:20

## 2017-05-18 RX ADMIN — KETOROLAC TROMETHAMINE 30 MG: 30 INJECTION, SOLUTION INTRAMUSCULAR; INTRAVENOUS at 12:14

## 2017-05-18 RX ADMIN — ROCURONIUM BROMIDE 25 MG: 10 INJECTION, SOLUTION INTRAVENOUS at 10:59

## 2017-05-18 RX ADMIN — GLYCOPYRROLATE 0.2 MG: 0.2 INJECTION INTRAMUSCULAR; INTRAVENOUS at 10:49

## 2017-05-18 RX ADMIN — CEFAZOLIN SODIUM 2 G: 2 SOLUTION INTRAVENOUS at 17:44

## 2017-05-18 RX ADMIN — CEFAZOLIN SODIUM 2 G: 2 SOLUTION INTRAVENOUS at 10:49

## 2017-05-18 RX ADMIN — HYDROMORPHONE HYDROCHLORIDE 0.5 MG: 2 INJECTION, SOLUTION INTRAMUSCULAR; INTRAVENOUS; SUBCUTANEOUS at 13:08

## 2017-05-18 RX ADMIN — FAMOTIDINE 20 MG: 20 TABLET ORAL at 09:47

## 2017-05-18 RX ADMIN — SODIUM CHLORIDE: 9 INJECTION, SOLUTION INTRAVENOUS at 12:00

## 2017-05-18 RX ADMIN — DEXTROSE MONOHYDRATE, SODIUM CHLORIDE, AND POTASSIUM CHLORIDE 25 ML/HR: 50; 4.5; 1.49 INJECTION, SOLUTION INTRAVENOUS at 15:56

## 2017-05-18 NOTE — ANESTHESIA POSTPROCEDURE EVALUATION
Post-Anesthesia Evaluation and Assessment    Patient: Bassem Cedillo MRN: [id number]  SSN: [id number]    YOB: 1950  Age: 73 y.o. Sex: male       Cardiovascular Function/Vital Signs  Visit Vitals    /72    Pulse 61    Temp 36.5 °C (97.7 °F)    Resp 14    Ht 5' 11\" (1.803 m)    Wt 100.9 kg (222 lb 6 oz)    SpO2 97%    BMI 31.02 kg/m2       Patient is status post general anesthesia for Procedure(s):  ANTERIOR CERVICAL DISCECTOMY WITH FUSION C5/6 C6/7. Nausea/Vomiting: None    Postoperative hydration reviewed and adequate. Pain:  Pain Scale 1: Numeric (0 - 10) (05/18/17 1327)  Pain Intensity 1: 3 (05/18/17 1327)   Managed    Neurological Status:   Neuro (WDL): Within Defined Limits (05/18/17 1238)  Neuro  LUE Motor Response: Purposeful (05/18/17 1238)  LLE Motor Response: Purposeful (05/18/17 1238)  RUE Motor Response: Purposeful (05/18/17 1238)  RLE Motor Response: Purposeful (05/18/17 1238)   At baseline    Mental Status and Level of Consciousness: Arousable    Pulmonary Status:   O2 Device: Nasal cannula (05/18/17 1257)   Adequate oxygenation and airway patent    Complications related to anesthesia: None    Post-anesthesia assessment completed.  No concerns    Signed By: Todd Hanna MD     May 18, 2017

## 2017-05-18 NOTE — ROUTINE PROCESS
Bedside and Verbal shift change report given to [address] [place] [address] (oncoming nurse) by Sosa Mcdonough (offgoing nurse). Report included the following information SBAR, Kardex, Procedure Summary, Intake/Output, MAR, Recent Results and Med Rec Status.

## 2017-05-18 NOTE — BRIEF OP NOTE
BRIEF OPERATIVE NOTE    Date of Procedure: 5/18/2017   Preoperative Diagnosis: Cervical stenosis of spine [M48.02]  Postoperative Diagnosis: Cervical stenosis of spine [M48.02]    Procedure(s):  ANTERIOR CERVICAL DISCECTOMY WITH FUSION C5/6 C6/7  Surgeon(s) and Role:     * Katelynn Newman MD - Primary         Assistant Staff:       Surgical Staff:  Circ-1: Tiffany Longo RN  Circ-Relief: Mickey Kim RN  Radiology Technician: Murali Arcos  Scrub Tech-1: Felipa Sink  Surg Asst-1: Johnathon Blair  Event Time In   Incision Start 1116   Incision Close 1220     Anesthesia: General   Estimated Blood Loss: 25  Specimens: * No specimens in log *   Findings: stenosis   Complications: 0  Implants:   Implant Name Type Inv.  Item Serial No.  Lot No. LRB No. Used Action   SPACER 0-P VA ACIF 8MM --  - [id number]  SPACER 0-P VA ACIF 8MM --   SYNTHES [place] [id number] N/A 1 Implanted   GRAFT DBM PLUS PASTE 1ML -- ALLOFUSE - [id number]  GRAFT DBM PLUS PASTE 1ML -- ALLOFUSE [id number] ALLO SOURCE [id number] N/A 1 Implanted   SPACER SPNE V-A LORDTC 9MM -- STRL ZERO-P VA - [id number]   SPACER SPNE V-A LORDTC 9MM -- STRL ZERO-P VA   SYNTHES [place] [id number] N/A 1 Implanted

## 2017-05-18 NOTE — ADDENDUM NOTE
Addendum  created 05/18/17 1417 by Valentin Parker CRNA    Anesthesia Intra Meds edited, Orders acknowledged in Narrator

## 2017-05-18 NOTE — ANESTHESIA PREPROCEDURE EVALUATION
Anesthetic History               Review of Systems / Medical History  Patient summary reviewed, nursing notes reviewed and pertinent labs reviewed    Pulmonary              Pertinent negatives: No sleep apnea     Neuro/Psych         Psychiatric history     Cardiovascular    Hypertension: well controlled              Exercise tolerance: >4 METS     GI/Hepatic/Renal     GERD: well controlled      Liver disease     Endo/Other    Diabetes: well controlled, type 2    Arthritis     Other Findings   Comments:   Risk Factors for Postoperative nausea/vomiting:       History of postoperative nausea/vomiting? NO       Female? NO       Motion sickness? NO       Intended opioid administration for postoperative analgesia? YES      Smoking Abstinence  Current Smoker? YES  Elective Surgery? YES  Seen preoperatively by anesthesiologist or proxy prior to day of surgery? YES  Pt abstained from smoking 24 hours prior to anesthesia?  YES           Physical Exam    Airway  Mallampati: III  TM Distance: > 6 cm  Neck ROM: normal range of motion   Mouth opening: Normal     Cardiovascular    Rhythm: regular  Rate: normal         Dental    Dentition: Edentulous     Pulmonary  Breath sounds clear to auscultation               Abdominal  GI exam deferred       Other Findings            Anesthetic Plan    ASA: 3  Anesthesia type: general          Induction: Intravenous  Anesthetic plan and risks discussed with: Patient

## 2017-05-18 NOTE — PROGRESS NOTES
Date of Surgery:5/18/17  Surgery: Anterior Cervical Discectomy with Fusion C5/6 C6/7  VSS  Dressing clean, dry and intact. Neck collar in place. ANGEL: no drainage  2 L NC  Swallowing ok  Neuro intact. Moving all extremities. 4/5 strength to BUE. Pre op pain: neck pain and bilateral arm numbness and weakness. Patient states this has resolved.      Adriana Norris NP

## 2017-05-18 NOTE — PERIOP NOTES
TRANSFER - OUT REPORT:    Verbal report given to Ruma RN(name) on Kaleigh Paci  being transferred to [address] [place] [address](unit) for routine post - op       Report consisted of patients Situation, Background, Assessment and   Recommendations(SBAR). Information from the following report(s) SBAR, OR Summary, Procedure Summary, Intake/Output and MAR was reviewed with the receiving nurse. Lines:   Peripheral IV 05/18/17 Left Arm (Active)   Site Assessment Clean, dry, & intact 5/18/2017 12:38 PM   Phlebitis Assessment 0 5/18/2017 12:38 PM   Infiltration Assessment 0 5/18/2017 12:38 PM   Dressing Status Clean, dry, & intact 5/18/2017 12:38 PM   Dressing Type Transparent;Tape 5/18/2017 12:38 PM   Hub Color/Line Status Pink; Infusing 5/18/2017 12:38 PM       Peripheral IV 05/18/17 Right Arm (Active)   Site Assessment Clean, dry, & intact 5/18/2017 12:38 PM   Phlebitis Assessment 0 5/18/2017 12:38 PM   Infiltration Assessment 0 5/18/2017 12:38 PM   Dressing Status Clean, dry, & intact 5/18/2017 12:38 PM   Dressing Type Transparent;Tape 5/18/2017 12:38 PM   Hub Color/Line Status Pink;Capped 5/18/2017 12:38 PM        Opportunity for questions and clarification was provided.       Patient transported with:   O2 @ 3 liters  Tech

## 2017-05-18 NOTE — OP NOTES
[address]    Name:  Soila Dancer  MR#:  [id number]  :  1950  Account #:  [de-identified]  Date of Adm:  2017  Date of Surgery:  2017      SURGEON: Flower Amin MD.    PREOPERATIVE DIAGNOSIS: Cervical spondylitic myelopathy. POSTOPERATIVE DIAGNOSIS: Cervical spondylitic myelopathy. PROCEDURES PERFORMED: Anterior cervical diskectomy and  fusion C5-6, anterior cervical diskectomy and fusion C6-7, Zero-P  interbody device x2. Anterior cervical plate fixation L3-T9, C6-C7 with  Zero-P plate and screws. ANESTHESIA:      FINDINGS: The patient had spondylitic stenosis at both levels with  uncinate spurring and disk protrusion. Surface anatomy made it best to  use of Zero-P device as opposed to an anterior cervical plate confluent  to both segments. DESCRIPTION OF PROCEDURE: Following induction of endotracheal  anesthesia, the patient placed with head in neutral position in Joya  headrest. The patient was prepped and draped in the usual fashion. Right-sided approach was utilized. Sternocleidomastoid and great  vessels mobilized laterally. The esophagus and trachea were  mobilized medially with blunt finger dissection. Prevertebral fascia was  entered and C-arm image verified our surgical level. Beginning  inferiorly at C6-7, Bladen pins placed in segments above and below. Cloward self-retaining retractor was placed to cover longus coli  musculature bilaterally  tissue incised and a radical diskectomy done  back to the posterior margin. Posterior marginal osteophytes were  thinned with a high-speed bur and resected with 4-0 Stephanie curet and  sella punch. Posterior longitudinal ligament was taken and a thorough  decompression done of the epidural space. Endplates repaired. Zero-P  interbody device sized, filled with demineralized bone matrix and  tamped firmly into place with excellent bony apposition.  The interval  plate was utilized with screw fixation C6 and C7 with the locking device  engaged. Procedure was repeated at C5-6. The wound was copiously  irrigated then, there was no active bleeding. The segments were  stable. A deep drain utilized. The neck was then closed in layers and  the skin closed with subcuticular suture and Dermabond. Sterile  occlusive dressing was placed upon the wound. All counts were  correct. ESTIMATED BLOOD LOSS: 10-25 mL. There were no complications. SPECIMENS REMOVED: No specimens. The patient went to the recovery room in good and stable condition.         Collette Ormond, MD Johana Arnold / Shawn.Mode  D:  05/18/2017   12:37  T:  05/18/2017   14:47  Job #:  [id number]

## 2017-05-19 ENCOUNTER — HOME HEALTH ADMISSION (OUTPATIENT)
Dept: HOME HEALTH SERVICES | Facility: HOME HEALTH | Age: 67
End: 2017-05-19
Payer: MEDICARE

## 2017-05-19 VITALS
DIASTOLIC BLOOD PRESSURE: 80 MMHG | HEIGHT: 71 IN | HEART RATE: 79 BPM | SYSTOLIC BLOOD PRESSURE: 154 MMHG | BODY MASS INDEX: 31.13 KG/M2 | OXYGEN SATURATION: 97 % | TEMPERATURE: 97.3 F | RESPIRATION RATE: 18 BRPM | WEIGHT: 222.38 LBS

## 2017-05-19 PROCEDURE — 97162 PT EVAL MOD COMPLEX 30 MIN: CPT

## 2017-05-19 PROCEDURE — 74011250636 HC RX REV CODE- 250/636: Performed by: ORTHOPAEDIC SURGERY

## 2017-05-19 PROCEDURE — 74011000250 HC RX REV CODE- 250: Performed by: ORTHOPAEDIC SURGERY

## 2017-05-19 PROCEDURE — 74011250637 HC RX REV CODE- 250/637: Performed by: ORTHOPAEDIC SURGERY

## 2017-05-19 PROCEDURE — 97165 OT EVAL LOW COMPLEX 30 MIN: CPT

## 2017-05-19 PROCEDURE — 77030012935 HC DRSG AQUACEL BMS -B

## 2017-05-19 RX ORDER — OXYCODONE AND ACETAMINOPHEN 10; 325 MG/1; MG/1
1 TABLET ORAL
Qty: 30 TAB | Refills: 0 | Status: SHIPPED | OUTPATIENT
Start: 2017-05-19 | End: 2017-08-28

## 2017-05-19 RX ADMIN — Medication 10 ML: at 10:26

## 2017-05-19 RX ADMIN — PANTOPRAZOLE SODIUM 40 MG: 40 TABLET, DELAYED RELEASE ORAL at 10:26

## 2017-05-19 RX ADMIN — CEFAZOLIN SODIUM 2 G: 2 SOLUTION INTRAVENOUS at 10:26

## 2017-05-19 RX ADMIN — AMLODIPINE BESYLATE 10 MG: 10 TABLET ORAL at 10:26

## 2017-05-19 RX ADMIN — FAMOTIDINE 20 MG: 10 INJECTION, SOLUTION INTRAVENOUS at 03:44

## 2017-05-19 RX ADMIN — CEFAZOLIN SODIUM 2 G: 2 SOLUTION INTRAVENOUS at 03:44

## 2017-05-19 RX ADMIN — OXYCODONE HYDROCHLORIDE AND ACETAMINOPHEN 1 TABLET: 10; 325 TABLET ORAL at 03:49

## 2017-05-19 RX ADMIN — LISINOPRIL 20 MG: 20 TABLET ORAL at 10:26

## 2017-05-19 NOTE — PROGRESS NOTES
Rounded on patient. Discussed with patient the importance of ambulating with assistance. Reinforced using incentive spirometer and doing ankle pumps when up in chair. Encouraged patient to take pain medication so that they can get OOB and ambulate to help recovery. Reviewed the importance of eating a healthy diet and not skipping meals for healing. Patient verbalizing understanding. Dressing dry and intact. Call light in reach. Patient asked if there is anything they need or questions they have.       Orthopedic

## 2017-05-19 NOTE — ROUTINE PROCESS
Patient AOX4, states he is comfortable and there is no pain. denies SOB, pedal pulses palpable, cap refill < 3 sec, sensation present, denies tingling, some numbness on the LEs. ANGEL Drain removed as ordered. Small drainage came out of the site after the drain removal. The incision site is  clean , dry intact and well approximated, no redness or swelling noticed. . 4 x 4 and transparent tape applied on both sites. Pt tolerated the procedure well. Pt  resting in bed in low position. No sign and symptoms of distress. Call bell in reach.

## 2017-05-19 NOTE — PROGRESS NOTES
conducted an initial consultation and Spiritual Assessment for Jelena Ying, who is a 73 y. o.,male. Patients Primary Language is: [place]. According to the patients EMR Adventism Affiliation is: [place]. The reason the Patient came to the hospital is:   Patient Active Problem List    Diagnosis Date Noted    [facility] 05/18/2017    Acute pain of right shoulder 04/04/2017    Cervical spinal stenosis 11/29/2016    Bulge of lumbar disc without myelopathy 11/29/2016    Lumbar facet arthropathy ([facility] 11/15/2016    Chronic pain 11/15/2016    Neuritis 11/15/2016    Kidney stones 05/10/2016    S/P lumbar fusion 02/08/2016    Onychomycosis of toenail 11/25/2015    GERD (gastroesophageal reflux disease) 05/01/2014    Chronic hepatitis C without hepatic coma ([facility] 04/03/2014    HTN (hypertension) 04/02/2014    Back pain 04/02/2014    Prediabetes 04/02/2014        The  provided the following Interventions:  Initiated a relationship of care and support. Explored issues of manuel, belief, spirituality and Latter day/ritual needs while hospitalized. Listened empathically as patient shared. Provided chaplaincy education. Provided information about Spiritual Care Services. Offered prayer and assurance of continued prayers on patient's behalf. Chart reviewed. The following outcomes where achieved:  Patient shared limited information about both their medical narrative and spiritual journey/beliefs.  confirmed Patient's Adventism Affiliation. Patient processed feeling about current hospitalization. Patient shared how much he appreciates the [employer]. Patient believes he has new lease on life. Patient expressed gratitude for 's visit. Plan:  Chaplains will continue to follow and will provide pastoral care on an as needed/requested basis.    recommends bedside caregivers page  on duty if patient shows signs of acute spiritual or emotional distress.       Brittney May  [address] [place] [address]  [phone]

## 2017-05-19 NOTE — HOME CARE
Received [facility] referral , Discharge noted for today, [facility] will follow pt for PT/ Katarzyna protocol; pt states he already has a RW and cane. BJ GILLETTE.

## 2017-05-19 NOTE — PROGRESS NOTES
Problem: Self Care Deficits Care Plan (Adult)  Goal: *Acute Goals and Plan of Care (Insert Text)  OCCUPATIONAL THERAPY EVALUATION/DISCHARGE     Patient: Ramon Whitaker (17 y.o. male)  Date: 5/19/2017  Primary Diagnosis: Cervical stenosis of spine [M48.02]  Procedure(s) (LRB):  ANTERIOR CERVICAL DISCECTOMY WITH FUSION C5/6 C6/7 (N/A) 1 Day Post-Op   Precautions: C-spine precautions         ASSESSMENT AND RECOMMENDATIONS:  Based on the objective data described below, the patient presents with decreased ADL status secondary to his C-spine precautions. This training begin and he reports he has made changes in his home setting. He reports an understanding of his precautions and associating them with his self care routine although is minimally receptive to specific changes/modifications stating: \"I'm not doing nothing for a few weeks\" he was instructed and reminded several times to avoid shaking his head and how to hold reading material, washing his hair and UB self care without flexing his shoulders past 90' flexion. He can simulate and reports an understanding and is working on his long time habit of nodding his head and may wear his soft collar longer to remind him of this. Feel he is at his max level of independence at this time (he has a sock aid and a reacher for dressing secondary to his lower back issues and he uses them); therefore, skilled occupational therapy is not indicated at this time. Discharge Recommendations: None  Further Equipment Recommendations for Discharge: N/A       COMPLEXITY      Eval Complexity: History: LOW Complexity : Brief history review ; Examination: LOW Complexity : 1-3 performance deficits relating to physical, cognitive , or psychosocial skils that result in activity limitations and / or participation restrictions ;  Decision Making:LOW Complexity : No comorbidities that affect functional and no verbal or physical assistance needed to complete eval tasks  Assessment: LOW Complexity          G-CODES:      Self Care  Current  CI= 1-19%   Goal  CI= 1-19%   D/C  CI= 1-19%. The severity rating is based on the Level of Assistance required for Functional Mobility and ADLs. SUBJECTIVE:   Patient stated I don't read or use a computer much.       OBJECTIVE DATA SUMMARY:       Past Medical History:   Diagnosis Date    Bilateral hip pain      Chronic pain       back; hx of opiod addiction    Depression      Diabetes ([address]       borderline, no meds-trying to control with diet    DJD (degenerative joint disease)      GERD (gastroesophageal reflux disease)      Hepatitis C     Hypertension      Kidney stones      Lymphadenopathy, generalized 2015     neg bx    Numbness of foot left     resolved per patient 16    S/P lumbar fusion 2016     L4/5 LAMINECTOMY/FUSION/TRANSFORAMINAL LUMBAR INTERBODY FUSION (TLIF) by Dr. Tommye Merlin on 16     Unspecified sleep apnea       no cpap machine-pt denies     Past Surgical History:   Procedure Laterality Date    HX APPENDECTOMY   as a child    HX BACK SURGERY   2015    HX COLONOSCOPY        negative    HX LUMBAR FUSION   2016    HX ORTHOPAEDIC         denies    HX TONSILLECTOMY   as a child    Ivin Apgar Right 3-10-16     Dr. Alberto Cox     Prior Level of Function/Home Situation:   Home Situation  Home Environment: Trailer/mobile home  # Steps to Enter: 4  One/Two Story Residence: One story  Living Alone: No  Support Systems: Family member(s), Friends \ neighbors  Patient Expects to be Discharged to[de-identified] Trailer/mobile home  Current DME Used/Available at Home: Norm Richters, straight  [X]     Right hand dominant       [ ]     Left hand dominant  Cognitive/Behavioral Status:   he is alert, oriented X 4   Skin: no skin integrity issues noted during OT evaluation; surgical site not observed  Edema: no extremity edema noted  Vision/Perceptual:      [facility] Coordination:   BUE's WFL (within constraints of C-spine precautions)   Balance:   independent standing for LB clothes management  Strength:  Bilateral  is 4+/5   Tone & Sensation:  BUE's WFL   Range of Motion:  BUE's AROM WFL within the constraints of C-spine precautions   Functional Mobility and Transfers for ADLs:  Bed Mobility:   supine to sit is modified independent as he is using log roll   Transfers:   sit to stand is independent and independent side stepping and stepping forward and back   ADL Assessment:   self feeding: Independent (simulated)  Grooming: independent (simulated)  UB bathing/dressing: modified independent (secondary to C-spine precautions)   LB bathing/dressing: modified independent (he uses a sock aid and a reacher at home)  ADL Intervention:   as noted above  Pain:  Pt reports 0/10 pain or discomfort prior to treatment. Pt reports 0/10 pain or discomfort post treatment. Activity Tolerance:   No SOB noted and no c/o fatigue  Please refer to the flowsheet for vital signs taken during this treatment. After treatment:   [ ]  Patient left in no apparent distress sitting up in chair  [X]  Patient left in no apparent distress sitting on the edge of the bed  [ ]  Call bell left within reach  [ ]  Nursing notified  [X]  Hand off to PT  [ ]  Bed alarm activated      COMMUNICATION/EDUCATION:   Communication/Collaboration:  [ ]      Home safety education was provided and the patient/caregiver indicated understanding. [X]      Patient/family have participated as able and agree with findings and recommendations. [ ]      Patient is unable to participate in plan of care at this time.      Kimberly Mcdonald OTR/L  Time Calculation: 11 mins

## 2017-05-19 NOTE — DISCHARGE INSTRUCTIONS
DISCHARGE SUMMARY from Nurse    The following personal items are in your possession at time of discharge:    Dental Appliances: None  Visual Aid: Glasses     Home Medications: None  Jewelry: None  Clothing: Footwear, Shirt, Pants, Undergarments  Other Valuables:  (drivers license)             PATIENT INSTRUCTIONS:    After general anesthesia or intravenous sedation, for 24 hours or while taking prescription Narcotics:  · Limit your activities  · Do not drive and operate hazardous machinery  · Do not make important personal or business decisions  · Do  not drink alcoholic beverages  · If you have not urinated within 8 hours after discharge, please contact your surgeon on call. Report the following to your surgeon:  · Excessive pain, swelling, redness or odor of or around the surgical area  · Temperature over 100.5  · Nausea and vomiting lasting longer than 4 hours or if unable to take medications  · Any signs of decreased circulation or nerve impairment to extremity: change in color, persistent  numbness, tingling, coldness or increase pain  · Any questions        What to do at Home:  Recommended activity: follow Anterior cervical discharge instuction    If you experience any of the following symptoms Temp > 100.5,uncontrollable pain, drainage from incision, Pain not controlled by pain medication, please follow up with Dr. Ara Cochran. *  Please give a list of your current medications to your Primary Care Provider. *  Please update this list whenever your medications are discontinued, doses are      changed, or new medications (including over-the-counter products) are added. *  Please carry medication information at all times in case of emergency situations. These are general instructions for a healthy lifestyle:    No smoking/ No tobacco products/ Avoid exposure to second hand smoke    Surgeon General's Warning:  Quitting smoking now greatly reduces serious risk to your health.     Obesity, smoking, and sedentary lifestyle greatly increases your risk for illness    A healthy diet, regular physical exercise & weight monitoring are important for maintaining a healthy lifestyle    You may be retaining fluid if you have a history of heart failure or if you experience any of the following symptoms:  Weight gain of 3 pounds or more overnight or 5 pounds in a week, increased swelling in our hands or feet or shortness of breath while lying flat in bed. Please call your doctor as soon as you notice any of these symptoms; do not wait until your next office visit. Recognize signs and symptoms of STROKE:    F-face looks uneven    A-arms unable to move or move unevenly    S-speech slurred or non-existent    T-time-call 911 as soon as signs and symptoms begin-DO NOT go       Back to bed or wait to see if you get better-TIME IS BRAIN. Warning Signs of HEART ATTACK     Call 911 if you have these symptoms:   Chest discomfort. Most heart attacks involve discomfort in the center of the chest that lasts more than a few minutes, or that goes away and comes back. It can feel like uncomfortable pressure, squeezing, fullness, or pain.  Discomfort in other areas of the upper body. Symptoms can include pain or discomfort in one or both arms, the back, neck, jaw, or stomach.  Shortness of breath with or without chest discomfort.  Other signs may include breaking out in a cold sweat, nausea, or lightheadedness. Don't wait more than five minutes to call 911 - MINUTES MATTER! Fast action can save your life. Calling 911 is almost always the fastest way to get lifesaving treatment. Emergency Medical Services staff can begin treatment when they arrive -- up to an hour sooner than if someone gets to the hospital by car. The discharge information has been reviewed with the patient. The patient verbalized understanding.     Discharge medications reviewed with the patient and appropriate educational materials and side effects teaching were provided. Patient armband removed and shredded  MyChart Activation    Thank you for requesting access to [facility]. Please follow the instructions below to securely access and download your online medical record. Smart[facility] allows you to send messages to your doctor, view your test results, renew your prescriptions, schedule appointments, and more. How Do I Sign Up? 1. In your internet browser, go to [email or web address]  2. Click on the First Time User? Click Here link in the Sign In box. You will be redirect to the New Member Sign Up page. 3. Enter your SmartHub Access Code exactly as it appears below. You will not need to use this code after youve completed the sign-up process. If you do not sign up before the expiration date, you must request a new code. SmartHub Access Code: Activation code not generated  Current SmartHub Status: Active (This is the date your SmartHub access code will )    4. Enter the last four digits of your Social Security Number (xxxx) and Date of Birth (mm/dd/yyyy) as indicated and click Submit. You will be taken to the next sign-up page. 5. Create a SmartHub ID. This will be your SmartHub login ID and cannot be changed, so think of one that is secure and easy to remember. 6. Create a SmartHub password. You can change your password at any time. 7. Enter your Password Reset Question and Answer. This can be used at a later time if you forget your password. 8. Enter your e-mail address. You will receive e-mail notification when new information is available in [address] 9. Click Sign Up. You can now view and download portions of your medical record. 10. Click the Download Summary menu link to download a portable copy of your medical information. Additional Information    If you have questions, please visit the Frequently Asked Questions section of the SmartHub website at [email or web address] Remember, SmartHub is NOT to be used for urgent needs. For medical emergencies, dial 911.

## 2017-05-19 NOTE — PROGRESS NOTES
Care Management Interventions  PCP Verified by CM: Yes  Mode of Transport at Discharge: BLS  Transition of Care Consult (CM Consult): [address]: Yes  MyChart Signup: No  Discharge Durable Medical Equipment: No  Physical Therapy Consult: Yes  Occupational Therapy Consult: Yes  Speech Therapy Consult: No  Current Support Network: Other (Lives with a friend. )  Confirm Follow Up Transport: Family  Plan discussed with Pt/Family/Caregiver: Yes  Freedom of Choice Offered: Yes  Discharge Location  Discharge Placement: Home with home health     72 y/o male admitted with cervical stenosis of spine. Pt states he already has an Advance Directive. He states he has a rolling walker and a cane. He states he has had [address] in the past. Pt states he lives with a friend and plans on going back home with [place] at discharge. FOC given and signed by pt and he chose [address]. [address] notified.

## 2017-05-19 NOTE — PROGRESS NOTES
Assessment completed. Patient alert and oriented x 4. Patient resting with eyes open. Patient denies SOB and chest pain. No respiratory distress noted. Bowel sounds present, but hypoactive in all four quadrants. Incision to neck. Dressing is clean, dry and intact. Call bell and phone within reach.   No further concerns at this time

## 2017-05-22 ENCOUNTER — HOME CARE VISIT (OUTPATIENT)
Dept: SCHEDULING | Facility: HOME HEALTH | Age: 67
End: 2017-05-22
Payer: MEDICARE

## 2017-05-22 ENCOUNTER — HOME CARE VISIT (OUTPATIENT)
Dept: SCHEDULING | Facility: HOME HEALTH | Age: 67
End: 2017-05-22

## 2017-05-22 ENCOUNTER — HOME CARE VISIT (OUTPATIENT)
Dept: HOME HEALTH SERVICES | Facility: HOME HEALTH | Age: 67
End: 2017-05-22
Payer: MEDICARE

## 2017-05-22 PROCEDURE — 3331090001 HH PPS REVENUE CREDIT

## 2017-05-22 PROCEDURE — 3331090003 HH PPS REVENUE ADJ

## 2017-05-22 PROCEDURE — 3331090002 HH PPS REVENUE DEBIT

## 2017-05-22 PROCEDURE — G0151 HHCP-SERV OF PT,EA 15 MIN: HCPCS

## 2017-05-22 PROCEDURE — 400013 HH SOC

## 2017-05-23 PROCEDURE — 3331090001 HH PPS REVENUE CREDIT

## 2017-05-23 PROCEDURE — 3331090002 HH PPS REVENUE DEBIT

## 2017-05-24 ENCOUNTER — OFFICE VISIT (OUTPATIENT)
Dept: INTERNAL MEDICINE CLINIC | Age: 67
End: 2017-05-24

## 2017-05-24 VITALS
OXYGEN SATURATION: 97 % | HEART RATE: 91 BPM | WEIGHT: 223 LBS | BODY MASS INDEX: 31.22 KG/M2 | SYSTOLIC BLOOD PRESSURE: 136 MMHG | RESPIRATION RATE: 16 BRPM | HEIGHT: 71 IN | DIASTOLIC BLOOD PRESSURE: 68 MMHG | TEMPERATURE: 98 F

## 2017-05-24 DIAGNOSIS — R73.03 PREDIABETES: ICD-10-CM

## 2017-05-24 DIAGNOSIS — I10 ESSENTIAL HYPERTENSION: ICD-10-CM

## 2017-05-24 DIAGNOSIS — G95.9 CERVICAL MYELOPATHY (HCC): Primary | ICD-10-CM

## 2017-05-24 PROCEDURE — 3331090001 HH PPS REVENUE CREDIT

## 2017-05-24 PROCEDURE — 3331090002 HH PPS REVENUE DEBIT

## 2017-05-24 NOTE — PROGRESS NOTES
HPI:   Hospital f/u post spinal surgery for cervical spinal stenosis  no post op complications  States limb numbness and neck pain have substantially improved  Hx notable for HTN/IGT  w/o chest pain/abd. discomfort; no dyspnea, cough or pedal edema; denies constitutional complaints of fever, night sweats or wt loss; no evidence of GI/ hemorrhage; no polyuria/polydipsia. Activity is slowly increasing post op    ROS is otherwise negative. Past Medical History:   Diagnosis Date    Bilateral hip pain     Chronic pain     back; hx of opiod addiction    Depression     Diabetes (HCC)     borderline, no meds-trying to control with diet    DJD (degenerative joint disease)     GERD (gastroesophageal reflux disease)     Hepatitis C January, 2015    Hypertension     Kidney stones     Lymphadenopathy, generalized 12/05/2015    neg bx    Numbness of foot left    resolved per patient 1/29/16    S/P lumbar fusion 2/9/2016    L4/5 LAMINECTOMY/FUSION/TRANSFORAMINAL LUMBAR INTERBODY FUSION (TLIF) by Dr. Jazmin Mabry on 2/8/16     Unspecified sleep apnea     no cpap machine-pt denies       Past Surgical History:   Procedure Laterality Date    HX APPENDECTOMY  as a child    HX BACK SURGERY  07/2015    HX COLONOSCOPY  2015    negative    HX LUMBAR FUSION  2/2016    HX ORTHOPAEDIC      denies    HX OTHER SURGICAL  05/2017    cervical fusion    HX TONSILLECTOMY  as a child    REMOVE [facility] Right 3-10-16    Dr. Angela Robertson History     Social History    Marital status:      Spouse name: N/A    Number of children: N/A    Years of education: N/A     Occupational History    Not on file.      Social History Main Topics    Smoking status: Former Smoker     Packs/day: 0.25     Years: 40.00     Types: Cigarettes     Quit date: 5/12/2017    Smokeless tobacco: Never Used    Alcohol use No    Drug use: Yes     Special: Marijuana, Cocaine      Comment: stopped cocaine 2007, marijuana 5/14/17    Sexual activity: No     Other Topics Concern    Not on file     Social History Narrative       Allergies   Allergen Reactions    Nicotine Contact Dermatitis     Nicotine Patch reaction - Contact dermatitis with numbness and tingling also occuring in the left arm and denuding of the skin.  Thimerosal Other (comments)     Contact lens solution, made eyes red and irrated       Family History   Problem Relation Age of Onset    Diabetes Sister     SLE Sister     Arthritis-osteo Sister     Heart Disease Sister        Current Outpatient Prescriptions   Medication Sig Dispense Refill    oxyCODONE-acetaminophen (PERCOCET 10)  mg per tablet Take 1 Tab by mouth every six (6) hours as needed. Max Daily Amount: 4 Tabs. 30 Tab 0    amLODIPine (NORVASC) 10 mg tablet Take 1 Tab by mouth daily. 90 Tab 1    omeprazole (PRILOSEC) 20 mg capsule Take 20 mg by mouth daily. 11    terbinafine HCl (LAMISIL) 250 mg tablet 250 mg. Takes 7 pills every other month      lisinopril (PRINIVIL, ZESTRIL) 20 mg tablet Take 1 Tab by mouth daily. 90 Tab 1           Visit Vitals    /68 (BP 1 Location: Left arm, BP Patient Position: Sitting)    Pulse 91    Temp 98 °F (36.7 °C) (Tympanic)    Resp 16    Ht 5' 11\" (1.803 m)    Wt 223 lb (101.2 kg)    SpO2 97%    BMI 31.1 kg/m2       PE  Well nourished in NAD  HEENT:  OP: clear. Neck: supple w/o  or bruits. Mild induration w/o fluctuance/drainage low anterior (R) neck  Chest: clear. CV: RRR w/o m,r,g; pulses intact. Abd: soft, NT, w/o HSM or mass. Ext: w/o edema. Neuro: NF. Assessment and Plan    Encounter Diagnoses   Name Primary?     Cervical myelopathy (HCC) Yes    Essential hypertension     Prediabetes    Recent cervical spinal surgery for stenosis/myelopathy - uneventful recovery with substantial benefit of pain/paresthesia reduction  HTN - controlled  IGT - continue dietary/exercise efforts  I have reviewed/discussed the above normal BMI with the patient. I have recommended the following interventions: dietary management education, guidance, and counseling .  .    OV 4 mos or prn  I have explained plan to patient and the patient verbalizes understanding

## 2017-05-24 NOTE — PATIENT INSTRUCTIONS

## 2017-05-24 NOTE — PROGRESS NOTES
1. Have you been to the ER, urgent care clinic since your last visit? Hospitalized since your last visit? Yes When: may 18 Where: [facility] Reason for visit: back surgery    2. Have you seen or consulted any other health care providers outside of the [address] [place] [address] since your last visit? Include any pap smears or colon screening.  No

## 2017-05-25 PROCEDURE — 3331090001 HH PPS REVENUE CREDIT

## 2017-05-25 PROCEDURE — 3331090002 HH PPS REVENUE DEBIT

## 2017-05-26 PROCEDURE — 3331090001 HH PPS REVENUE CREDIT

## 2017-05-26 PROCEDURE — 3331090002 HH PPS REVENUE DEBIT

## 2017-05-27 PROCEDURE — 3331090001 HH PPS REVENUE CREDIT

## 2017-05-27 PROCEDURE — 3331090002 HH PPS REVENUE DEBIT

## 2017-05-28 PROCEDURE — 3331090002 HH PPS REVENUE DEBIT

## 2017-05-28 PROCEDURE — 3331090001 HH PPS REVENUE CREDIT

## 2017-05-29 PROCEDURE — 3331090001 HH PPS REVENUE CREDIT

## 2017-05-29 PROCEDURE — 3331090002 HH PPS REVENUE DEBIT

## 2017-05-30 PROCEDURE — 3331090001 HH PPS REVENUE CREDIT

## 2017-05-30 PROCEDURE — 3331090002 HH PPS REVENUE DEBIT

## 2017-05-30 NOTE — DISCHARGE SUMMARY
Discharge  Summary     Patient: Yuni Davila MRN: [id number]  SSN: [id number]    YOB: 1950  Age: 73 y.o. Sex: male       Admit Date: 5/18/2017    Discharge Date: 5/30/2017      Admission Diagnoses: Cervical stenosis of spine [M48.02]    Discharge Diagnoses:   Problem List as of 5/19/2017  Date Reviewed: 5/18/2017          Codes Class Noted - Resolved    Cervical myelopathy ([facility] [address] ICD-10-CM: G95.9  ICD-9-CM: 721.1  5/18/2017 - Present        Acute pain of right shoulder ICD-10-CM: M25.511  ICD-9-CM: 719.41  4/4/2017 - Present        Cervical spinal stenosis ICD-10-CM: M48.02  ICD-9-CM: 723.0  11/29/2016 - Present        Bulge of lumbar disc without myelopathy ICD-10-CM: M51.26  ICD-9-CM: 722.10  11/29/2016 - Present        Lumbar facet arthropathy (HCC) ICD-10-CM: M12.88  ICD-9-CM: 721.3  11/15/2016 - Present        Chronic pain ICD-10-CM: G89.29  ICD-9-CM: 338.29  11/15/2016 - Present        Neuritis ICD-10-CM: M79.2  ICD-9-CM: 729.2  11/15/2016 - Present        Kidney stones ICD-10-CM: N20.0  ICD-9-CM: 592.0  5/10/2016 - Present        S/P lumbar fusion ICD-10-CM: Z98.1  ICD-9-CM: V45.4  2/8/2016 - Present    Overview Signed 2/9/2016  1:55 PM by Master Talyor     L4/5 LAMINECTOMY/FUSION/TRANSFORAMINAL LUMBAR INTERBODY FUSION (TLIF) by Dr. Tommye Merlin on 2/8/16.               Onychomycosis of toenail ICD-10-CM: B35.1  ICD-9-CM: 110.1  11/25/2015 - Present        GERD (gastroesophageal reflux disease) ICD-10-CM: K21.9  ICD-9-CM: 530.81  5/1/2014 - Present        Chronic hepatitis C without hepatic coma (HCC) ICD-10-CM: B18.2  ICD-9-CM: 070.54  4/3/2014 - Present        HTN (hypertension) ICD-10-CM: I10  ICD-9-CM: 401.9  4/2/2014 - Present        Back pain ICD-10-CM: M54.9  ICD-9-CM: 724.5  4/2/2014 - Present        Prediabetes ICD-10-CM: R73.03  ICD-9-CM: 790.29  4/2/2014 - Present        RESOLVED: Advanced directives, counseling/discussion ICD-10-CM: Z71.89  ICD-9-CM: V65.49 11/25/2015 - 5/10/2016        RESOLVED: Steatosis, liver ICD-10-CM: K76.0  ICD-9-CM: 571.8  10/29/2015 - 5/10/2016        RESOLVED: H/O lumbosacral spine surgery ICD-10-CM: Z98.890  ICD-9-CM: V15.29  7/22/2015 - 5/10/2016        RESOLVED: Sciatica ICD-10-CM: M54.30  ICD-9-CM: 724.3  9/17/2014 - 5/10/2016        RESOLVED: MEI (acute kidney injury) ([facility] ICD-10-CM: N17.9  ICD-9-CM: 584.9  9/13/2014 - 5/10/2016        RESOLVED: Early satiety ICD-10-CM: R68.81  ICD-9-CM: 780.94  9/13/2014 - 5/10/2016        RESOLVED: Dehydration ICD-10-CM: E86.0  ICD-9-CM: 276.51  9/2/2014 - 5/10/2016        RESOLVED: Acute renal failure ([facility] ICD-10-CM: N17.9  ICD-9-CM: 584.9  9/2/2014 - 5/10/2016        RESOLVED: UTI (urinary tract infection) ICD-10-CM: N39.0  ICD-9-CM: 599.0  9/2/2014 - 5/10/2016        RESOLVED: Tobacco abuse ICD-10-CM: Z72.0  ICD-9-CM: 305.1  8/21/2014 - 5/10/2016        RESOLVED: Heel pain ICD-10-CM: M79.673  ICD-9-CM: 729.5  4/2/2014 - 5/10/2016        RESOLVED: Plantar fasciitis ICD-10-CM: M72.2  ICD-9-CM: 728.71  4/2/2014 - 5/10/2016               Discharge Condition: Good    Procedure: Anterior cervical diskectomy and  fusion C5-6, anterior cervical diskectomy and fusion C6-7, Zero-P  interbody device x2. Anterior cervical plate fixation J6-K7, C6-C7 with  Zero-P plate and screws. Hospital Course: Normal hospital course for this procedure. Tolerated surgical intervention well. VSS Throughout. Neuro intact. Incision dry and intact, tolerating PO intake, voiding adequately. Disposition: home    Discharge Medications:      Current Discharge Medication List      START taking these medications       Dose & Instructions Dispensing Information Comments   Morning Noon Evening Bedtime    oxyCODONE-acetaminophen  mg per tablet   Commonly known as:  PERCOCET 10       Your last dose was: Your next dose is:              Dose:  1 Tab   Take 1 Tab by mouth every six (6) hours as needed.  Max Daily Amount: 4 Tabs. Quantity:  30 Tab   Refills:  0                           CONTINUE these medications which have NOT CHANGED       Dose & Instructions Dispensing Information Comments   Morning Noon Evening Bedtime    amLODIPine 10 mg tablet   Commonly known as:  NORVASC       Your last dose was: Your next dose is:              Dose:  10 mg   Take 1 Tab by mouth daily. Quantity:  90 Tab   Refills:  1                         lisinopril 20 mg tablet   Commonly known as:  PRINIVIL, ZESTRIL       Your last dose was: Your next dose is:              Dose:  20 mg   Take 1 Tab by mouth daily. Quantity:  90 Tab   Refills:  1                         omeprazole 20 mg capsule   Commonly known as:  PRILOSEC       Your last dose was: Your next dose is:              Dose:  20 mg   Take 20 mg by mouth daily. Refills:  11                         terbinafine HCl 250 mg tablet   Commonly known as:  LAMISIL       Your last dose was: Your next dose is:              Dose:  250 mg   250 mg. Takes 7 pills every other month    Refills:  0                              Where to Get Your Medications      Information on where to get these meds will be given to you by the nurse or doctor. ! Ask your nurse or doctor about these medications     oxyCODONE-acetaminophen  mg per tablet                     Follow-up Appointments   Procedures    FOLLOW UP VISIT Appointment in: Two Weeks     Standing Status:   Standing     Number of Occurrences:   1     Order Specific Question:   Appointment in     Answer:    Two Weeks       Signed By: Verner Pun, NP     May 30, 2017

## 2017-05-31 PROCEDURE — 3331090001 HH PPS REVENUE CREDIT

## 2017-05-31 PROCEDURE — 3331090002 HH PPS REVENUE DEBIT

## 2017-06-01 ENCOUNTER — OFFICE VISIT (OUTPATIENT)
Dept: ORTHOPEDIC SURGERY | Age: 67
End: 2017-06-01

## 2017-06-01 VITALS
TEMPERATURE: 99.1 F | SYSTOLIC BLOOD PRESSURE: 130 MMHG | BODY MASS INDEX: 31.5 KG/M2 | HEIGHT: 71 IN | OXYGEN SATURATION: 98 % | DIASTOLIC BLOOD PRESSURE: 70 MMHG | HEART RATE: 90 BPM | WEIGHT: 225 LBS

## 2017-06-01 VITALS
RESPIRATION RATE: 18 BRPM | BODY MASS INDEX: 31.64 KG/M2 | TEMPERATURE: 98.3 F | OXYGEN SATURATION: 97 % | HEIGHT: 71 IN | SYSTOLIC BLOOD PRESSURE: 154 MMHG | WEIGHT: 226 LBS | HEART RATE: 83 BPM | DIASTOLIC BLOOD PRESSURE: 83 MMHG

## 2017-06-01 DIAGNOSIS — Z98.1 S/P CERVICAL SPINAL FUSION: Primary | ICD-10-CM

## 2017-06-01 PROCEDURE — 3331090001 HH PPS REVENUE CREDIT

## 2017-06-01 PROCEDURE — 3331090002 HH PPS REVENUE DEBIT

## 2017-06-01 NOTE — PATIENT INSTRUCTIONS
Cervical Spinal Fusion: What to Expect at Home  Your Recovery    After surgery, you can expect your neck to feel stiff and sore. This should improve in the weeks after surgery. You may have trouble sitting or standing in one position for very long and may need pain medicine in the weeks after your surgery. You may need to wear a neck brace for a while. It may take 4 to 6 weeks to get back to your usual activities, but it may depend on what kind of surgery you had. Your doctor may advise you to work with a physical therapist to strengthen the muscles around your neck and back. This care sheet gives you a general idea about how long it will take for you to recover. But each person recovers at a different pace. Follow the steps below to get better as quickly as possible. How can you care for yourself at home? Activity  · Rest when you feel tired. Getting enough sleep will help you recover. · Try to walk each day. Start by walking a little more than you did the day before. Bit by bit, increase the amount you walk. Walking boosts blood flow and helps prevent pneumonia and constipation. Walking may also decrease your muscle soreness after surgery. · Follow your doctor's directions about not lifting anything that would strain your neck and back. This may include a child, heavy grocery bags and milk containers, a heavy briefcase or backpack, cat litter or dog food bags, or a vacuum . · Avoid strenuous activities, such as bicycle riding, jogging, weightlifting, or aerobic exercise, until your doctor says it is okay. · Do not drive for 2 to 4 weeks after your surgery or until your doctor says it is okay. · Avoid taking long car trips for 2 to 4 weeks after surgery. Your neck may become tired and painful from sitting too long in one position. · You will probably need to take 4 to 6 weeks off from work. It depends on the type of work you do and how you feel.   · You may have sex as soon as you feel able, but avoid positions that put stress on your neck or cause pain. Diet  · You can eat your normal diet. If your stomach is upset, try bland, low-fat foods like plain rice, broiled chicken, toast, and yogurt. · Drink plenty of fluids. If you have kidney, heart, or liver disease and have to limit fluids, talk with your doctor before you increase the amount of fluids you drink. · You may notice that your bowel movements are not regular right after your surgery. This is common. Try to avoid constipation and straining with bowel movements. You may want to take a fiber supplement every day. If you have not had a bowel movement after a couple of days, ask your doctor about taking a mild laxative. Medicines  · Your doctor will tell you if and when you can restart your medicines. He or she will also give you instructions about taking any new medicines. · If you take blood thinners, such as warfarin (Coumadin), clopidogrel (Plavix), or aspirin, be sure to talk to your doctor. He or she will tell you if and when to start taking those medicines again. Make sure that you understand exactly what your doctor wants you to do. · Be safe with medicines. Take pain medicines exactly as directed. ¨ If the doctor gave you a prescription medicine for pain, take it as prescribed. ¨ If you are not taking a prescription pain medicine, ask your doctor if you can take an over-the-counter medicine. · If your doctor prescribed antibiotics, take them as directed. Do not stop taking them just because you feel better. You need to take the full course of antibiotics. · If you think your pain pill is making you sick to your stomach:  ¨ Take your pills after meals (unless your doctor has told you not to). ¨ Ask your doctor for a different pain pill. Incision care  · You will be given specific instructions about how to care for the cut (incision) the doctor made.  The instructions will depend on the type of materials used to close the cut.  Exercise  · Do exercises as instructed by your doctor or physical therapist to improve your strength and flexibility. Other instructions  · To reduce stiffness and help sore muscles, use a warm water bottle, a heating pad set on low, or a warm cloth on your neck. Do not put heat right over the incision. Do not go to sleep with a heating pad on your skin. Follow-up care is a key part of your treatment and safety. Be sure to make and go to all appointments, and call your doctor if you are having problems. It's also a good idea to know your test results and keep a list of the medicines you take. When should you call for help? Call 911 anytime you think you may need emergency care. For example, call if:  · You passed out (lost consciousness). · You have sudden chest pain and shortness of breath, or you cough up blood. · You cannot swallow. · You have severe pain in your neck or back. · You are unable to move an arm or a leg at all. Call your doctor now or seek immediate medical care if:  · You have pain that does not get better after you take pain pills. · You have signs of infection, such as:  ¨ Increased pain, swelling, warmth, or redness. ¨ Red streaks leading from the site. ¨ Pus draining from the site. ¨ A fever. · You have new or worse symptoms in your arms, legs, chest, belly, or buttocks. Symptoms may include:  ¨ Numbness or tingling. ¨ Weakness. ¨ Pain. · You lose bladder or bowel control. · You have loose stitches, or your incision comes open. · You have blood or fluid draining from the incision. Watch closely for changes in your health, and be sure to contact your doctor if:  · You do not have a bowel movement after taking a laxative. · You are not getting better as expected. Where can you learn more? Go to http://gomez-jarvis.info/. Enter C201 in the search box to learn more about \"Cervical Spinal Fusion: What to Expect at Home. \"  Current as of: May 23, 2016  Content Version: 11.2  © 1338-5851 [employer]. Care instructions adapted under license by [employer] (which disclaims liability or warranty for this information). If you have questions about a medical condition or this instruction, always ask your healthcare professional. [address] any warranty or liability for your use of this information.

## 2017-06-01 NOTE — PROGRESS NOTES
Chief complaint/History of Present Illness:  Chief Complaint   Patient presents with    Neck Pain     post op     HPI  Ramon Whitaker is a  73 y.o.  male      HISTORY OF PRESENT ILLNESS:  The patient comes in today two weeks status post his ACDF of C5-6 and C6-7. He is doing well. He is swallowing okay. He states his bilateral arm numbness and tingling has resolved and the pain has greatly improved. He is having less headaches. He still has some right shoulder pain, which might be intrinsic to his shoulder. He is taking Percocet 10/325 mg twice a day. He is going to start weaning off of that. He states he is taking it for posterior neck pain. He also reports that his hips and leg pain is improved too. He is not sure why that would happen, however, he is able to walk without a limp now. He is very happy about that. He does not work. He is 71-year-old on Social Security. He quit smoking one week before the surgery. He was a one-pack-a-day smoker. He is hoping not to resume that habit. PHYSICAL EXAM:  Mr. Coy Houser is a 71-year-old male. He is alert and oriented. He has a full weight bearing, non-antalgic gait. He does have a poor tandem gait. He has 5/5 strength of the bilateral lower extremities, negative Reyess. ASSESSENT/PLAN:  This is a patient two weeks out from his ACDF of C5-6 and C6-7. He is doing well. We went over wound care and activity level. He is going to wean off the Percocet now. He may take Tylenol for any discomfort. He knows not to take any anti-inflammatories for three months. We will see him back in four weeks with Dr. hSira Argueta, at which time we will get a cervical AP and lateral x-ray.         Review of systems:    Past Medical History:   Diagnosis Date    Bilateral hip pain     Chronic pain     back; hx of opiod addiction    Depression     Diabetes ([address]     borderline, no meds-trying to control with diet    DJD (degenerative joint disease)     GERD (gastroesophageal reflux disease)     Hepatitis C January, 2015    Hypertension     Kidney stones     Lymphadenopathy, generalized 12/05/2015    neg bx    Numbness of foot left    resolved per patient 1/29/16    S/P lumbar fusion 2/9/2016    L4/5 LAMINECTOMY/FUSION/TRANSFORAMINAL LUMBAR INTERBODY FUSION (TLIF) by Dr. Funmilayo Chung on 2/8/16     Unspecified sleep apnea     no cpap machine-pt denies     Past Surgical History:   Procedure Laterality Date    HX APPENDECTOMY  as a child    HX BACK SURGERY  07/2015    HX CERVICAL FUSION  05/18/2017    ACDF C5/6 C6/7    HX COLONOSCOPY  2015    negative    HX LUMBAR FUSION  2/2016    HX ORTHOPAEDIC      denies    HX OTHER SURGICAL  05/2017    cervical fusion    HX TONSILLECTOMY  as a child    REMOVE Josette Right 3-10-16    Dr. Radha Cartagena History     Social History    Marital status:      Spouse name: N/A    Number of children: N/A    Years of education: N/A     Occupational History    Not on file. Social History Main Topics    Smoking status: Former Smoker     Packs/day: 0.25     Years: 40.00     Types: Cigarettes     Quit date: 5/12/2017    Smokeless tobacco: Never Used    Alcohol use No    Drug use: Yes     Special: Marijuana, Cocaine      Comment: stopped cocaine 2007, marijuana 5/14/17    Sexual activity: No     Other Topics Concern    Not on file     Social History Narrative     Family History   Problem Relation Age of Onset    Diabetes Sister     SLE Sister     Arthritis-osteo Sister     Heart Disease Sister        Physical Exam:  Visit Vitals    /83    Pulse 83    Temp 98.3 °F (36.8 °C) (Oral)    Resp 18    Ht 5' 11\" (1.803 m)    Wt 226 lb (102.5 kg)    SpO2 97%    BMI 31.52 kg/m2     Pain Scale: 0 - No pain/10     has been . reviewed and is appropriate        Lisset Carlton was seen today for neck pain.     Diagnoses and all orders for this visit:    S/P cervical spinal fusion            Follow-up Disposition:  Return in about 4 weeks (around 6/29/2017) for with Dr. Josee Ruelas.         We have informed Cherylene Mimes to notify us for immediate appointment if he has any worsening neurogical symptoms or if an emergency situation presents, then call [phone]

## 2017-06-02 PROCEDURE — 3331090001 HH PPS REVENUE CREDIT

## 2017-06-02 PROCEDURE — 3331090002 HH PPS REVENUE DEBIT

## 2017-06-03 PROCEDURE — 3331090001 HH PPS REVENUE CREDIT

## 2017-06-03 PROCEDURE — 3331090002 HH PPS REVENUE DEBIT

## 2017-06-04 PROCEDURE — 3331090001 HH PPS REVENUE CREDIT

## 2017-06-04 PROCEDURE — 3331090002 HH PPS REVENUE DEBIT

## 2017-06-05 PROCEDURE — 3331090002 HH PPS REVENUE DEBIT

## 2017-06-05 PROCEDURE — 3331090001 HH PPS REVENUE CREDIT

## 2017-06-06 PROCEDURE — 3331090001 HH PPS REVENUE CREDIT

## 2017-06-06 PROCEDURE — 3331090002 HH PPS REVENUE DEBIT

## 2017-06-07 PROCEDURE — 3331090002 HH PPS REVENUE DEBIT

## 2017-06-07 PROCEDURE — 3331090001 HH PPS REVENUE CREDIT

## 2017-06-08 PROCEDURE — 3331090001 HH PPS REVENUE CREDIT

## 2017-06-08 PROCEDURE — 3331090002 HH PPS REVENUE DEBIT

## 2017-06-09 PROCEDURE — 3331090002 HH PPS REVENUE DEBIT

## 2017-06-09 PROCEDURE — 3331090001 HH PPS REVENUE CREDIT

## 2017-06-10 PROCEDURE — 3331090002 HH PPS REVENUE DEBIT

## 2017-06-10 PROCEDURE — 3331090001 HH PPS REVENUE CREDIT

## 2017-06-11 PROCEDURE — 3331090001 HH PPS REVENUE CREDIT

## 2017-06-11 PROCEDURE — 3331090002 HH PPS REVENUE DEBIT

## 2017-06-30 ENCOUNTER — OFFICE VISIT (OUTPATIENT)
Dept: ORTHOPEDIC SURGERY | Age: 67
End: 2017-06-30

## 2017-06-30 VITALS
WEIGHT: 225 LBS | OXYGEN SATURATION: 97 % | RESPIRATION RATE: 18 BRPM | BODY MASS INDEX: 31.5 KG/M2 | HEART RATE: 88 BPM | HEIGHT: 71 IN | SYSTOLIC BLOOD PRESSURE: 133 MMHG | DIASTOLIC BLOOD PRESSURE: 72 MMHG | TEMPERATURE: 98.7 F

## 2017-06-30 DIAGNOSIS — M48.02 CERVICAL SPINAL STENOSIS: ICD-10-CM

## 2017-06-30 DIAGNOSIS — Z98.1 S/P CERVICAL SPINAL FUSION: Primary | ICD-10-CM

## 2017-06-30 NOTE — PROGRESS NOTES
[address]  [address], [address]  [place], [address] [place] [address] Street  Phone: [phone]  Fax: [phone]  PROGRESS NOTE  Patient: Milton Dias                MRN: [id number]       SSN: [id number]  YOB: 1950        AGE: 73 y.o. SEX: male  Body mass index is 31.38 kg/(m^2). PCP: Yvette Chang MD  06/30/17    Chief Complaint   Patient presents with    Neck Pain     6 week post op       HISTORY OF PRESENT ILLNESS, RADIOGRAPHS, and PLAN:     HISTORY:  Mr. Martell Samples returns today. He is six weeks out from his two-level cervical decompression and fusion. He is doing great. He rates his pain as a 2/10. His neck pain is resolved and other than some trapezius stiffness, he is quite pleased. ASSESSMENT/PLAN: I am thrilled by his progress. I will have him continue an exercise program.  The only issue that we are dealing with to some degree is his tandem gait, which is slowly improving. I will see him back in six weeks time. cc: Marya Chavez M.D.          Past Medical History:   Diagnosis Date    Bilateral hip pain     Chronic pain     back; hx of opiod addiction    Depression     Diabetes (HCC)     borderline, no meds-trying to control with diet    DJD (degenerative joint disease)     GERD (gastroesophageal reflux disease)     Hepatitis C January, 2015    Hypertension     Kidney stones     Lymphadenopathy, generalized 12/05/2015    neg bx    Numbness of foot left    resolved per patient 1/29/16    S/P lumbar fusion 2/9/2016    L4/5 LAMINECTOMY/FUSION/TRANSFORAMINAL LUMBAR INTERBODY FUSION (TLIF) by Dr. Jazmin Mabry on 2/8/16     Unspecified sleep apnea     no cpap machine-pt denies       Family History   Problem Relation Age of Onset    Diabetes Sister     SLE Sister     Arthritis-osteo Sister     Heart Disease Sister        Current Outpatient Prescriptions   Medication Sig Dispense Refill    amLODIPine (NORVASC) 10 mg tablet Take 1 Tab by mouth daily. 90 Tab 1    omeprazole (PRILOSEC) 20 mg capsule Take 20 mg by mouth daily. 11    lisinopril (PRINIVIL, ZESTRIL) 20 mg tablet Take 1 Tab by mouth daily. 90 Tab 1    oxyCODONE-acetaminophen (PERCOCET 10)  mg per tablet Take 1 Tab by mouth every six (6) hours as needed. Max Daily Amount: 4 Tabs. 30 Tab 0    terbinafine HCl (LAMISIL) 250 mg tablet 250 mg. Takes 7 pills every other month         Allergies   Allergen Reactions    Nicotine Contact Dermatitis     Nicotine Patch reaction - Contact dermatitis with numbness and tingling also occuring in the left arm and denuding of the skin.     Thimerosal Other (comments)     Contact lens solution, made eyes red and irrated       Past Surgical History:   Procedure Laterality Date    HX APPENDECTOMY  as a child    HX BACK SURGERY  07/2015    HX CERVICAL FUSION  05/18/2017    ACDF C5/6 C6/7    HX COLONOSCOPY  2015    negative    HX LUMBAR FUSION  2/2016    HX ORTHOPAEDIC      denies    HX OTHER SURGICAL  05/2017    cervical fusion    HX TONSILLECTOMY  as a child    REMOVE Josette Right 3-10-16    Dr. Amberly James       Past Medical History:   Diagnosis Date    Bilateral hip pain     Chronic pain     back; hx of opiod addiction    Depression     Diabetes ([address]     borderline, no meds-trying to control with diet    DJD (degenerative joint disease)     GERD (gastroesophageal reflux disease)     Hepatitis C January, 2015    Hypertension     Kidney stones     Lymphadenopathy, generalized 12/05/2015    neg bx    Numbness of foot left    resolved per patient 1/29/16    S/P lumbar fusion 2/9/2016    L4/5 LAMINECTOMY/FUSION/TRANSFORAMINAL LUMBAR INTERBODY FUSION (TLIF) by Dr. Mine Null on 2/8/16     Unspecified sleep apnea     no cpap machine-pt denies       Social History     Social History    Marital status:      Spouse name: N/A    Number of children: N/A    Years of education: N/A Occupational History    Not on file. Social History Main Topics    Smoking status: Former Smoker     Packs/day: 0.25     Years: 40.00     Types: Cigarettes     Quit date: 5/12/2017    Smokeless tobacco: Never Used    Alcohol use No    Drug use: Yes     Special: Marijuana, Cocaine      Comment: stopped cocaine 2007, marijuana 5/14/17    Sexual activity: No     Other Topics Concern    Not on file     Social History Narrative         REVIEW OF SYSTEMS:   CONSTITUTIONAL SYMPTOMS:  Negative. EYES:  Negative. EARS, NOSE, THROAT AND MOUTH:  Negative. CARDIOVASCULAR:  Negative. RESPIRATORY:  Negative. GENITOURINARY: Negative. GASTROINTESTINAL:  Negative. INTEGUMENTARY (SKIN AND/OR BREAST):  Negative. MUSCULOSKELETAL: Per HPI.   ENDOCRINE/RHEUMATOLOGIC:  Negative. NEUROLOGICAL:  Per HPI. HEMATOLOGIC/LYMPHATIC:  Negative. ALLERGIC/IMMUNOLOGIC:  Negative. PSYCHIATRIC:  Negative. PHYSICAL EXAMINATION:   Visit Vitals    /72    Pulse 88    Temp 98.7 °F (37.1 °C) (Oral)    Resp 18    Ht 5' 11\" (1.803 m)    Wt 225 lb (102.1 kg)    SpO2 97%    BMI 31.38 kg/m2    PAIN SCALE: 2/10    CONSTITUTIONAL: The patient is in no apparent distress and is alert and oriented x 3. HEENT: Normocephalic. Hearing grossly intact. NECK: Supple and symmetric. no tenderness, or masses were felt. RESPIRATORY: No labored breathing. CARDIOVASCULAR: The carotid pulses were normal. Peripheral pulses were 2+. CHEST: Normal AP diameter and normal contour without any kyphoscoliosis. LYMPHATIC: No lymphadenopathy was appreciated in the neck, axillae or groin. SKIN:  Incision healing well, no drainage, no erythema, no hernia, no seroma, no swelling, no dehiscence, incision well approximated. Negative for scars, rashes, lesions, or ulcers on the right upper, right lower, left upper, left lower and trunk. NEUROLOGICAL: Alert and oriented x 3. Ambulation without assistive device. FWB.   EXTREMITIES: See musculoskeletal.  MUSCULOSKELETAL:   Head and Neck:  Negative for misalignment, asymmetry, crepitation, defects, tenderness masses or effusions.  Left Upper Extremity: Inspection, percussion and palpation preformed. Reyess sign is negative.  Right Upper Extremity: Inspection, percussion and palpation preformed. Reyess sign is negative.  Spine, Ribs and Pelvis: Inspection, percussion and palpation preformed. Negative for misalignment, asymmetry, crepitation, defects, tenderness masses or effusions.  Left Lower Extremity: Inspection, percussion and palpation preformed. Negative straight leg raise.  Right Lower Extremity: Inspection, percussion and palpation preformed. Negative straight leg raise. SPINE EXAM:     Cervical spine: Neck is midline. Normal muscle tone. No focal atrophy is noted. ASSESSMENT    ICD-10-CM ICD-9-CM    1. S/P cervical spinal fusion Z98.1 V45.4 AMB POC XRAY, SPINE, CERVICAL; 2 OR 3   2. Cervical spinal stenosis M48.02 723.0 AMB POC XRAY, SPINE, CERVICAL; 2 OR 3       Written by Jese Laird, as dictated by Daniella Mccullough MD.    I, Dr. Daniella Mccullough MD, confirm that all documentation is accurate.

## 2017-06-30 NOTE — MR AVS SNAPSHOT
Visit Information Date & Time Provider Department Dept. Phone Encounter #  
 6/30/2017 12:50 PM Shena Stone MD [facility] [phone] [id number] Follow-up Instructions Return in about 6 weeks (around 8/11/2017) for w NP. Your Appointments 8/22/2017 11:00 AM  
Follow Up with Gage Doherty NP [facility] Appt Note: HCV; same One [address]  
  
   
 [id number] [facility] [address]  
  
    
 8/28/2017  8:15 AM  
Follow Up with Owen Vinson MD  
[facility] Appt Note: 6 mos [address], [address] [place] [address]  
  
   
 [address], [address] [place] [address] Upcoming Health Maintenance Date Due ZOSTER VACCINE AGE 60> 8/18/2011 GLAUCOMA SCREENING Q2Y 8/18/2016 Pneumococcal 65+ Low/Medium Risk (1 of 2 - PCV13) 8/18/2016 MEDICARE YEARLY EXAM 8/18/2016 FOBT Q 1 YEAR AGE 50-75 12/8/2016 INFLUENZA AGE 9 TO ADULT 8/1/2017 DTaP/Tdap/Td series (2 - Td) 12/9/2025 Allergies as of 6/30/2017  Review Complete On: 6/30/2017 By: Derian Napoles LPN Severity Noted Reaction Type Reactions Nicotine Medium 06/11/2015    Contact Dermatitis Nicotine Patch reaction - Contact dermatitis with numbness and tingling also occuring in the left arm and denuding of the skin. Thimerosal  07/21/2015    Other (comments) Contact lens solution, made eyes red and irrated Current Immunizations  Reviewed on 12/9/2015 Name Date Influenza Vaccine 10/1/2016, 10/29/2015 Influenza Vaccine PF 10/29/2014 Tdap 12/9/2015 Not reviewed this visit You Were Diagnosed With   
  
 Codes Comments S/P cervical spinal fusion    -  Primary ICD-10-CM: Z98.1 ICD-9-CM: V45.4 Vitals BP Pulse Temp Resp Height(growth percentile) Weight(growth percentile) 133/72 88 98.7 °F (37.1 °C) (Oral) 18 5' 11\" (1.803 m) 225 lb (102.1 kg) SpO2 BMI Smoking Status 97% 31.38 kg/m2 Former Smoker BMI and BSA Data Body Mass Index Body Surface Area  
 31.38 kg/m 2 2.26 m 2 Preferred Pharmacy Pharmacy Name Phone [address], [address] [place] [address] [phone] Your Updated Medication List  
  
   
This list is accurate as of: 6/30/17  1:23 PM.  Always use your most recent med list. amLODIPine 10 mg tablet Commonly known as:  Oanh Medici Take 1 Tab by mouth daily. lisinopril 20 mg tablet Commonly known as:  Sydna Lies Take 1 Tab by mouth daily. omeprazole 20 mg capsule Commonly known as:  PRILOSEC Take 20 mg by mouth daily. oxyCODONE-acetaminophen  mg per tablet Commonly known as:  PERCOCET 10 Take 1 Tab by mouth every six (6) hours as needed. Max Daily Amount: 4 Tabs. terbinafine HCl 250 mg tablet Commonly known as:  LAMISIL  
250 mg. Takes 7 pills every other month We Performed the Following AMB POC XRAY, SPINE, CERVICAL; 2 OR 3 [91287 CPT(R)] Follow-up Instructions Return in about 6 weeks (around 8/11/2017) for w NP. Introducing [facility]! Dear Gaye Gibbons: Thank you for requesting a [employer] account. Our records indicate that you already have an active [employer] account. You can access your account anytime at [email or web address] Did you know that you can access your hospital and ER discharge instructions at any time in [employer]? You can also review all of your test results from your hospital stay or ER visit. Additional Information If you have questions, please visit the Frequently Asked Questions section of the [employer] Point website at [email or web address] Remember, MyChart is NOT to be used for urgent needs. For medical emergencies, dial 911. Now available from your iPhone and Android! Please provide this summary of care documentation to your next provider. Your primary care clinician is listed as Hari Pena. If you have any questions after today's visit, please call [phone].

## 2017-08-09 ENCOUNTER — OFFICE VISIT (OUTPATIENT)
Dept: ORTHOPEDIC SURGERY | Age: 67
End: 2017-08-09

## 2017-08-09 VITALS
OXYGEN SATURATION: 97 % | DIASTOLIC BLOOD PRESSURE: 73 MMHG | HEART RATE: 89 BPM | BODY MASS INDEX: 31.64 KG/M2 | RESPIRATION RATE: 18 BRPM | HEIGHT: 71 IN | SYSTOLIC BLOOD PRESSURE: 136 MMHG | TEMPERATURE: 98.2 F | WEIGHT: 226 LBS

## 2017-08-09 DIAGNOSIS — M47.816 LUMBAR FACET ARTHROPATHY: ICD-10-CM

## 2017-08-09 DIAGNOSIS — M79.2 NEURITIS: ICD-10-CM

## 2017-08-09 DIAGNOSIS — M54.42 CHRONIC BILATERAL LOW BACK PAIN WITH BILATERAL SCIATICA: Primary | ICD-10-CM

## 2017-08-09 DIAGNOSIS — Z98.1 S/P LUMBAR FUSION: ICD-10-CM

## 2017-08-09 DIAGNOSIS — M54.41 CHRONIC BILATERAL LOW BACK PAIN WITH BILATERAL SCIATICA: Primary | ICD-10-CM

## 2017-08-09 DIAGNOSIS — G89.29 CHRONIC BILATERAL LOW BACK PAIN WITH BILATERAL SCIATICA: Primary | ICD-10-CM

## 2017-08-09 DIAGNOSIS — Z98.1 S/P CERVICAL SPINAL FUSION: ICD-10-CM

## 2017-08-09 RX ORDER — GABAPENTIN 100 MG/1
100 CAPSULE ORAL 3 TIMES DAILY
Qty: 90 CAP | Refills: 0 | Status: SHIPPED | OUTPATIENT
Start: 2017-08-09 | End: 2017-09-06 | Stop reason: DRUGHIGH

## 2017-08-09 NOTE — PATIENT INSTRUCTIONS
Back Pain: Care Instructions  Your Care Instructions    Back pain has many possible causes. It is often related to problems with muscles and ligaments of the back. It may also be related to problems with the nerves, discs, or bones of the back. Moving, lifting, standing, sitting, or sleeping in an awkward way can strain the back. Sometimes you don't notice the injury until later. Arthritis is another common cause of back pain. Although it may hurt a lot, back pain usually improves on its own within several weeks. Most people recover in 12 weeks or less. Using good home treatment and being careful not to stress your back can help you feel better sooner. Follow-up care is a key part of your treatment and safety. Be sure to make and go to all appointments, and call your doctor if you are having problems. Its also a good idea to know your test results and keep a list of the medicines you take. How can you care for yourself at home? · Sit or lie in positions that are most comfortable and reduce your pain. Try one of these positions when you lie down:  ¨ Lie on your back with your knees bent and supported by large pillows. ¨ Lie on the floor with your legs on the seat of a sofa or chair. Terressa Gutter on your side with your knees and hips bent and a pillow between your legs. ¨ Lie on your stomach if it does not make pain worse. · Do not sit up in bed, and avoid soft couches and twisted positions. Bed rest can help relieve pain at first, but it delays healing. Avoid bed rest after the first day of back pain. · Change positions every 30 minutes. If you must sit for long periods of time, take breaks from sitting. Get up and walk around, or lie in a comfortable position. · Try using a heating pad on a low or medium setting for 15 to 20 minutes every 2 or 3 hours. Try a warm shower in place of one session with the heating pad. · You can also try an ice pack for 10 to 15 minutes every 2 to 3 hours.  Put a thin cloth between the ice pack and your skin. · Take pain medicines exactly as directed. ¨ If the doctor gave you a prescription medicine for pain, take it as prescribed. ¨ If you are not taking a prescription pain medicine, ask your doctor if you can take an over-the-counter medicine. · Take short walks several times a day. You can start with 5 to 10 minutes, 3 or 4 times a day, and work up to longer walks. Walk on level surfaces and avoid hills and stairs until your back is better. · Return to work and other activities as soon as you can. Continued rest without activity is usually not good for your back. · To prevent future back pain, do exercises to stretch and strengthen your back and stomach. Learn how to use good posture, safe lifting techniques, and proper body mechanics. When should you call for help? Call your doctor now or seek immediate medical care if:  · You have new or worsening numbness in your legs. · You have new or worsening weakness in your legs. (This could make it hard to stand up.)  · You lose control of your bladder or bowels. Watch closely for changes in your health, and be sure to contact your doctor if:  · Your pain gets worse. · You are not getting better after 2 weeks. Where can you learn more? Go to http://gomez-jarvis.info/. Enter O699 in the search box to learn more about \"Back Pain: Care Instructions. \"  Current as of: March 21, 2017  Content Version: 11.3  © 5909-4224 [employer]. Care instructions adapted under license by [employer] (which disclaims liability or warranty for this information). If you have questions about a medical condition or this instruction, always ask your healthcare professional. [address] any warranty or liability for your use of this information.

## 2017-08-09 NOTE — MR AVS SNAPSHOT
Visit Information Date & Time Provider Department Dept. Phone Encounter #  
 8/9/2017  8:30 AM Chelly Huber NP [facility] [phone] [id number] Follow-up Instructions Return in about 4 weeks (around 9/6/2017) for w/ NP buttery. Follow-up and Disposition History Your Appointments 8/22/2017 11:00 AM  
Follow Up with Prince Jorge NP [facility] [place] [facility] ([facility]) Appt Note: HCV; same [facility] [address] [facility] [address]  
  
   
 [address] [address]  
  
    
 8/28/2017  8:15 AM  
Follow Up with Nguyen Bazzi MD  
[facility] Appt Note: 6 mos [address], [address]  
  
   
 [address], [address] [facility] [address] Upcoming Health Maintenance Date Due ZOSTER VACCINE AGE 60> 6/18/2011 GLAUCOMA SCREENING Q2Y 8/18/2016 Pneumococcal 65+ Low/Medium Risk (1 of 2 - PCV13) 8/18/2016 MEDICARE YEARLY EXAM 8/18/2016 FOBT Q 1 YEAR AGE 50-75 12/8/2016 INFLUENZA AGE 9 TO ADULT 8/1/2017 DTaP/Tdap/Td series (2 - Td) 12/9/2025 Allergies as of 8/9/2017  Review Complete On: 8/9/2017 By: Yelitza Maddox LPN Severity Noted Reaction Type Reactions Nicotine Medium 06/11/2015    Contact Dermatitis Nicotine Patch reaction - Contact dermatitis with numbness and tingling also occuring in the left arm and denuding of the skin. Thimerosal  07/21/2015    Other (comments) Contact lens solution, made eyes red and irrated Current Immunizations  Reviewed on 12/9/2015 Name Date Influenza Vaccine 10/1/2016, 10/29/2015 Influenza Vaccine PF 10/29/2014 Tdap 12/9/2015 Not reviewed this visit You Were Diagnosed With   
  
 Codes Comments Chronic bilateral low back pain with bilateral sciatica    -  Primary ICD-10-CM: M54.42, M54.41, G89.29 ICD-9-CM: 724.2, 724.3, 338.29 Lumbar facet arthropathy     ICD-10-CM: M12.88 ICD-9-CM: 721.3 S/P lumbar fusion     ICD-10-CM: Z98.1 ICD-9-CM: V45.4 S/P cervical spinal fusion     ICD-10-CM: Z98.1 ICD-9-CM: V45.4 Neuritis     ICD-10-CM: M79.2 ICD-9-CM: 729.2 Vitals BP Pulse Temp Resp Height(growth percentile) Weight(growth percentile) 136/73 89 98.2 °F (36.8 °C) (Oral) 18 5' 11\" (1.803 m) 226 lb (102.5 kg) SpO2 BMI Smoking Status 97% 31.52 kg/m2 Former Smoker BMI and BSA Data Body Mass Index Body Surface Area  
 31.52 kg/m 2 2.27 m 2 Preferred Pharmacy Pharmacy Name Phone [address] [place] [address], [address] [place] [address] [phone] Your Updated Medication List  
  
   
This list is accurate as of: 8/9/17  9:05 AM.  Always use your most recent med list. amLODIPine 10 mg tablet Commonly known as:  Orval Kristin Take 1 Tab by mouth daily. gabapentin 100 mg capsule Commonly known as:  NEURONTIN Take 1 Cap by mouth three (3) times daily. Taper up: 1 tab nightly x 4 days, 1 tab am and pm X 4 day, then 1 tab TID  
  
 lisinopril 20 mg tablet Commonly known as:  Gianluca Handing Take 1 Tab by mouth daily. omeprazole 20 mg capsule Commonly known as:  PRILOSEC Take 20 mg by mouth daily. oxyCODONE-acetaminophen  mg per tablet Commonly known as:  PERCOCET 10 Take 1 Tab by mouth every six (6) hours as needed. Max Daily Amount: 4 Tabs. terbinafine HCl 250 mg tablet Commonly known as:  LAMISIL  
250 mg. Takes 7 pills every other month Prescriptions Sent to Pharmacy Refills  
 gabapentin (NEURONTIN) 100 mg capsule 0 Sig: Take 1 Cap by mouth three (3) times daily. Taper up: 1 tab nightly x 4 days, 1 tab am and pm X 4 day, then 1 tab TID Class: Normal  
 Pharmacy: [id number] [facility], [address] [facility] #: [phone] Route: Oral  
  
We Performed the Following REFERRAL TO PHYSICAL THERAPY [OKB37 Custom] Comments:  
 Cervical and lumbar (assitance with HEP) Follow-up Instructions Return in about 4 weeks (around 9/6/2017) for w/ NP ron. Referral Information Referral ID Referred By Referred To  
  
 [id number] Radha ZHONG Not Available Visits Status Start Date End Date 1 New Request 8/9/17 8/9/18 If your referral has a status of pending review or denied, additional information will be sent to support the outcome of this decision. Patient Instructions Back Pain: Care Instructions Your Care Instructions Back pain has many possible causes. It is often related to problems with muscles and ligaments of the back. It may also be related to problems with the nerves, discs, or bones of the back. Moving, lifting, standing, sitting, or sleeping in an awkward way can strain the back. Sometimes you don't notice the injury until later. Arthritis is another common cause of back pain. Although it may hurt a lot, back pain usually improves on its own within several weeks. Most people recover in 12 weeks or less. Using good home treatment and being careful not to stress your back can help you feel better sooner. Follow-up care is a key part of your treatment and safety. Be sure to make and go to all appointments, and call your doctor if you are having problems. Its also a good idea to know your test results and keep a list of the medicines you take. How can you care for yourself at home? · Sit or lie in positions that are most comfortable and reduce your pain. Try one of these positions when you lie down: ¨ Lie on your back with your knees bent and supported by large pillows. ¨ Lie on the floor with your legs on the seat of a sofa or chair. Sherle Plants on your side with your knees and hips bent and a pillow between your legs. ¨ Lie on your stomach if it does not make pain worse. · Do not sit up in bed, and avoid soft couches and twisted positions. Bed rest can help relieve pain at first, but it delays healing. Avoid bed rest after the first day of back pain. · Change positions every 30 minutes. If you must sit for long periods of time, take breaks from sitting. Get up and walk around, or lie in a comfortable position. · Try using a heating pad on a low or medium setting for 15 to 20 minutes every 2 or 3 hours. Try a warm shower in place of one session with the heating pad. · You can also try an ice pack for 10 to 15 minutes every 2 to 3 hours. Put a thin cloth between the ice pack and your skin. · Take pain medicines exactly as directed. ¨ If the doctor gave you a prescription medicine for pain, take it as prescribed. ¨ If you are not taking a prescription pain medicine, ask your doctor if you can take an over-the-counter medicine. · Take short walks several times a day. You can start with 5 to 10 minutes, 3 or 4 times a day, and work up to longer walks. Walk on level surfaces and avoid hills and stairs until your back is better. · Return to work and other activities as soon as you can. Continued rest without activity is usually not good for your back. · To prevent future back pain, do exercises to stretch and strengthen your back and stomach. Learn how to use good posture, safe lifting techniques, and proper body mechanics. When should you call for help? Call your doctor now or seek immediate medical care if: 
· You have new or worsening numbness in your legs. · You have new or worsening weakness in your legs. (This could make it hard to stand up.) · You lose control of your bladder or bowels. Watch closely for changes in your health, and be sure to contact your doctor if: 
· Your pain gets worse. · You are not getting better after 2 weeks. Where can you learn more? Go to http://gomez-jarvis.info/. Enter Y784 in the search box to learn more about \"Back Pain: Care Instructions. \" Current as of: March 21, 2017 Content Version: 11.3 © 1210-8000 "[employer] a [employer]". Care instructions adapted under license by [employer] (which disclaims liability or warranty for this information). If you have questions about a medical condition or this instruction, always ask your healthcare professional. [address] any warranty or liability for your use of this information. Introducing [facility] [place] [facility]! Dear Janice Owen: Thank you for requesting a [employer] account. Our records indicate that you already have an active [employer] account. You can access your account anytime at [email or web address] Did you know that you can access your hospital and ER discharge instructions at any time in InfoBionic? You can also review all of your test results from your hospital stay or ER visit. Additional Information If you have questions, please visit the Frequently Asked Questions section of the Info[employer] website at [email or web address] Remember, InfoBionic is NOT to be used for urgent needs. For medical emergencies, dial 911. Now available from your iPhone and Android! Please provide this summary of care documentation to your next provider. Your primary care clinician is listed as David Yadav. If you have any questions after today's visit, please call [phone].

## 2017-08-09 NOTE — PROGRESS NOTES
[address]  [address], [address]  [place]  Phone: [phone]  Fax: [phone]    Brea Hurtado  : 1950  PCP: Valdez Rose MD    PROGRESS NOTE    HISTORY OF PRESENT ILLNESS:  Chief Complaint   Patient presents with    Neck Pain     follow up     Conrad Arango is a 73 y.o.  male with who had a 2 level cervical decompression and fusion about 3 months ago. His pain has improved since surgery. He complains of intermittent posterior neck pain and trapezius pian and muscle tension. He is requesting PT. He also complaints of some leg pain today. He has a shorter standing/ walking tolerance (he states he can only work about 3 hours). He states the pain radiates from his bilateral buttocks to his anterior thighs down to his shins. This is a burning and aching sensation. It is intermittent. He has a hx of a lumbar fusion. He is also complaining of his eyes going crossed a couple times a day. He has not seen opthamology in about 5 years and he wears glasses. Denies bladder/bowel dysfunction, saddle paresthesia, weakness, gait disturbance, or other neurological deficit. Pt at this time desires to continue with current care/proceed with medication evaluation. ASSESSMENT  73 y.o. male with neck and back pain. Diagnoses and all orders for this visit:    1. Chronic bilateral low back pain with bilateral sciatica    2. Lumbar facet arthropathy    3. S/P lumbar fusion  -     REFERRAL TO PHYSICAL THERAPY    4. S/P cervical spinal fusion  -     REFERRAL TO PHYSICAL THERAPY    5. Neuritis  -     gabapentin (NEURONTIN) 100 mg capsule; Take 1 Cap by mouth three (3) times daily. Taper up: 1 tab nightly x 4 days, 1 tab am and pm X 4 day, then 1 tab TID       IMPRESSION/PLAN    1) Pt was given information on back care exercises. 2) PT referral today. 3) Gabapentin 100 TID. Will taper up.    4) He will go see opthalmology for his glasses. 4) Mr. Ugo Aguila has a reminder for a \"due or due soon\" health maintenance. I have asked that he contact his primary care provider, Ariana Delgado MD, for follow-up on this health maintenance. 5) We have informed patient to notify us for immediate appointment if he has any worsening neurogical symptoms or if an emergency situation presents, then call 911  5) Pt will follow-up in one month with NP. 6) Review EMG at next visit, being faxed from Neuro today. Risks and benefits of ongoing opiate therapy have been reviewed with the patient.  is appropriate. No pain behaviors. Denies thoughts of harming self or others. Pt has a good risk to benefit ratio which allows the pt to function in a home environment without side effects. PAST MEDICAL HISTORY  Past Medical History:   Diagnosis Date    Bilateral hip pain     Chronic pain     back; hx of opiod addiction    Depression     Diabetes ([address]     borderline, no meds-trying to control with diet    DJD (degenerative joint disease)     GERD (gastroesophageal reflux disease)     Hepatitis C January, 2015    Hypertension     Kidney stones     Lymphadenopathy, generalized 12/05/2015    neg bx    Numbness of foot left    resolved per patient 1/29/16    S/P lumbar fusion 2/9/2016    L4/5 LAMINECTOMY/FUSION/TRANSFORAMINAL LUMBAR INTERBODY FUSION (TLIF) by Dr. Philip Boyer on 2/8/16     Unspecified sleep apnea     no cpap machine-pt denies        MEDICATIONS  Current Outpatient Prescriptions   Medication Sig Dispense Refill    gabapentin (NEURONTIN) 100 mg capsule Take 1 Cap by mouth three (3) times daily. Taper up: 1 tab nightly x 4 days, 1 tab am and pm X 4 day, then 1 tab TID 90 Cap 0    amLODIPine (NORVASC) 10 mg tablet Take 1 Tab by mouth daily. 90 Tab 1    omeprazole (PRILOSEC) 20 mg capsule Take 20 mg by mouth daily. 11    terbinafine HCl (LAMISIL) 250 mg tablet 250 mg.  Takes 7 pills every other month      lisinopril (PRINIVIL, ZESTRIL) 20 mg tablet Take 1 Tab by mouth daily. 90 Tab 1    oxyCODONE-acetaminophen (PERCOCET 10)  mg per tablet Take 1 Tab by mouth every six (6) hours as needed. Max Daily Amount: 4 Tabs. 30 Tab 0       ALLERGIES  Allergies   Allergen Reactions    Nicotine Contact Dermatitis     Nicotine Patch reaction - Contact dermatitis with numbness and tingling also occuring in the left arm and denuding of the skin.  Thimerosal Other (comments)     Contact lens solution, made eyes red and irrated       SOCIAL HISTORY    Social History     Social History    Marital status:      Spouse name: N/A    Number of children: N/A    Years of education: N/A     Occupational History    Not on file. Social History Main Topics    Smoking status: Former Smoker     Packs/day: 0.25     Years: 40.00     Types: Cigarettes     Quit date: 5/12/2017    Smokeless tobacco: Never Used    Alcohol use No    Drug use: Yes     Special: Marijuana, Cocaine      Comment: stopped cocaine 2007, marijuana 5/14/17    Sexual activity: No     Other Topics Concern    Not on file     Social History Narrative       SUBJECTIVE    Work:     Smoking Status: non    Pain Scale: 2/10    Pain Assessment  8/9/2017   Location of Pain Neck; Other (comment)   Pain Location Comment -   Location Modifiers -   Severity of Pain 3   Quality of Pain Aching; Other (Comment)   Quality of Pain Comment eyes cross and dizziness   Duration of Pain -   Frequency of Pain Constant   Date Pain First Started -   Aggravating Factors Other (Comment)   Aggravating Factors Comment excessive activity, and later in the day   Limiting Behavior -   Relieving Factors Rest   Relieving Factors Comment -   Result of Injury -       Accompanied by self. REVIEW OF SYSTEMS  ROS    Constitutional: Negative for fever, chills, or weight change. Respiratory: Negative for cough or shortness of breath.     Cardiovascular: Negative for chest pain or palpitations. Gastrointestinal: Negative for acid reflux, change in bowel habits, or constipation. Genitourinary: Negative for incontinence, dysuria and flank pain. Musculoskeletal: Positive for low back and trapezious pain. Skin: Negative for rash. Neurological: Negative for headaches, dizziness, or numbness. Endo/Heme/Allergies: Negative . Psychiatric/Behavioral: Negative. PHYSICAL EXAMINATION  Visit Vitals    /73    Pulse 89    Temp 98.2 °F (36.8 °C) (Oral)    Resp 18    Ht 5' 11\" (1.803 m)    Wt 226 lb (102.5 kg)    SpO2 97%    BMI 31.52 kg/m2       Constitutional: Well developed,  well nourished,  awake, alert, and in no acute distress. Neurological:  Sensation to light touch is intact. Psychiatric: Affect and mood are appropriate. Integumentary: No rashes or abrasions noted on exposed areas,  warm, dry and intact. Cardiovascular/Peripheral Vascular:  No peripheral edema is noted. Lymphatic:  No evidence of lymphedema. No cervical lymphadenopathy. SPINE/MUSCULOSKELETAL EXAM    Cervical spine:  Neck is midline. Normal muscle tone. No focal atrophy is noted. Shoulder ROM intact. No Tenderness to palpation . Negative Spurling's sign. Negative Tinel's sign. Negative Reyes's sign. Lumbar spine:  No rash, ecchymosis, or gross obliquity. No fasciculations. No focal atrophy is noted. Range of motion is intact. No Tenderness to palpation. SI joints non-tender. Trochanters non tender. Musculoskeletal: minimial pain with extension, axial loading, or forward flexion. No pain with internal or external rotation of his hips. MOTOR    Biceps  Triceps Deltoids Wrist Ext Wrist Flex Hand Intrin   Right +4/5 +4/5 +4/5 +4/5 +4/5 +4/5   Left +4/5 +4/5 +4/5 +4/5 +4/5 +4/5      Hip Flex  Quads Hamstrings Ankle DF EHL Ankle PF   Right +4/5 +4/5 +4/5 +4/5 +4/5 +4/5   Left +4/5 +4/5 +4/5 +4/5 +4/5 +4/5       Straight Leg raise positive left.      normal gait and station    Ambulation without assistive device. Full- weight bearing, non-antalgic gait.     Percerakesh Eaton, NP

## 2017-08-16 ENCOUNTER — HOSPITAL ENCOUNTER (OUTPATIENT)
Dept: PHYSICAL THERAPY | Age: 67
Discharge: HOME | End: 2017-08-16
Payer: MEDICARE

## 2017-08-16 PROCEDURE — G8979 MOBILITY GOAL STATUS: HCPCS

## 2017-08-16 PROCEDURE — 97110 THERAPEUTIC EXERCISES: CPT

## 2017-08-16 PROCEDURE — G8978 MOBILITY CURRENT STATUS: HCPCS

## 2017-08-16 PROCEDURE — 97162 PT EVAL MOD COMPLEX 30 MIN: CPT

## 2017-08-16 NOTE — PROGRESS NOTES
[facility]  [place] [facility] [facility]  [address] [place] [address]  [phone] fax    Plan of Care/ Statement of Necessity for Physical Therapy Services    Patient name: Beverli Meckel Start of Care: 2017   Referral source: Lauren Burroughs NP : 1950    Medical Diagnosis: Back pain [M54.9]  Neck pain [M54.2]   Onset Date:C/S Fusion , L/S Fusion     Treatment Diagnosis: L/S and C/S Fusion   Prior Hospitalization: see medical history Provider#: [id number]   Medications: Verified on Patient summary List    Comorbidities: Depression, Arthritis, Hep C, GERD, Dizziness. Prior Level of Function: Previous Edgar/. Walked 6 miles/day. 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 Assessment/ key information: Pt is a 73 yr old male S/P  Cervical Decompression and Fusion 17 and L/S Fusion 16. He reports residual pain to his hip and buttock area as well as upper and mid back stiffness. Pt exhibits poor core activation and strength. He denies any tingling or numbness to his extremities and is most concerned about his poor  Endurance,  and multiple near falls. LE strength, left H/S is 4/5, hips and quad =4+/5. Pt has limited LE flexibility and muscle tightness. His standing balance is poor with EO/EC. He has palpable tightness to his paraspinals, H/S, UT and Rhomboids. Pt ambulates with a guarded gait and is aware of his balance deficits. He reports he is much improved since his surgeries and want to return to walking as well as improve strength and endurance. Pt will benefit from skilled therapy to improve QOL and functional limitations to return to light activities.     Evaluation Complexity History MEDIUM  Complexity : 1-2 comorbidities / personal factors will impact the outcome/ POC ; Examination MEDIUM Complexity : 3 Standardized tests and measures addressing body structure, function, activity limitation and / or participation in recreation  ;Presentation MEDIUM Complexity : Evolving with changing characteristics  ; Clinical Decision Making MEDIUM Complexity : FOTO score of 26-74  Overall Complexity Rating: MEDIUM  Problem List: pain affecting function, decrease ROM, decrease strength, impaired gait/ balance, decrease ADL/ functional abilitiies, decrease activity tolerance, decrease flexibility/ joint mobility and decrease transfer abilities   Treatment Plan may include any combination of the following: Therapeutic exercise, Therapeutic activities, Neuromuscular re-education, Physical agent/modality, Gait/balance training, Manual therapy, Patient education, Self Care training, Functional mobility training and Home safety training  Patient / Family readiness to learn indicated by: asking questions, trying to perform skills and interest  Persons(s) to be included in education: patient (P)  Barriers to Learning/Limitations: None  Patient Goal (s): Less Pain and better Range of Motion  Patient Self Reported Health Status: fair  Rehabilitation Potential: good    Short Term Goals: To be accomplished in 1 weeks:   1. Pt will be compliant with a HEP to improve walking tolerance. Long Term Goals: To be accomplished in 6 weeks:   1. Pt will increase L/S FOTO score by 8 pts to improve with ambulation endurance. 2. Pt will tolerate ambulation for approx 1 hr to improve with return to daily walking activities. 3. Pt will perform EC on foam x 30 sec to improve static balance and decrease falls. 4. Pt will increase C/S Rotation to >50 deg to ease with driving. Frequency / Duration: Patient to be seen 2-3 times per week for 6 weeks.     Patient/ Caregiver education and instruction: Diagnosis, prognosis, self care and exercises   [x]  Plan of care has been reviewed with CHIKA    G-Codes (GP)  Mobility   Current  CK= 40-59%   Goal  CJ= 20-39%    The severity rating is based on clinical judgment and the FOTO score. Certification Period: 8/16/17-11/15/17  Khoikana Maria Antonia, PT 8/16/2017 2:19 PM    ________________________________________________________________________    I certify that the above Therapy Services are being furnished while the patient is under my care. I agree with the treatment plan and certify that this therapy is necessary.     Physician's Signature:____________________  Date:____________Time: _________    Please sign and return to [facility]  [address] [facility] OF David Huang  [phone] fax

## 2017-08-16 NOTE — PROGRESS NOTES
PT DAILY TREATMENT NOTE - [facility]     Patient Name: Barbra Chavez  Date:2017  : 1950  [x]  Patient  Verified  Payor: BLUE CROSS MEDICARE / Plan: [facility] / Product Type: Managed Care Medicare /    In time:1100  Out time:1150  Total Treatment Time (min): 50  Total Timed Codes (min): 25  1:1 Treatment Time ( [address] only): 50   Visit #: 1 of 12    Treatment Area: Back pain [M54.9]  Neck pain [M54.2]    SUBJECTIVE  Pain Level (0-10 scale): 5/10  Any medication changes, allergies to medications, adverse drug reactions, diagnosis change, or new procedure performed?: [x] No    [] Yes (see summary sheet for update)  Subjective functional status/changes:   [] No changes reported  See eval    OBJECTIVE    25 min []Eval                  []Re-Eval       25 min Therapeutic Exercise:  [] See flow sheet :   Rationale: increase ROM and increase strength to improve the patients ability to ease with ADL's. With   [] TE   [] TA   [] neuro   [] other: Patient Education: [x] Review HEP    [] Progressed/Changed HEP based on:   [] positioning   [] body mechanics   [] transfers   [] heat/ice application    [] other:      Other Objective/Functional Measures: see eval     Pain Level (0-10 scale) post treatment: 10    ASSESSMENT/Changes in Function: see eval    Patient will continue to benefit from skilled PT services to modify and progress therapeutic interventions, address functional mobility deficits, address ROM deficits, address strength deficits, analyze and address soft tissue restrictions, analyze and cue movement patterns, analyze and modify body mechanics/ergonomics and assess and modify postural abnormalities to attain remaining goals.      [x]  See Plan of Care  []  See progress note/recertification  []  See Discharge Summary         Progress towards goals / Updated goals:  See POC    PLAN  [x]  Upgrade activities as tolerated     [x]  Continue plan of care  []  Update interventions per flow sheet       []  Discharge due to:_  []  Other:_      Antonina Araujo, PT 8/16/2017  11:01 AM    Future Appointments  Date Time Provider Jaime Reeder   8/22/2017 11:00 AM [place] Ditto, NP [facility] KATHY SCHED   8/28/2017 8:15 AM Sujatha Unger MD [facility] KATHY SCHED   9/6/2017 11:00 AM Nabor Stewart  [address]

## 2017-08-21 ENCOUNTER — HOSPITAL ENCOUNTER (EMERGENCY)
Age: 67
Discharge: HOME | End: 2017-08-21
Attending: EMERGENCY MEDICINE
Payer: MEDICARE

## 2017-08-21 VITALS
DIASTOLIC BLOOD PRESSURE: 75 MMHG | BODY MASS INDEX: 31.5 KG/M2 | RESPIRATION RATE: 20 BRPM | TEMPERATURE: 98.4 F | OXYGEN SATURATION: 96 % | SYSTOLIC BLOOD PRESSURE: 117 MMHG | HEART RATE: 93 BPM | HEIGHT: 71 IN | WEIGHT: 225 LBS

## 2017-08-21 DIAGNOSIS — S05.02XA LEFT CORNEAL ABRASION, INITIAL ENCOUNTER: Primary | ICD-10-CM

## 2017-08-21 PROCEDURE — 99283 EMERGENCY DEPT VISIT LOW MDM: CPT

## 2017-08-21 PROCEDURE — 74011000250 HC RX REV CODE- 250: Performed by: EMERGENCY MEDICINE

## 2017-08-21 RX ORDER — HYDROCODONE BITARTRATE AND ACETAMINOPHEN 5; 325 MG/1; MG/1
1 TABLET ORAL
Qty: 10 TAB | Refills: 0 | Status: SHIPPED | OUTPATIENT
Start: 2017-08-21 | End: 2018-01-02

## 2017-08-21 RX ORDER — PROPARACAINE HYDROCHLORIDE 5 MG/ML
1 SOLUTION/ DROPS OPHTHALMIC
Status: COMPLETED | OUTPATIENT
Start: 2017-08-21 | End: 2017-08-21

## 2017-08-21 RX ORDER — ERYTHROMYCIN 5 MG/G
OINTMENT OPHTHALMIC
Qty: 1 G | Refills: 0 | Status: SHIPPED | OUTPATIENT
Start: 2017-08-21 | End: 2017-08-28

## 2017-08-21 RX ADMIN — PROPARACAINE HYDROCHLORIDE 1 DROP: 5 SOLUTION/ DROPS OPHTHALMIC at 18:13

## 2017-08-21 RX ADMIN — FLUORESCEIN SODIUM 1 STRIP: 1 STRIP OPHTHALMIC at 18:13

## 2017-08-21 NOTE — ED PROVIDER NOTES
HPI Comments: 72yo m c/o left eye pain. Started when he was mowing grass and felt something get in his eye. He was rubbing it and tried rinsing it with water but the pain has worsened. He has blurred vision. His eye is red. Drainage from the eye. He wears glasses not contacts. He does not have an eye doctor. Patient is a 78 y.o. male presenting with foreign body in eye. Foreign Body in [address] [place] [address] symptoms include discharge and eye redness. Pertinent negatives include no fever and no dizziness. Past Medical History:   Diagnosis Date    Bilateral hip pain     Chronic pain     back; hx of opiod addiction    Depression     Diabetes ([address]     borderline, no meds-trying to control with diet    DJD (degenerative joint disease)     GERD (gastroesophageal reflux disease)     Hepatitis C January, 2015    Hypertension     Kidney stones     Lymphadenopathy, generalized 12/05/2015    neg bx    Numbness of foot left    resolved per patient 1/29/16    S/P lumbar fusion 2/9/2016    L4/5 LAMINECTOMY/FUSION/TRANSFORAMINAL LUMBAR INTERBODY FUSION (TLIF) by Dr. Emily Myers on 2/8/16     Unspecified sleep apnea     no cpap machine-pt denies       Past Surgical History:   Procedure Laterality Date    HX APPENDECTOMY  as a child    HX BACK SURGERY  07/2015    HX CERVICAL FUSION  05/18/2017    ACDF C5/6 C6/7    HX COLONOSCOPY  2015    negative    HX LUMBAR FUSION  2/2016    HX ORTHOPAEDIC      denies    HX OTHER SURGICAL  05/2017    cervical fusion    HX TONSILLECTOMY  as a child    [facility]    Dr. Nikko Byrne         Family History:   Problem Relation Age of Onset    Diabetes Sister     SLE Sister     Arthritis-osteo Sister     Heart Disease Sister        Social History     Social History    Marital status:      Spouse name: N/A    Number of children: N/A    Years of education: N/A     Occupational History    Not on file.      Social History Main Topics    Smoking status: Former Smoker     Packs/day: 0.25     Years: 40.00     Types: Cigarettes     Quit date: 5/12/2017    Smokeless tobacco: Never Used    Alcohol use No    Drug use: Yes     Special: Marijuana, Cocaine      Comment: stopped cocaine 2007, marijuana 5/14/17    Sexual activity: No     Other Topics Concern    Not on file     Social History Narrative         ALLERGIES: Nicotine and Thimerosal    Review of Systems   Constitutional: Negative for fever. HENT: Negative for facial swelling. Eyes: Positive for discharge, redness and visual disturbance. Respiratory: Negative for shortness of breath. Cardiovascular: Negative for chest pain. Gastrointestinal: Negative for abdominal pain. Genitourinary: Negative for dysuria. Musculoskeletal: Negative for neck pain. Skin: Negative for rash. Neurological: Negative for dizziness. Psychiatric/Behavioral: Negative for confusion. All other systems reviewed and are negative. Vitals:    08/21/17 1748   BP: 117/75   Pulse: 93   Resp: 20   Temp: 98.4 °F (36.9 °C)   SpO2: 96%   Weight: 102.1 kg (225 lb)   Height: 5' 11\" (1.803 m)            Physical Exam   Constitutional: He appears well-developed and well-nourished. No distress. HENT:   Head: Normocephalic and atraumatic. Eyes: Left conjunctiva is injected. Slit lamp exam:       The left eye shows corneal abrasion. Corneal abrasion encompassing 10-20% of the cornea at the center. Neck: Normal range of motion. Neck supple. Cardiovascular: Normal rate. Pulmonary/Chest: Effort normal.   Abdominal: Soft. Musculoskeletal: Normal range of motion. Neurological: He is alert. Skin: Skin is warm and dry. He is not diaphoretic. Psychiatric: He has a normal mood and affect. Nursing note and vitals reviewed.        MDM  Number of Diagnoses or Management Options  Left corneal abrasion, initial encounter: new and does not require workup  Diagnosis management comments: Left eye corneal abrasion. Instructed patient that it is very important that he follows up with ophthalmology TOMORROW. R/x for erythro. Return if vision or pain worsens. Discussed treatment plan, return precautions, symptomatic relief, and expected time to improvement. All questions answered. Patient is stable for discharge and outpatient management. ED Course       Eye Stain    Date/Time: 8/21/2017 6:36 PM    Performed by: PA  Supervising provider: Dr. Cali Ackerman         Corneal abrasion was present on eyelid eversion. Left eye location: 12 o'clock      Anterior chamber is clear. Patient tolerance: Patient tolerated the procedure well with no immediate complications  My total time at bedside, performing this procedure was 16-30 minutes. Diagnosis:   1. Left corneal abrasion, initial encounter          Disposition: home    Follow-up Information     Follow up With Details Comments Contact Info    [facility] In 1 day  [address] [facility]  [phone]    [facility]  [facility] [address] [place] [address] W    [address] [facility] EMERGENCY DEPT  Immediately if symptoms worsen [address] [place] [address]  [phone]          Patient's Medications   Start Taking    ERYTHROMYCIN (ILOTYCIN) OPHTHALMIC OINTMENT    Take as directed    HYDROCODONE-ACETAMINOPHEN (NORCO) 5-325 MG PER TABLET    Take 1 Tab by mouth every four (4) hours as needed for Pain. Max Daily Amount: 6 Tabs. Continue Taking    AMLODIPINE (NORVASC) 10 MG TABLET    Take 1 Tab by mouth daily. GABAPENTIN (NEURONTIN) 100 MG CAPSULE    Take 1 Cap by mouth three (3) times daily. Taper up: 1 tab nightly x 4 days, 1 tab am and pm X 4 day, then 1 tab TID    LISINOPRIL (PRINIVIL, ZESTRIL) 20 MG TABLET    Take 1 Tab by mouth daily. OMEPRAZOLE (PRILOSEC) 20 MG CAPSULE    Take 20 mg by mouth daily.     OXYCODONE-ACETAMINOPHEN (PERCOCET 10)  MG PER TABLET    Take 1 Tab by mouth every six (6) hours as needed. Max Daily Amount: 4 Tabs. TERBINAFINE HCL (LAMISIL) 250 MG TABLET    250 mg.  Takes 7 pills every other month   These Medications have changed    No medications on file   Stop Taking    No medications on file     Vlad No PA-C

## 2017-08-21 NOTE — DISCHARGE INSTRUCTIONS
Corneal Scratches: Care Instructions  Your Care Instructions    The cornea is the clear surface that covers the front of the eye. When a speck of dirt, a wood chip, an insect, or another object flies into your eye, it can cause a painful scratch on the cornea. Wearing contact lenses too long or rubbing your eyes can also scratch the cornea. Small scratches usually heal in a day or two. Deeper scratches may take longer. If you have had a foreign object removed from your eye or you have a corneal scratch, you will need to watch for infection and vision problems while your eye heals. Follow-up care is a key part of your treatment and safety. Be sure to make and go to all appointments, and call your doctor if you are having problems. It's also a good idea to know your test results and keep a list of the medicines you take. How can you care for yourself at home? · The doctor probably used a medicine during your exam to numb your eye. When it wears off in 30 to 60 minutes, your eye pain may come back. Take pain medicines exactly as directed. ¨ If the doctor gave you a prescription medicine for pain, take it as prescribed. ¨ If you are not taking a prescription pain medicine, ask your doctor if you can take an over-the-counter medicine. ¨ Do not take two or more pain medicines at the same time unless the doctor told you to. Many pain medicines have acetaminophen, which is Tylenol. Too much acetaminophen (Tylenol) can be harmful. · Do not rub your injured eye. Rubbing can make it worse. · Use the prescribed eyedrops or ointment as directed. Be sure the dropper or bottle tip is clean. To put in eyedrops or ointment:  ¨ Tilt your head back, and pull your lower eyelid down with one finger. ¨ Drop or squirt the medicine inside the lower lid. ¨ Close your eye for 30 to 60 seconds to let the drops or ointment move around. ¨ Do not touch the ointment or dropper tip to your eyelashes or any other surface.   · Do not use your contact lens in your hurt eye until your doctor says you can. Also, do not wear eye makeup until your eye has healed. · Do not drive if you have blurred vision. · Bright light may hurt. Sunglasses can help. · To prevent eye injuries in the future, wear safety glasses or goggles when you work with machines or tools, mow the lawn, or ride a bike or motorcycle. When should you call for help? Call your doctor now or seek immediate medical care if:  · You have signs of an eye infection, such as:  ¨ Pus or thick discharge coming from the eye. ¨ Redness or swelling around the eye. ¨ A fever. · You have new or worse eye pain. · You have vision changes. · It feels like there is something in your eye. · Light hurts your eye. Watch closely for changes in your health, and be sure to contact your doctor if:  · You do not get better as expected. Where can you learn more? Go to http://gomez-jarvis.info/. Enter B030 in the search box to learn more about \"Corneal Scratches: Care Instructions. \"  Current as of: March 20, 2017  Content Version: 11.3  © 4362-2682 [employer]. Care instructions adapted under license by [employer] (which disclaims liability or warranty for this information). If you have questions about a medical condition or this instruction, always ask your healthcare professional. [place] [address] any warranty or liability for your use of this information.

## 2017-08-28 ENCOUNTER — HOSPITAL ENCOUNTER (OUTPATIENT)
Dept: LAB | Age: 67
Discharge: HOME | End: 2017-08-28

## 2017-08-28 ENCOUNTER — OFFICE VISIT (OUTPATIENT)
Dept: INTERNAL MEDICINE CLINIC | Age: 67
End: 2017-08-28

## 2017-08-28 ENCOUNTER — HOSPITAL ENCOUNTER (OUTPATIENT)
Dept: PHYSICAL THERAPY | Age: 67
Discharge: HOME | End: 2017-08-28
Payer: MEDICARE

## 2017-08-28 VITALS
DIASTOLIC BLOOD PRESSURE: 74 MMHG | RESPIRATION RATE: 16 BRPM | BODY MASS INDEX: 30.8 KG/M2 | TEMPERATURE: 97.9 F | WEIGHT: 220 LBS | OXYGEN SATURATION: 97 % | HEART RATE: 91 BPM | HEIGHT: 71 IN | SYSTOLIC BLOOD PRESSURE: 130 MMHG

## 2017-08-28 DIAGNOSIS — I10 ESSENTIAL HYPERTENSION: Primary | ICD-10-CM

## 2017-08-28 DIAGNOSIS — R73.02 IGT (IMPAIRED GLUCOSE TOLERANCE): ICD-10-CM

## 2017-08-28 DIAGNOSIS — F32.A DEPRESSION, UNSPECIFIED DEPRESSION TYPE: ICD-10-CM

## 2017-08-28 DIAGNOSIS — Z23 ENCOUNTER FOR IMMUNIZATION: ICD-10-CM

## 2017-08-28 DIAGNOSIS — G89.4 CHRONIC PAIN SYNDROME: ICD-10-CM

## 2017-08-28 DIAGNOSIS — I10 ESSENTIAL HYPERTENSION WITH GOAL BLOOD PRESSURE LESS THAN 140/90: ICD-10-CM

## 2017-08-28 PROCEDURE — 97110 THERAPEUTIC EXERCISES: CPT

## 2017-08-28 PROCEDURE — 99001 SPECIMEN HANDLING PT-LAB: CPT | Performed by: INTERNAL MEDICINE

## 2017-08-28 RX ORDER — LISINOPRIL 20 MG/1
20 TABLET ORAL DAILY
Qty: 90 TAB | Refills: 1 | Status: SHIPPED | OUTPATIENT
Start: 2017-08-28 | End: 2017-11-21 | Stop reason: SDUPTHER

## 2017-08-28 RX ORDER — OMEPRAZOLE 20 MG/1
20 CAPSULE, DELAYED RELEASE ORAL DAILY
Qty: 90 CAP | Refills: 3 | Status: SHIPPED | OUTPATIENT
Start: 2017-08-28 | End: 2018-08-08 | Stop reason: SDUPTHER

## 2017-08-28 RX ORDER — AMLODIPINE BESYLATE 10 MG/1
10 TABLET ORAL DAILY
Qty: 90 TAB | Refills: 1 | Status: SHIPPED | OUTPATIENT
Start: 2017-08-28 | End: 2018-02-20 | Stop reason: SDUPTHER

## 2017-08-28 NOTE — PROGRESS NOTES
1. Have you been to the ER, urgent care clinic since your last visit? Hospitalized since your last visit? Yes When: aug  Where: [facility] Reason for visit: eye accident    2. Have you seen or consulted any other health care providers outside of the [address] [place] [address] since your last visit? Include any pap smears or colon screening.  No

## 2017-08-28 NOTE — PROGRESS NOTES
HPI:   Routine f/u of HTN, IGT; ortho manages chronic pain; hx also is remarkable for chronic but mild depression  Recent corneal abrasion (seen at ER); saw opthal as well  w/o chest pain/abd. discomfort; no dyspnea, cough or pedal edema; denies constitutional complaints of fever, night sweats or wt loss; no evidence of GI/ hemorrhage; no polyuria/polydipsia. ROS is otherwise negative. Past Medical History:   Diagnosis Date    Bilateral hip pain     Chronic pain     back; hx of opiod addiction    Depression     Diabetes (HCC)     borderline, no meds-trying to control with diet    DJD (degenerative joint disease)     GERD (gastroesophageal reflux disease)     Hepatitis C January, 2015    Hypertension     Kidney stones     Lymphadenopathy, generalized 12/05/2015    neg bx    Numbness of foot left    resolved per patient 1/29/16    S/P lumbar fusion 2/9/2016    L4/5 LAMINECTOMY/FUSION/TRANSFORAMINAL LUMBAR INTERBODY FUSION (TLIF) by Dr. Uriel Mauricio on 2/8/16     Unspecified sleep apnea     no cpap machine-pt denies       Past Surgical History:   Procedure Laterality Date    HX APPENDECTOMY  as a child    HX BACK SURGERY  07/2015    HX CERVICAL FUSION  05/18/2017    ACDF C5/6 C6/7    HX COLONOSCOPY  2015    negative    HX LUMBAR FUSION  2/2016    HX ORTHOPAEDIC      denies    HX OTHER SURGICAL  05/2017    cervical fusion    HX TONSILLECTOMY  as a child    [facility]    Dr. Niesha Davidson History     Social History    Marital status:      Spouse name: N/A    Number of children: N/A    Years of education: N/A     Occupational History    Not on file.      Social History Main Topics    Smoking status: Current Every Day Smoker     Packs/day: 0.25     Years: 40.00     Types: Cigarettes     Last attempt to quit: 5/12/2017    Smokeless tobacco: Never Used    Alcohol use No    Drug use: Yes     Special: Marijuana, Cocaine      Comment: stopped cocaine 2007, marijuana 5/14/17    Sexual activity: No     Other Topics Concern    Not on file     Social History Narrative       Allergies   Allergen Reactions    Nicotine Contact Dermatitis     Nicotine Patch reaction - Contact dermatitis with numbness and tingling also occuring in the left arm and denuding of the skin.  Thimerosal Other (comments)     Contact lens solution, made eyes red and irrated       Family History   Problem Relation Age of Onset    Diabetes Sister     SLE Sister     Arthritis-osteo Sister     Heart Disease Sister        Current Outpatient Prescriptions   Medication Sig Dispense Refill    erythromycin (ILOTYCIN) ophthalmic ointment Take as directed 1 g 0    HYDROcodone-acetaminophen (NORCO) 5-325 mg per tablet Take 1 Tab by mouth every four (4) hours as needed for Pain. Max Daily Amount: 6 Tabs. 10 Tab 0    gabapentin (NEURONTIN) 100 mg capsule Take 1 Cap by mouth three (3) times daily. Taper up: 1 tab nightly x 4 days, 1 tab am and pm X 4 day, then 1 tab TID 90 Cap 0    amLODIPine (NORVASC) 10 mg tablet Take 1 Tab by mouth daily. 90 Tab 1    omeprazole (PRILOSEC) 20 mg capsule Take 20 mg by mouth daily. 11    terbinafine HCl (LAMISIL) 250 mg tablet 250 mg. Takes 7 pills every other month      lisinopril (PRINIVIL, ZESTRIL) 20 mg tablet Take 1 Tab by mouth daily. 90 Tab 1           Visit Vitals    /74 (BP 1 Location: Left arm, BP Patient Position: Sitting)    Pulse 91    Temp 97.9 °F (36.6 °C) (Tympanic)    Resp 16    Ht 5' 11\" (1.803 m)    Wt 220 lb (99.8 kg)    SpO2 97%    BMI 30.68 kg/m2       PE  Well nourished in NAD  HEENT:  OP: clear. Neck: supple w/o mass or bruits. Chest: clear. CV: RRR w/o m,r,g; pulses intact. Abd: soft, NT, w/o HSM or mass. Ext: w/o edema. Neuro: NF. Assessment and Plan    Encounter Diagnoses   Name Primary?     Essential hypertension Yes    Chronic pain syndrome     IGT (impaired glucose tolerance)     Depression, unspecified depression type     Encounter for immunization    HTN - controlled  IGT - continue dietary/exercise efforts  Chronic pain d/t spinal arthritis - continue ortho f/u  Depression - chronic/mild; has been on antidepressant in past but does not want pharmacologic rx at this time  The patient was counseled on the dangers of tobacco use, and was advised to quit. Reviewed strategies to maximize success, including removing cigarettes and smoking materials from environment. I have reviewed/discussed the above normal BMI with the patient. I have recommended the following interventions: dietary management education, guidance, and counseling . Sena Mariano     Prevnar/flu vaccines given  No change in rx  OV 4-6 mos or prn  I have explained plan to patient and the patient verbalizes understanding

## 2017-08-28 NOTE — PROGRESS NOTES
PT DAILY TREATMENT NOTE - [facility]     Patient Name: Dilia Coulter  Date:2017  : 1950  [x]  Patient  Verified  Payor: BLUE CROSS MEDICARE / Plan: [facility] / Product Type: Managed Care Medicare /    In time:933  Out time:1015  Total Treatment Time (min): 42  Total Timed Codes (min): 32  1:1 Treatment Time ( [address] only): 32   Visit #: 2 of 12    Treatment Area: Back pain [M54.9]  Neck pain [M54.2]    SUBJECTIVE  Pain Level (0-10 scale): 6-710  Any medication changes, allergies to medications, adverse drug reactions, diagnosis change, or new procedure performed?: [x] No    [] Yes (see summary sheet for update)  Subjective functional status/changes:   [] No changes reported  Reports he has been doing his HEP. He worked 3 hrs of light work this weekend and has been hurting since then. He had an eye accident and is being followed by his MD.    OBJECTIVE    Modality rationale: decrease pain to improve the patients ability to ease with adl's.    Min Type Additional Details    [] Estim:  []Unatt       []IFC  []Premod                        []Other:  []w/ice   []w/heat  Position:  Location:    [] Estim: []Att    []TENS instruct  []NMES                    []Other:  []w/US   []w/ice   []w/heat  Position:  Location:    []  Traction: [] Cervical       []Lumbar                       [] Prone          []Supine                       []Intermittent   []Continuous Lbs:  [] before manual  [] after manual    []  Ultrasound: []Continuous   [] Pulsed                           []1MHz   []3MHz W/cm2:  Location:    []  Iontophoresis with dexamethasone         Location: [] Take home patch   [] In clinic   10 []  Ice     [x]  heat  []  Ice massage  []  Laser   []  Anodyne Position:seated  Location:bilateral shoulders    []  Laser with stim  []  Other:  Position:  Location:    []  Vasopneumatic Device Pressure:       [] lo [] med [] hi   Temperature: [] lo [] med [] hi   [] Skin assessment post-treatment:  []intact []redness- no adverse reaction      32 min Therapeutic Exercise:  [] See flow sheet :   Rationale: increase ROM, increase strength and improve balance to improve the patients ability to ease with ADl's            With   [] TE   [] TA   [] neuro   [] other: Patient Education: [x] Review HEP    [] Progressed/Changed HEP based on:   [] positioning   [] body mechanics   [] transfers   [] heat/ice application    [] other:      Other Objective/Functional Measures: Initiated all ex's w/o increased pain today. Unsteady on foam with EC. Decreased H/S flexibility bilateraly   UT massage/TPR x 5 mins    Pain Level (0-10 scale) post treatment: 5/10    ASSESSMENT/Changes in Function: Progressing well . Pt prefered the moist heat today. He does have palpable trigger points to his right > left UT. He has a thera cane at home that he uses for TPR. Pt reported he did 3 hrs of \"light\" work on Saturday and required help to get into his truck due to [address]. Patient will continue to benefit from skilled PT services to modify and progress therapeutic interventions, address functional mobility deficits, address ROM deficits, address strength deficits, analyze and address soft tissue restrictions, analyze and cue movement patterns, analyze and modify body mechanics/ergonomics, assess and modify postural abnormalities and address imbalance/dizziness to attain remaining goals.      [x]  See Plan of Care  []  See progress note/recertification  []  See Discharge Summary         Progress towards goals / Updated goals:      PLAN  []  Upgrade activities as tolerated     []  Continue plan of care  []  Update interventions per flow sheet       []  Discharge due to:_  []  Other:_      Dionne Ulloa PT 8/28/2017  10:03 AM    Future Appointments  Date Time Provider Jaime Reeder   8/29/2017 3:00 PM Roxane Adams PT [id number] [facility]   9/1/2017 10:30 AM Roxane Adams PT MMCPTPB [facility]   9/6/2017 11:00 AM Erin ZHONG Karolyn Leon,  [address]   9/8/2017 11:30 AM Helga Dhillon, PT MMCPTPB [facility]   9/11/2017 10:00 AM Sangeeta Sosa MMCPTPB [facility]   9/14/2017 10:30 AM Aide Ferguson, PT [id number] [facility]   9/15/2017 11:00 AM Helga Dhillon, PT [id number] [facility]   2/20/2018 8:15 AM Jaz Castrejon  [address], [address] [place]

## 2017-08-28 NOTE — PATIENT INSTRUCTIONS

## 2017-08-29 ENCOUNTER — HOSPITAL ENCOUNTER (OUTPATIENT)
Dept: PHYSICAL THERAPY | Age: 67
Discharge: HOME | End: 2017-08-29
Payer: MEDICARE

## 2017-08-29 PROCEDURE — 97110 THERAPEUTIC EXERCISES: CPT

## 2017-09-01 ENCOUNTER — HOSPITAL ENCOUNTER (OUTPATIENT)
Dept: PHYSICAL THERAPY | Age: 67
Discharge: HOME | End: 2017-09-01
Payer: MEDICARE

## 2017-09-01 PROCEDURE — 97140 MANUAL THERAPY 1/> REGIONS: CPT

## 2017-09-01 PROCEDURE — 97110 THERAPEUTIC EXERCISES: CPT

## 2017-09-01 NOTE — PROGRESS NOTES
PT DAILY TREATMENT NOTE - [facility]     Patient Name: Katherine Talley  Date:2017  : 1950  [x]  Patient  Verified  Payor: BLUE CROSS MEDICARE / Plan: [facility] / Product Type: Managed Care Medicare /    In time:10:23  Out time: 11:11  Total Treatment Time (min): 48  Total Timed Codes (min): 48  1:1 Treatment Time ( [address] only): 48   Visit #: 4 of 12    Treatment Area: Back pain [M54.9]  Neck pain [M54.2]    SUBJECTIVE  Pain Level (0-10 scale): 3/10  Any medication changes, allergies to medications, adverse drug reactions, diagnosis change, or new procedure performed?: [x] No    [] Yes (see summary sheet for update)  Subjective functional status/changes:   [] No changes reported  Pt reported pain decreased today but his neck is still quite stiff.  Pt reported performing nodding with head roasted to one side and instructed by MD.    OBJECTIVE  Modality rationale: decrease pain and increase tissue extensibility to improve the patients ability to reduce pain and improve ease of head movements with ADLs   Min Type Additional Details     [] Estim:  []Unatt       []IFC  []Premod                        []Other:  []w/ice   []w/heat  Position:  Location:     [] Estim: []Att    []TENS instruct  []NMES                    []Other:  []w/US   []w/ice   []w/heat  Position:  Location:     []  Traction: [] Cervical       []Lumbar                       [] Prone          []Supine                       []Intermittent   []Continuous Lbs:  [] before manual  [] after manual     []  Ultrasound: []Continuous   [] Pulsed                           []1MHz   []3MHz W/cm2:  Location:     []  Iontophoresis with dexamethasone         Location: [] Take home patch   [] In clinic   10  During Jud []  Ice     [x]  heat  []  Ice massage  []  Laser   []  Anodyne Position: seated   Location: c/s     []  Laser with stim  []  Other:  Position:  Location:     []  Vasopneumatic Device Pressure:       [] lo [] med [] hi   Temperature: [] lo [] med [] hi   [x] Skin assessment post-treatment:  [x]intact []redness- no adverse reaction    []redness  adverse reaction:         38 min Therapeutic Exercise:  [x] See flow sheet :   Rationale: increase ROM and increase strength to improve the patients ability to improve ease of ambulation and daily tasks      10 min Manual:  [] See flow sheet : DTM for B UT,/s & upper t/s muscles, SOR   Rationale: increase ROM and increase tissue extensibility, improve trigger points to improve the patients ability to improve ease of daily tasks performance         With   [] TE   [] TA   [] neuro   [] other: Patient Education: [x] Review HEP    [] Progressed/Changed HEP based on:   [] positioning   [] body mechanics   [] transfers   [] heat/ice application    [] other:      Other Objective/Functional Measures:    Performed more Jud for c/s    Tolerated PT session with no increased pain   Cont to demonstrated poor core strength   Mod hypertonicity of B UT, max hypertonicity of suboccipital mucles    Mod challenged with MT Jud, min shoulders hiking, able to self correct after verbal cues    Pain Level (0-10 scale) post treatment: 2/10    ASSESSMENT/Changes in Function: pt progressing steady towards goals. Review self STM/STM with tennis ball/cane, pt demonstrated good verbal understanding. Will progress with more core and paraspinal muscle    Patient will continue to benefit from skilled PT services to modify and progress therapeutic interventions, address ROM deficits, address strength deficits, analyze and address soft tissue restrictions, analyze and cue movement patterns, assess and modify postural abnormalities and instruct in home and community integration to attain remaining goals.      Progress towards goals / Updated goals:  Short Term Goals: To be accomplished in 1 weeks:    1. Pt will be compliant with a HEP to improve walking tolerance. Goal met.  8/29/17  Long Term Goals: To be accomplished in 6 weeks:   1. Pt will increase L/S FOTO score by 8 pts to improve with ambulation endurance. 2. Pt will tolerate ambulation for approx 1 hr to improve with return to daily walking activities. 3. Pt will perform EC on foam x 30 sec to improve static balance and decrease falls.    4. Pt will increase C/S Rotation to >50 deg to ease with driving.      PLAN  []  Upgrade activities as tolerated     [x]  Continue plan of care  []  Update interventions per flow sheet       []  Discharge due to:_  []  Other:_      Aram Ramsay, PT 9/1/2017  8:43 AM    Future Appointments  Date Time Provider Jaime Lilli   9/1/2017 10:30 AM Renata Gomez MMCPTPB [facility]   9/6/2017 11:00 AM Marlon Gilbert,  [address]   9/8/2017 11:30 AM Nannette Sanders, PT MMCPTPB [facility]   9/11/2017 10:00 AM Graeme Moreira [facility]CPTP[facility]   9/14/2017 10:30 AM Claudette Jensen, PT MMCPTPB [facility]   9/15/2017 11:00 AM Helga Gonzales, PT METXRID [facility]   2/20/2018 8:15 AM Janet Burciaga  [address], [address] [place]

## 2017-09-06 ENCOUNTER — OFFICE VISIT (OUTPATIENT)
Dept: ORTHOPEDIC SURGERY | Age: 67
End: 2017-09-06

## 2017-09-06 VITALS
SYSTOLIC BLOOD PRESSURE: 123 MMHG | BODY MASS INDEX: 30.6 KG/M2 | OXYGEN SATURATION: 97 % | DIASTOLIC BLOOD PRESSURE: 69 MMHG | TEMPERATURE: 98.3 F | WEIGHT: 218.6 LBS | HEART RATE: 86 BPM | HEIGHT: 71 IN | RESPIRATION RATE: 18 BRPM

## 2017-09-06 DIAGNOSIS — M54.50 NONSPECIFIC PAIN LUMBAR REGION: ICD-10-CM

## 2017-09-06 DIAGNOSIS — M54.16 RADICULOPATHY OF LUMBAR REGION: ICD-10-CM

## 2017-09-06 DIAGNOSIS — M47.816 LUMBAR FACET ARTHROPATHY: ICD-10-CM

## 2017-09-06 DIAGNOSIS — Z98.1 S/P LUMBAR FUSION: ICD-10-CM

## 2017-09-06 DIAGNOSIS — M48.02 CERVICAL SPINAL STENOSIS: Primary | ICD-10-CM

## 2017-09-06 RX ORDER — GABAPENTIN 300 MG/1
300 CAPSULE ORAL 3 TIMES DAILY
Qty: 90 CAP | Refills: 1 | Status: SHIPPED | OUTPATIENT
Start: 2017-09-06 | End: 2018-01-02 | Stop reason: HOSPADM

## 2017-09-06 NOTE — MR AVS SNAPSHOT
Visit Information Date & Time Provider Department Dept. Phone Encounter #  
 9/6/2017 11:00 AM Sunday HAILEY John [facility] [phone] [id number] Follow-up Instructions Return in about 1 month (around 10/6/2017) for w/ Dr. Bela Guzman for MRIFU. Follow-up and Disposition History Your Appointments 2/20/2018  8:15 AM  
Follow Up with Chava Flor MD  
[address] (Catrina Prado) Appt Note: 6mo  
 [address], [address] [place] [address]  
  
   
 [address], [address] [place] [address] Upcoming Health Maintenance Date Due ZOSTER VACCINE AGE 60> 6/18/2011 GLAUCOMA SCREENING Q2Y 8/18/2016 MEDICARE YEARLY EXAM 8/18/2016 FOBT Q 1 YEAR AGE 50-75 12/8/2016 Pneumococcal 65+ Low/Medium Risk (2 of 2 - PPSV23) 8/28/2018 DTaP/Tdap/Td series (2 - Td) 12/9/2025 Allergies as of 9/6/2017  Review Complete On: 9/6/2017 By: Bhargavi Rayo LPN Severity Noted Reaction Type Reactions Nicotine Medium 06/11/2015    Contact Dermatitis Nicotine Patch reaction - Contact dermatitis with numbness and tingling also occuring in the left arm and denuding of the skin. Thimerosal  07/21/2015    Other (comments) Contact lens solution, made eyes red and irrated Current Immunizations  Reviewed on 12/9/2015 Name Date Influenza High Dose Vaccine PF 8/28/2017 Influenza Vaccine 10/1/2016, 10/29/2015 Influenza Vaccine PF 10/29/2014 Pneumococcal Conjugate (PCV-13) 8/28/2017 Tdap 12/9/2015 Not reviewed this visit You Were Diagnosed With   
  
 Codes Comments Cervical spinal stenosis    -  Primary ICD-10-CM: M48.02 
ICD-9-CM: 723.0 Lumbar facet arthropathy     ICD-10-CM: M12.88 ICD-9-CM: 721.3 S/P lumbar fusion     ICD-10-CM: Z98.1 ICD-9-CM: V45.4 Vitals BP Pulse Temp Resp Height(growth percentile) Weight(growth percentile) 123/69 86 98.3 °F (36.8 °C) (Oral) 18 5' 11\" (1.803 m) 218 lb 9.6 oz (99.2 kg) SpO2 BMI Smoking Status 97% 30.49 kg/m2 Current Every Day Smoker BMI and BSA Data Body Mass Index Body Surface Area  
 30.49 kg/m 2 2.23 m 2 Preferred Pharmacy Pharmacy Name Phone [address], [address] [place] [address] [phone] Your Updated Medication List  
  
   
This list is accurate as of: 9/6/17 12:03 PM.  Always use your most recent med list. amLODIPine 10 mg tablet Commonly known as:  Paula Ape Take 1 Tab by mouth daily. gabapentin 300 mg capsule Commonly known as:  NEURONTIN Take 1 Cap by mouth three (3) times daily. HYDROcodone-acetaminophen 5-325 mg per tablet Commonly known as:  Cyndi Imtiaz Take 1 Tab by mouth every four (4) hours as needed for Pain. Max Daily Amount: 6 Tabs. lisinopril 20 mg tablet Commonly known as:  Karn Desanctis Take 1 Tab by mouth daily. omeprazole 20 mg capsule Commonly known as:  PRILOSEC Take 1 Cap by mouth daily. terbinafine HCl 250 mg tablet Commonly known as:  LAMISIL  
250 mg. Takes 7 pills every other month Prescriptions Sent to Pharmacy Refills  
 gabapentin (NEURONTIN) 300 mg capsule 1 Sig: Take 1 Cap by mouth three (3) times daily. Class: Normal  
 Pharmacy: [address] [place] [address] #: [phone] Route: Oral  
  
Follow-up Instructions Return in about 1 month (around 10/6/2017) for w/ Dr. Guzman for MRIFU. To-Do List   
 09/06/2017 Imaging:  MRI LUMB SPINE W WO CONT   
  
 09/08/2017 11:30 AM  
  Appointment with Hema Gamez PT at [facility] PT [address] ([phone])  
  
 09/11/2017 10:00 AM  
  Appointment with Shania Connell PT at [facility] PT [address] [facility] ([phone]) 09/14/2017 10:30 AM  
  Appointment with Maria Elena Odonnell PT at [facility] PT [address] ([phone])  
  
 09/15/2017 11:00 AM  
  Appointment with Elvis Lieberman PT at [facility] PT [address] ([phone])  
  
 09/19/2017 7:00 AM  
  Appointment with [facility] MRI RM 1 at [facility] RAD MRI ([phone]) GENERAL INSTRUCTIONS  Bring information (ID card) if you have any medically implanted devices. You will be required to lie still while the procedure is being performed. Remove any jewelry (including body piercing, hairpins) prior to MRI. If you have had a creatinine level drawn within the past 30 days, please bring most recent results to your appt. Bring any films, CD's, and reports related to your study with you on the day of your exam.  This only includes studies done outside of [address] [place] [address], \[facility] [place] [facility]\"", [place] [address], and [facility]. Bring a complete list of all medications you are currently taking to include prescriptions, over-the-counter meds, herbals, vitamins & any dietary supplements. If you were given medications for claustrophobia or anxiety, please arrange to have someone drive you to your appointment. QUESTIONS  Notify the MRI Department if you have any questions concerning your study. [facility] - [phone] [address] [phone] [facility]! Dear Amari Ortiz: Thank you for requesting a [employer] account. Our records indicate that you already have an active [employer] account. You can access your account anytime at [email or web address] Did you know that you can access your hospital and ER discharge instructions at any time in [employer]? You can also review all of your test results from your hospital stay or ER visit. Additional Information If you have questions, please visit the Frequently Asked Questions section of the [employer] website at [email or web address] Remember, MyChart is NOT to be used for urgent needs. For medical emergencies, dial 911. Now available from your iPhone and Android! Please provide this summary of care documentation to your next provider. Your primary care clinician is listed as Sabine Pelaez. If you have any questions after today's visit, please call [phone].

## 2017-09-06 NOTE — PROGRESS NOTES
[address]  [address], [address]  [place] [address]  Phone: [phone]  Fax: [phone]    Gaby Hernandez  : 1950  PCP: Josie Garcia MD    PROGRESS NOTE    HISTORY OF PRESENT ILLNESS:  Chief Complaint   Patient presents with    Back Pain     follow up     Daphney Madison is a 78 y.o.  male with history of a 2 level cervical decompression and fusion about 4 months ago. His neck is still stiff. He is also S/P L4-5 TLIF in 2016. He is complaining of leg pain which radiates from his anterior thighs to his feet. His buttocks stay numb. (L4-S1) His legs are bothering him more than anything. He feels off balance. This pain is affecting his day to day activities. He is completing PT and is on Antineuritics. Denies bladder/bowel dysfunction, saddle paresthesia, weakness, gait disturbance, or other neurological deficit. States he has been using gabapentin 100 mg tid  with minimal relief. Pt at this time desires to  continue with current care/proceed with medication evaluation. EMG shows: Sensimotor exonal peripheral neuropathy. Chronic lower lumbosacral radiculopathy with predominant involvement of L5 and possible involvement of L4 and S1 bilaterally. He saw is opthalmologist.    ASSESSMENT  78 y.o. male with low back and neck pain. Diagnoses and all orders for this visit:    1. Cervical spinal stenosis    2. Lumbar facet arthropathy    3. S/P lumbar fusion       IMPRESSION/PLAN    1) Pt was given information on lumbar exercises. 2) Continue PT  3) Lumbar MRI  4) Increase gabapentin to 300 mg tid. 5) Mr. Tino Caballero has a reminder for a \"due or due soon\" health maintenance. I have asked that he contact his primary care provider, Josie Garcia MD, for follow-up on this health maintenance.   6) We have informed patient to notify us for immediate appointment if he has any worsening neurogical symptoms or if an emergency situation presents, then call 911  7) Pt will follow-up in 1 month with Dr. Tino Wilkes for MRI FU. Risks and benefits of ongoing opiate therapy have been reviewed with the patient.  is appropriate. No pain behaviors. Denies thoughts of harming self or others. Pt has a good risk to benefit ratio which allows the pt to function in a home environment without side effects. PAST MEDICAL HISTORY  Past Medical History:   Diagnosis Date    Bilateral hip pain     Chronic pain     back; hx of opiod addiction    Depression     Diabetes ([address]     borderline, no meds-trying to control with diet    DJD (degenerative joint disease)     GERD (gastroesophageal reflux disease)     Hepatitis C January, 2015    Hypertension     Kidney stones     Lymphadenopathy, generalized 12/05/2015    neg bx    Numbness of foot left    resolved per patient 1/29/16    S/P lumbar fusion 2/9/2016    L4/5 LAMINECTOMY/FUSION/TRANSFORAMINAL LUMBAR INTERBODY FUSION (TLIF) by Dr. Yinka Daigle on 2/8/16     Unspecified sleep apnea     no cpap machine-pt denies        MEDICATIONS  Current Outpatient Prescriptions   Medication Sig Dispense Refill    lisinopril (PRINIVIL, ZESTRIL) 20 mg tablet Take 1 Tab by mouth daily. 90 Tab 1    omeprazole (PRILOSEC) 20 mg capsule Take 1 Cap by mouth daily. 90 Cap 3    gabapentin (NEURONTIN) 100 mg capsule Take 1 Cap by mouth three (3) times daily. Taper up: 1 tab nightly x 4 days, 1 tab am and pm X 4 day, then 1 tab TID 90 Cap 0    amLODIPine (NORVASC) 10 mg tablet Take 1 Tab by mouth daily. 90 Tab 1    HYDROcodone-acetaminophen (NORCO) 5-325 mg per tablet Take 1 Tab by mouth every four (4) hours as needed for Pain. Max Daily Amount: 6 Tabs. 10 Tab 0    terbinafine HCl (LAMISIL) 250 mg tablet 250 mg.  Takes 7 pills every other month         ALLERGIES  Allergies   Allergen Reactions    Nicotine Contact Dermatitis     Nicotine Patch reaction - Contact dermatitis with numbness and tingling also occuring in the left arm and denuding of the skin.  Thimerosal Other (comments)     Contact lens solution, made eyes red and irrated       SOCIAL HISTORY    Social History     Social History    Marital status:      Spouse name: N/A    Number of children: N/A    Years of education: N/A     Occupational History    Not on file. Social History Main Topics    Smoking status: Current Every Day Smoker     Packs/day: 0.25     Years: 40.00     Types: Cigarettes     Last attempt to quit: 5/12/2017    Smokeless tobacco: Never Used    Alcohol use No    Drug use: Yes     Special: Marijuana, Cocaine      Comment: stopped cocaine 2007, marijuana 5/14/17    Sexual activity: No     Other Topics Concern    Not on file     Social History Narrative       SUBJECTIVE      Pain Scale: 6/10    Pain Assessment  9/6/2017   Location of Pain Back   Pain Location Comment -   Location Modifiers -   Severity of Pain 7   Quality of Pain Aching   Quality of Pain Comment numbness, tingling   Duration of Pain -   Frequency of Pain Constant   Date Pain First Started -   Aggravating Factors Standing;Walking   Aggravating Factors Comment lying down to long   Limiting Behavior -   Relieving Factors Rest   Relieving Factors Comment -   Result of Injury -       Accompanied by self. REVIEW OF SYSTEMS  ROS    Constitutional: Negative for fever, chills, or weight change. Respiratory: Negative for cough or shortness of breath. Cardiovascular: Negative for chest pain or palpitations. Gastrointestinal: Negative for acid reflux, change in bowel habits, or constipation. Genitourinary: Negative for incontinence, dysuria and flank pain. Musculoskeletal: Positive for low back and leg pain. Skin: Negative for rash. Neurological: Negative for headaches, dizziness, or numbness. Endo/Heme/Allergies: Negative . Psychiatric/Behavioral: Negative.        PHYSICAL EXAMINATION  Visit Vitals    /69    Pulse 86    Temp 98.3 °F (36.8 °C) (Oral)    Resp 18    Ht 5' 11\" (1.803 m)    Wt 218 lb 9.6 oz (99.2 kg)    SpO2 97%    BMI 30.49 kg/m2       Constitutional: Well developed,  well nourished,  awake, alert, and in no acute distress. Neurological:  Sensation to light touch is intact. Psychiatric: Affect and mood are appropriate. Integumentary: No rashes or abrasions noted on exposed areas,  warm, dry and intact. Cardiovascular/Peripheral Vascular:  No peripheral edema is noted. Lymphatic:  No evidence of lymphedema. No cervical lymphadenopathy. SPINE/MUSCULOSKELETAL EXAM    Cervical spine:  Neck is midline. Normal muscle tone. No focal atrophy is noted. Shoulder ROM intact. No Tenderness to palpation . Negative Spurling's sign. Negative Tinel's sign. Negative Reyes's sign. Lumbar spine:  No rash, ecchymosis, or gross obliquity. No fasciculations. No focal atrophy is noted. Range of motion is intact. NoTenderness to palpation . SI joints non-tender. Trochanters non tender. Musculoskeletal:  No pain with extension, axial loading, or forward flexion. No pain with internal or external rotation of his hips. MOTOR    Biceps  Triceps Deltoids Wrist Ext Wrist Flex Hand Intrin   Right +4/5 +4/5 +4/5 +4/5 +4/5 +4/5   Left +4/5 +4/5 +4/5 +4/5 +4/5 +4/5      Hip Flex  Quads Hamstrings Ankle DF EHL Ankle PF   Right +4/5 +4/5 +4/5 +4/5 +4/5 +4/5   Left +4/5 +4/5 +4/5 +4/5 +4/5 +4/5       Straight Leg raise bilaterally. normal gait and station    Ambulation without assistive device. full weight bearing, non-antalgic gait.     Guillaume Davalos NP

## 2017-09-08 ENCOUNTER — HOSPITAL ENCOUNTER (OUTPATIENT)
Dept: PHYSICAL THERAPY | Age: 67
Discharge: HOME | End: 2017-09-08
Payer: MEDICARE

## 2017-09-08 PROCEDURE — 97140 MANUAL THERAPY 1/> REGIONS: CPT

## 2017-09-08 PROCEDURE — 97110 THERAPEUTIC EXERCISES: CPT

## 2017-09-08 NOTE — PROGRESS NOTES
PT DAILY TREATMENT NOTE - [facility]     Patient Name: Beverli Meckel  Date:2017  : 1950  [x]  Patient  Verified  Payor: BLUE CROSS MEDICARE / Plan: [facility] / Product Type: Managed Care Medicare /    In time:1130  Out time:1235  Total Treatment Time (min): 65  Total Timed Codes (min): 55  1:1 Treatment Time ( [address] only): 30   Visit #: 5 of 12    Treatment Area: Back pain [M54.9]  Neck pain [M54.2]    SUBJECTIVE  Pain Level (0-10 scale): 7/10  Any medication changes, allergies to medications, adverse drug reactions, diagnosis change, or new procedure performed?: [x] No    [] Yes (see summary sheet for update)  Subjective functional status/changes:   [] No changes reported  Pt reports his legs were hurting a lot yesterday. MRI on the . Pt reports he hurt everywhere today.   OBJECTIVE    Modality rationale: decrease pain to improve the patients ability to ease with ADL's   Min Type Additional Details    [] Estim:  []Unatt       []IFC  []Premod                        []Other:  []w/ice   []w/heat  Position:  Location:    [] Estim: []Att    []TENS instruct  []NMES                    []Other:  []w/US   []w/ice   []w/heat  Position:  Location:    []  Traction: [] Cervical       []Lumbar                       [] Prone          []Supine                       []Intermittent   []Continuous Lbs:  [] before manual  [] after manual    []  Ultrasound: []Continuous   [] Pulsed                           []1MHz   []3MHz W/cm2:  Location:    []  Iontophoresis with dexamethasone         Location: [] Take home patch   [] In clinic   10 []  Ice     [x]  heat  []  Ice massage  []  Laser   []  Anodyne Position:bilateral shouldersLocation: seated    []  Laser with stim  []  Other:  Position:  Location:    []  Vasopneumatic Device Pressure:       [] lo [] med [] hi   Temperature: [] lo [] med [] hi   [] Skin assessment post-treatment:  []intact []redness- no adverse reaction []redness  adverse reaction:       45 min Therapeutic Exercise:  [] See flow sheet :   Rationale: increase ROM, increase strength, improve coordination, improve balance and increase proprioception to improve the patients ability to ease with ADL's    8 min Manual Therapy:  STM to bilateral SCM, [place], TPR to left UT   Rationale: decrease pain, increase ROM, increase tissue extensibility and decrease trigger points to ease with ADL's        With   [] TE   [] TA   [] neuro   [] other: Patient Education: [x] Review HEP    [] Progressed/Changed HEP based on:   [] positioning   [] body mechanics   [] transfers   [] heat/ice application    [] other:      Other Objective/Functional Measures: Decreased balance strategies with EO/EC. Pt requires SBA/CGA due to LOB easily. Increase muscle activity to UT with deep trigger points. Pain Level (0-10 scale) post treatment: 3/10    ASSESSMENT/Changes in Function: Pt is progressing with therapy. He is concerned that he will need more than 8 visits to improve function. Patient will continue to benefit from skilled PT services to modify and progress therapeutic interventions, address functional mobility deficits, address ROM deficits, address strength deficits, analyze and address soft tissue restrictions, analyze and cue movement patterns, analyze and modify body mechanics/ergonomics, assess and modify postural abnormalities and address imbalance/dizziness to attain remaining goals. []  See Plan of Care  [x]  See progress note/recertification  []  See Discharge Summary         Progress towards goals / Updated goals:  Short Term Goals: To be accomplished in 1 weeks:                        1. Pt will be compliant with a HEP to improve walking tolerance.  Goal met. 8/29/17  Long Term Goals: To be accomplished in 6 weeks:                        1. Pt will increase L/S FOTO score by 8 pts to improve with ambulation endurance.                         2. Pt will tolerate ambulation for approx 1 hr to improve with return to daily walking activities. 3. Pt will perform EC on foam x 30 sec to improve static balance and decrease falls. Not met. Pt presently has LOB on floor.  9/8/17                        4. Pt will increase C/S Rotation to >50 deg to ease with driving.         PLAN  []  Upgrade activities as tolerated     [x]  Continue plan of care  []  Update interventions per flow sheet       []  Discharge due to:_  []  Other:_      Luh Crooks PT 9/8/2017  2:13 PM    Future Appointments  Date Time Provider Jaime Reeder   9/11/2017 10:00 AM Gricelda Bright PT [id number] [facility]   9/14/2017 10:30 AM Gricelda Bright PT [id number] [facility]   9/15/2017 11:00 AM Luh Crooks PT [id number] [facility]   9/19/2017 7:00 AM [facility] MRI RM 1 MMCRMRI [facility]   10/25/2017 9:15 AM Yoselin Golden  [address]   2/20/2018 8:15 AM Aicha Barnes  [address] [place]

## 2017-09-11 ENCOUNTER — TELEPHONE (OUTPATIENT)
Dept: ORTHOPEDIC SURGERY | Age: 67
End: 2017-09-11

## 2017-09-11 ENCOUNTER — HOSPITAL ENCOUNTER (OUTPATIENT)
Dept: PHYSICAL THERAPY | Age: 67
Discharge: HOME | End: 2017-09-11
Payer: MEDICARE

## 2017-09-11 PROCEDURE — G8979 MOBILITY GOAL STATUS: HCPCS

## 2017-09-11 PROCEDURE — 97110 THERAPEUTIC EXERCISES: CPT

## 2017-09-11 PROCEDURE — G8978 MOBILITY CURRENT STATUS: HCPCS

## 2017-09-11 PROCEDURE — 97140 MANUAL THERAPY 1/> REGIONS: CPT

## 2017-09-11 NOTE — PROGRESS NOTES
PT DAILY TREATMENT NOTE - [facility]     Patient Name: Reinier Atkins  Date:2017  : 1950  [x]  Patient  Verified  Payor: BLUE CROSS MEDICARE / Plan: [facility] / Product Type: Managed Care Medicare /    In time:10:30  Out time:10:43  Total Treatment Time (min): 43  Total Timed Codes (min): 33  1:1 Treatment Time ( only): 30  Visit #: 6 of 12    Treatment Area: Back pain [M54.9]  Neck pain [M54.2]    SUBJECTIVE  Pain Level (0-10 scale): 15/10  Any medication changes, allergies to medications, adverse drug reactions, diagnosis change, or new procedure performed?: [x] No    [] Yes (see summary sheet for update)  Subjective functional status/changes:   [] No changes reported  Pt reports 15/10 pain today after pushing/moving a recliner yesterday. The pain was not immediate. The pain feels like more than muscle soreness, but he believes he just over-did it and it will work itself out. He believes he is getting some side effects from the Gabapentin. He has lost his appetite a bit. Denies abdominal pain or problems with bowel or bladder. He has some nausea and dry heaving sometimes in the morning but he has reflux and that is not new. He has found that ice helps his lower back more than heat.         OBJECTIVE    Modality rationale: decrease pain and increase tissue extensibility to improve the patients ability to tolerate movement with daily tasks    Min Type Additional Details    [] Estim:  []Unatt       []IFC  []Premod                        []Other:  []w/ice   []w/heat  Position:  Location:    [] Estim: []Att    []TENS instruct  []NMES                    []Other:  []w/US   []w/ice   []w/heat  Position:  Location:    []  Traction: [] Cervical       []Lumbar                       [] Prone          []Supine                       []Intermittent   []Continuous Lbs:  [] before manual  [] after manual    []  Ultrasound: []Continuous   [] Pulsed []1MHz   []3MHz W/cm2:  Location:    []  Iontophoresis with dexamethasone         Location: [] Take home patch   [] In clinic   10 []  Ice     [x]  heat  []  Ice massage  []  Laser   []  Anodyne Position: seated  Location: neck    []  Laser with stim  []  Other:  Position:  Location:    []  Vasopneumatic Device Pressure:       [] lo [] med [] hi   Temperature: [] lo [] med [] hi   [x] Skin assessment post-treatment:  [x]intact []redness- no adverse reaction    []redness  adverse reaction:       25 min Therapeutic Exercise:  [x] See flow sheet :   Rationale: increase ROM and increase strength to improve the patients ability to improve ambulatory tolerance and ease of ADLs/household tasks       8 min Manual Therapy:  Pelvic alignment check, MET with shotgun to correct R PI, DTM B UT, TPR L UT/levator    Rationale: decrease pain, increase ROM, increase tissue extensibility and decrease trigger points to reduce pain for improved ambulatory ability and ease of household tasks             With   [] TE   [] TA   [] neuro   [] other: Patient Education: [x] Review HEP    [] Progressed/Changed HEP based on:   [] positioning   [] body mechanics   [] transfers   [] heat/ice application    [x] other: pt advised to call his MD about potential side effects of his medication       Other Objective/Functional Measures:     Severe TTP L SIJ     R Upslip, corrected with leg lengthening exercise  R PI, corrected with MET with shotgun  Pain with attempted R hip flexion for MET, so pt only performed unilateral hip extension on the L for MET   Reduced pain with bridging after correction     TTP B iliacus R > L, no TTP  throughout abdomen    No change in pain with manual to UT/levator     Cervical rotation AROM: R 46 dgrs, L 42 dgrs   Pt requested heat for his neck and denied ice for his back     Lumbar FOTO 27  Neck FOTO 41    No increased pain with therapy     Pain Level (0-10 scale) post treatment: 5-6/10    ASSESSMENT/Changes in Function: See Progress Note    Patient will continue to benefit from skilled PT services to modify and progress therapeutic interventions, address functional mobility deficits, address ROM deficits, address strength deficits, analyze and address soft tissue restrictions, analyze and cue movement patterns, analyze and modify body mechanics/ergonomics, assess and modify postural abnormalities, address imbalance/dizziness and instruct in home and community integration to attain remaining goals. []  See Plan of Care  [x]  See progress note/recertification  []  See Discharge Summary         Progress towards goals / Updated goals:  Short Term Goals: To be accomplished in 1 weeks:                        1. Pt will be compliant with a HEP to improve walking tolerance.  Goal met. 8/29/17  Long Term Goals: To be accomplished in 6 weeks:                        1. Pt will increase L/S FOTO score by 8 pts to improve with ambulation endurance.                        2. Pt will tolerate ambulation for approx 1 hr to improve with return to daily walking activities. Goal met. Pt is able to walk for an hour 9/11/17                        3. Pt will perform EC on foam x 30 sec to improve static balance and decrease falls. Not met. Pt presently has LOB on floor. 9/8/17                        4. Pt will increase C/S Rotation to >50 deg to ease with driving. Progressing.  R 46 dgrs, L 42 dgrs 9/11/17      PLAN  []  Upgrade activities as tolerated     [x]  Continue plan of care  []  Update interventions per flow sheet       []  Discharge due to:_  []  Other:_      Lilia Garza, PT 9/11/2017  9:11 AM    Future Appointments  Date Time Provider Jaime Reeder   9/11/2017 10:00 AM Lilia Garza PT [id number] [facility]   9/14/2017 10:30 AM Lilia Garza PT [id number] [facility]   9/15/2017 11:00 AM Raghu Kwok PT [id number] [facility]   9/19/2017 7:00 AM [facility] MRI RM 1 MMCRMRI [facility]   10/25/2017 9:15 AM Sarthak Marshall  [address]   2/20/2018 8:15 AM Josie Garcia  [address] [place]

## 2017-09-11 NOTE — TELEPHONE ENCOUNTER
Patient returned call to facility. Per patient, he just started taking 300mg of Gabapentin without slowly increasing. Patient would like to try Gabapentin as he believes it may help with his pain, and he did not have the side effects when he was taking the 100mg TID, and he slowly tapered up that dosage. Patient is going to take tonight's dose of 300mg at bedtime, and was given instructions on how to taper Gabapentin 300mg to 100mg TID, over a 7 day period beginning tomorrow 09/12/17. Patient instructed to continue this taper until he receives a return call from our office in regards to medication. Please review and advise.

## 2017-09-11 NOTE — TELEPHONE ENCOUNTER
Attempted to contact patient, Reached voicemail identified as \"Manuelito\", left message, identified myself/facility/callback number, requested return call to facility.

## 2017-09-11 NOTE — TELEPHONE ENCOUNTER
Patient called stating his prescription gabapentin (NEURONTIN) 300 mg capsule was bumped up from 100 mg and he thinks he's having side effects from the change in dosage. He says he has diarrhea, he is dizzy and disoriented, and he has no appetite. He says he was advised to talk to [facility] before he just stops taking it altogether. Please advise patient at [phone].

## 2017-09-11 NOTE — TELEPHONE ENCOUNTER
Please have him taper off the Gabapentin. Please verify if the patient has tried either Topamax or Lyrica? If he has not, verify he does not have glaucoma. Let him know I can send in a new anti-neuritic to try as he is not tolerating the higher dose of gabapentin.

## 2017-09-12 NOTE — PROGRESS NOTES
[facility]  Amelia Camacho  [address] [facility]  [phone] fax    Continued Plan of Care/ Re-certification for Physical Therapy Services    Patient name: Vlad Mace Start of Care: 17   Referral source: Latosha Callahan NP : 1950   Medical/Treatment Diagnosis: Back pain [M54.9]  Neck pain [M54.2] Onset Date:C/S Fusion , L/S Fusion      Prior Hospitalization: see medical history Provider#: [id number]   Medications: Verified on Patient Summary List    Comorbidities: Depression, Arthritis, Hep C, GERD, Dizziness. Prior Level of Function: Previous Edgar/. Walked 6 miles/day. 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 Status at last note/certification: Goal met   Current Status: met    Goal: Pt will increase L/S FOTO score by 8 pts to improve with ambulation endurance. Status at last note/certification: Not met. Declined 26 points   Current Status: not met    Goal: Pt will tolerate ambulation for approx 1 hr to improve with return to daily walking activities. Status at last note/certification: Goal met. Pt is able to walk for an hour   Current Status: met    Goal: Pt will perform EC on foam x 30 sec to improve static balance and decrease falls  Status at last note/certification: Not met. Pt presently has LOB on floor. Current Status: not met    Goal: Pt will increase C/S Rotation to >50 deg to ease with driving  Status at last note/certification: Progressing.  R 46 dgrs, L 42 dgrs   Current Status: not met    Key functional changes: minor reduction in pain levels until recent increase after pushing a recliner, improving cervical mobility       Problems/ barriers to goal attainment: degenerative changes    Problem List: pain affecting function, decrease ROM, decrease strength, impaired gait/ balance, decrease ADL/ functional abilitiies, decrease activity tolerance, decrease flexibility/ joint mobility and decrease transfer abilities    Treatment Plan: Therapeutic exercise, Therapeutic activities, Neuromuscular re-education, Physical agent/modality, Gait/balance training, Manual therapy, Patient education, Self Care training, Functional mobility training, Home safety training and Stair training     Patient Goal (s) has been updated and includes: less pain     Goals for this certification period to be accomplished in 4 weeks:  1. Pt will increase L/S FOTO score by 8 pts to improve with ambulation endurance. 2. Pt will perform EC on foam x 30 sec to improve static balance and decrease falls  3. Pt will increase C/S Rotation to >50 deg to ease with driving. 4. Pt will report <5/10 average pain in order to improve QOL     Frequency / Duration: Patient to be seen 2-3 times per week for 4 weeks:    Assessment / Recommendations: Pt is making slow progress in therapy, meeting 2/5 initial goals. He reports significant improvement in ambulatory ability, with ambulatory tolerance currently at 1 hour. Cervical AROM is improving. FOTO score has declined despite subjective reports of functional improvement. FOTO score may have declined d/t significant increase in pain levels prior to completing FOTO after moving a recliner in his home. Increased pain was reduced with correction of pelvic obliquity of R PI. Pt continues to demonstrate static balance deficits with LOB with Romberg stance EC on foam.  Pt will benefit from continued skilled PT to address remaining strength, flexibility, and static/dynamic balance deficits for improved ease of functional tasks and improved QOL.       G-Codes (GP)  Mobility  [id number] Current  CL= 60-79%  [id number] Goal  CJ= 20-39%    The severity rating is based on clinical judgment and the FOTO score. Certification Period: 9/11/17 to 10/10/17    Yimi Lopez, PT 9/11/2017 10:24 PM    ________________________________________________________________________  I certify that the above Therapy Services are being furnished while the patient is under my care. I agree with the treatment plan and certify that this therapy is necessary. [] I have read the above and request that my patient continue as recommended.   [] I have read the above report and request that my patient continue therapy with the following changes/special instructions: _______________________________________  [] I have read the above report and request that my patient be discharged from therapy    Physician's Signature:________________________________Date:___________Time:__________    Please sign and return to [facility]  Jessica Muller  [address] [place] [address]  [phone] fax

## 2017-09-12 NOTE — TELEPHONE ENCOUNTER
Please proceed with the taper. Thank you. If he has any problems after he tapers, have him call the office back.

## 2017-09-12 NOTE — TELEPHONE ENCOUNTER
Returned call to patient to inform of message below per NP Gilbert. Patient thought about it last night, and would like to just come off of gabapentin all together. Per patient, he believes he can just deal with the pain. Patient provided tapering schedule on how to come off of gabapentin completely. Patient also instructed to return call to our office to possibly try an alternate medication. Patient denies any history of glaucoma. Patient verbalized agreement/understanding. No further action required at this time.

## 2017-09-14 ENCOUNTER — HOSPITAL ENCOUNTER (OUTPATIENT)
Dept: PHYSICAL THERAPY | Age: 67
Discharge: HOME | End: 2017-09-14
Payer: MEDICARE

## 2017-09-14 PROCEDURE — 97110 THERAPEUTIC EXERCISES: CPT

## 2017-09-14 NOTE — PROGRESS NOTES
PT DAILY TREATMENT NOTE - [facility]     Patient Name: Rubi Funes  Date:2017  : 1950  [x]  Patient  Verified  Payor: BLUE CROSS MEDICARE / Plan: [address] / Product Type: Managed Care Medicare /    In time: 10:26  Out time: 11:01  Total Treatment Time (min): 35  Total Timed Codes (min): 35  1:1 Treatment Time ( only): 30  Visit #: 7 of 12    Treatment Area: Back pain [M54.9]  Neck pain [M54.2]    SUBJECTIVE  Pain Level (0-10 scale): 8/10  Any medication changes, allergies to medications, adverse drug reactions, diagnosis change, or new procedure performed?: [x] No    [] Yes (see summary sheet for update)  Subjective functional status/changes:   [] No changes reported  Pt reports that he is feeling better than the last time he came to therapy. He is getting shooting pains down his RLE intermittently that are not new, but he had one this morning that caused his R knee to buckle. He caught himself with the [address]. He uses the [address] for about half the day. He feels like his neck is a little better. He can move it more and has less pain. His roommate is always telling him to stand up straight when he's walking at home. Pt called his MD about his Gabapentin and she decreased his dose. He has not noticed a difference yet since he started taking less medication 3 days ago. He will bring a list of his medications next time. 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 He is hoping to just supervise.      OBJECTIVE      30 min Therapeutic Exercise:  [x] See flow sheet :   Rationale: increase ROM and increase strength to improve the patients ability to improve ease of ambulation and household tasks    5 min Manual Therapy:  Pelvic alignment check, MET with shotgun to correct R PI   Rationale: decrease pain and increase ROM to improve ease of ambulation             With   [] TE   [] TA   [] neuro   [] other: Patient Education: [x] Review HEP    [] Progressed/Changed HEP based on:   [] positioning   [] body mechanics   [] transfers   [] heat/ice application    [] other:      Other Objective/Functional Measures:     Pt entered therapy with SPC held on R and advancing with RLE, demonstrating lateral trunk lurch with ambulation    Adjusted SPC to one higher level and advised pt to hold cane on L, pt demonstrated increased symmetry with gait pattern  Cues to reduce anterior translation and \"keep knees behind toes\" with mini squats, improved form with cuing     Discussed plan for pt to continue to address cervical impairments at home and allow therapy to focus on LBP as that is his primary complaint, pt agreed. R knee near buckled with 2nd rep of step up, pt reported improved ease with step ups on R as he continued, therapist was close supervision during step ups     Attempted donkey kicks on R but pt reported pain over R SIJ, ceased after 2 reps d/t pain    Pain relieved with correction of R PI, pt educated on self-corrections and given handout to independently management repeated obliquity of R PI    Reduced pain following therapy     Pain Level (0-10 scale) post treatment: 4/10    ASSESSMENT/Changes in Function:     Pt is making slow, steady progress towards therapy goals. 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 Pt is continuing to address cervical deficits with HEP to allow therapy to focus on LBP as this is his chief complaint. Will continue to address BLE strength and core/hip stability deficits to maintain pelvic alignment for reduced pain with daily tasks and improved QOL.       Patient will continue to benefit from skilled PT services to modify and progress therapeutic interventions, address functional mobility deficits, address ROM deficits, address strength deficits, analyze and address soft tissue restrictions, analyze and cue movement patterns and instruct in home and community integration to attain remaining goals. Progress towards goals / Updated goals:  1. Pt will increase L/S FOTO score by 8 pts to improve with ambulation endurance. 2. Pt will perform EC on foam x 30 sec to improve static balance and decrease falls  3. Pt will increase C/S Rotation to >50 deg to ease with driving.    4. Pt will report <5/10 average pain in order to improve QOL     PLAN  []  Upgrade activities as tolerated     [x]  Continue plan of care  []  Update interventions per flow sheet       []  Discharge due to:_  []  Other:_      Saranya Huddleston PT 9/14/2017  8:29 AM    Future Appointments  Date Time Provider Jaime Lilli   9/14/2017 10:30 AM Saranya Huddleston PT [id number] [facility]   9/15/2017 11:00 AM Sharita Zazueta PT [id number] [facility]   9/19/2017 7:00 AM [facility] MRI RM 1 MMCRMRI [facility]   10/25/2017 9:15 AM Tam Del Rosario  [address]   2/20/2018 8:15 AM Royal Markie  [address] [place]

## 2017-09-15 ENCOUNTER — HOSPITAL ENCOUNTER (OUTPATIENT)
Dept: PHYSICAL THERAPY | Age: 67
Discharge: HOME | End: 2017-09-15
Payer: MEDICARE

## 2017-09-15 PROCEDURE — 97110 THERAPEUTIC EXERCISES: CPT

## 2017-09-15 NOTE — PROGRESS NOTES
PT DAILY TREATMENT NOTE - [facility]     Patient Name: Jordyn Wade  Date:9/15/2017  : 1950  [x]  Patient  Verified  Payor: BLUE CROSS MEDICARE / Plan: [facility] / Product Type: Managed Care Medicare /    In time:1100 Out time:1143  Total Treatment Time (min): 43  Total Timed Codes (min): 43  1:1 Treatment Time ( [address] only): 43   Visit #: 8 of 12    Treatment Area: Back pain [M54.9]  Neck pain [M54.2]    SUBJECTIVE  Pain Level (0-10 scale): 6-7/10  Any medication changes, allergies to medications, adverse drug reactions, diagnosis change, or new procedure performed?: [x] No    [] Yes (see summary sheet for update)  Subjective functional status/changes:   [] No changes reported  Reports the push/pull at the leg/hip decreases the pain. He brought his current meds. Pt to wean off Gabapentin in 2-3 days. Pt reports the therapy is helping and he would like to continue at 3x/week. OBJECTIVE    33 min Therapeutic Exercise:  [] See flow sheet :   Rationale: increase ROM, increase strength, improve coordination and improve balance to improve the patients ability to ease with ADL's         10 min Manual Therapy:  [] See flow sheet : Left UT and LS STM, and along lateral scapula. Rationale: increase ROM, increase strength, improve coordination and improve balance to improve the patients ability to ease with ADL's. With   [] TE   [] TA   [] neuro   [] other: Patient Education: [x] Review HEP    [] Progressed/Changed HEP based on:   [] positioning   [] body mechanics   [] transfers   [] heat/ice application    [] other:      Other Objective/Functional Measures: Reports  his LS and hips feel better after doing self MET. Decreased LS tightness after stretches as well as right SI Pain after self MET. Pt's knees continues to buckle due to prior weakness. Pain Level (0-10 scale) post treatment: 3/10    ASSESSMENT/Changes in Function: Pt is progressing well.  He continues to have weakness that will cause his legs to buckle. Pt reports sharp pain would come on suddenly that he would feel from his C/S to his legs. Patient will continue to benefit from skilled PT services to modify and progress therapeutic interventions, address functional mobility deficits, address ROM deficits, address strength deficits, analyze and address soft tissue restrictions, analyze and cue movement patterns, analyze and modify body mechanics/ergonomics, assess and modify postural abnormalities and address imbalance/dizziness to attain remaining goals. []  See Plan of Care  [x]  See progress note/recertification  []  See Discharge Summary         Progress towards goals / Updated goals:  1. Pt will increase L/S FOTO score by 8 pts to improve with ambulation endurance. 2. Pt will perform EC on foam x 30 sec to improve static balance and decrease falls  3. Pt will increase C/S Rotation to >50 deg to ease with driving.    4. Pt will report <5/10 average pain in order to improve QOL     PLAN  [x]  Upgrade activities as tolerated     [x]  Continue plan of care  []  Update interventions per flow sheet       []  Discharge due to:_  []  Other:_      Elvis Lieberman, PT 9/15/2017  11:46 AM    Future Appointments  Date Time Provider Jaime Lilli   9/19/2017 7:00 AM [facility] MRI RM 1 MMCRMRI [facility]   9/19/2017 11:30 AM Helga Rodriguez, PT IICIZFV [facility]   10/25/2017 9:15 AM Sagrario Edwards  [address]    2/20/2018 8:15 AM Dalia Anglin  [address] [place]

## 2017-09-19 ENCOUNTER — HOSPITAL ENCOUNTER (OUTPATIENT)
Dept: MRI IMAGING | Age: 67
Discharge: HOME | End: 2017-09-19
Attending: NURSE PRACTITIONER
Payer: MEDICARE

## 2017-09-19 ENCOUNTER — APPOINTMENT (OUTPATIENT)
Dept: PHYSICAL THERAPY | Age: 67
End: 2017-09-19
Payer: MEDICARE

## 2017-09-19 DIAGNOSIS — M54.50 NONSPECIFIC PAIN LUMBAR REGION: ICD-10-CM

## 2017-09-19 DIAGNOSIS — M47.816 LUMBAR FACET ARTHROPATHY: ICD-10-CM

## 2017-09-19 DIAGNOSIS — M54.16 RADICULOPATHY OF LUMBAR REGION: ICD-10-CM

## 2017-09-19 LAB — CREAT UR-MCNC: 1.2 MG/DL (ref 0.6–1.3)

## 2017-09-19 PROCEDURE — 72158 MRI LUMBAR SPINE W/O & W/DYE: CPT

## 2017-09-19 PROCEDURE — A9585 GADOBUTROL INJECTION: HCPCS | Performed by: NURSE PRACTITIONER

## 2017-09-19 PROCEDURE — 82565 ASSAY OF CREATININE: CPT

## 2017-09-19 PROCEDURE — 74011250636 HC RX REV CODE- 250/636: Performed by: NURSE PRACTITIONER

## 2017-09-19 RX ADMIN — GADOBUTROL 9 ML: 604.72 INJECTION INTRAVENOUS at 07:38

## 2017-09-25 ENCOUNTER — HOSPITAL ENCOUNTER (OUTPATIENT)
Dept: PHYSICAL THERAPY | Age: 67
Discharge: HOME | End: 2017-09-25
Payer: MEDICARE

## 2017-09-25 PROCEDURE — 97110 THERAPEUTIC EXERCISES: CPT

## 2017-09-25 NOTE — PROGRESS NOTES
PT DAILY TREATMENT NOTE - [facility]     Patient Name: Amanda Norman  Date:2017  : 1950  [x]  Patient  Verified  Payor: BLUE CROSS MEDICARE / Plan: [facility] / Product Type: Managed Care Medicare /    In time:2:30  Out time:2:58  Total Treatment Time (min): 28  Total Timed Codes (min): 28  1:1 Treatment Time ( [address] only): 25  Visit #: 9 of 12    Treatment Area: Back pain [M54.9]  Neck pain [M54.2]    SUBJECTIVE  Pain Level (0-10 scale): 4/10 neck, 0/10 back  Any medication changes, allergies to medications, adverse drug reactions, diagnosis change, or new procedure performed?: [x] No    [] Yes (see summary sheet for update)  Subjective functional status/changes:   [] No changes reported  Pt reports that he believes the exercises are helping. His pain has been better for the past 2.5 weeks. He was able to go walking with his friend for 2.5 hours on . They walked pretty slowly. He has been doing the self-corrections for his hips at times when he has pain,and the one time it really helped his pain. His neck still stays stiff. OBJECTIVE      28 min Therapeutic Exercise:  [x] See flow sheet :   Rationale: increase ROM and increase strength to improve the patients ability to improve ease of standing,ambulation, and daily activities.              With   [] TE   [] TA   [] neuro   [] other: Patient Education: [x] Review HEP    [] Progressed/Changed HEP based on:   [] positioning   [] body mechanics   [] transfers   [] heat/ice application    [] other:      Other Objective/Functional Measures:     No pain/difficulty with standing strengthening interventions  Hand held assist after 29 seconds with 3rd trial of Romberg Foam EC  No LOB with Romberg Foam EC with feet slightly apart for 1st trial   Challenged with initiation of dead bugs to further challenge core stability   No increased pain with therapy      Pain Level (0-10 scale) post treatment: 3-4/10 neck, 0/10 back    ASSESSMENT/Changes in Function:     Pt is making steady progress towards final goals in therapy. He reports reducing pain levels for the past week and walked 2.5 on Sunday without pain increase. He has met his pain goals and is making steady progress with balance goal.  Will transition towards final HEP with anticipated DC in 2 sessions as pt would like to continue independently at a gym. Patient will continue to benefit from skilled PT services to modify and progress therapeutic interventions, address ROM deficits, address strength deficits, analyze and address soft tissue restrictions, analyze and cue movement patterns, assess and modify postural abnormalities and instruct in home and community integration to attain remaining goals. Progress towards goals / Updated goals:  1. Pt will increase L/S FOTO score by 8 pts to improve with ambulation endurance. 2. Pt will perform EC on foam x 30 sec to improve static balance and decrease falls Progressing. 29 seconds. 9/25/17  3. Pt will increase C/S Rotation to >50 deg to ease with driving. 4. Pt will report <5/10 average pain in order to improve QOL Goal met. Max pain 5/10.   Average pain 3-4/10, least pain 0/10 9/25/17    PLAN  []  Upgrade activities as tolerated     [x]  Continue plan of care  []  Update interventions per flow sheet       []  Discharge due to:_  []  Other:_      Kieran Joe PT 9/25/2017  2:38 PM    Future Appointments  Date Time Provider Jaime Reeder   9/26/2017 3:30 PM Kieran Joe PT [id number] [facility]   9/29/2017 9:00 AM Helga Lakhani PT [id number] [facility]   10/25/2017 9:15 AM Roberta Jordan  [address]   2/20/2018 8:15 AM Samantha Hensley  [address] [place]

## 2017-09-26 ENCOUNTER — HOSPITAL ENCOUNTER (OUTPATIENT)
Dept: PHYSICAL THERAPY | Age: 67
Discharge: HOME | End: 2017-09-26
Payer: MEDICARE

## 2017-09-26 PROCEDURE — 97110 THERAPEUTIC EXERCISES: CPT

## 2017-09-26 NOTE — PROGRESS NOTES
PT DAILY TREATMENT NOTE - [facility]     Patient Name: Luisa Payne  Date:2017  : 1950  [x]  Patient  Verified  Payor: BLUE CROSS MEDICARE / Plan: [facility] / Product Type: Managed Care Medicare /    In time:3:30  Out time:3:58  Total Treatment Time (min): 28  Total Timed Codes (min):28  1:1 Treatment Time ( [address] only): 28  Visit #: 10 of 12    Treatment Area: Back pain [M54.9]  Neck pain [M54.2]    SUBJECTIVE  Pain Level (0-10 scale): 3-4/10 neck, 0/10 back  Any medication changes, allergies to medications, adverse drug reactions, diagnosis change, or new procedure performed?: [x] No    [] Yes (see summary sheet for update)  Subjective functional status/changes:   [] No changes reported  Pt is planning on returning to gym following DC. He is going to start with the exercises therapy gives him and then slowly return to machines as tolerated. OBJECTIVE    28 min Therapeutic Exercise:  [x] See flow sheet :   Rationale: increase ROM and increase strength to improve the patients ability to continue to progress independently upon DC             With   [] TE   [] TA   [] neuro   [] other: Patient Education: [x] Review HEP    [] Progressed/Changed HEP based on:   [] positioning   [] body mechanics   [] transfers   [] heat/ice application    [] other:      Other Objective/Functional Measures:     Cervical AROM Rotation: 50 dgrs  B  Gave and reviewed final HEP  Pt brought his old HEPs that he had been using as well and therapy eliminated easier interventions   No pain/difficulty with leg press  Challenged with TB extension with plum TB  Proper form with TB rows/ext  No increased pain with therapy    No pain reported after session      Pain Level (0-10 scale) post treatment: 0/10    ASSESSMENT/Changes in Function:     Pt is making steady progress towards final goals in therapy.   He demonstrated proper form with updated HEP and was educated on continuing core/hip/upper back interventions at gym within his tolerance. Will review final HEP for one final session with anticipated DC next session. Patient will continue to benefit from skilled PT services to modify and progress therapeutic interventions, address ROM deficits, address strength deficits, analyze and address soft tissue restrictions, analyze and cue movement patterns, assess and modify postural abnormalities and instruct in home and community integration to attain remaining goals. Progress towards goals / Updated goals:  1. Pt will increase L/S FOTO score by 8 pts to improve with ambulation endurance. 2. Pt will perform EC on foam x 30 sec to improve static balance and decrease falls Progressing. 29 seconds. 9/25/17  3. Pt will increase C/S Rotation to >50 deg to ease with driving. Goal met. 50 dgrs B 9/26/17   4. Pt will report <5/10 average pain in order to improve QOL Goal met. Max pain 5/10.   Average pain 3-4/10, least pain 0/10 9/25/17    PLAN  []  Upgrade activities as tolerated     [x]  Continue plan of care  []  Update interventions per flow sheet       []  Discharge due to:_  []  Other:_      Kym Edward, PT 9/26/2017  3:34 PM    Future Appointments  Date Time Provider Jaime Lilli   9/29/2017 9:00 AM Antonina Araujo, PT [id number] [facility]   10/25/2017 9:15 AM Sherley Baltazar  [address]   2/20/2018 8:15 AM Sujatha Unger  [address] [place] [address]

## 2017-09-29 ENCOUNTER — HOSPITAL ENCOUNTER (OUTPATIENT)
Dept: PHYSICAL THERAPY | Age: 67
Discharge: HOME | End: 2017-09-29
Payer: MEDICARE

## 2017-09-29 PROCEDURE — 97110 THERAPEUTIC EXERCISES: CPT

## 2017-09-29 NOTE — PROGRESS NOTES
PT DAILY TREATMENT NOTE - [facility]     Patient Name: Marisela Catching  Date:2017  : 1950 855[x]  Patient  Verified  Payor: BLUE CROSS MEDICARE / Plan: [facility] CARE / Product Type: Managed Care Medicare /    In time:855  Out time:930  Total Treatment Time (min): 35  Total Timed Codes (min): 35  1:1 Treatment Time ( only): 25   Visit #: 11 of 12    Treatment Area: Back pain [M54.9]  Neck pain [M54.2]    SUBJECTIVE  Pain Level (0-10 scale): 3-4/10  Any medication changes, allergies to medications, adverse drug reactions, diagnosis change, or new procedure performed?: [x] No    [] Yes (see summary sheet for update)  Subjective functional status/changes:   [] No changes reported  Pt reports \"You guys have helped me a lot\"    OBJECTIVE    Modality rationale: decrease pain to improve the patients ability to ease with ADL's   Min Type Additional Details    [] Estim:  []Unatt       []IFC  []Premod                        []Other:  []w/ice   []w/heat  Position:  Location:    [] Estim: []Att    []TENS instruct  []NMES                    []Other:  []w/US   []w/ice   []w/heat  Position:  Location:    []  Traction: [] Cervical       []Lumbar                       [] Prone          []Supine                       []Intermittent   []Continuous Lbs:  [] before manual  [] after manual    []  Ultrasound: []Continuous   [] Pulsed                           []1MHz   []3MHz W/cm2:  Location:    []  Iontophoresis with dexamethasone         Location: [] Take home patch   [] In clinic   10 []  Ice     [x]  heat  []  Ice massage  []  Laser   []  Anodyne Position:seated  Location:CS    []  Laser with stim  []  Other:  Position:  Location:    []  Vasopneumatic Device Pressure:       [] lo [] med [] hi   Temperature: [] lo [] med [] hi   [] Skin assessment post-treatment:  []intact []redness- no adverse reaction      25 min Therapeutic Exercise:  [] See flow sheet :   Rationale: increase ROM, increase strength and improve coordination to improve the patients ability to ease with ADLs; With   [] TE   [] TA   [] neuro   [] other: Patient Education: [x] Review HEP    [] Progressed/Changed HEP based on:   [] positioning   [] body mechanics   [] transfers   [] heat/ice application    [] other:      Other Objective/Functional Measures: No increased pain reported. Demonstrated all ex's for HEP and educated pt on getting MD's permission to return to the GYM. Pain Level (0-10 scale) post treatment: 2/10    ASSESSMENT/Changes in Function: Progressed well. Pt reports >70% improvement. He is a     Patient will continue to benefit from skilled PT services to address imbalance/dizziness and instruct in home and community integration to attain remaining goals. []  See Plan of Care  []  See progress note/recertification  []  See Discharge Summary         Progress towards goals / Updated goals:  1. Pt will increase L/S FOTO score by 8 pts to improve with ambulation endurance. NOT MET. 9/29/17  2. Pt will perform EC on foam x 30 sec to improve static balance and decrease falls Progressing.  29 seconds. 9/25/17  3. Pt will increase C/S Rotation to >50 deg to ease with driving. Goal met. 50 dgrs B 9/26/17   4.  Pt will report <5/10 average pain in order to improve QOL Goal met.  Max pain 5/10.  Average pain 3-4/10, least pain 0/10 9/25/17    PLAN  [x]  Upgrade activities as tolerated     [x]  Continue plan of care  []  Update interventions per flow sheet       []  Discharge due to:_  []  Other:_      Ezekiel Miranda, PT 9/29/2017  2:48 PM    Future Appointments  Date Time Provider Jaime Reeder   10/25/2017 9:15 AM Jacek Smith  [address]   2/20/2018 8:15 AM Isaías Torres  [address] [place]

## 2017-10-25 ENCOUNTER — OFFICE VISIT (OUTPATIENT)
Dept: ORTHOPEDIC SURGERY | Age: 67
End: 2017-10-25

## 2017-10-25 VITALS
DIASTOLIC BLOOD PRESSURE: 75 MMHG | TEMPERATURE: 98.4 F | WEIGHT: 226 LBS | RESPIRATION RATE: 18 BRPM | BODY MASS INDEX: 31.64 KG/M2 | HEIGHT: 71 IN | OXYGEN SATURATION: 95 % | SYSTOLIC BLOOD PRESSURE: 150 MMHG | HEART RATE: 76 BPM

## 2017-10-25 DIAGNOSIS — M48.062 SPINAL STENOSIS OF LUMBAR REGION WITH NEUROGENIC CLAUDICATION: ICD-10-CM

## 2017-10-25 DIAGNOSIS — M54.2 CERVICAL PAIN: ICD-10-CM

## 2017-10-25 DIAGNOSIS — M79.18 CHRONIC MUSCULOSKELETAL PAIN: ICD-10-CM

## 2017-10-25 DIAGNOSIS — G89.29 CHRONIC MUSCULOSKELETAL PAIN: ICD-10-CM

## 2017-10-25 DIAGNOSIS — M62.830 MUSCLE SPASM OF BACK: ICD-10-CM

## 2017-10-25 DIAGNOSIS — F11.11 HISTORY OF OPIOID ABUSE (HCC): ICD-10-CM

## 2017-10-25 DIAGNOSIS — M47.816 LUMBAR FACET ARTHROPATHY: Primary | ICD-10-CM

## 2017-10-25 DIAGNOSIS — M79.2 NEURITIS: ICD-10-CM

## 2017-10-25 DIAGNOSIS — R26.9 GAIT ABNORMALITY: ICD-10-CM

## 2017-10-25 RX ORDER — DULOXETIN HYDROCHLORIDE 60 MG/1
CAPSULE, DELAYED RELEASE ORAL
Qty: 30 CAP | Refills: 2 | Status: SHIPPED | OUTPATIENT
Start: 2017-10-25 | End: 2017-12-06 | Stop reason: DRUGHIGH

## 2017-10-25 RX ORDER — DULOXETIN HYDROCHLORIDE 30 MG/1
CAPSULE, DELAYED RELEASE ORAL
Qty: 7 CAP | Refills: 0 | Status: SHIPPED | OUTPATIENT
Start: 2017-10-25 | End: 2017-12-06 | Stop reason: DRUGHIGH

## 2017-10-25 NOTE — MR AVS SNAPSHOT
Visit Information Date & Time Provider Department Dept. Phone Encounter #  
 10/25/2017  9:15 AM Ermias Espana MD [facility] [phone] [id number] Follow-up Instructions Return in about 6 weeks (around 12/6/2017) for PT follow up, Medication follow up. Routing History Your Appointments 2/20/2018  8:15 AM  
Follow Up with Giselle Winter MD  
[address] ([facility]) Appt Note: 6mo  
 [address], [address] [place] [address]  
  
   
 [address], [address] [place] [address] Upcoming Health Maintenance Date Due ZOSTER VACCINE AGE 60> 6/18/2011 GLAUCOMA SCREENING Q2Y 8/18/2016 MEDICARE YEARLY EXAM 8/18/2016 FOBT Q 1 YEAR AGE 50-75 12/8/2016 Pneumococcal 65+ Low/Medium Risk (2 of 2 - PPSV23) 8/28/2018 DTaP/Tdap/Td series (2 - Td) 12/9/2025 Allergies as of 10/25/2017  Review Complete On: 10/25/2017 By: Ermias Espana MD  
  
 Severity Noted Reaction Type Reactions Nicotine Medium 06/11/2015    Contact Dermatitis Nicotine Patch reaction - Contact dermatitis with numbness and tingling also occuring in the left arm and denuding of the skin. Thimerosal  07/21/2015    Other (comments) Contact lens solution, made eyes red and irrated Current Immunizations  Reviewed on 12/9/2015 Name Date Influenza High Dose Vaccine PF 8/28/2017 Influenza Vaccine 10/1/2016, 10/29/2015 Influenza Vaccine PF 10/29/2014 Pneumococcal Conjugate (PCV-13) 8/28/2017 Tdap 12/9/2015 Not reviewed this visit You Were Diagnosed With   
  
 Codes Comments Lumbar facet arthropathy    -  Primary ICD-10-CM: M12.88 ICD-9-CM: 721.3 Muscle spasm of back     ICD-10-CM: K25.650 ICD-9-CM: 724.8 Chronic musculoskeletal pain     ICD-10-CM: M79.1, G89.29 ICD-9-CM: 729.1, 338.29   
 Spinal stenosis of lumbar region with neurogenic claudication     ICD-10-CM: M48.062 
ICD-9-CM: 724.03 Gait abnormality     ICD-10-CM: R26.9 ICD-9-CM: 781.2 Neuritis     ICD-10-CM: M79.2 ICD-9-CM: 729.2 Cervical pain     ICD-10-CM: M54.2 ICD-9-CM: 723.1 Vitals BP Pulse Temp Resp Height(growth percentile) Weight(growth percentile) 150/75 76 98.4 °F (36.9 °C) (Oral) 18 5' 11\" (1.803 m) 226 lb (102.5 kg) SpO2 BMI Smoking Status 95% 31.52 kg/m2 Current Every Day Smoker BMI and BSA Data Body Mass Index Body Surface Area  
 31.52 kg/m 2 2.27 m 2 Preferred Pharmacy Pharmacy Name Phone [address] [place] [address] [phone] Your Updated Medication List  
  
   
This list is accurate as of: 10/25/17  9:59 AM.  Always use your most recent med list. amLODIPine 10 mg tablet Commonly known as:  Arlyss Rifle Take 1 Tab by mouth daily. * DULoxetine 30 mg capsule Commonly known as:  CYMBALTA Take 1 po with dinner for 7 days  Indications: CHRONIC MUSCULOSKELETAL PAIN, NEUROPATHIC PAIN  
  
 * DULoxetine 60 mg capsule Commonly known as:  CYMBALTA 1 po daily with food  Indications: CHRONIC MUSCULOSKELETAL PAIN, NEUROPATHIC PAIN  
  
 gabapentin 300 mg capsule Commonly known as:  NEURONTIN Take 1 Cap by mouth three (3) times daily. HYDROcodone-acetaminophen 5-325 mg per tablet Commonly known as:  Dina President Take 1 Tab by mouth every four (4) hours as needed for Pain. Max Daily Amount: 6 Tabs. lisinopril 20 mg tablet Commonly known as:  Fran Shutters Take 1 Tab by mouth daily. omeprazole 20 mg capsule Commonly known as:  PRILOSEC Take 1 Cap by mouth daily. terbinafine HCl 250 mg tablet Commonly known as:  LAMISIL  
250 mg. Takes 7 pills every other month * Notice:   This list has 2 medication(s) that are the same as other medications prescribed for you. Read the directions carefully, and ask your doctor or other care provider to review them with you. Prescriptions Printed Refills DULoxetine (CYMBALTA) 30 mg capsule 0 Sig: Take 1 po with dinner for 7 days  Indications: CHRONIC MUSCULOSKELETAL PAIN, NEUROPATHIC PAIN Class: Print DULoxetine (CYMBALTA) 60 mg capsule 2 Si po daily with food  Indications: CHRONIC MUSCULOSKELETAL PAIN, NEUROPATHIC PAIN Class: Print We Performed the Following REFERRAL TO PHYSICAL THERAPY [QKH68 Custom] Comments:  
 Evaluate and Treat Cervical and Lumbar PT 
2-3 x wk x 4 wks Follow-up Instructions Return in about 6 weeks (around 2017) for PT follow up, Medication follow up. Referral Information Referral ID Referred By Referred To [id number] ALICE, Yoana0 Marlena Pichardo [facility] Epi Phone: [phone] Visits Status Start Date End Date 1 New Request 10/25/17 10/25/18 If your referral has a status of pending review or denied, additional information will be sent to support the outcome of this decision. Patient Instructions Low Back Arthritis: Exercises Your Care Instructions Here are some examples of typical rehabilitation exercises for your condition. Start each exercise slowly. Ease off the exercise if you start to have pain. Your doctor or physical therapist will tell you when you can start these exercises and which ones will work best for you. When you are not being active, find a comfortable position for rest. Some people are comfortable on the floor or a medium-firm bed with a small pillow under their head and another under their knees. Some people prefer to lie on their side with a pillow between their knees. Don't stay in one position for too long. Take short walks (10 to 20 minutes) every 2 to 3 hours.  Avoid slopes, hills, and stairs until you feel better. Walk only distances you can manage without pain, especially leg pain. How to do the exercises Pelvic tilt 1. Lie on your back with your knees bent. 2. \"Brace\" your stomachtighten your muscles by pulling in and imagining your belly button moving toward your spine. 3. Press your lower back into the floor. You should feel your hips and pelvis rock back. 4. Hold for 6 seconds while breathing smoothly. 5. Relax and allow your pelvis and hips to rock forward. 6. Repeat 8 to 12 times. Back stretches 1. Get down on your hands and knees on the floor. 2. Relax your head and allow it to droop. Round your back up toward the ceiling until you feel a nice stretch in your upper, middle, and lower back. Hold this stretch for as long as it feels comfortable, or about 15 to 30 seconds. 3. Return to the starting position with a flat back while you are on your hands and knees. 4. Let your back sway by pressing your stomach toward the floor. Lift your buttocks toward the ceiling. 5. Hold this position for 15 to 30 seconds. 6. Repeat 2 to 4 times. Follow-up care is a key part of your treatment and safety. Be sure to make and go to all appointments, and call your doctor if you are having problems. It's also a good idea to know your test results and keep a list of the medicines you take. Where can you learn more? Go to http://gomez-jarvis.info/. Enter P656 in the search box to learn more about \"Low Back Arthritis: Exercises. \" Current as of: March 21, 2017 Content Version: 11.3 © 1703-2855 [employer]wise[employer]. Care instructions adapted under license by [employer] (which disclaims liability or warranty for this information). If you have questions about a medical condition or this instruction, always ask your healthcare professional. [address] any warranty or liability for your use of this information. Introducing [facility]! Dear Volodymyr Billings: Thank you for requesting a [employer] account. Our records indicate that you already have an active [employer] account. You can access your account anytime at [email or web address] Did you know that you can access your hospital and ER discharge instructions at any time in [employer]? You can also review all of your test results from your hospital stay or ER visit. Additional Information If you have questions, please visit the Frequently Asked Questions section of the [employer] website at [email or web address] Remember, [employer] is NOT to be used for urgent needs. For medical emergencies, dial 911. Now available from your iPhone and Android! Please provide this summary of care documentation to your next provider. Your primary care clinician is listed as Lucia Awad. If you have any questions after today's visit, please call [phone].

## 2017-10-25 NOTE — PATIENT INSTRUCTIONS

## 2017-11-15 NOTE — ANCILLARY DISCHARGE INSTRUCTIONS
[facility] Catherene Speak  [address] [facility]  [phone] fax    Physical Therapy Discharge Summary           Patient name: Maya Spencer Start of Care: 17   Referral source: Marbella Polk NP : 1950   Medical/Treatment Diagnosis: Back pain [M54.9]  Neck pain [M54.2] Onset Date:C/S Fusion , L/S Fusion    Prior Hospitalization: see medical history Provider#: [id number]   Medications: Verified on Patient Summary List     Comorbidities: Depression, Arthritis, Hep C, GERD, Dizziness.   Prior Level of Function: Previous Edgar/. Walked 6 miles/day. 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 Pt will increase L/S FOTO score by 8 pts to improve with ambulation endurance. NOT MET. 17  2. Pt will perform EC on foam x 30 sec to improve static balance and decrease falls MET  3. Pt will increase C/S Rotation to >50 deg to ease with driving. Goal met.  50 dgrs B    4. Pt will report <5/10 average pain in order to improve QOL Goal met.  Max pain 5/10.  Average pain 3-4/10, least pain.     Progressed well. Pt reports >70% improvement  Pt is planning on returning to gym following DC. He is going to start with the exercises therapy gives him and then slowly return to machines as tolerated.     Pt is making steady progress towards final goals in therapy. He reports reducing pain levels for the past week and walked 2.5 miles without pain increase. He has met his pain goals and is making steady progress with balance goals. Pt is D/c'd with an updated HEP. G-Codes (GP)  Mobility  [id number] Current  CL= 60-79%  [id number] Goal  CJ= 20-39%  The severity rating is based on clinical judgment and the FOTO score.       ASSESSMENT/RECOMMENDATIONS:  [x]Discontinue therapy: [x]Patient has reached or is progressing toward set goals      []Patient is non-compliant or has abdicated      []Due to lack of appreciable progress towards set goals    Denice Porter, PT 11/15/2017 2:26 PM

## 2017-11-21 DIAGNOSIS — I10 ESSENTIAL HYPERTENSION WITH GOAL BLOOD PRESSURE LESS THAN 140/90: ICD-10-CM

## 2017-11-21 RX ORDER — LISINOPRIL 20 MG/1
20 TABLET ORAL DAILY
Qty: 90 TAB | Refills: 1 | Status: SHIPPED | OUTPATIENT
Start: 2017-11-21 | End: 2018-02-20 | Stop reason: SDUPTHER

## 2017-11-21 NOTE — TELEPHONE ENCOUNTER
Requested Prescriptions     Pending Prescriptions Disp Refills    lisinopril (PRINIVIL, ZESTRIL) 20 mg tablet 90 Tab 1     Sig: Take 1 Tab by mouth daily.

## 2017-12-06 ENCOUNTER — OFFICE VISIT (OUTPATIENT)
Dept: ORTHOPEDIC SURGERY | Age: 67
End: 2017-12-06

## 2017-12-06 VITALS
OXYGEN SATURATION: 97 % | BODY MASS INDEX: 30.72 KG/M2 | RESPIRATION RATE: 18 BRPM | HEIGHT: 71 IN | TEMPERATURE: 98.4 F | HEART RATE: 88 BPM | DIASTOLIC BLOOD PRESSURE: 78 MMHG | SYSTOLIC BLOOD PRESSURE: 136 MMHG | WEIGHT: 219.4 LBS

## 2017-12-06 DIAGNOSIS — M79.2 NEURITIS: ICD-10-CM

## 2017-12-06 DIAGNOSIS — M47.816 LUMBAR FACET ARTHROPATHY: ICD-10-CM

## 2017-12-06 DIAGNOSIS — M48.02 CERVICAL SPINAL STENOSIS: Primary | ICD-10-CM

## 2017-12-06 DIAGNOSIS — M54.41 CHRONIC BILATERAL LOW BACK PAIN WITH BILATERAL SCIATICA: ICD-10-CM

## 2017-12-06 DIAGNOSIS — M51.36 BULGE OF LUMBAR DISC WITHOUT MYELOPATHY: ICD-10-CM

## 2017-12-06 DIAGNOSIS — G89.29 CHRONIC MUSCULOSKELETAL PAIN: ICD-10-CM

## 2017-12-06 DIAGNOSIS — M54.42 CHRONIC BILATERAL LOW BACK PAIN WITH BILATERAL SCIATICA: ICD-10-CM

## 2017-12-06 DIAGNOSIS — Z98.1 S/P LUMBAR FUSION: ICD-10-CM

## 2017-12-06 DIAGNOSIS — M79.18 CHRONIC MUSCULOSKELETAL PAIN: ICD-10-CM

## 2017-12-06 DIAGNOSIS — G89.29 CHRONIC BILATERAL LOW BACK PAIN WITH BILATERAL SCIATICA: ICD-10-CM

## 2017-12-06 DIAGNOSIS — M54.10 RADICULOPATHY OF LEG: ICD-10-CM

## 2017-12-06 RX ORDER — DULOXETIN HYDROCHLORIDE 60 MG/1
CAPSULE, DELAYED RELEASE ORAL
Qty: 30 CAP | Refills: 2 | Status: SHIPPED | OUTPATIENT
Start: 2017-12-06 | End: 2018-07-09 | Stop reason: ALTCHOICE

## 2017-12-06 NOTE — PROGRESS NOTES
[address]  [address], [address]  [place], [address] [place] [address] Street  Phone: [phone]  Fax: [phone]    Marina Hernandez  : 1950  PCP: Jacob Nunes MD    PROGRESS NOTE    HISTORY OF PRESENT ILLNESS:  Chief Complaint   Patient presents with    Back Pain     6 week follow up    Neck Pain     Felicitas French is a 78 y.o.  male with history of cervical and lumbar pain. Of note, patient had a prior L4-5 fusion in 2016. Pt also had C5-6,C6-7 ACDF with Dr. Romeo Valadez on 2017. He continues to perform neck exercises with benefit but still has some stiffness. He has tried cervical PT with benefit. Recently, he has had lumbar pain with radiating pain down both legs. He was sent to PT and started on Cymbalta. Today, he states he is doing great. He states he is having no back or neck pain what so ever. He feels his range of motion is much improved. He is completing his HEP 4 x a day and walking his dog. He was intolerant to the gabapentin. He is taking Cymbalta 60 with great relief. He is still having some left leg pain which radiates form your left buttocks down his calf to his ankle. He does have some right anterior thigh and shin pain. This aches at night. He states he has not gone to lumbar PT due to insurance. He will go back when this is resolved. Denies bladder/bowel dysfunction, saddle paresthesia, weakness, gait disturbance, or other neurological deficit. Pt at this time desires to continue with current care/proceed with medication evaluation. He is waiting for his insurance to get straightened out and he may be interested in a SI in the future. ASSESSMENT  78 y.o. male with cervical and lumbar pain and left leg pain. Diagnoses and all orders for this visit:    1. Cervical spinal stenosis    2. Bulge of lumbar disc without myelopathy    3. Lumbar facet arthropathy    4. Chronic bilateral low back pain with bilateral sciatica    5.  S/P lumbar fusion    6. Radiculopathy of leg  -     DULoxetine (CYMBALTA) 60 mg capsule; 1 po daily with food  Indications: CHRONIC MUSCULOSKELETAL PAIN, NEUROPATHIC PAIN    7. Chronic musculoskeletal pain  -     DULoxetine (CYMBALTA) 60 mg capsule; 1 po daily with food  Indications: CHRONIC MUSCULOSKELETAL PAIN, NEUROPATHIC PAIN    8. Neuritis  -     DULoxetine (CYMBALTA) 60 mg capsule; 1 po daily with food  Indications: CHRONIC MUSCULOSKELETAL PAIN, NEUROPATHIC PAIN       IMPRESSION/PLAN    1) Pt was given information on cervical and lumbar exercises. 2) Continue Cymbalta 60 mg. He does not want to add any medications at this time. 3)  We discussed SI. He wants to wait for SI unitl his insurance is straitened out prior to this. He will also return to PT. May call for new PT lumbar referral.   4) We discussed OTC melatonin for sleep. 5) Mr. Danya Brittle has a reminder for a \"due or due soon\" health maintenance. I have asked that he contact his primary care provider, Halie Dalton MD, for follow-up on this health maintenance. 6) We have informed patient to notify us for immediate appointment if he has any worsening neurogical symptoms or if an emergency situation presents, then call 911  7) Pt will follow-up in 3 months. No pain behaviors. Denies thoughts of harming self or others. Pt has a good risk to benefit ratio which allows the pt to function in a home environment without side effects.          PAST MEDICAL HISTORY  Past Medical History:   Diagnosis Date    Bilateral hip pain     Chronic pain     back; hx of opiod addiction    Depression     Diabetes ([facility] [address]     borderline, no meds-trying to control with diet    DJD (degenerative joint disease)     GERD (gastroesophageal reflux disease)     Hepatitis C January, 2015    Hypertension     Kidney stones     Lymphadenopathy, generalized 12/05/2015    neg bx    Numbness of foot left    resolved per patient 1/29/16    S/P lumbar fusion 2/9/2016    L4/5 LAMINECTOMY/FUSION/TRANSFORAMINAL LUMBAR INTERBODY FUSION (TLIF) by Dr. Eluogio Richardson on 2/8/16     Unspecified sleep apnea     no cpap machine-pt denies        MEDICATIONS  Current Outpatient Prescriptions   Medication Sig Dispense Refill    DULoxetine (CYMBALTA) 60 mg capsule 1 po daily with food  Indications: CHRONIC MUSCULOSKELETAL PAIN, NEUROPATHIC PAIN 30 Cap 2    lisinopril (PRINIVIL, ZESTRIL) 20 mg tablet Take 1 Tab by mouth daily. 90 Tab 1    amLODIPine (NORVASC) 10 mg tablet Take 1 Tab by mouth daily. 90 Tab 1    omeprazole (PRILOSEC) 20 mg capsule Take 1 Cap by mouth daily. 90 Cap 3    gabapentin (NEURONTIN) 300 mg capsule Take 1 Cap by mouth three (3) times daily. 90 Cap 1    HYDROcodone-acetaminophen (NORCO) 5-325 mg per tablet Take 1 Tab by mouth every four (4) hours as needed for Pain. Max Daily Amount: 6 Tabs. 10 Tab 0    terbinafine HCl (LAMISIL) 250 mg tablet 250 mg. Takes 7 pills every other month         ALLERGIES  Allergies   Allergen Reactions    Nicotine Contact Dermatitis     Nicotine Patch reaction - Contact dermatitis with numbness and tingling also occuring in the left arm and denuding of the skin.  Thimerosal Other (comments)     Contact lens solution, made eyes red and irrated       SOCIAL HISTORY    Social History     Social History    Marital status:      Spouse name: N/A    Number of children: N/A    Years of education: N/A     Occupational History    Not on file.      Social History Main Topics    Smoking status: Current Every Day Smoker     Packs/day: 0.25     Years: 40.00     Types: Cigarettes     Last attempt to quit: 5/12/2017    Smokeless tobacco: Never Used    Alcohol use No    Drug use: Yes     Special: Marijuana, Cocaine      Comment: stopped cocaine 2007, marijuana 5/14/17    Sexual activity: No     Other Topics Concern    Not on file     Social History Narrative       SUBJECTIVE        Pain Scale: 3/10    Pain Assessment  10/25/2017   Location of Pain Back   Pain Location Comment -   Location Modifiers -   Severity of Pain -   Quality of Pain Aching;Burning   Quality of Pain Comment N/T/weakness   Duration of Pain -   Frequency of Pain Intermittent   Date Pain First Started -   Aggravating Factors -   Aggravating Factors Comment -   Limiting Behavior -   Relieving Factors -   Relieving Factors Comment -   Result of Injury No       Accompanied by self. REVIEW OF SYSTEMS  ROS    Constitutional: Negative for fever, chills, or weight change. Respiratory: Negative for cough or shortness of breath. Cardiovascular: Negative for chest pain or palpitations. Gastrointestinal: Negative for acid reflux, change in bowel habits, or constipation. Genitourinary: Negative for incontinence, dysuria and flank pain. Musculoskeletal: Positive for left leg pain. Skin: Negative for rash. Neurological: Negative for headaches, dizziness, or numbness. Endo/Heme/Allergies: Negative . Psychiatric/Behavioral: Negative. PHYSICAL EXAMINATION  Visit Vitals    /78    Pulse 88    Temp 98.4 °F (36.9 °C) (Oral)    Resp 18    Ht 5' 11\" (1.803 m)    Wt 219 lb 6.4 oz (99.5 kg)    SpO2 97%    BMI 30.6 kg/m2       Constitutional: Well developed,  well nourished,  awake, alert, and in no acute distress. Neurological:  Sensation to light touch is intact. Psychiatric: Affect and mood are appropriate. Integumentary: No rashes or abrasions noted on exposed areas,  warm, dry and intact. Cardiovascular/Peripheral Vascular:  No peripheral edema is noted. Lymphatic:  No evidence of lymphedema. No cervical lymphadenopathy. SPINE/MUSCULOSKELETAL EXAM    Cervical spine:  Neck is midline. Normal muscle tone. No focal atrophy is noted. Shoulder ROM intact. No Tenderness to palpation . Negative Spurling's sign. Negative Tinel's sign. Negative Reyes's sign. Lumbar spine:  No rash, ecchymosis, or gross obliquity. No fasciculations. No focal atrophy is noted. Range of motion is intact. No Tenderness to palpation . SI joints non-tender. Trochanters non tender. Musculoskeletal:  No pain with extension, axial loading, or forward flexion. No pain with internal or external rotation of his hips. MOTOR    Biceps  Triceps Deltoids Wrist Ext Wrist Flex Hand Intrin   Right +4/5 +4/5 +4/5 +4/5 +4/5 +4/5   Left +4/5 +4/5 +4/5 +4/5 +4/5 +4/5      Hip Flex  Quads Hamstrings Ankle DF EHL Ankle PF   Right +4/5 +4/5 +4/5 +4/5 +4/5 +4/5   Left +4/5 +4/5 +4/5 +4/5 +4/5 +4/5     Straight Leg raise negative bilatersally. normal gait and station    Ambulation with single point cane. full weight bearing, non-antalgic gait.     Alvin Freeman NP

## 2017-12-06 NOTE — MR AVS SNAPSHOT
Visit Information Date & Time Provider Department Dept. Phone Encounter #  
 12/6/2017  9:30 AM Cari Tillman NP [facility] [phone] [id number] Follow-up Instructions Return in about 3 months (around 3/6/2018) for for med FU. Your Appointments 2/20/2018  8:15 AM  
Follow Up with Taryn Mathis MD  
[address] [facility] Appt Note: 6mo  
 [address], [address]  
  
   
 [address], [address] [place] [address] Upcoming Health Maintenance Date Due ZOSTER VACCINE AGE 60> 6/18/2011 GLAUCOMA SCREENING Q2Y 8/18/2016 MEDICARE YEARLY EXAM 8/18/2016 FOBT Q 1 YEAR AGE 50-75 12/8/2016 Pneumococcal 65+ Low/Medium Risk (2 of 2 - PPSV23) 8/28/2018 DTaP/Tdap/Td series (2 - Td) 12/9/2025 Allergies as of 12/6/2017  Review Complete On: 12/6/2017 By: Jessica Hooker Severity Noted Reaction Type Reactions Nicotine Medium 06/11/2015    Contact Dermatitis Nicotine Patch reaction - Contact dermatitis with numbness and tingling also occuring in the left arm and denuding of the skin. Thimerosal  07/21/2015    Other (comments) Contact lens solution, made eyes red and irrated Current Immunizations  Reviewed on 12/9/2015 Name Date Influenza High Dose Vaccine PF 8/28/2017 Influenza Vaccine 10/1/2016, 10/29/2015 Influenza Vaccine PF 10/29/2014 Pneumococcal Conjugate (PCV-13) 8/28/2017 Tdap 12/9/2015 Not reviewed this visit You Were Diagnosed With   
  
 Codes Comments Cervical spinal stenosis    -  Primary ICD-10-CM: M48.02 
ICD-9-CM: 723.0 Bulge of lumbar disc without myelopathy     ICD-10-CM: M51.26 
ICD-9-CM: 722.10 Lumbar facet arthropathy     ICD-10-CM: M12.88 ICD-9-CM: 722. 3  Chronic bilateral low back pain with bilateral sciatica     ICD-10-CM: M54.42, M54.41, G89.29 
 ICD-9-CM: 724.2, 724.3, 338.29 S/P lumbar fusion     ICD-10-CM: Z98.1 ICD-9-CM: V45.4 Radiculopathy of leg     ICD-10-CM: M54.10 ICD-9-CM: 724.4 Chronic musculoskeletal pain     ICD-10-CM: M79.1, G89.29 ICD-9-CM: 729.1, 338.29 Neuritis     ICD-10-CM: M79.2 ICD-9-CM: 729.2 Vitals BP Pulse Temp Resp Height(growth percentile) Weight(growth percentile) 136/78 88 98.4 °F (36.9 °C) (Oral) 18 5' 11\" (1.803 m) 219 lb 6.4 oz (99.5 kg) SpO2 BMI Smoking Status 97% 30.6 kg/m2 Current Every Day Smoker BMI and BSA Data Body Mass Index Body Surface Area  
 30.6 kg/m 2 2.23 m 2 Preferred Pharmacy Pharmacy Name Phone [address], [address] [place] [address] [phone] Your Updated Medication List  
  
   
This list is accurate as of: 17  9:35 AM.  Always use your most recent med list. amLODIPine 10 mg tablet Commonly known as:  [place]ach Take 1 Tab by mouth daily. DULoxetine 60 mg capsule Commonly known as:  CYMBALTA 1 po daily with food  Indications: CHRONIC MUSCULOSKELETAL PAIN, NEUROPATHIC PAIN  
  
 gabapentin 300 mg capsule Commonly known as:  NEURONTIN Take 1 Cap by mouth three (3) times daily. HYDROcodone-acetaminophen 5-325 mg per tablet Commonly known as:  Annamary Susannah Take 1 Tab by mouth every four (4) hours as needed for Pain. Max Daily Amount: 6 Tabs. lisinopril 20 mg tablet Commonly known as:  [place] Escort Take 1 Tab by mouth daily. omeprazole 20 mg capsule Commonly known as:  PRILOSEC Take 1 Cap by mouth daily. terbinafine HCl 250 mg tablet Commonly known as:  LAMISIL  
250 mg. Takes 7 pills every other month Prescriptions Sent to Pharmacy Refills DULoxetine (CYMBALTA) 60 mg capsule 2 Si po daily with food  Indications: CHRONIC MUSCULOSKELETAL PAIN, NEUROPATHIC PAIN  Class: Normal  
 Pharmacy: [id number] [facility], [address] [facility] #: [phone] Follow-up Instructions Return in about 3 months (around 3/6/2018) for for med FU. Patient Instructions Neck Arthritis: Exercises Your Care Instructions Here are some examples of typical rehabilitation exercises for your condition. Start each exercise slowly. Ease off the exercise if you start to have pain. Your doctor or physical therapist will tell you when you can start these exercises and which ones will work best for you. How to do the exercises Neck stretches to the side 1. This stretch works best if you keep your shoulder down as you lean away from it. To help you remember to do this, start by relaxing your shoulders and lightly holding on to your thighs or your chair. 2. Tilt your head toward your shoulder and hold for 15 to 30 seconds. Let the weight of your head stretch your muscles. 3. Repeat 2 to 4 times toward each shoulder. Chin tuck 1. Lie on the floor with a rolled-up towel under your neck. Your head should be touching the floor. 2. Slowly bring your chin toward your chest. 
3. Hold for a count of 6, and then relax for up to 10 seconds. 4. Repeat 8 to 12 times. Active cervical rotation 1. Sit in a firm chair, or stand up straight. 2. Keeping your chin level, turn your head to the right, and hold for 15 to 30 seconds. 3. Turn your head to the left and hold for 15 to 30 seconds. 4. Repeat 2 to 4 times to each side. Shoulder blade squeeze 1. While standing, squeeze your shoulder blades together. 2. Do not raise your shoulders up as you are squeezing. 3. Hold for 6 seconds. 4. Repeat 8 to 12 times. Shoulder rolls 1. Sit comfortably with your feet shoulder-width apart. You can also do this exercise standing up. 2. Roll your shoulders up, then back, and then down in a smooth, circular motion. 3. Repeat 2 to 4 times. Follow-up care is a key part of your treatment and safety. Be sure to make and go to all appointments, and call your doctor if you are having problems. It's also a good idea to know your test results and keep a list of the medicines you take. Where can you learn more? Go to http://gomez-jarvis.info/. Enter M989 in the search box to learn more about \"Neck Arthritis: Exercises. \" Current as of: March 21, 2017 Content Version: 11.4 © 6837-6040 [employer]. Care instructions adapted under license by [employer] (which disclaims liability or warranty for this information). If you have questions about a medical condition or this instruction, always ask your healthcare professional. [address] any warranty or liability for your use of this information. Learning About How to Have a Healthy Back What causes back pain? Back pain is often caused by overuse, strain, or injury. For example, people often hurt their backs playing sports or working in the yard, being jolted in a car accident, or lifting something too heavy. Aging plays a part too. Your bones and muscles tend to lose strength as you age, which makes injury more likely. The spongy discs between the bones of the spine (vertebrae) may suffer from wear and tear and no longer provide enough cushion between the bones. A disc that bulges or breaks open (herniated disc) can press on nerves, causing back pain. In some people, back pain is the result of arthritis, broken vertebrae caused by bone loss (osteoporosis), illness, or a spine problem. Although most people have back pain at one time or another, there are steps you can take to make it less likely. How can you have a healthy back? Reduce stress on your back through good posture Slumping or slouching alone may not cause low back pain. But after the back has been strained or injured, bad posture can make pain worse. · Sleep in a position that maintains your back's normal curves and on a mattress that feels comfortable. Sleep on your side with a pillow between your knees, or sleep on your back with a pillow under your knees. These positions can reduce strain on your back. · Stand and sit up straight. \"Good posture\" generally means your ears, shoulders, and hips are in a straight line. · If you must stand for a long time, put one foot on a stool, ledge, or box. Switch feet every now and then. · Sit in a chair that is low enough to let you place both feet flat on the floor with both knees nearly level with your hips. If your chair or desk is too high, use a footrest to raise your knees. Place a small pillow, a rolled-up towel, or a lumbar roll in the curve of your back if you need extra support. · Try a kneeling chair, which helps tilt your hips forward. This takes pressure off your lower back. · Try sitting on an exercise ball. It can rock from side to side, which helps keep your back loose. · When driving, keep your knees nearly level with your hips. Sit straight, and drive with both hands on the steering wheel. Your arms should be in a slightly bent position. Reduce stress on your back through careful lifting · Squat down, bending at the hips and knees only. If you need to, put one knee to the floor and extend your other knee in front of you, bent at a right angle (half kneeling). · Press your chest straight forward. This helps keep your upper back straight while keeping a slight arch in your low back. · Hold the load as close to your body as possible, at the level of your belly button (navel). · Use your feet to change direction, taking small steps. · Lead with your hips as you change direction. Keep your shoulders in line with your hips as you move. · Set down your load carefully, squatting with your knees and hips only. Exercise and stretch your back · Do some exercise on most days of the week, if your doctor says it is okay. You can walk, run, swim, or cycle. · Stretch your back muscles. Here are a few exercises to try: ¨ Lie on your back, and gently pull one bent knee to your chest. Put that foot back on the floor, and then pull the other knee to your chest. 
¨ Do pelvic tilts. Lie on your back with your knees bent. Tighten your stomach muscles. Pull your belly button (navel) in and up toward your ribs. You should feel like your back is pressing to the floor and your hips and pelvis are slightly lifting off the floor. Hold for 6 seconds while breathing smoothly. ¨ Sit with your back flat against a wall. · Keep your core muscles strong. The muscles of your back, belly (abdomen), and buttocks support your spine. ¨ Pull in your belly and imagine pulling your navel toward your spine. Hold this for 6 seconds, then relax. Remember to keep breathing normally as you tense your muscles. ¨ Do curl-ups. Always do them with your knees bent. Keep your low back on the floor, and curl your shoulders toward your knees using a smooth, slow motion. Keep your arms folded across your chest. If this bothers your neck, try putting your hands behind your neck (not your head), with your elbows spread apart. ¨ Lie on your back with your knees bent and your feet flat on the floor. Tighten your belly muscles, and then push with your feet and raise your buttocks up a few inches. Hold this position 6 seconds as you continue to breathe normally, then lower yourself slowly to the floor. Repeat 8 to 12 times. ¨ If you like group exercise, try Pilates or yoga. These classes have poses that strengthen the core muscles. Lead a healthy lifestyle · Stay at a healthy weight to avoid strain on your back. · Do not smoke. Smoking increases the risk of osteoporosis, which weakens the spine.  If you need help quitting, talk to your doctor about stop-smoking programs and medicines. These can increase your chances of quitting for good. Where can you learn more? Go to http://gomez-jarvis.info/. Enter L315 in the search box to learn more about \"Learning About How to Have a Healthy Back. \" Current as of: March 21, 2017 Content Version: 11.4 © 8188-1195 [employer]. Care instructions adapted under license by [employer] (which disclaims liability or warranty for this information). If you have questions about a medical condition or this instruction, always ask your healthcare professional. [address] any warranty or liability for your use of this information. Introducing [facility]! Dear John Fry: Thank you for requesting a [facility] account. Our records indicate that you already have an active [facility] account. You can access your account anytime at [email or web address] Did you know that you can access your hospital and ER discharge instructions at any time in [facility]? You can also review all of your test results from your hospital stay or ER visit. Additional Information If you have questions, please visit the Frequently Asked Questions section of the [facility] website at [email or web address] Remember, [facility] is NOT to be used for urgent needs. For medical emergencies, dial 911. Now available from your iPhone and Android! Please provide this summary of care documentation to your next provider. Your primary care clinician is listed as Jojo Escalera. If you have any questions after today's visit, please call [phone].

## 2017-12-06 NOTE — PATIENT INSTRUCTIONS
Neck Arthritis: Exercises  Your Care Instructions  Here are some examples of typical rehabilitation exercises for your condition. Start each exercise slowly. Ease off the exercise if you start to have pain. Your doctor or physical therapist will tell you when you can start these exercises and which ones will work best for you. How to do the exercises  Neck stretches to the side    1. This stretch works best if you keep your shoulder down as you lean away from it. To help you remember to do this, start by relaxing your shoulders and lightly holding on to your thighs or your chair. 2. Tilt your head toward your shoulder and hold for 15 to 30 seconds. Let the weight of your head stretch your muscles. 3. Repeat 2 to 4 times toward each shoulder. Chin tuck    1. Lie on the floor with a rolled-up towel under your neck. Your head should be touching the floor. 2. Slowly bring your chin toward your chest.  3. Hold for a count of 6, and then relax for up to 10 seconds. 4. Repeat 8 to 12 times. Active cervical rotation    1. Sit in a firm chair, or stand up straight. 2. Keeping your chin level, turn your head to the right, and hold for 15 to 30 seconds. 3. Turn your head to the left and hold for 15 to 30 seconds. 4. Repeat 2 to 4 times to each side. Shoulder blade squeeze    1. While standing, squeeze your shoulder blades together. 2. Do not raise your shoulders up as you are squeezing. 3. Hold for 6 seconds. 4. Repeat 8 to 12 times. Shoulder rolls    1. Sit comfortably with your feet shoulder-width apart. You can also do this exercise standing up. 2. Roll your shoulders up, then back, and then down in a smooth, circular motion. 3. Repeat 2 to 4 times. Follow-up care is a key part of your treatment and safety. Be sure to make and go to all appointments, and call your doctor if you are having problems. It's also a good idea to know your test results and keep a list of the medicines you take.   Where can you learn more? Go to http://gomez-jarvis.info/. Enter M418 in the search box to learn more about \"Neck Arthritis: Exercises. \"  Current as of: March 21, 2017  Content Version: 11.4  © 4631-2712 [employer]. Care instructions adapted under license by [employer] (which disclaims liability or warranty for this information). If you have questions about a medical condition or this instruction, always ask your healthcare professional. [place] [address] any warranty or liability for your use of this information. Learning About How to Have a Healthy Back  What causes back pain? Back pain is often caused by overuse, strain, or injury. For example, people often hurt their backs playing sports or working in the yard, being jolted in a car accident, or lifting something too heavy. Aging plays a part too. Your bones and muscles tend to lose strength as you age, which makes injury more likely. The spongy discs between the bones of the spine (vertebrae) may suffer from wear and tear and no longer provide enough cushion between the bones. A disc that bulges or breaks open (herniated disc) can press on nerves, causing back pain. In some people, back pain is the result of arthritis, broken vertebrae caused by bone loss (osteoporosis), illness, or a spine problem. Although most people have back pain at one time or another, there are steps you can take to make it less likely. How can you have a healthy back? Reduce stress on your back through good posture  Slumping or slouching alone may not cause low back pain. But after the back has been strained or injured, bad posture can make pain worse. · Sleep in a position that maintains your back's normal curves and on a mattress that feels comfortable. Sleep on your side with a pillow between your knees, or sleep on your back with a pillow under your knees. These positions can reduce strain on your back.   · Stand and sit up straight. \"Good posture\" generally means your ears, shoulders, and hips are in a straight line. · If you must stand for a long time, put one foot on a stool, ledge, or box. Switch feet every now and then. · Sit in a chair that is low enough to let you place both feet flat on the floor with both knees nearly level with your hips. If your chair or desk is too high, use a footrest to raise your knees. Place a small pillow, a rolled-up towel, or a lumbar roll in the curve of your back if you need extra support. · Try a kneeling chair, which helps tilt your hips forward. This takes pressure off your lower back. · Try sitting on an exercise ball. It can rock from side to side, which helps keep your back loose. · When driving, keep your knees nearly level with your hips. Sit straight, and drive with both hands on the steering wheel. Your arms should be in a slightly bent position. Reduce stress on your back through careful lifting  · Squat down, bending at the hips and knees only. If you need to, put one knee to the floor and extend your other knee in front of you, bent at a right angle (half kneeling). · Press your chest straight forward. This helps keep your upper back straight while keeping a slight arch in your low back. · Hold the load as close to your body as possible, at the level of your belly button (navel). · Use your feet to change direction, taking small steps. · Lead with your hips as you change direction. Keep your shoulders in line with your hips as you move. · Set down your load carefully, squatting with your knees and hips only. Exercise and stretch your back  · Do some exercise on most days of the week, if your doctor says it is okay. You can walk, run, swim, or cycle. · Stretch your back muscles.  Here are a few exercises to try:  Estrella Gal on your back, and gently pull one bent knee to your chest. Put that foot back on the floor, and then pull the other knee to your chest.  ¨ Do pelvic tilts. Lie on your back with your knees bent. Tighten your stomach muscles. Pull your belly button (navel) in and up toward your ribs. You should feel like your back is pressing to the floor and your hips and pelvis are slightly lifting off the floor. Hold for 6 seconds while breathing smoothly. ¨ Sit with your back flat against a wall. · Keep your core muscles strong. The muscles of your back, belly (abdomen), and buttocks support your spine. ¨ Pull in your belly and imagine pulling your navel toward your spine. Hold this for 6 seconds, then relax. Remember to keep breathing normally as you tense your muscles. ¨ Do curl-ups. Always do them with your knees bent. Keep your low back on the floor, and curl your shoulders toward your knees using a smooth, slow motion. Keep your arms folded across your chest. If this bothers your neck, try putting your hands behind your neck (not your head), with your elbows spread apart. ¨ Lie on your back with your knees bent and your feet flat on the floor. Tighten your belly muscles, and then push with your feet and raise your buttocks up a few inches. Hold this position 6 seconds as you continue to breathe normally, then lower yourself slowly to the floor. Repeat 8 to 12 times. ¨ If you like group exercise, try Pilates or yoga. These classes have poses that strengthen the core muscles. Lead a healthy lifestyle  · Stay at a healthy weight to avoid strain on your back. · Do not smoke. Smoking increases the risk of osteoporosis, which weakens the spine. If you need help quitting, talk to your doctor about stop-smoking programs and medicines. These can increase your chances of quitting for good. Where can you learn more? Go to http://gomez-jarvis.info/. Enter L315 in the search box to learn more about \"Learning About How to Have a Healthy Back. \"  Current as of: March 21, 2017  Content Version: 11.4  © 1789-0498 [employer], [facility].  Care instructions adapted under license by [employer] (which disclaims liability or warranty for this information). If you have questions about a medical condition or this instruction, always ask your healthcare professional. [address] any warranty or liability for your use of this information.

## 2018-01-17 PROBLEM — F33.9 RECURRENT DEPRESSION (HCC): Status: ACTIVE | Noted: 2018-01-17

## 2018-01-23 ENCOUNTER — OFFICE VISIT (OUTPATIENT)
Dept: ORTHOPEDIC SURGERY | Age: 68
End: 2018-01-23

## 2018-01-23 VITALS
HEIGHT: 71 IN | OXYGEN SATURATION: 98 % | TEMPERATURE: 98.1 F | HEART RATE: 75 BPM | DIASTOLIC BLOOD PRESSURE: 79 MMHG | BODY MASS INDEX: 31.47 KG/M2 | WEIGHT: 224.8 LBS | SYSTOLIC BLOOD PRESSURE: 136 MMHG

## 2018-01-23 DIAGNOSIS — Z98.1 S/P LUMBAR FUSION: ICD-10-CM

## 2018-01-23 DIAGNOSIS — M47.816 LUMBAR FACET ARTHROPATHY: Primary | ICD-10-CM

## 2018-01-23 DIAGNOSIS — Z98.1 S/P CERVICAL SPINAL FUSION: ICD-10-CM

## 2018-01-23 DIAGNOSIS — M79.2 NEURITIS: ICD-10-CM

## 2018-01-23 NOTE — PROGRESS NOTES
[address]  [address], [address]  [facility], [address]  Phone: [phone]  Fax: [phone]    Prabhu Champagne  : 1950  PCP: Stefanie Zapata MD    PROGRESS NOTE    HISTORY OF PRESENT ILLNESS:  Chief Complaint   Patient presents with    Back Pain     back follow up     Hip Pain     left      Ragini Irby is a 78 y.o.  male with history of cervical and lumbar pain. Of note, patient had a prior L4-5 fusion in 2016. Pt also had C5-6,C6-7 ACDF with Dr. Arthur Wilson on 2017. He was intolerant to the gabapentin. He is taking Cymbalta 60 with great relief. He is still having some left leg pain which radiates form your left buttocks down his calf to his ankle. He does have some right anterior thigh and shin pain. This aches at night. At his last he was interested in a SI/ PT but was waiting for his insurance to start back. Today, he is having a sharp knife pain in his left hip pain down his posterior thigh all the way to his foot (L5-S1). Denies bladder/bowel dysfunction, saddle paresthesia, weakness, gait disturbance, or other neurological deficit. States he has been using Cymbalta with moderate relief. Pt at this time desires to continue with current care/proceed with medication evaluation/proceed with  SI.    Lumbar MRI: 2017     1. At L4-L5, there has been fusion and posterior decompression, with  degenerative changes still resulting in mild to moderate spinal canal stenosis  and moderate foraminal stenoses. No specific findings to suggest infection. An  enhancing left cauda equina nerve root, similar to prior, possibly postsurgical,  or reactive, of unlikely significance.     -Multilevel advanced degenerative disc disease with multiple disc  bulges/protrusions. Multilevel advanced facet arthropathy. Up to moderate spinal  canal stenosis at L2-L3 and L3-L4. Multilevel moderate to severe foraminal  stenoses, most notably L5-S1.  Abutment of several nerve roots as above.     -See level by level details above. In general, degenerative changes and degree  of stenosis similar to 2016 MRI. ASSESSMENT  78 y.o. male with left leg pain. Diagnoses and all orders for this visit:    1. Lumbar facet arthropathy  -     SCHEDULE SURGERY    2. S/P cervical spinal fusion    3. S/P lumbar fusion  -     SCHEDULE SURGERY       IMPRESSION/PLAN    1) Pt was given information on back care. 2) Schedule L5/S1 Left SNRB  3) Continue Cymbalta 60 mg  4) Mr. Jose Preston has a reminder for a \"due or due soon\" health maintenance. I have asked that he contact his primary care provider, Elsa Bazzi MD, for follow-up on this health maintenance. 5) We have informed patient to notify us for immediate appointment if he has any worsening neurogical symptoms or if an emergency situation presents, then call 911  6) Pt will follow-up in 6 weeks for block FU. PAST MEDICAL HISTORY  Past Medical History:   Diagnosis Date    Bilateral hip pain     Chronic pain     back; hx of opiod addiction    Depression     Diabetes ([facility] [address]     borderline, no meds-trying to control with diet    DJD (degenerative joint disease)     GERD (gastroesophageal reflux disease)     Hepatitis C January, 2015    Hypertension     Kidney stones     Lymphadenopathy, generalized 12/05/2015    neg bx    Numbness of foot left    resolved per patient 1/29/16    S/P lumbar fusion 2/9/2016    L4/5 LAMINECTOMY/FUSION/TRANSFORAMINAL LUMBAR INTERBODY FUSION (TLIF) by Dr. Víctor Mcnamara on 2/8/16     Unspecified sleep apnea     no cpap machine-pt denies        MEDICATIONS  Current Outpatient Prescriptions   Medication Sig Dispense Refill    tamsulosin (FLOMAX) 0.4 mg capsule Take 1 Cap by mouth daily (after dinner).  90 Cap 3    DULoxetine (CYMBALTA) 60 mg capsule 1 po daily with food  Indications: CHRONIC MUSCULOSKELETAL PAIN, NEUROPATHIC PAIN 30 Cap 2    lisinopril (PRINIVIL, ZESTRIL) 20 mg tablet Take 1 Tab by mouth daily. 90 Tab 1    amLODIPine (NORVASC) 10 mg tablet Take 1 Tab by mouth daily. 90 Tab 1    omeprazole (PRILOSEC) 20 mg capsule Take 1 Cap by mouth daily. 90 Cap 3       ALLERGIES  Allergies   Allergen Reactions    Nicotine Contact Dermatitis     Nicotine Patch reaction - Contact dermatitis with numbness and tingling also occuring in the left arm and denuding of the skin.  Thimerosal Other (comments)     Contact lens solution, made eyes red and irrated       SOCIAL HISTORY    Social History     Social History    Marital status:      Spouse name: N/A    Number of children: N/A    Years of education: N/A     Occupational History    Not on file. Social History Main Topics    Smoking status: Current Every Day Smoker     Packs/day: 0.25     Years: 40.00     Types: Cigarettes     Last attempt to quit: 5/12/2017    Smokeless tobacco: Never Used    Alcohol use No    Drug use: Yes     Special: Marijuana, Cocaine      Comment: stopped cocaine 2007, marijuana 5/14/17    Sexual activity: No     Other Topics Concern    Not on file     Social History Narrative       SUBJECTIVE      Pain Scale: 4/10    Pain Assessment  1/23/2018   Location of Pain Hip;Back   Pain Location Comment -   Location Modifiers -   Severity of Pain 4   Quality of Pain Sharp   Quality of Pain Comment -   Duration of Pain -   Frequency of Pain Intermittent   Date Pain First Started -   Aggravating Factors -   Aggravating Factors Comment -   Limiting Behavior -   Relieving Factors -   Relieving Factors Comment -   Result of Injury No       Accompanied by self. REVIEW OF SYSTEMS  ROS    Constitutional: Negative for fever, chills, or weight change. Respiratory: Negative for cough or shortness of breath. Cardiovascular: Negative for chest pain or palpitations. Gastrointestinal: Negative for acid reflux, change in bowel habits, or constipation. Genitourinary: Negative for incontinence, dysuria and flank pain. Musculoskeletal: Positive for left leg pain. Skin: Negative for rash. Neurological: Negative for headaches, dizziness, or numbness. Endo/Heme/Allergies: Negative . Psychiatric/Behavioral: Negative. PHYSICAL EXAMINATION  Visit Vitals    /79    Pulse 75    Temp 98.1 °F (36.7 °C) (Oral)    Ht 5' 11\" (1.803 m)    Wt 224 lb 12.8 oz (102 kg)    SpO2 98%    BMI 31.35 kg/m2       Constitutional: Well developed,  well nourished,  awake, alert, and in no acute distress. Neurological:  Sensation to light touch is intact. Psychiatric: Affect and mood are appropriate. Integumentary: No rashes or abrasions noted on exposed areas,  warm, dry and intact. Cardiovascular/Peripheral Vascular:  No peripheral edema is noted. Lymphatic:  No evidence of lymphedema. No cervical lymphadenopathy. SPINE/MUSCULOSKELETAL EXAM    Lumbar spine:  No rash, ecchymosis, or gross obliquity. No fasciculations. No focal atrophy is noted. Range of motion is intact. NoTenderness to palpation . SI joints non-tender. Trochanters non tender. Musculoskeletal:  No pain with extension, axial loading, or forward flexion. No pain with internal or external rotation of his hips. MOTOR     Hip Flex  Quads Hamstrings Ankle DF EHL Ankle PF   Right +4/5 +4/5 +4/5 +4/5 +4/5 +4/5   Left +4/5 +4/5 +4/5 +4/5 +4/5 +4/5        Straight Leg raise negative bilaterally. normal gait and station    Ambulation without assistive device. full weight bearing, non-antalgic gait.     Tawnya Khan NP

## 2018-01-23 NOTE — MR AVS SNAPSHOT
[facility] 
 
 
 [address] [facility] [address] 
[phone] Patient: Antoine Zazueta MRN: [id number] Princess Small Visit Information Date & Time Provider Department Dept. Phone Encounter #  
 1/23/2018  1:00 PM Mayelin Santos NP [facility] [phone] [id number] Follow-up Instructions Return in about 6 weeks (around 3/6/2018) for w/ NP or Dr Joao Holbrook for block fu. Routing History Follow-up and Disposition History Your Appointments 2/20/2018  8:15 AM  
Follow Up with Haskel Baumgarten, MD  
[address] ([address]) Appt Note: 6mo  
 [address], [address] [facility] [address]  
  
   
 [address], [address] [facility] [address] 3/14/2018  9:30 AM  
Follow Up with Mayelin Santos NP  
[facility] ([address] [place] [address] Road) Appt Note: 6wk BLOCK FU; Anh Pel 1/31/18  
 [address] [facility] [address]  
[phone]  
  
   
 [address] [facility],[address] [facility]  
  
    
  
 7/9/2018  3:15 PM  
Any with Mery Luciano MD  
[facility] ([address]) Appt Note: 6 month follow up [address] [place] [id number]  
[address] [facility] [address] [facility] [address] [place] [address] Upcoming Health Maintenance Date Due ZOSTER VACCINE AGE 60> 6/18/2011 GLAUCOMA SCREENING Q2Y 8/18/2016 MEDICARE YEARLY EXAM 8/18/2016 FOBT Q 1 YEAR AGE 50-75 12/8/2016 Pneumococcal 65+ Low/Medium Risk (2 of 2 - PPSV23) 8/28/2018 DTaP/Tdap/Td series (2 - Td) 12/9/2025 Allergies as of 1/23/2018  Review Complete On: 1/23/2018 By: Patricia Snow LPN Severity Noted Reaction Type Reactions Nicotine Medium 06/11/2015    Contact Dermatitis Nicotine Patch reaction - Contact dermatitis with numbness and tingling also occuring in the left arm and denuding of the skin. Thimerosal  07/21/2015    Other (comments) Contact lens solution, made eyes red and irrated Current Immunizations  Reviewed on 12/9/2015 Name Date Influenza High Dose Vaccine PF 8/28/2017 Influenza Vaccine 10/1/2016, 10/29/2015 Influenza Vaccine PF 10/29/2014 Pneumococcal Conjugate (PCV-13) 8/28/2017 Tdap 12/9/2015 Not reviewed this visit You Were Diagnosed With   
  
 Codes Comments Lumbar facet arthropathy    -  Primary ICD-10-CM: M12.88 ICD-9-CM: 721.3 S/P cervical spinal fusion     ICD-10-CM: Z98.1 ICD-9-CM: V45.4 S/P lumbar fusion     ICD-10-CM: Z98.1 ICD-9-CM: V45.4 Neuritis     ICD-10-CM: M79.2 ICD-9-CM: 729.2 Vitals BP Pulse Temp Height(growth percentile) Weight(growth percentile) SpO2  
 136/79 75 98.1 °F (36.7 °C) (Oral) 5' 11\" (1.803 m) 224 lb 12.8 oz (102 kg) 98% BMI Smoking Status 31.35 kg/m2 Current Every Day Smoker BMI and BSA Data Body Mass Index Body Surface Area  
 31.35 kg/m 2 2.26 m 2 Preferred Pharmacy Pharmacy Name Phone [address], [address] [place] [address] [phone] Your Updated Medication List  
  
   
This list is accurate as of: 1/23/18  2:11 PM.  Always use your most recent med list. amLODIPine 10 mg tablet Commonly known as:  Yesy Garcia Take 1 Tab by mouth daily. DULoxetine 60 mg capsule Commonly known as:  CYMBALTA 1 po daily with food  Indications: CHRONIC MUSCULOSKELETAL PAIN, NEUROPATHIC PAIN  
  
 lisinopril 20 mg tablet Commonly known as:  Rennis Douse Take 1 Tab by mouth daily. omeprazole 20 mg capsule Commonly known as:  PRILOSEC Take 1 Cap by mouth daily. tamsulosin 0.4 mg capsule Commonly known as:  FLOMAX Take 1 Cap by mouth daily (after dinner). We Performed the Following SCHEDULE SURGERY [ASR6028 Custom] Follow-up Instructions Return in about 6 weeks (around 3/6/2018) for w/ NP or Dr Sarah Pina for liana fu. To-Do List   
 01/29/2018 7:30 AM  
  Appointment with [facility] CT RM 1 at [facility] RAD CT ([phone]) ORAL CONTRAST/PREP   Oral Contrast/Prep from radiology  no later than one day prior to study date/time. DIET RESTRICTIONS  Nothing to eat or drink, 4 hours prior to study  May have water to take meds  May drink oral contrast if directed  GENERAL INSTRUCTIONS  If you were given premedications for IV contrast to take prior to having your study, please arrange to have someone drive you to your appointment. If you have had a creatinine level drawn within the past 30 days, please bring most recent results to your appt. MEDICATIONS  Bring a complete list of all medications you are currently taking to include prescriptions, over-the-counter meds, herbals, vitamins & any dietary supplements. RELATED STUDY INFORMATION  Bring any films, CDs, and reports related with you on the day of your exam.  This only includes studies done outside of [address] [place] [address], [facility], [place], and [facility]. QUESTIONS  Notify the CT Department if you have any questions concerning your study. [place] - [phone] [facility] SURGERY [place] - [phone] Patient Instructions Learning About How to Have a Healthy Back What causes back pain? Back pain is often caused by overuse, strain, or injury. For example, people often hurt their backs playing sports or working in the yard, being jolted in a car accident, or lifting something too heavy. Aging plays a part too. Your bones and muscles tend to lose strength as you age, which makes injury more likely.  The spongy discs between the bones of the spine (vertebrae) may suffer from wear and tear and no longer provide enough cushion between the bones. A disc that bulges or breaks open (herniated disc) can press on nerves, causing back pain. In some people, back pain is the result of arthritis, broken vertebrae caused by bone loss (osteoporosis), illness, or a spine problem. Although most people have back pain at one time or another, there are steps you can take to make it less likely. How can you have a healthy back? Reduce stress on your back through good posture Slumping or slouching alone may not cause low back pain. But after the back has been strained or injured, bad posture can make pain worse. · Sleep in a position that maintains your back's normal curves and on a mattress that feels comfortable. Sleep on your side with a pillow between your knees, or sleep on your back with a pillow under your knees. These positions can reduce strain on your back. · Stand and sit up straight. \"Good posture\" generally means your ears, shoulders, and hips are in a straight line. · If you must stand for a long time, put one foot on a stool, ledge, or box. Switch feet every now and then. · Sit in a chair that is low enough to let you place both feet flat on the floor with both knees nearly level with your hips. If your chair or desk is too high, use a footrest to raise your knees. Place a small pillow, a rolled-up towel, or a lumbar roll in the curve of your back if you need extra support. · Try a kneeling chair, which helps tilt your hips forward. This takes pressure off your lower back. · Try sitting on an exercise ball. It can rock from side to side, which helps keep your back loose. · When driving, keep your knees nearly level with your hips. Sit straight, and drive with both hands on the steering wheel. Your arms should be in a slightly bent position. Reduce stress on your back through careful lifting · Squat down, bending at the hips and knees only. If you need to, put one knee to the floor and extend your other knee in front of you, bent at a right angle (half kneeling). · Press your chest straight forward. This helps keep your upper back straight while keeping a slight arch in your low back. · Hold the load as close to your body as possible, at the level of your belly button (navel). · Use your feet to change direction, taking small steps. · Lead with your hips as you change direction. Keep your shoulders in line with your hips as you move. · Set down your load carefully, squatting with your knees and hips only. Exercise and stretch your back · Do some exercise on most days of the week, if your doctor says it is okay. You can walk, run, swim, or cycle. · Stretch your back muscles. Here are a few exercises to try: ¨ Lie on your back, and gently pull one bent knee to your chest. Put that foot back on the floor, and then pull the other knee to your chest. 
¨ Do pelvic tilts. Lie on your back with your knees bent. Tighten your stomach muscles. Pull your belly button (navel) in and up toward your ribs. You should feel like your back is pressing to the floor and your hips and pelvis are slightly lifting off the floor. Hold for 6 seconds while breathing smoothly. ¨ Sit with your back flat against a wall. · Keep your core muscles strong. The muscles of your back, belly (abdomen), and buttocks support your spine. ¨ Pull in your belly and imagine pulling your navel toward your spine. Hold this for 6 seconds, then relax. Remember to keep breathing normally as you tense your muscles. ¨ Do curl-ups. Always do them with your knees bent. Keep your low back on the floor, and curl your shoulders toward your knees using a smooth, slow motion. Keep your arms folded across your chest. If this bothers your neck, try putting your hands behind your neck (not your head), with your elbows spread apart. ¨ Lie on your back with your knees bent and your feet flat on the floor. Tighten your belly muscles, and then push with your feet and raise your buttocks up a few inches. Hold this position 6 seconds as you continue to breathe normally, then lower yourself slowly to the floor. Repeat 8 to 12 times. ¨ If you like group exercise, try Pilates or yoga. These classes have poses that strengthen the core muscles. Lead a healthy lifestyle · Stay at a healthy weight to avoid strain on your back. · Do not smoke. Smoking increases the risk of osteoporosis, which weakens the spine. If you need help quitting, talk to your doctor about stop-smoking programs and medicines. These can increase your chances of quitting for good. Where can you learn more? Go to http://gomez-jarvis.info/. Enter L315 in the search box to learn more about \"Learning About How to Have a Healthy Back. \" Current as of: March 21, 2017 Content Version: 11.4 © 0098-2346 [employer]. Care instructions adapted under license by [employer] (which disclaims liability or warranty for this information). If you have questions about a medical condition or this instruction, always ask your healthcare professional. [place] [address] any warranty or liability for your use of this information. Introducing [facility]! Dear Maryanne Estevez: Thank you for requesting a SumUp account. Our records indicate that you already have an active SumUp account. You can access your account anytime at [email or web address] Did you know that you can access your hospital and ER discharge instructions at any time in SumUp? You can also review all of your test results from your hospital stay or ER visit. Additional Information If you have questions, please visit the Frequently Asked Questions section of the SumUp website at [email or web address] Remember, MyChart is NOT to be used for urgent needs. For medical emergencies, dial 911. Now available from your iPhone and Android! Please provide this summary of care documentation to your next provider. Your primary care clinician is listed as Marci Monreal. If you have any questions after today's visit, please call [phone].

## 2018-01-23 NOTE — PATIENT INSTRUCTIONS
Learning About How to Have a Healthy Back  What causes back pain? Back pain is often caused by overuse, strain, or injury. For example, people often hurt their backs playing sports or working in the yard, being jolted in a car accident, or lifting something too heavy. Aging plays a part too. Your bones and muscles tend to lose strength as you age, which makes injury more likely. The spongy discs between the bones of the spine (vertebrae) may suffer from wear and tear and no longer provide enough cushion between the bones. A disc that bulges or breaks open (herniated disc) can press on nerves, causing back pain. In some people, back pain is the result of arthritis, broken vertebrae caused by bone loss (osteoporosis), illness, or a spine problem. Although most people have back pain at one time or another, there are steps you can take to make it less likely. How can you have a healthy back? Reduce stress on your back through good posture  Slumping or slouching alone may not cause low back pain. But after the back has been strained or injured, bad posture can make pain worse. · Sleep in a position that maintains your back's normal curves and on a mattress that feels comfortable. Sleep on your side with a pillow between your knees, or sleep on your back with a pillow under your knees. These positions can reduce strain on your back. · Stand and sit up straight. \"Good posture\" generally means your ears, shoulders, and hips are in a straight line. · If you must stand for a long time, put one foot on a stool, ledge, or box. Switch feet every now and then. · Sit in a chair that is low enough to let you place both feet flat on the floor with both knees nearly level with your hips. If your chair or desk is too high, use a footrest to raise your knees. Place a small pillow, a rolled-up towel, or a lumbar roll in the curve of your back if you need extra support.   · Try a kneeling chair, which helps tilt your hips forward. This takes pressure off your lower back. · Try sitting on an exercise ball. It can rock from side to side, which helps keep your back loose. · When driving, keep your knees nearly level with your hips. Sit straight, and drive with both hands on the steering wheel. Your arms should be in a slightly bent position. Reduce stress on your back through careful lifting  · Squat down, bending at the hips and knees only. If you need to, put one knee to the floor and extend your other knee in front of you, bent at a right angle (half kneeling). · Press your chest straight forward. This helps keep your upper back straight while keeping a slight arch in your low back. · Hold the load as close to your body as possible, at the level of your belly button (navel). · Use your feet to change direction, taking small steps. · Lead with your hips as you change direction. Keep your shoulders in line with your hips as you move. · Set down your load carefully, squatting with your knees and hips only. Exercise and stretch your back  · Do some exercise on most days of the week, if your doctor says it is okay. You can walk, run, swim, or cycle. · Stretch your back muscles. Here are a few exercises to try:  Dionne Fontan on your back, and gently pull one bent knee to your chest. Put that foot back on the floor, and then pull the other knee to your chest.  ¨ Do pelvic tilts. Lie on your back with your knees bent. Tighten your stomach muscles. Pull your belly button (navel) in and up toward your ribs. You should feel like your back is pressing to the floor and your hips and pelvis are slightly lifting off the floor. Hold for 6 seconds while breathing smoothly. ¨ Sit with your back flat against a wall. · Keep your core muscles strong. The muscles of your back, belly (abdomen), and buttocks support your spine. ¨ Pull in your belly and imagine pulling your navel toward your spine. Hold this for 6 seconds, then relax.  Remember to keep breathing normally as you tense your muscles. ¨ Do curl-ups. Always do them with your knees bent. Keep your low back on the floor, and curl your shoulders toward your knees using a smooth, slow motion. Keep your arms folded across your chest. If this bothers your neck, try putting your hands behind your neck (not your head), with your elbows spread apart. ¨ Lie on your back with your knees bent and your feet flat on the floor. Tighten your belly muscles, and then push with your feet and raise your buttocks up a few inches. Hold this position 6 seconds as you continue to breathe normally, then lower yourself slowly to the floor. Repeat 8 to 12 times. ¨ If you like group exercise, try Pilates or yoga. These classes have poses that strengthen the core muscles. Lead a healthy lifestyle  · Stay at a healthy weight to avoid strain on your back. · Do not smoke. Smoking increases the risk of osteoporosis, which weakens the spine. If you need help quitting, talk to your doctor about stop-smoking programs and medicines. These can increase your chances of quitting for good. Where can you learn more? Go to http://gomez-jarvis.info/. Enter L315 in the search box to learn more about \"Learning About How to Have a Healthy Back. \"  Current as of: March 21, 2017  Content Version: 11.4  © 7357-4656 [employer]wise[employer]. Care instructions adapted under license by [employer] (which disclaims liability or warranty for this information). If you have questions about a medical condition or this instruction, always ask your healthcare professional. [place] [address] any warranty or liability for your use of this information.

## 2018-02-05 ENCOUNTER — HOSPITAL ENCOUNTER (OUTPATIENT)
Dept: CT IMAGING | Age: 68
Discharge: HOME | End: 2018-02-05
Attending: UROLOGY
Payer: MEDICARE

## 2018-02-05 DIAGNOSIS — D35.00 BENIGN NEOPLASM OF ADRENAL GLAND, UNSPECIFIED LATERALITY: ICD-10-CM

## 2018-02-05 PROCEDURE — 74178 CT ABD&PLV WO CNTR FLWD CNTR: CPT

## 2018-02-05 PROCEDURE — 74011636320 HC RX REV CODE- 636/320: Performed by: UROLOGY

## 2018-02-05 RX ADMIN — IOPAMIDOL 100 ML: 612 INJECTION, SOLUTION INTRAVENOUS at 08:00

## 2018-02-06 ENCOUNTER — TELEPHONE (OUTPATIENT)
Dept: ORTHOPEDIC SURGERY | Age: 68
End: 2018-02-06

## 2018-02-06 NOTE — TELEPHONE ENCOUNTER
Returned call to patient, verified , informed of message below per NP Gilbert, per patient he is still under the weather and will try to call back for the block/injection when he feels better. No further action required at this time.

## 2018-02-06 NOTE — TELEPHONE ENCOUNTER
Patient did not have block as he was sick. Would like to ask if Dr. Joao Holbrook if she thought the spine stimulator would be more beneficial than the block.  Please call him back ref the stimulator in stead of the block at [phone]

## 2018-02-06 NOTE — TELEPHONE ENCOUNTER
I would recommend trying the SI first to see if this relieves the pain. It is a less invasive procedure.

## 2018-02-08 NOTE — PROGRESS NOTES
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 Will cont to observe  Keep appt as scheduled   Stable lung and lizbeth hepatis nodules. F/u with primary doctor for nodules.

## 2018-02-20 ENCOUNTER — OFFICE VISIT (OUTPATIENT)
Dept: INTERNAL MEDICINE CLINIC | Age: 68
End: 2018-02-20

## 2018-02-20 VITALS
HEART RATE: 81 BPM | WEIGHT: 215 LBS | OXYGEN SATURATION: 97 % | DIASTOLIC BLOOD PRESSURE: 80 MMHG | TEMPERATURE: 97.7 F | HEIGHT: 71 IN | BODY MASS INDEX: 30.1 KG/M2 | RESPIRATION RATE: 16 BRPM | SYSTOLIC BLOOD PRESSURE: 134 MMHG

## 2018-02-20 DIAGNOSIS — Z00.00 ROUTINE GENERAL MEDICAL EXAMINATION AT A HEALTH CARE FACILITY: Primary | ICD-10-CM

## 2018-02-20 DIAGNOSIS — I10 ESSENTIAL HYPERTENSION: ICD-10-CM

## 2018-02-20 DIAGNOSIS — I10 ESSENTIAL HYPERTENSION WITH GOAL BLOOD PRESSURE LESS THAN 140/90: ICD-10-CM

## 2018-02-20 DIAGNOSIS — G89.4 CHRONIC PAIN SYNDROME: ICD-10-CM

## 2018-02-20 RX ORDER — AMLODIPINE BESYLATE 10 MG/1
10 TABLET ORAL DAILY
Qty: 90 TAB | Refills: 1 | Status: SHIPPED | OUTPATIENT
Start: 2018-02-20 | End: 2018-08-08 | Stop reason: SDUPTHER

## 2018-02-20 RX ORDER — LISINOPRIL 20 MG/1
20 TABLET ORAL DAILY
Qty: 90 TAB | Refills: 1 | Status: SHIPPED | OUTPATIENT
Start: 2018-02-20 | End: 2018-08-08 | Stop reason: SDUPTHER

## 2018-02-20 NOTE — PATIENT INSTRUCTIONS
DASH Diet: Care Instructions  Your Care Instructions    The DASH diet is an eating plan that can help lower your blood pressure. DASH stands for Dietary Approaches to Stop Hypertension. Hypertension is high blood pressure. The DASH diet focuses on eating foods that are high in calcium, potassium, and magnesium. These nutrients can lower blood pressure. The foods that are highest in these nutrients are fruits, vegetables, low-fat dairy products, nuts, seeds, and legumes. But taking calcium, potassium, and magnesium supplements instead of eating foods that are high in those nutrients does not have the same effect. The DASH diet also includes whole grains, fish, and poultry. The DASH diet is one of several lifestyle changes your doctor may recommend to lower your high blood pressure. Your doctor may also want you to decrease the amount of sodium in your diet. Lowering sodium while following the DASH diet can lower blood pressure even further than just the DASH diet alone. Follow-up care is a key part of your treatment and safety. Be sure to make and go to all appointments, and call your doctor if you are having problems. It's also a good idea to know your test results and keep a list of the medicines you take. How can you care for yourself at home? Following the DASH diet  · Eat 4 to 5 servings of fruit each day. A serving is 1 medium-sized piece of fruit, ½ cup chopped or canned fruit, 1/4 cup dried fruit, or 4 ounces (½ cup) of fruit juice. Choose fruit more often than fruit juice. · Eat 4 to 5 servings of vegetables each day. A serving is 1 cup of lettuce or raw leafy vegetables, ½ cup of chopped or cooked vegetables, or 4 ounces (½ cup) of vegetable juice. Choose vegetables more often than vegetable juice. · Get 2 to 3 servings of low-fat and fat-free dairy each day. A serving is 8 ounces of milk, 1 cup of yogurt, or 1 ½ ounces of cheese. · Eat 6 to 8 servings of grains each day.  A serving is 1 slice of bread, 1 ounce of dry cereal, or ½ cup of cooked rice, pasta, or cooked cereal. Try to choose whole-grain products as much as possible. · Limit lean meat, poultry, and fish to 2 servings each day. A serving is 3 ounces, about the size of a deck of cards. · Eat 4 to 5 servings of nuts, seeds, and legumes (cooked dried beans, lentils, and split peas) each week. A serving is 1/3 cup of nuts, 2 tablespoons of seeds, or ½ cup of cooked beans or peas. · Limit fats and oils to 2 to 3 servings each day. A serving is 1 teaspoon of vegetable oil or 2 tablespoons of salad dressing. · Limit sweets and added sugars to 5 servings or less a week. A serving is 1 tablespoon jelly or jam, ½ cup sorbet, or 1 cup of lemonade. · Eat less than 2,300 milligrams (mg) of sodium a day. If you limit your sodium to 1,500 mg a day, you can lower your blood pressure even more. Tips for success  · Start small. Do not try to make dramatic changes to your diet all at once. You might feel that you are missing out on your favorite foods and then be more likely to not follow the plan. Make small changes, and stick with them. Once those changes become habit, add a few more changes. · Try some of the following:  ¨ Make it a goal to eat a fruit or vegetable at every meal and at snacks. This will make it easy to get the recommended amount of fruits and vegetables each day. ¨ Try yogurt topped with fruit and nuts for a snack or healthy dessert. ¨ Add lettuce, tomato, cucumber, and onion to sandwiches. ¨ Combine a ready-made pizza crust with low-fat mozzarella cheese and lots of vegetable toppings. Try using tomatoes, squash, spinach, broccoli, carrots, cauliflower, and onions. ¨ Have a variety of cut-up vegetables with a low-fat dip as an appetizer instead of chips and dip. ¨ Sprinkle sunflower seeds or chopped almonds over salads. Or try adding chopped walnuts or almonds to cooked vegetables.   ¨ Try some vegetarian meals using beans and peas. Add garbanzo or kidney beans to salads. Make burritos and tacos with mashed le beans or black beans. Where can you learn more? Go to http://gomez-jarvis.info/. Enter J065 in the search box to learn more about \"DASH Diet: Care Instructions. \"  Current as of: September 21, 2016  Content Version: 11.4  © 5651-6616 [employer]. Care instructions adapted under license by [employer] (which disclaims liability or warranty for this information). If you have questions about a medical condition or this instruction, always ask your healthcare professional. [address] any warranty or liability for your use of this information. Medicare Wellness Visit, Male    The best way to live healthy is to have a healthy lifestyle by eating a well-balanced diet, exercising regularly, limiting alcohol and stopping smoking. Regular physical exams and screening tests are another way to keep healthy. Preventive exams provided by your health care provider can find health problems before they become diseases or illnesses. Preventive services including immunizations, screening tests, monitoring and exams can help you take care of your own health. All people over age 72 should have a pneumovax  and and a prevnar shot to prevent pneumonia. These are once in a lifetime unless you and your provider decide differently. All people over 65 should have a yearly flu shot and a tetanus vaccine every 10 years. Screening for diabetes mellitus with a blood sugar test should be done every year. Glaucoma is a disease of the eye due to increased ocular pressure that can lead to blindness and it should be done every year by an eye professional.    Cardiovascular screening tests that check for elevated lipids (fatty part of blood) which can lead to heart disease and strokes should be done every 5 years.     Colorectal screening that evaluates for blood or polyps in your colon should be done yearly as a stool test or every five years as a flexible sigmoidoscope or every 10 years as a colonoscopy up to age 76. Men up to age 76 may need a screening blood test for prostate cancer at certain intervals, depending on their personal and family history. This decision is between the patient and his provider. If you have been a smoker or had family history of abdominal aortic aneurysms, you and your provider may decide to schedule an ultrasound test of your aorta. Hepatitis C screening is also recommended for anyone born between [birth year] through [address]. A shingles vaccine is also recommended once in a lifetime after age 61. Your Medicare Wellness Exam is recommended annually. Here is a list of your current Health Maintenance items with a due date:  Health Maintenance Due   Topic Date Due    Shingles Vaccine  06/18/2011    Glaucoma Screening   08/18/2016    Annual Well Visit  08/18/2016    Stool testing for trace blood  12/08/2016              Body Mass Index: Care Instructions  Your Care Instructions    Body mass index (BMI) can help you see if your weight is raising your risk for health problems. It uses a formula to compare how much you weigh with how tall you are. · A BMI lower than 18.5 is considered underweight. · A BMI between 18.5 and 24.9 is considered healthy. · A BMI between 25 and 29.9 is considered overweight. A BMI of 30 or higher is considered obese. If your BMI is in the normal range, it means that you have a lower risk for weight-related health problems. If your BMI is in the overweight or obese range, you may be at increased risk for weight-related health problems, such as high blood pressure, heart disease, stroke, arthritis or joint pain, and diabetes.  If your BMI is in the underweight range, you may be at increased risk for health problems such as fatigue, lower protection (immunity) against illness, muscle loss, bone loss, hair loss, and hormone problems. BMI is just one measure of your risk for weight-related health problems. You may be at higher risk for health problems if you are not active, you eat an unhealthy diet, or you drink too much alcohol or use tobacco products. Follow-up care is a key part of your treatment and safety. Be sure to make and go to all appointments, and call your doctor if you are having problems. It's also a good idea to know your test results and keep a list of the medicines you take. How can you care for yourself at home? · Practice healthy eating habits. This includes eating plenty of fruits, vegetables, whole grains, lean protein, and low-fat dairy. · If your doctor recommends it, get more exercise. Walking is a good choice. Bit by bit, increase the amount you walk every day. Try for at least 30 minutes on most days of the week. · Do not smoke. Smoking can increase your risk for health problems. If you need help quitting, talk to your doctor about stop-smoking programs and medicines. These can increase your chances of quitting for good. · Limit alcohol to 2 drinks a day for men and 1 drink a day for women. Too much alcohol can cause health problems. If you have a BMI higher than 25  · Your doctor may do other tests to check your risk for weight-related health problems. This may include measuring the distance around your waist. A waist measurement of more than 40 inches in men or 35 inches in women can increase the risk of weight-related health problems. · Talk with your doctor about steps you can take to stay healthy or improve your health. You may need to make lifestyle changes to lose weight and stay healthy, such as changing your diet and getting regular exercise. If you have a BMI lower than 18.5  · Your doctor may do other tests to check your risk for health problems. · Talk with your doctor about steps you can take to stay healthy or improve your health.  You may need to make lifestyle changes to gain or maintain weight and stay healthy, such as getting more healthy foods in your diet and doing exercises to build muscle. Where can you learn more? Go to http://gomez-jarvis.info/. Enter S176 in the search box to learn more about \"Body Mass Index: Care Instructions. \"  Current as of: October 13, 2016  Content Version: 11.4  © 2180-2837 [employer]. Care instructions adapted under license by [employer] (which disclaims liability or warranty for this information). If you have questions about a medical condition or this instruction, always ask your healthcare professional. [place] [address] any warranty or liability for your use of this information.

## 2018-02-20 NOTE — PROGRESS NOTES
1. Have you been to the ER, urgent care clinic since your last visit? Hospitalized since your last visit? No    2. Have you seen or consulted any other health care providers outside of the [address] [place] [address] since your last visit? Include any pap smears or colon screening.  No

## 2018-02-20 NOTE — PROGRESS NOTES
This is an Initial Medicare Annual Wellness Exam (AWV) (Performed 12 months after IPPE or effective date of Medicare Part B enrollment, Once in a lifetime)    I have reviewed the patient's medical history in detail and updated the computerized patient record. History     Past Medical History:   Diagnosis Date    Bilateral hip pain     Chronic pain     back; hx of opiod addiction    Depression     Diabetes ([address]     borderline, no meds-trying to control with diet    DJD (degenerative joint disease)     GERD (gastroesophageal reflux disease)     Hepatitis C 01/2015    +Harvoni rx    Hypertension     Kidney stones     Lymphadenopathy, generalized 12/05/2015    neg bx    Numbness of foot left    resolved per patient 1/29/16    S/P lumbar fusion 2/9/2016    L4/5 LAMINECTOMY/FUSION/TRANSFORAMINAL LUMBAR INTERBODY FUSION (TLIF) by Dr. Karla Castrejon on 2/8/16     Unspecified sleep apnea     no cpap machine-pt denies      Past Surgical History:   Procedure Laterality Date    HX APPENDECTOMY  as a child    HX BACK SURGERY  07/2015    HX CERVICAL FUSION  05/18/2017    ACDF C5/6 C6/7    HX COLONOSCOPY  2015    negative    HX LUMBAR FUSION  2/2016    HX ORTHOPAEDIC      denies    HX OTHER SURGICAL  05/2017    cervical fusion    HX TONSILLECTOMY  as a child    [facility] 3-10-16    Dr. Aidan Winn     Current Outpatient Prescriptions   Medication Sig Dispense Refill    tamsulosin (FLOMAX) 0.4 mg capsule Take 1 Cap by mouth daily (after dinner). 90 Cap 3    DULoxetine (CYMBALTA) 60 mg capsule 1 po daily with food  Indications: CHRONIC MUSCULOSKELETAL PAIN, NEUROPATHIC PAIN 30 Cap 2    lisinopril (PRINIVIL, ZESTRIL) 20 mg tablet Take 1 Tab by mouth daily. 90 Tab 1    amLODIPine (NORVASC) 10 mg tablet Take 1 Tab by mouth daily. 90 Tab 1    omeprazole (PRILOSEC) 20 mg capsule Take 1 Cap by mouth daily.  90 Cap 3     Allergies   Allergen Reactions    Nicotine Contact Dermatitis Nicotine Patch reaction - Contact dermatitis with numbness and tingling also occuring in the left arm and denuding of the skin.     Thimerosal Other (comments)     Contact lens solution, made eyes red and irrated     Family History   Problem Relation Age of Onset    Diabetes Sister     SLE Sister     Arthritis-osteo Sister     Heart Disease Sister      Social History   Substance Use Topics    Smoking status: Current Every Day Smoker     Packs/day: 0.25     Years: 40.00     Types: Cigarettes     Last attempt to quit: 5/12/2017    Smokeless tobacco: Never Used    Alcohol use No     Patient Active Problem List   Diagnosis Code    HTN (hypertension) I10    Back pain M54.9    Prediabetes R73.03    Chronic hepatitis C without hepatic coma (HCC) B18.2    GERD (gastroesophageal reflux disease) K21.9    Onychomycosis of toenail B35.1    S/P lumbar fusion Z98.1    Kidney stones N20.0    Lumbar facet arthropathy M12.88    Chronic pain G89.29    Neuritis M79.2    Cervical spinal stenosis M48.02    Bulge of lumbar disc without myelopathy M51.26    Acute pain of right shoulder M25.511    Cervical myelopathy (HCC) G95.9    S/P cervical spinal fusion Z98.1    IGT (impaired glucose tolerance) R73.02    Depression F32.9    History of opioid abuse Z87.898    Recurrent depression (HCC) F33.9       Depression Risk Factor Screening:     PHQ over the last two weeks 8/28/2017   Little interest or pleasure in doing things Nearly every day   Feeling down, depressed or hopeless Nearly every day   Total Score PHQ 2 6   Trouble falling or staying asleep, or sleeping too much -   Feeling tired or having little energy -   Poor appetite or overeating -   Feeling bad about yourself - or that you are a failure or have let yourself or your family down -   Trouble concentrating on things such as school, work, reading or watching TV -   Moving or speaking so slowly that other people could have noticed; or the opposite being so fidgety that others notice -   Thoughts of being better off dead, or hurting yourself in some way -   PHQ 9 Score -   How difficult have these problems made it for you to do your work, take care of your home and get along with others -     Alcohol Risk Factor Screening: You do not drink alcohol or very rarely. Functional Ability and Level of Safety:     Hearing Loss  Hearing is good. Activities of Daily Living  The home contains: no safety equipment. Patient does total self care    Fall Risk  Fall Risk Assessment, last 12 mths 1/23/2018   Able to walk? Yes   Fall in past 12 months? Yes   Fall with injury? -   Number of falls in past 12 months 6   Fall Risk Score -       Abuse Screen  Patient is not abused    Cognitive Screening   Evaluation of Cognitive Function:  Has your family/caregiver stated any concerns about your memory: no  Normal    Patient Care Team   Patient Care Team:  Cira Oneal MD as PCP - General (Internal Medicine)  Sunil Bermudez MD as Physician (Urology)  Karol Martin MD (General Surgery)    Assessment/Plan   Education and counseling provided:  Are appropriate based on today's review and evaluation    Diagnoses and all orders for this visit:    1. Routine general medical examination at a health care facility    2. Essential hypertension    3.  Chronic pain syndrome         Health Maintenance Due   Topic Date Due    ZOSTER VACCINE AGE 60>  06/18/2011    GLAUCOMA SCREENING Q2Y  08/18/2016    MEDICARE YEARLY EXAM  08/18/2016    FOBT Q 1 YEAR AGE 50-75  12/08/2016   Medicare wellness performed  Labs done Jan 2018 reviewed  Continue dietary/exercise efforts; stop smoking  [place] neg 2015  Vaccines: flu vaccine already given  Advance directive discussed    PROGRESS NOTE  HPI:   Routine f/u  Hx notable for HTN/chronic pain  w/o chest pain/abd. discomfort; no dyspnea, cough or increased pedal edema; denies constitutional complaints of fever, night sweats or wt loss; no evidence of GI/ hemorrhage; chronic BPH sxs. Activity is sedentary    ROS is otherwise negative. Past Medical History:   Diagnosis Date    Bilateral hip pain     Chronic pain     back; hx of opiod addiction    Depression     Diabetes (HCC)     borderline, no meds-trying to control with diet    DJD (degenerative joint disease)     GERD (gastroesophageal reflux disease)     Hepatitis C 01/2015    +Harvoni rx    Hypertension     Kidney stones     Lymphadenopathy, generalized 12/05/2015    neg bx    Numbness of foot left    resolved per patient 1/29/16    S/P lumbar fusion 2/9/2016    L4/5 LAMINECTOMY/FUSION/TRANSFORAMINAL LUMBAR INTERBODY FUSION (TLIF) by Dr. Scott Geiger on 2/8/16     Unspecified sleep apnea     no cpap machine-pt denies       Past Surgical History:   Procedure Laterality Date    HX APPENDECTOMY  as a child    HX BACK SURGERY  07/2015    HX CERVICAL FUSION  05/18/2017    ACDF C5/6 C6/7    HX COLONOSCOPY  2015    negative    HX LUMBAR FUSION  2/2016    HX ORTHOPAEDIC      denies    HX OTHER SURGICAL  05/2017    cervical fusion    HX TONSILLECTOMY  as a child    [facility]    Dr. Natalie Puente History     Social History    Marital status:      Spouse name: N/A    Number of children: N/A    Years of education: N/A     Occupational History    Not on file.      Social History Main Topics    Smoking status: Current Every Day Smoker     Packs/day: 0.25     Years: 40.00     Types: Cigarettes     Last attempt to quit: 5/12/2017    Smokeless tobacco: Never Used    Alcohol use No    Drug use: Yes     Special: Marijuana, Cocaine      Comment: stopped cocaine 2007, marijuana 5/14/17    Sexual activity: No     Other Topics Concern    Not on file     Social History Narrative       Allergies   Allergen Reactions    Nicotine Contact Dermatitis     Nicotine Patch reaction - Contact dermatitis with numbness and tingling also occuring in the left arm and denuding of the skin.  Thimerosal Other (comments)     Contact lens solution, made eyes red and irrated       Family History   Problem Relation Age of Onset    Diabetes Sister     SLE Sister     Arthritis-osteo Sister     Heart Disease Sister        Current Outpatient Prescriptions   Medication Sig Dispense Refill    tamsulosin (FLOMAX) 0.4 mg capsule Take 1 Cap by mouth daily (after dinner). 90 Cap 3    DULoxetine (CYMBALTA) 60 mg capsule 1 po daily with food  Indications: CHRONIC MUSCULOSKELETAL PAIN, NEUROPATHIC PAIN 30 Cap 2    lisinopril (PRINIVIL, ZESTRIL) 20 mg tablet Take 1 Tab by mouth daily. 90 Tab 1    amLODIPine (NORVASC) 10 mg tablet Take 1 Tab by mouth daily. 90 Tab 1    omeprazole (PRILOSEC) 20 mg capsule Take 1 Cap by mouth daily. 90 Cap 3           Visit Vitals    /80 (BP 1 Location: Left arm, BP Patient Position: Sitting)    Pulse 81    Temp 97.7 °F (36.5 °C) (Tympanic)    Resp 16    Ht 5' 11\" (1.803 m)    Wt 215 lb (97.5 kg)    SpO2 97%    BMI 29.99 kg/m2       PE  Well nourished in NAD  HEENT:  OP: clear. Neck: supple w/o mass or bruits. Chest: clear. CV: RRR w/o m,r,g; pulses intact. Abd: soft, NT, w/o HSM or mass. Rectal: per urology  Ext: tr edema. Neuro: NF. Assessment and Plan    Encounter Diagnoses   Name Primary?  Routine general medical examination at a health care facility Yes    Essential hypertension     Chronic pain syndrome    HTN - controlled  Chronic pain - management per spine/ortho  Hep C - GI f/u advised (s/p Harvoni rx and apparently had SVR)  The patient was counseled on the dangers of tobacco use, and was advised to quit. Reviewed strategies to maximize success, including removing cigarettes and smoking materials from environment. No change in rx  OV 6 mos or prn  I have explained plan to patient and the patient verbalizes understanding            Discussed the patient's BMI with him.   The BMI follow up plan is as follows:     dietary management education, guidance, and counseling  encourage exercise  monitor weight  prescribed dietary intake    An After Visit Summary was printed and given to the patient.

## 2018-03-07 ENCOUNTER — HOSPITAL ENCOUNTER (OUTPATIENT)
Age: 68
Setting detail: OUTPATIENT SURGERY
Discharge: HOME | End: 2018-03-07
Attending: PHYSICAL MEDICINE & REHABILITATION | Admitting: PHYSICAL MEDICINE & REHABILITATION
Payer: MEDICARE

## 2018-03-07 ENCOUNTER — APPOINTMENT (OUTPATIENT)
Dept: GENERAL RADIOLOGY | Age: 68
End: 2018-03-07
Attending: PHYSICAL MEDICINE & REHABILITATION
Payer: MEDICARE

## 2018-03-07 VITALS
WEIGHT: 215 LBS | DIASTOLIC BLOOD PRESSURE: 91 MMHG | RESPIRATION RATE: 16 BRPM | HEIGHT: 71 IN | BODY MASS INDEX: 30.1 KG/M2 | SYSTOLIC BLOOD PRESSURE: 165 MMHG | HEART RATE: 89 BPM | TEMPERATURE: 98.3 F | OXYGEN SATURATION: 99 %

## 2018-03-07 PROCEDURE — 74011636320 HC RX REV CODE- 636/320: Performed by: PHYSICAL MEDICINE & REHABILITATION

## 2018-03-07 PROCEDURE — 74011250636 HC RX REV CODE- 250/636: Performed by: PHYSICAL MEDICINE & REHABILITATION

## 2018-03-07 PROCEDURE — 77030003676 HC NDL SPN MPRI -A: Performed by: PHYSICAL MEDICINE & REHABILITATION

## 2018-03-07 PROCEDURE — 74011250637 HC RX REV CODE- 250/637: Performed by: PHYSICAL MEDICINE & REHABILITATION

## 2018-03-07 PROCEDURE — 76010000009 HC PAIN MGT 0 TO 30 MIN PROC: Performed by: PHYSICAL MEDICINE & REHABILITATION

## 2018-03-07 PROCEDURE — 77030003669 HC NDL SPN COOK -B: Performed by: PHYSICAL MEDICINE & REHABILITATION

## 2018-03-07 PROCEDURE — 74011636320 HC RX REV CODE- 636/320

## 2018-03-07 PROCEDURE — 74011000250 HC RX REV CODE- 250

## 2018-03-07 PROCEDURE — 74011250636 HC RX REV CODE- 250/636

## 2018-03-07 RX ORDER — SODIUM CHLORIDE 0.9 % (FLUSH) 0.9 %
5-10 SYRINGE (ML) INJECTION AS NEEDED
Status: DISCONTINUED | OUTPATIENT
Start: 2018-03-07 | End: 2018-03-07 | Stop reason: HOSPADM

## 2018-03-07 RX ORDER — DIAZEPAM 5 MG/1
5-20 TABLET ORAL ONCE
Status: COMPLETED | OUTPATIENT
Start: 2018-03-07 | End: 2018-03-07

## 2018-03-07 RX ORDER — LIDOCAINE HYDROCHLORIDE 10 MG/ML
INJECTION, SOLUTION EPIDURAL; INFILTRATION; INTRACAUDAL; PERINEURAL AS NEEDED
Status: DISCONTINUED | OUTPATIENT
Start: 2018-03-07 | End: 2018-03-07 | Stop reason: HOSPADM

## 2018-03-07 RX ORDER — DEXAMETHASONE SODIUM PHOSPHATE 100 MG/10ML
INJECTION INTRAMUSCULAR; INTRAVENOUS AS NEEDED
Status: DISCONTINUED | OUTPATIENT
Start: 2018-03-07 | End: 2018-03-07 | Stop reason: HOSPADM

## 2018-03-07 RX ADMIN — DIAZEPAM 10 MG: 5 TABLET ORAL at 13:28

## 2018-03-07 NOTE — PROCEDURES
PROCEDURE NOTE      Patient Name: Kraig Escobedo    Date of Procedure: March 7, 2018    Preoperative Diagnosis:  Neuritis [M79.2]  Lumbar facet arthropathy [M12.88]  Status post lumbar spinal fusion [Z98.1]    Post Operative Diagnosis:  Neuritis [M79.2]  Lumbar facet arthropathy [M12.88]  Status post lumbar spinal fusion [Z98.1]    Procedure :    left L5 Selective Nerve Root Block  left S1 Selective Nerve Root Block    Consent:  Informed consent was obtained prior to the procedure. The patient was given the opportunity to ask questions regarding the procedure and its associated risks. In addition to the potential risks associated with the procedure itself, the patient was informed both verbally and in writing of the potential side effects of the use of glucocorticoid. The patient appeared to comprehend the informed consent and desired to have the procedure performed. Procedure: The patient was placed in the prone position on the fluoroscopy table and the back was prepped and draped in the usual sterile manner. The exact spinal level was  identified using fluoroscopy, and Lidocaine 1 % was injected locally, a # 22 gauge spinal needle was passed to the transverse process. The depth was noted and the needle redirected to pass inferior and approximately one cm anterior to the transverse process. YES  1 cc of Isovue M-200 was used to verify positioning in the epidural and paravertebral space and outlined the course of the spinal nerve into the epidural space. The same procedure was repeated at each spinal level indicated above. A total of 30 mg of preservative free Dexamethasone and 5 cc of Lidocaine was slowly injected. The patient tolerated the procedure well. The injection area was cleaned and bandaids applied. Not excessive bleeding was noted. Patient dressed and discharged to home with instructions. Discussion: The patient tolerated the procedure well. Sherita Manzanares MD  March 7, 2018

## 2018-03-07 NOTE — H&P
Date of Surgery Update:  Cinthya Farris was seen and examined. History and physical has been reviewed. The patient has been examined. There have been no significant clinical changes since the completion of the last office visit.       Signed By: Nita Bella MD     March 7, 2018 2:23 PM

## 2018-03-07 NOTE — DISCHARGE INSTRUCTIONS
[facility]   (ERICK)  Dr. Nancy Aguilera, Dr. Joao Holbrook, Dr. Tanya Wright not drive a car, operate heavy machinery or dangerous equipment for 24 hours. * Activity as tolerated; rest for the remainder of the day. * Resume pre-block medications including those for your family doctor. * Do not drink alcoholic beverages for 24 hours. Alcohol and the medications you have received may interact and cause an adverse reaction. * You may feel better this evening and worse tomorrow, as the numbing medications wears off and the steroid has yet to begin to work. After 48 hrs the steroid should begin to release bringing you relief. * You may shower this evening and remove any bandages. * Avoid hot tubs and heating pads for 24 hours. You may use cold packs on the procedure site as tolerated for the first 24 hours. * If a headache develops, drink plenty of fluids and rest.  Take over the counter medications for headache if needed. If the headache continues longer than 24 hours, call MD at the [address] [place] [address]. [phone]    * Continue taking pain medications as needed. * You may resume your regular diet if tolerated. Otherwise, start with sips of water and advance slowly. * If Diabetic: check your blood sugar three times a day for the next 3 days. If your sugar is greater than 300 call your family doctor. If your sugar is greater than 400, have someone transport you to the nearest Emergency Room. * If you experience any of the following problems, Please Call the [address] [place] [address] at [phone].         * Shortness of Breath    * Fever of 101 or higher    * Nausea / Vomiting    * Severe Headache    * Weakness or numbness in arms or legs that is not      resolving    * Prolonged increase in pain greater than 4 days      DISCHARGE SUMMARY from Nurse      PATIENT INSTRUCTIONS:    After oral sedation, for 24 hours or while taking prescription Narcotics:  · Limit your activities  · Do not drive and operate hazardous machinery  · Do not make important personal or business decisions  · Do  not drink alcoholic beverages  · If you have not urinated within 8 hours after discharge, please contact your surgeon on call. Report the following to your surgeon:  · Excessive pain, swelling, redness or odor of or around the surgical area  · Temperature over 101  · Nausea and vomiting lasting longer than 4 hours or if unable to take medications  · Any signs of decreased circulation or nerve impairment to extremity: change in color, persistent  numbness, tingling, coldness or increase pain  · Any questions            What to do at Home:  Recommended activity: Activity as tolerated, NO DRIVING FOR 12 Hours post injection          *  Please give a list of your current medications to your Primary Care Provider. *  Please update this list whenever your medications are discontinued, doses are      changed, or new medications (including over-the-counter products) are added. *  Please carry medication information at all times in case of emergency situations. These are general instructions for a healthy lifestyle:    No smoking/ No tobacco products/ Avoid exposure to second hand smoke    Surgeon General's Warning:  Quitting smoking now greatly reduces serious risk to your health. Obesity, smoking, and sedentary lifestyle greatly increases your risk for illness    A healthy diet, regular physical exercise & weight monitoring are important for maintaining a healthy lifestyle    You may be retaining fluid if you have a history of heart failure or if you experience any of the following symptoms:  Weight gain of 3 pounds or more overnight or 5 pounds in a week, increased swelling in our hands or feet or shortness of breath while lying flat in bed.   Please call your doctor as soon as you notice any of these symptoms; do not wait until your next office visit. Recognize signs and symptoms of STROKE:    F-face looks uneven    A-arms unable to move or move unevenly    S-speech slurred or non-existent    T-time-call 911 as soon as signs and symptoms begin-DO NOT go       Back to bed or wait to see if you get better-TIME IS BRAIN. [employer] Activation    Thank you for requesting access to [employer]. Please follow the instructions below to securely access and download your online medical record. [employer] allows you to send messages to your doctor, view your test results, renew your prescriptions, schedule appointments, and more. How Do I Sign Up? 1. In your internet browser, go to [email or web address]  2. Click on the First Time User? Click Here link in the Sign In box. You will be redirect to the New Member Sign Up page. 3. Enter your [employer] Access Code exactly as it appears below. You will not need to use this code after youve completed the sign-up process. If you do not sign up before the expiration date, you must request a new code. [employer] Access Code: Activation code not generated  Current [employer] Status: Active (This is the date your [employer] access code will )    4. Enter the last four digits of your Social Security Number (xxxx) and Date of Birth (mm/dd/yyyy) as indicated and click Submit. You will be taken to the next sign-up page. 5. Create a [employer] ID. This will be your [employer] login ID and cannot be changed, so think of one that is secure and easy to remember. 6. Create a [employer] password. You can change your password at any time. 7. Enter your Password Reset Question and Answer. This can be used at a later time if you forget your password. 8. Enter your e-mail address. You will receive e-mail notification when new information is available in [address] 9. Click Sign Up. You can now view and download portions of your medical record.   10. Click the Download Summary menu link to download a portable copy of your medical information. Additional Information    If you have questions, please visit the Frequently Asked Questions section of the [employer] website at [email or web address] Remember, [employer] is NOT to be used for urgent needs. For medical emergencies, dial 911.

## 2018-03-08 NOTE — PROGRESS NOTES
Problem: Mobility Impaired (Adult and Pediatric)  Goal: *Acute Goals and Plan of Care (Insert Text)  Physical Therapy Goals  Initiated 5/19/2017 and to be accomplished within 1 day(s): 5/19/2017  1. Patient will move from supine to sit and sit to supine in bed with independence. 2. Patient will transfer from bed to chair and chair to bed with independence using the least restrictive device. 3. Patient will perform sit to stand with independence. 4. Patient will ambulate with independence for 200 feet with the least restrictive device. 5. Patient will ascend/descend 5 stairs with bilateral handrail(s) with modified independence. PHYSICAL THERAPY EVALUATION & DISCHARGE     Patient: Deann Herzog (67 y.o. male)  Date: 5/19/2017  Primary Diagnosis: Cervical stenosis of spine [M48.02]  Procedure(s) (LRB):  ANTERIOR CERVICAL DISCECTOMY WITH FUSION C5/6 C6/7 (N/A) 1 Day Post-Op   Precautions:  Fall      ASSESSMENT AND RECOMMENDATIONS:  Based on the objective data described below, the patient presents at his functional baseline regarding ambulation and stair negotiation. Pt has no inpatient physical therapy needs at this time. Skilled physical therapy is not indicated at this time. Discharge Recommendations: Outpatient when cleared my surgeon. Further Equipment Recommendations for Discharge: N/A        G-CODES:      Mobility  Current  CI= 1-19%   Goal  CI= 1-19%  D/C  CI= 1-19%. The severity rating is based on the Level of Assistance required for Functional Mobility and ADLs. SUBJECTIVE:   Patient stated i'm ok.       OBJECTIVE DATA SUMMARY:       Past Medical History:   Diagnosis Date    Bilateral hip pain      Chronic pain       back; hx of opiod addiction    Depression      Diabetes ([facility]       borderline, no meds-trying to control with diet    DJD (degenerative joint disease)      GERD (gastroesophageal reflux disease)      Hepatitis C January, 2015    Hypertension  Kidney stones      Lymphadenopathy, generalized 12/05/2015     neg bx    Numbness of foot left     resolved per patient 1/29/16    S/P lumbar fusion 2/9/2016     L4/5 LAMINECTOMY/FUSION/TRANSFORAMINAL LUMBAR INTERBODY FUSION (TLIF) by Dr. Oz Hanna on 2/8/16     Unspecified sleep apnea       no cpap machine-pt denies     Past Surgical History:   Procedure Laterality Date    HX APPENDECTOMY   as a child    HX BACK SURGERY   07/2015    HX COLONOSCOPY   2015     negative    HX LUMBAR FUSION   2/2016    HX ORTHOPAEDIC         denies    HX TONSILLECTOMY   as a child    REMOVE Josette Right 3-10-16     Dr. Jody Lewis     Barriers to Learning/Limitations: None  Compensate with: visual, verbal, tactile, kinesthetic cues/model  Prior Level of Function/Home Situation: Independent/Mod I occasionally with ambulation using straight cane. Pt independent with ADLs. Home Situation  Home Environment: Trailer/mobile home  # Steps to Enter: 4  Rails to Enter: Yes  Hand Rails : Bilateral  Wheelchair Ramp: No  One/Two Story Residence: One story  Living Alone: No  Support Systems:  (roommate)  Patient Expects to be Discharged to[de-identified] Trailer/mobile home  Current DME Used/Available at Home: [employer], straight, Walker, rollator  Critical Behavior:  Neurologic State: Alert  Orientation Level: Oriented X4  Cognition: Appropriate decision making; Appropriate for age attention/concentration; Appropriate safety awareness; Follows commands  Safety/Judgement: Awareness of environment; Fall prevention;Good awareness of safety precautions  Psychosocial  Patient Behaviors: Calm; Cooperative  Purposeful Interaction: Yes     Strength:    Strength:  Within functional limits (Bilat LE)     Tone & Sensation:   Tone: Normal (Bilat LE)  Sensation: Intact (Bilat LE: Light Touch )     Range Of Motion:  AROM: Within functional limits (Bilat LE)     Functional Mobility:  Bed Mobility:  Supine to Sit: Independent  Sit to Supine: Independent     Transfers:  Sit to Stand: Independent  Stand to Sit: Independent        Balance:   Sitting: Intact  Standing: Intact  Ambulation/Gait Training:  Distance (ft): 200 Feet (ft)  Ambulation - Level of Assistance: Independent  Gait Description (WDL): Exceptions to WDL  Gait Abnormalities:  (Decreased knee extension with terminal stance during ambulat)  Base of Support: Widened     Pain:  Pain prior to treatment: 0/10  Pain after treatment: 0/10     Activity Tolerance:   Good  Please refer to the flowsheet for vital signs taken during this treatment. After treatment:   [ ]         Patient left in no apparent distress sitting up in chair  [X]         Patient left in no apparent distress in bed  [X]         Call bell left within reach  [ ]         Nursing notified  [ ]         Caregiver present  [ ]         Bed alarm activated      COMMUNICATION/EDUCATION:   [X]         Fall prevention education was provided and the patient/caregiver indicated understanding. [X]         Patient/family have participated as able in goal setting and plan of care. [X]         Patient/family agree to work toward stated goals and plan of care. [ ]         Patient understands intent and goals of therapy, but is neutral about his/her participation. [ ]         Patient is unable to participate in goal setting and plan of care.      Thank you for this referral.  Gisela Recinos, PT, DPT   Time Calculation: 16 mins Statement Selected

## 2018-03-19 ENCOUNTER — OFFICE VISIT (OUTPATIENT)
Dept: ORTHOPEDIC SURGERY | Age: 68
End: 2018-03-19

## 2018-03-19 VITALS
WEIGHT: 224 LBS | SYSTOLIC BLOOD PRESSURE: 141 MMHG | DIASTOLIC BLOOD PRESSURE: 118 MMHG | RESPIRATION RATE: 19 BRPM | HEIGHT: 71 IN | BODY MASS INDEX: 31.36 KG/M2 | OXYGEN SATURATION: 98 % | TEMPERATURE: 97.6 F | HEART RATE: 86 BPM

## 2018-03-19 DIAGNOSIS — M48.02 CERVICAL SPINAL STENOSIS: ICD-10-CM

## 2018-03-19 DIAGNOSIS — M48.062 SPINAL STENOSIS OF LUMBAR REGION WITH NEUROGENIC CLAUDICATION: ICD-10-CM

## 2018-03-19 DIAGNOSIS — M54.16 LUMBAR RADICULAR PAIN: Primary | ICD-10-CM

## 2018-03-19 RX ORDER — PREGABALIN 75 MG/1
CAPSULE ORAL
Qty: 14 CAP | Refills: 0 | Status: SHIPPED | OUTPATIENT
Start: 2018-03-19 | End: 2018-04-02

## 2018-03-19 RX ORDER — PREGABALIN 150 MG/1
150 CAPSULE ORAL 2 TIMES DAILY
Qty: 60 CAP | Refills: 2 | Status: SHIPPED | OUTPATIENT
Start: 2018-03-19 | End: 2018-04-02

## 2018-03-19 NOTE — PATIENT INSTRUCTIONS
Pregabalin (By mouth)   Pregabalin (pre-GA-ba-sabiha)  Treats nerve and muscle pain, including fibromyalgia. Also treats seizures. Brand Name(s): Lyrica   There may be other brand names for this medicine. When This Medicine Should Not Be Used: This medicine is not right for everyone. Do not use it if you had an allergic reaction to pregabalin. How to Use This Medicine:   Capsule, Liquid  · Take your medicine as directed. Your dose may need to be changed several times to find what works best for you. · Measure the oral liquid medicine with a marked measuring spoon, oral syringe, or medicine cup. · This medicine should come with a Medication Guide. Ask your pharmacist for a copy if you do not have one. · Missed dose: Take a dose as soon as you remember. If it is almost time for your next dose, wait until then and take a regular dose. Do not take extra medicine to make up for a missed dose. · Store the medicine in a closed container at room temperature, away from heat, moisture, and direct light. Drugs and Foods to Avoid:   Ask your doctor or pharmacist before using any other medicine, including over-the-counter medicines, vitamins, and herbal products. · Some medicines can affect how pregabalin works. Tell your doctor if you are using any of the following:   ¨ Diabetes medicine that you take by mouth (pioglitazone, rosiglitazone)  ¨ An ACE inhibitor (benazepril, enalapril, lisinopril, quinapril, ramipril)  · Tell your doctor if you use anything else that makes you sleepy. Some examples are allergy medicine, narcotic pain medicine, and alcohol. Warnings While Using This Medicine:   · Tell your doctor if you are pregnant or breastfeeding, or if you have kidney disease, heart failure, heart rhythm problems, angioedema, a bleeding disorder, or a low blood platelet count. Tell your doctor if you have a history of alcohol or drug abuse, depression, or other mood problems.   · This medicine may cause the following problems:   ¨ Serious allergic reactions  ¨ Suicidal thoughts  · This medicine may make you dizzy or drowsy. It may also cause blurry or double vision. Do not drive or do anything that could be dangerous until you know how this medicine affects you. · Do not stop using this medicine suddenly. Your doctor will need to slowly decrease your dose before you stop it completely. · Your doctor will check your progress and the effects of this medicine at regular visits. Keep all appointments. · Keep all medicine out of the reach of children. Never share your medicine with anyone. Possible Side Effects While Using This Medicine:   Call your doctor right away if you notice any of these side effects:  · Allergic reaction: Itching or hives, swelling in your face or hands, swelling or tingling in your mouth or throat, chest tightness, trouble breathing  · Blistering, peeling, red skin rash  · Blurry or double vision  · Fever, chills, cough, sore throat, and body aches  · Muscle pain, tenderness, or weakness, fever, or general ill feeling  · Rapid weight gain, swelling in your hands, ankles, or feet  · Severe dizziness or drowsiness  · Sudden mood changes, unusual thoughts or behavior such as extreme happiness or depression  · Suicidal thoughts  · Swelling in your throat, head, or neck  · Uneven heartbeat  · Unusual bleeding, bruising, or weakness  If you notice these less serious side effects, talk with your doctor:   · Confusion, trouble concentrating  · Constipation  · Dry mouth  If you notice other side effects that you think are caused by this medicine, tell your doctor. Call your doctor for medical advice about side effects. You may report side effects to FDA at 7-924-FDA-6519  © 2017 [employer] Information is for End User's use only and may not be sold, redistributed or otherwise used for commercial purposes. The above information is an  only.  It is not intended as medical advice for individual conditions or treatments. Talk to your doctor, nurse or pharmacist before following any medical regimen to see if it is safe and effective for you.

## 2018-03-19 NOTE — PROGRESS NOTES
[address], [address]  [place], [address] [place] [address] Street  Phone: [phone]  Fax: [phone]  PROGRESS NOTE  Patient: Nel Potts                MRN: [id number]       SSN: [id number]  YOB: 1950        AGE: 78 y.o. SEX: male  Body mass index is 31.24 kg/(m^2). PCP: Jessi Jacobson MD  03/19/18    Chief Complaint   Patient presents with    Follow-up     Severe lower back pain        HISTORY OF PRESENT ILLNESS:  Nel Potts is a 78 y.o.  male with history of chronic LBP and BLE pain for 30+ years and radiation of pain to BLE in the L4-5 . 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 He had an EMG 01/2017 that showed peripheral sensiromotor neuropathy and BLE Chronic L5 radiculopathy and possible L4 and S1 radiculopathy. Pain is aching, burning, numbing and radiating. Pain is worse with walking, standing and affects sleep and recreational activities. Pain is better with nothing. He comes in today following an L5-S1 SNRB about two weeks ago without any benefit. His pain is unchanged since last OV. He is neurologically intact with good strength to BLE. He has + 's cart sign and his symptoms are consistent with stenosis. States he has been using Cymbalta 60mg Daily and  with minimal, relief. He has tried and failed Gabapentin. He has had kidney stones in the past and is not a good candidate for Topamax. Denies bladder/bowel dysfunction, saddle paresthesia, weakness, gait disturbance, or other neurological deficits. Denies chills, fever,night sweats, unexplained weight loss/weight gain, chest pain, sob or anxiety.  Reports no new medical issues or hospitalizations since the last visit. Pt at this time desires to  continue with current care/proceed with medication evaluation/proceed with evaluation of LBP and RLE pain. Radiographs  IMPRESSION: LS MRI Dated 09/19/2017     1. At L4-L5, there has been fusion and posterior decompression, with  degenerative changes still resulting in mild to moderate spinal canal stenosis  and moderate foraminal stenoses. No specific findings to suggest infection. An  enhancing left cauda equina nerve root, similar to prior, possibly postsurgical,  or reactive, of unlikely significance.     -Multilevel advanced degenerative disc disease with multiple disc  bulges/protrusions. Multilevel advanced facet arthropathy. Up to moderate spinal  canal stenosis at L2-L3 and L3-L4. Multilevel moderate to severe foraminal  stenoses, most notably L5-S1. Abutment of several nerve roots as above. ASSESSMENT   Diagnoses and all orders for this visit:    1. Lumbar radicular pain  -     pregabalin (LYRICA) 75 mg capsule; 1 cap bid x 1 week  -     pregabalin (LYRICA) 150 mg capsule; Take 1 Cap by mouth two (2) times a day. Max Daily Amount: 300 mg.    2. Spinal stenosis of lumbar region with neurogenic claudication  -     pregabalin (LYRICA) 75 mg capsule; 1 cap bid x 1 week  -     pregabalin (LYRICA) 150 mg capsule; Take 1 Cap by mouth two (2) times a day. Max Daily Amount: 300 mg.    3. Cervical spinal stenosis       IMPRESSION AND PLAN:  This is a pt with spinal stenosis who is doing is unchanged since last OV. 1) Pt was given information on Lyrica   2) Trial of Lyrica  3) No narcotics, old hx of drug abuse, 20 years ago  4) Continue Cymbalta  4) Mr. Titi Sheppard has a reminder for a \"due or due soon\" health maintenance. I have asked that he contact his primary care provider, Kenia Shah MD, for follow-up on this health maintenance.   5) We have informed patient to notify us for immediate appointment if he has any worsening neurogical symptoms or if an emergency situation presents, then call 911  6)  has been reviewed and is appropriate  7) Pt will follow-up in 6 wks w/ NP. Subjective    Work Not working, use to [employer]    Smoking Status Current Smoker, SMOKING CESSATION DISCUSSED    Pain Scale: 10 - Worst pain ever/10    Pain Assessment  1/23/2018   Location of Pain Hip;Back   Pain Location Comment -   Location Modifiers -   Severity of Pain 4   Quality of Pain Sharp   Quality of Pain Comment -   Duration of Pain -   Frequency of Pain Intermittent   Date Pain First Started -   Aggravating Factors -   Aggravating Factors Comment -   Limiting Behavior -   Relieving Factors -   Relieving Factors Comment -   Result of Injury No         REVIEW OF SYSTEMS  Constitutional: Negative for fever, chills, or weight change. Respiratory: Negative for cough or shortness of breath. Cardiovascular: Negative for chest pain or palpitations. Gastrointestinal: Negative for incontinence, acid reflux, change in bowel habits, or constipation. Genitourinary: Negative for incontinence, dysuria and flank pain. Musculoskeletal: Positive for LBP and RLE pain. See HPI. Skin: Negative for rash. Neurological:  radiculopathy. See HPI. Endo/Heme/Allergies: Negative. Psychiatric/Behavioral: Negative. PHYSICAL EXAMINATION  Visit Vitals    BP (!) 141/118    Pulse 86    Temp 97.6 °F (36.4 °C) (Oral)    Resp 19    Ht 5' 11\" (1.803 m)    Wt 224 lb (101.6 kg)    SpO2 98%    BMI 31.24 kg/m2         Accompanied by SELF. Constitutional:  Well developed, well nourished, in no acute distress. Psychiatric: Affect and mood are appropriate. Integumentary: No rashes or abrasions noted on exposed areas. Cardiovascular/Peripheral Vascular: +2 radial & pedal pulses. No peripheral edema is noted. Lymphatic:  No evidence of lymphedema. No cervical lymphadenopathy. SPINE/MUSCULOSKELETAL EXAM    Cervical spine:  Neck is midline. Normal muscle tone. No focal atrophy is noted. Neck ROM intact with flexion, extension, turning right, turning left. Shoulder ROM intact. Tenderness to palpation none. Negative Spurling's sign. Negative Tinel's sign. Negative Reyes's sign. Sensation grossly intact to light touch. Lumbar spine:  No rash, ecchymosis, or gross obliquity. No fasciculations. No focal atrophy is noted. Range of motion is decreased with extension. Tenderness to palpation diffuse low-back pain. No tenderness to palpation at the sciatic notch. SI joints non-tender. Trochanters non tender. Straight leg raise negative    Sensation grossly intact to light touch. MOTOR:     Hip Flex Quads Hamstrings Ankle DF EHL Ankle PF   Right 5/5 5/5 5/5 5/5 5/5 5/5   Left 5/5 5/5 5/5 5/5 5/5 5/5       DTRs are No biceps, triceps, brachioradialis, patella, and Achilles. Ambulation with single point cane.  FWB.    + 's cart        PAST MEDICAL HISTORY   Past Medical History:   Diagnosis Date    Bilateral hip pain     Chronic pain     back; hx of opiod addiction    Depression     Diabetes ([facility] [address]     borderline, no meds-trying to control with diet    DJD (degenerative joint disease)     GERD (gastroesophageal reflux disease)     Hepatitis C 01/2015    +Harvoni rx    Hypertension     Kidney stones     Lymphadenopathy, generalized 12/05/2015    neg bx    Numbness of foot left    resolved per patient 1/29/16    S/P lumbar fusion 2/9/2016    L4/5 LAMINECTOMY/FUSION/TRANSFORAMINAL LUMBAR INTERBODY FUSION (TLIF) by Dr. Anu Aguirre on 2/8/16     Unspecified sleep apnea     no cpap machine-pt denies       Past Surgical History:   Procedure Laterality Date    HX APPENDECTOMY  as a child    HX BACK SURGERY  07/2015    HX CERVICAL FUSION  05/18/2017    ACDF C5/6 C6/7    HX COLONOSCOPY  2015    negative    HX LUMBAR FUSION  2/2016    HX ORTHOPAEDIC      denies    HX OTHER SURGICAL  05/2017    cervical fusion    HX TONSILLECTOMY  as a child [facility] 3-10-16    Dr. Flaquito Heredia   . MEDICATIONS      Current Outpatient Prescriptions   Medication Sig Dispense Refill    pregabalin (LYRICA) 75 mg capsule 1 cap bid x 1 week 14 Cap 0    pregabalin (LYRICA) 150 mg capsule Take 1 Cap by mouth two (2) times a day. Max Daily Amount: 300 mg. 60 Cap 2    amLODIPine (NORVASC) 10 mg tablet Take 1 Tab by mouth daily. 90 Tab 1    lisinopril (PRINIVIL, ZESTRIL) 20 mg tablet Take 1 Tab by mouth daily. 90 Tab 1    tamsulosin (FLOMAX) 0.4 mg capsule Take 1 Cap by mouth daily (after dinner). 90 Cap 3    DULoxetine (CYMBALTA) 60 mg capsule 1 po daily with food  Indications: CHRONIC MUSCULOSKELETAL PAIN, NEUROPATHIC PAIN 30 Cap 2    omeprazole (PRILOSEC) 20 mg capsule Take 1 Cap by mouth daily. 90 Cap 3        ALLERGIES    Allergies   Allergen Reactions    Nicotine Contact Dermatitis     Nicotine Patch reaction - Contact dermatitis with numbness and tingling also occuring in the left arm and denuding of the skin.  Thimerosal Other (comments)     Contact lens solution, made eyes red and irrated          SOCIAL HISTORY    Social History     Social History    Marital status:      Spouse name: N/A    Number of children: N/A    Years of education: N/A     Occupational History    Not on file.      Social History Main Topics    Smoking status: Current Every Day Smoker     Packs/day: 0.25     Years: 40.00     Types: Cigarettes     Last attempt to quit: 5/12/2017    Smokeless tobacco: Never Used    Alcohol use No    Drug use: Yes     Special: Marijuana, Cocaine      Comment: stopped cocaine 2007, marijuana 5/14/17    Sexual activity: No     Other Topics Concern    Not on file     Social History Narrative       FAMILY HISTORY    Family History   Problem Relation Age of Onset    Diabetes Sister     SLE Sister     Arthritis-osteo Sister     Heart Disease Sister          Myriam Nunez NP

## 2018-03-19 NOTE — MR AVS SNAPSHOT
Pascual Jaquez 
 
 
 [address] [facility] [address] 
[phone] Patient: Nel Potts MRN: [id number] Clint Jennings Visit Information Date & Time Provider Department Dept. Phone Encounter #  
 3/19/2018  9:10 AM Maria Cano NP [address] [facility] [phone] [id number] Follow-up Instructions Return in about 6 weeks (around 4/30/2018). Your Appointments 8/20/2018  8:15 AM  
Office Visit with Jessi Jacobson MD  
[facility] [place] [address]) Appt Note: 6 mo f/u  
 [address] [facility], [address] [facility] [address]  
  
   
 [address] [address], [address] [facility] [address]  
  
    
  
 7/9/2018  3:15 PM  
Any with Jaimie Conde MD  
[facility] ([address]) Appt Note: 6 month follow up  
 [address] [facility] [address] [place] [address] [facility] [address]  
  
   
 [address] [facility] [place] [address] Upcoming Health Maintenance Date Due ZOSTER VACCINE AGE 60> 6/18/2011 GLAUCOMA SCREENING Q2Y 8/18/2016 FOBT Q 1 YEAR AGE 50-75 12/8/2016 Pneumococcal 65+ Low/Medium Risk (2 of 2 - PPSV23) 8/28/2018 MEDICARE YEARLY EXAM 2/21/2019 DTaP/Tdap/Td series (2 - Td) 12/9/2025 Allergies as of 3/19/2018  Review Complete On: 3/19/2018 By: Laural Cerise Severity Noted Reaction Type Reactions Nicotine Medium 06/11/2015    Contact Dermatitis Nicotine Patch reaction - Contact dermatitis with numbness and tingling also occuring in the left arm and denuding of the skin. Thimerosal  07/21/2015    Other (comments) Contact lens solution, made eyes red and irrated Current Immunizations  Reviewed on 12/9/2015 Name Date Influenza High Dose Vaccine PF 8/28/2017 Influenza Vaccine 10/1/2016, 10/29/2015 Influenza Vaccine PF 10/29/2014 Pneumococcal Conjugate (PCV-13) 8/28/2017 Tdap 12/9/2015 Not reviewed this visit You Were Diagnosed With   
  
 Codes Comments Lumbar radicular pain    -  Primary ICD-10-CM: M54.16 
ICD-9-CM: 724.4 Spinal stenosis of lumbar region with neurogenic claudication     ICD-10-CM: M48.062 
ICD-9-CM: 724.03 Cervical spinal stenosis     ICD-10-CM: M48.02 
ICD-9-CM: 723.0 Vitals BP Pulse Temp Resp Height(growth percentile) Weight(growth percentile) (!) 141/118 86 97.6 °F (36.4 °C) (Oral) 19 5' 11\" (1.803 m) 224 lb (101.6 kg) SpO2 BMI Smoking Status 98% 31.24 kg/m2 Current Every Day Smoker BMI and BSA Data Body Mass Index Body Surface Area  
 31.24 kg/m 2 2.26 m 2 Preferred Pharmacy Pharmacy Name Phone [address] [place] [address], [address] [place] [address] [phone] Your Updated Medication List  
  
   
This list is accurate as of 3/19/18  9:47 AM.  Always use your most recent med list. amLODIPine 10 mg tablet Commonly known as:  Salima Prow Take 1 Tab by mouth daily. DULoxetine 60 mg capsule Commonly known as:  CYMBALTA 1 po daily with food  Indications: CHRONIC MUSCULOSKELETAL PAIN, NEUROPATHIC PAIN  
  
 lisinopril 20 mg tablet Commonly known as:  Laney Cage Take 1 Tab by mouth daily. omeprazole 20 mg capsule Commonly known as:  PRILOSEC Take 1 Cap by mouth daily. * pregabalin 75 mg capsule Commonly known as:  LYRICA  
1 cap bid x 1 week * pregabalin 150 mg capsule Commonly known as:  Pipestem Minder Take 1 Cap by mouth two (2) times a day. Max Daily Amount: 300 mg.  
  
 tamsulosin 0.4 mg capsule Commonly known as:  FLOMAX Take 1 Cap by mouth daily (after dinner). * Notice: This list has 2 medication(s) that are the same as other medications prescribed for you.  Read the directions carefully, and ask your doctor or other care provider to review them with you. Prescriptions Printed Refills  
 pregabalin (LYRICA) 75 mg capsule 0 Si cap bid x 1 week Class: Print  
 pregabalin (LYRICA) 150 mg capsule 2 Sig: Take 1 Cap by mouth two (2) times a day. Max Daily Amount: 300 mg. Class: Print Route: Oral  
  
Follow-up Instructions Return in about 6 weeks (around 2018). Patient Instructions Pregabalin (By mouth) Pregabalin (pre-GA-ba-saibha) Treats nerve and muscle pain, including fibromyalgia. Also treats seizures. Brand Name(s): Lyrica There may be other brand names for this medicine. When This Medicine Should Not Be Used: This medicine is not right for everyone. Do not use it if you had an allergic reaction to pregabalin. How to Use This Medicine:  
Capsule, Liquid · Take your medicine as directed. Your dose may need to be changed several times to find what works best for you. · Measure the oral liquid medicine with a marked measuring spoon, oral syringe, or medicine cup. · This medicine should come with a Medication Guide. Ask your pharmacist for a copy if you do not have one. · Missed dose: Take a dose as soon as you remember. If it is almost time for your next dose, wait until then and take a regular dose. Do not take extra medicine to make up for a missed dose. · Store the medicine in a closed container at room temperature, away from heat, moisture, and direct light. Drugs and Foods to Avoid: Ask your doctor or pharmacist before using any other medicine, including over-the-counter medicines, vitamins, and herbal products. · Some medicines can affect how pregabalin works. Tell your doctor if you are using any of the following: ¨ Diabetes medicine that you take by mouth (pioglitazone, rosiglitazone) ¨ An ACE inhibitor (benazepril, enalapril, lisinopril, quinapril, ramipril) · Tell your doctor if you use anything else that makes you sleepy. Some examples are allergy medicine, narcotic pain medicine, and alcohol. Warnings While Using This Medicine: · Tell your doctor if you are pregnant or breastfeeding, or if you have kidney disease, heart failure, heart rhythm problems, angioedema, a bleeding disorder, or a low blood platelet count. Tell your doctor if you have a history of alcohol or drug abuse, depression, or other mood problems. · This medicine may cause the following problems:  
¨ Serious allergic reactions ¨ Suicidal thoughts · This medicine may make you dizzy or drowsy. It may also cause blurry or double vision. Do not drive or do anything that could be dangerous until you know how this medicine affects you. · Do not stop using this medicine suddenly. Your doctor will need to slowly decrease your dose before you stop it completely. · Your doctor will check your progress and the effects of this medicine at regular visits. Keep all appointments. · Keep all medicine out of the reach of children. Never share your medicine with anyone. Possible Side Effects While Using This Medicine:  
Call your doctor right away if you notice any of these side effects: · Allergic reaction: Itching or hives, swelling in your face or hands, swelling or tingling in your mouth or throat, chest tightness, trouble breathing · Blistering, peeling, red skin rash · Blurry or double vision · Fever, chills, cough, sore throat, and body aches · Muscle pain, tenderness, or weakness, fever, or general ill feeling · Rapid weight gain, swelling in your hands, ankles, or feet · Severe dizziness or drowsiness · Sudden mood changes, unusual thoughts or behavior such as extreme happiness or depression · Suicidal thoughts · Swelling in your throat, head, or neck · Uneven heartbeat · Unusual bleeding, bruising, or weakness If you notice these less serious side effects, talk with your doctor: · Confusion, trouble concentrating · Constipation · Dry mouth If you notice other side effects that you think are caused by this medicine, tell your doctor. Call your doctor for medical advice about side effects. You may report side effects to FDA at 7-492-FWW-4386 © 2017 [facility] Information is for End User's use only and may not be sold, redistributed or otherwise used for commercial purposes. The above information is an  only. It is not intended as medical advice for individual conditions or treatments. Talk to your doctor, nurse or pharmacist before following any medical regimen to see if it is safe and effective for you. Introducing [facility]! Dear Roman Steen: Thank you for requesting a [employer] account. Our records indicate that you already have an active [employer] account. You can access your account anytime at [email or web address] Did you know that you can access your hospital and ER discharge instructions at any time in [employer]? You can also review all of your test results from your hospital stay or ER visit. Additional Information If you have questions, please visit the Frequently Asked Questions section of the [employer] website at [email or web address] Remember, [employer] is NOT to be used for urgent needs. For medical emergencies, dial 911. Now available from your iPhone and Android! Please provide this summary of care documentation to your next provider. Your primary care clinician is listed as Santi Pike. If you have any questions after today's visit, please call [phone].

## 2018-04-02 ENCOUNTER — OFFICE VISIT (OUTPATIENT)
Dept: INTERNAL MEDICINE CLINIC | Age: 68
End: 2018-04-02

## 2018-04-02 VITALS
OXYGEN SATURATION: 98 % | HEART RATE: 73 BPM | RESPIRATION RATE: 16 BRPM | HEIGHT: 71 IN | WEIGHT: 226 LBS | BODY MASS INDEX: 31.64 KG/M2 | DIASTOLIC BLOOD PRESSURE: 80 MMHG | SYSTOLIC BLOOD PRESSURE: 134 MMHG | TEMPERATURE: 97.5 F

## 2018-04-02 DIAGNOSIS — R51.9 ACUTE NONINTRACTABLE HEADACHE, UNSPECIFIED HEADACHE TYPE: Primary | ICD-10-CM

## 2018-04-02 DIAGNOSIS — I10 ESSENTIAL HYPERTENSION: ICD-10-CM

## 2018-04-02 NOTE — PATIENT INSTRUCTIONS
DASH Diet: Care Instructions  Your Care Instructions    The DASH diet is an eating plan that can help lower your blood pressure. DASH stands for Dietary Approaches to Stop Hypertension. Hypertension is high blood pressure. The DASH diet focuses on eating foods that are high in calcium, potassium, and magnesium. These nutrients can lower blood pressure. The foods that are highest in these nutrients are fruits, vegetables, low-fat dairy products, nuts, seeds, and legumes. But taking calcium, potassium, and magnesium supplements instead of eating foods that are high in those nutrients does not have the same effect. The DASH diet also includes whole grains, fish, and poultry. The DASH diet is one of several lifestyle changes your doctor may recommend to lower your high blood pressure. Your doctor may also want you to decrease the amount of sodium in your diet. Lowering sodium while following the DASH diet can lower blood pressure even further than just the DASH diet alone. Follow-up care is a key part of your treatment and safety. Be sure to make and go to all appointments, and call your doctor if you are having problems. It's also a good idea to know your test results and keep a list of the medicines you take. How can you care for yourself at home? Following the DASH diet  · Eat 4 to 5 servings of fruit each day. A serving is 1 medium-sized piece of fruit, ½ cup chopped or canned fruit, 1/4 cup dried fruit, or 4 ounces (½ cup) of fruit juice. Choose fruit more often than fruit juice. · Eat 4 to 5 servings of vegetables each day. A serving is 1 cup of lettuce or raw leafy vegetables, ½ cup of chopped or cooked vegetables, or 4 ounces (½ cup) of vegetable juice. Choose vegetables more often than vegetable juice. · Get 2 to 3 servings of low-fat and fat-free dairy each day. A serving is 8 ounces of milk, 1 cup of yogurt, or 1 ½ ounces of cheese. · Eat 6 to 8 servings of grains each day.  A serving is 1 slice of bread, 1 ounce of dry cereal, or ½ cup of cooked rice, pasta, or cooked cereal. Try to choose whole-grain products as much as possible. · Limit lean meat, poultry, and fish to 2 servings each day. A serving is 3 ounces, about the size of a deck of cards. · Eat 4 to 5 servings of nuts, seeds, and legumes (cooked dried beans, lentils, and split peas) each week. A serving is 1/3 cup of nuts, 2 tablespoons of seeds, or ½ cup of cooked beans or peas. · Limit fats and oils to 2 to 3 servings each day. A serving is 1 teaspoon of vegetable oil or 2 tablespoons of salad dressing. · Limit sweets and added sugars to 5 servings or less a week. A serving is 1 tablespoon jelly or jam, ½ cup sorbet, or 1 cup of lemonade. · Eat less than 2,300 milligrams (mg) of sodium a day. If you limit your sodium to 1,500 mg a day, you can lower your blood pressure even more. Tips for success  · Start small. Do not try to make dramatic changes to your diet all at once. You might feel that you are missing out on your favorite foods and then be more likely to not follow the plan. Make small changes, and stick with them. Once those changes become habit, add a few more changes. · Try some of the following:  ¨ Make it a goal to eat a fruit or vegetable at every meal and at snacks. This will make it easy to get the recommended amount of fruits and vegetables each day. ¨ Try yogurt topped with fruit and nuts for a snack or healthy dessert. ¨ Add lettuce, tomato, cucumber, and onion to sandwiches. ¨ Combine a ready-made pizza crust with low-fat mozzarella cheese and lots of vegetable toppings. Try using tomatoes, squash, spinach, broccoli, carrots, cauliflower, and onions. ¨ Have a variety of cut-up vegetables with a low-fat dip as an appetizer instead of chips and dip. ¨ Sprinkle sunflower seeds or chopped almonds over salads. Or try adding chopped walnuts or almonds to cooked vegetables.   ¨ Try some vegetarian meals using beans and peas. Add garbanzo or kidney beans to salads. Make burritos and tacos with mashed le beans or black beans. Where can you learn more? Go to http://gomez-jarvis.info/. Enter B024 in the search box to learn more about \"DASH Diet: Care Instructions. \"  Current as of: September 21, 2016  Content Version: 11.4  © 4010-5126 [employer]. Care instructions adapted under license by "[employer] (which disclaims liability or warranty for this information). If you have questions about a medical condition or this instruction, always ask your healthcare professional. [address] any warranty or liability for your use of this information. Body Mass Index: Care Instructions  Your Care Instructions    Body mass index (BMI) can help you see if your weight is raising your risk for health problems. It uses a formula to compare how much you weigh with how tall you are. · A BMI lower than 18.5 is considered underweight. · A BMI between 18.5 and 24.9 is considered healthy. · A BMI between 25 and 29.9 is considered overweight. A BMI of 30 or higher is considered obese. If your BMI is in the normal range, it means that you have a lower risk for weight-related health problems. If your BMI is in the overweight or obese range, you may be at increased risk for weight-related health problems, such as high blood pressure, heart disease, stroke, arthritis or joint pain, and diabetes. If your BMI is in the underweight range, you may be at increased risk for health problems such as fatigue, lower protection (immunity) against illness, muscle loss, bone loss, hair loss, and hormone problems. BMI is just one measure of your risk for weight-related health problems. You may be at higher risk for health problems if you are not active, you eat an unhealthy diet, or you drink too much alcohol or use tobacco products.   Follow-up care is a key part of your treatment and safety. Be sure to make and go to all appointments, and call your doctor if you are having problems. It's also a good idea to know your test results and keep a list of the medicines you take. How can you care for yourself at home? · Practice healthy eating habits. This includes eating plenty of fruits, vegetables, whole grains, lean protein, and low-fat dairy. · If your doctor recommends it, get more exercise. Walking is a good choice. Bit by bit, increase the amount you walk every day. Try for at least 30 minutes on most days of the week. · Do not smoke. Smoking can increase your risk for health problems. If you need help quitting, talk to your doctor about stop-smoking programs and medicines. These can increase your chances of quitting for good. · Limit alcohol to 2 drinks a day for men and 1 drink a day for women. Too much alcohol can cause health problems. If you have a BMI higher than 25  · Your doctor may do other tests to check your risk for weight-related health problems. This may include measuring the distance around your waist. A waist measurement of more than 40 inches in men or 35 inches in women can increase the risk of weight-related health problems. · Talk with your doctor about steps you can take to stay healthy or improve your health. You may need to make lifestyle changes to lose weight and stay healthy, such as changing your diet and getting regular exercise. If you have a BMI lower than 18.5  · Your doctor may do other tests to check your risk for health problems. · Talk with your doctor about steps you can take to stay healthy or improve your health. You may need to make lifestyle changes to gain or maintain weight and stay healthy, such as getting more healthy foods in your diet and doing exercises to build muscle. Where can you learn more? Go to http://gomez-jarvis.info/.   Enter S176 in the search box to learn more about \"Body Mass Index: Care Instructions. \"  Current as of: October 13, 2016  Content Version: 11.4  © 9649-0663 [employer], [facility]. Care instructions adapted under license by [employer] (which disclaims liability or warranty for this information). If you have questions about a medical condition or this instruction, always ask your healthcare professional. [place] [address] any warranty or liability for your use of this information.

## 2018-04-02 NOTE — PROGRESS NOTES
HPI:   Multiple problems including HTN, chronic pain presents with HAS x 3-4 mos; pain begins posterior neck (B) and radiates to bifrontal area  No focal weakness, N/V; no trauma  Plans to see psych in near future d/t chronic depression w/o suicidal ideation    ROS is otherwise negative. Past Medical History:   Diagnosis Date    Bilateral hip pain     Chronic pain     back; hx of opiod addiction    Depression     Diabetes (HCC)     borderline, no meds-trying to control with diet    DJD (degenerative joint disease)     GERD (gastroesophageal reflux disease)     Hepatitis C 01/2015    +Harvoni rx    Hypertension     Kidney stones     Lymphadenopathy, generalized 12/05/2015    neg bx    Numbness of foot left    resolved per patient 1/29/16    S/P lumbar fusion 2/9/2016    L4/5 LAMINECTOMY/FUSION/TRANSFORAMINAL LUMBAR INTERBODY FUSION (TLIF) by Dr. Jasmyne Moulton on 2/8/16     Unspecified sleep apnea     no cpap machine-pt denies       Past Surgical History:   Procedure Laterality Date    HX APPENDECTOMY  as a child    HX BACK SURGERY  07/2015    HX CERVICAL FUSION  05/18/2017    ACDF C5/6 C6/7    HX COLONOSCOPY  2015    negative    HX LUMBAR FUSION  2/2016    HX ORTHOPAEDIC      denies    HX OTHER SURGICAL  05/2017    cervical fusion    HX TONSILLECTOMY  as a child    [facility]    Dr. Rob Nicole History     Social History    Marital status:      Spouse name: N/A    Number of children: N/A    Years of education: N/A     Occupational History    Not on file.      Social History Main Topics    Smoking status: Current Every Day Smoker     Packs/day: 0.25     Years: 40.00     Types: Cigarettes     Last attempt to quit: 5/12/2017    Smokeless tobacco: Never Used    Alcohol use No    Drug use: Yes     Special: Marijuana, Cocaine      Comment: stopped cocaine 2007, marijuana 5/14/17    Sexual activity: No     Other Topics Concern    Not on file     Social History Narrative       Allergies   Allergen Reactions    Nicotine Contact Dermatitis     Nicotine Patch reaction - Contact dermatitis with numbness and tingling also occuring in the left arm and denuding of the skin.  Thimerosal Other (comments)     Contact lens solution, made eyes red and irrated       Family History   Problem Relation Age of Onset    Diabetes Sister     SLE Sister     Arthritis-osteo Sister     Heart Disease Sister        Current Outpatient Prescriptions   Medication Sig Dispense Refill    amLODIPine (NORVASC) 10 mg tablet Take 1 Tab by mouth daily. 90 Tab 1    lisinopril (PRINIVIL, ZESTRIL) 20 mg tablet Take 1 Tab by mouth daily. 90 Tab 1    tamsulosin (FLOMAX) 0.4 mg capsule Take 1 Cap by mouth daily (after dinner). 90 Cap 3    DULoxetine (CYMBALTA) 60 mg capsule 1 po daily with food  Indications: CHRONIC MUSCULOSKELETAL PAIN, NEUROPATHIC PAIN 30 Cap 2    omeprazole (PRILOSEC) 20 mg capsule Take 1 Cap by mouth daily. 90 Cap 3           Visit Vitals    /80 (BP 1 Location: Left arm, BP Patient Position: Sitting)    Pulse 73    Temp 97.5 °F (36.4 °C) (Tympanic)    Resp 16    Ht 5' 11\" (1.803 m)    Wt 226 lb (102.5 kg)    SpO2 98%    BMI 31.52 kg/m2       PE  Well nourished in NAD  HEENT: PERRLA, EOMI;  OP: clear. TMS: clear  Neck: supple w/o mass or bruits. + tenderness (B) posterior muscles with FROM of neck and minimal pain  Chest: clear. CV: RRR w/o m,r,g; pulses intact. Abd: soft, NT, w/o HSM or mass. Ext: tr edema. Neuro: NF. Assessment and Plan    Encounter Diagnoses   Name Primary?  Acute nonintractable headache, unspecified headache type Yes    Essential hypertension      HA - probably MSK in cause - pt requests head CT to r/o other causes  F/U worsening   [facility] as he has chronic pain related to cervical disc disease  HTN - controlled  The patient was counseled on the dangers of tobacco use, and was advised to quit. Reviewed strategies to maximize success, including removing cigarettes and smoking materials from environment. OV 4 mos or prn  I have explained plan to patient and the patient verbalizes understanding        Discussed the patient's BMI with him. The BMI follow up plan is as follows:     dietary management education, guidance, and counseling  encourage exercise  monitor weight  prescribed dietary intake    An After Visit Summary was printed and given to the patient.

## 2018-04-02 NOTE — PROGRESS NOTES
1. Have you been to the ER, urgent care clinic since your last visit? Hospitalized since your last visit? No    2. Have you seen or consulted any other health care providers outside of the [address] [place] [address] since your last visit? Include any pap smears or colon screening.  No

## 2018-04-12 ENCOUNTER — HOSPITAL ENCOUNTER (OUTPATIENT)
Dept: CT IMAGING | Age: 68
Discharge: HOME | End: 2018-04-12
Attending: INTERNAL MEDICINE
Payer: MEDICARE

## 2018-04-12 DIAGNOSIS — R51.9 ACUTE NONINTRACTABLE HEADACHE, UNSPECIFIED HEADACHE TYPE: ICD-10-CM

## 2018-04-12 PROCEDURE — 70450 CT HEAD/BRAIN W/O DYE: CPT

## 2018-04-26 ENCOUNTER — OFFICE VISIT (OUTPATIENT)
Dept: ORTHOPEDIC SURGERY | Age: 68
End: 2018-04-26

## 2018-04-26 VITALS
SYSTOLIC BLOOD PRESSURE: 147 MMHG | TEMPERATURE: 98.3 F | DIASTOLIC BLOOD PRESSURE: 82 MMHG | HEART RATE: 88 BPM | BODY MASS INDEX: 31.08 KG/M2 | RESPIRATION RATE: 20 BRPM | WEIGHT: 222 LBS | HEIGHT: 71 IN

## 2018-04-26 DIAGNOSIS — S39.012A LOW BACK STRAIN, INITIAL ENCOUNTER: ICD-10-CM

## 2018-04-26 DIAGNOSIS — Z98.1 S/P CERVICAL SPINAL FUSION: ICD-10-CM

## 2018-04-26 DIAGNOSIS — M48.062 SPINAL STENOSIS OF LUMBAR REGION WITH NEUROGENIC CLAUDICATION: ICD-10-CM

## 2018-04-26 DIAGNOSIS — G95.9 CERVICAL MYELOPATHY (HCC): ICD-10-CM

## 2018-04-26 DIAGNOSIS — M54.16 LUMBAR RADICULAR PAIN: Primary | ICD-10-CM

## 2018-04-26 RX ORDER — CYCLOBENZAPRINE HCL 5 MG
5-10 TABLET ORAL
Qty: 45 TAB | Refills: 1 | Status: SHIPPED | OUTPATIENT
Start: 2018-04-26 | End: 2018-08-29

## 2018-04-26 RX ORDER — METHYLPREDNISOLONE 4 MG/1
TABLET ORAL
Qty: 1 DOSE PACK | Refills: 0 | Status: SHIPPED | OUTPATIENT
Start: 2018-04-26 | End: 2018-08-08

## 2018-04-26 RX ORDER — PREGABALIN 150 MG/1
CAPSULE ORAL
COMMUNITY
Start: 2018-03-19 | End: 2018-04-26 | Stop reason: ALTCHOICE

## 2018-04-26 RX ORDER — ESCITALOPRAM OXALATE 10 MG/1
10 TABLET ORAL DAILY
COMMUNITY
End: 2018-07-09

## 2018-04-26 RX ORDER — KETOROLAC TROMETHAMINE 15 MG/ML
60 INJECTION, SOLUTION INTRAMUSCULAR; INTRAVENOUS ONCE
Qty: 1 VIAL | Refills: 0
Start: 2018-04-26 | End: 2018-04-26

## 2018-04-26 RX ORDER — OXCARBAZEPINE 300 MG/1
300 TABLET, FILM COATED ORAL DAILY
COMMUNITY
End: 2019-06-13

## 2018-04-26 NOTE — PROGRESS NOTES
[address], [address]  [facility], [address]  Phone: [phone]  Fax: [phone]  PROGRESS NOTE  Patient: Joseline Robertson                MRN: [id number]       SSN: [id number]  YOB: 1950        AGE: 78 y.o. SEX: male  Body mass index is 30.96 kg/(m^2). PCP: Jake Larson MD  04/26/18    Chief Complaint   Patient presents with    Back Pain     lower    Leg Pain     bilateral    Hip Pain     bilateral    Follow-up     increased pain       HISTORY OF PRESENT ILLNESS:  Joseline Robertson is a 78 y.o.  male with history of chronic LBP and BLE pain for 30+ years and radiation of pain to BLE in the L4-5 . 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 He had an EMG 01/2017 that showed peripheral sensiromotor neuropathy and BLE Chronic L5 radiculopathy and possible L4 and S1 radiculopathy. Pain is aching, burning, numbing and radiating. Pain is worse with walking, standing and affects sleep and recreational activities. Pain is better with nothing. He had a Left L5-S1 SNRB almost two months ago without any benefit. At his last OV: I Rx him a trial of Lyrica. Today: He reports that the Lyrica gave him bad SEs. Today, he is having increased right-sided back pain x 24 hours after bending over to  a book. He is also having this neck and right shoulder pain. He has had the shoulder pain before, he has known arthritis in that shoulder. He feels like his shoulder pain is due to leaning more on it from using his cane. He is continuing to do well from his neck surgery with good strength on exam and no radiating arm pains.  His back pain is consistent with an acute strain and muscle spasms. He has an old hx of illegal drug abuse and wishes to avoid pain medications, and I agree. He denies hx of DM and any contraindication to NSAIDs.     States he has been using Cymbalta 60mg Daily and  with minimal, relief. He has tried and failed Gabapentin and Lyrica. He has had kidney stones in the past and is not a good candidate for Topamax. Denies bladder/bowel dysfunction, saddle paresthesia, weakness, gait disturbance, or other neurological deficits. Denies chills, fever,night sweats, unexplained weight loss/weight gain, chest pain, sob or anxiety. Reports no new medical issues or hospitalizations since the last visit. Pt at this time desires to  continue with current care/proceed with medication evaluation/proceed with evaluation of LBP and RLE pain    Radiographs  IMPRESSION: LS MRI Dated 09/19/2017      1. At L4-L5, there has been fusion and posterior decompression, with  degenerative changes still resulting in mild to moderate spinal canal stenosis  and moderate foraminal stenoses. No specific findings to suggest infection. An  enhancing left cauda equina nerve root, similar to prior, possibly postsurgical,  or reactive, of unlikely significance.      -Multilevel advanced degenerative disc disease with multiple disc  bulges/protrusions. Multilevel advanced facet arthropathy. Up to moderate spinal  canal stenosis at L2-L3 and L3-L4. Multilevel moderate to severe foraminal  stenoses, most notably L5-S1. Abutment of several nerve roots as above. ASSESSMENT   Diagnoses and all orders for this visit:    1. Lumbar radicular pain    2. Spinal stenosis of lumbar region with neurogenic claudication    3. Cervical myelopathy (HCC)  -     AMB POC XRAY, SPINE, CERVICAL; 2 OR 3    4. S/P cervical spinal fusion  -     AMB POC XRAY, SPINE, CERVICAL; 2 OR 3    5.  Low back strain, initial encounter  -     KETOROLAC TROMETHAMINE INJ  -     THER/PROPH/DIAG INJECTION, SUBCUT/IM    Other orders  - methylPREDNISolone (MEDROL, TIFFANY,) 4 mg tablet; Per dose pack instructions  -     ketorolac (TORADOL) 15 mg/mL soln injection; 4 mL by IntraMUSCular route once for 1 dose. -     cyclobenzaprine (FLEXERIL) 5 mg tablet; Take 1-2 Tabs by mouth three (3) times daily as needed for Muscle Spasm(s). IMPRESSION AND PLAN:  This is a pt with spinal stenosis who had an acute back strain yesterday. He has chronic BLE sciatica, unresponsive to conservative tx. He is inquiring about a SCS. He also, had an ACDF almost a year ago with Dr. Eva Cross for myelopathy. He has done well from that. I got a set of routine CS x-rays for Dr. Sayra Gilmore evaluation. 1) Pt was given information on back strain and back care   2) Toradol inj follow by MDP for back strain  3) CS x-rays to evaluate fusion  4) PRN Flexeril for spasms  5) Ice, OTC rubs  6) Encouraged to start slowly increase walking, stop back brace use  4) Mr. Melissa Kim has a reminder for a \"due or due soon\" health maintenance. I have asked that he contact his primary care provider, Unique iVlleda MD, for follow-up on this health maintenance. 5) We have informed patient to notify us for immediate appointment if he has any worsening neurogical symptoms or if an emergency situation presents, then call 911  6)  has been reviewed and is appropriate  7) Pt will follow-up in 4 wks w/ Dr. Eva Cross. Yearly eval for ACDF and consideration of SCS. Subjective    Work Not working, use to [employer]. Smoking Status smoker    Pain Scale: 10 - Worst pain ever/10    Pain Assessment  4/26/2018   Location of Pain Back;Leg;Hip   Pain Location Comment -   Location Modifiers Left;Right   Severity of Pain 10   Quality of Pain Aching;Dull; Bul Dopp; Throbbing;Burning   Quality of Pain Comment -   Duration of Pain Persistent   Frequency of Pain Constant   Date Pain First Started -   Aggravating Factors -   Aggravating Factors Comment -   Limiting Behavior -   Relieving Factors -   Relieving Factors Comment -   Result of Injury No         REVIEW OF SYSTEMS  Constitutional: Negative for fever, chills, or weight change. Respiratory: Negative for cough or shortness of breath. Cardiovascular: Negative for chest pain or palpitations. Gastrointestinal: Negative for incontinence, acid reflux, change in bowel habits, or constipation. Genitourinary: Negative for incontinence, dysuria and flank pain. Musculoskeletal: Positive for Right shoulder, LBP pain. See HPI. Skin: Negative for rash. Neurological: BLE L5/S1  radiculopathy. See HPI. Endo/Heme/Allergies: Negative. Psychiatric/Behavioral: Negative. PHYSICAL EXAMINATION  Visit Vitals    /82    Pulse 88    Temp 98.3 °F (36.8 °C) (Oral)    Resp 20    Ht 5' 11\" (1.803 m)    Wt 222 lb (100.7 kg)    BMI 30.96 kg/m2         Accompanied by Self. Constitutional:  Well developed, well nourished, in no acute distress. Psychiatric: Affect and mood are appropriate. Integumentary: No rashes or abrasions noted on exposed areas. Cardiovascular/Peripheral Vascular: +2 radial & pedal pulses. No peripheral edema is noted. Lymphatic:  No evidence of lymphedema. No cervical lymphadenopathy. SPINE/MUSCULOSKELETAL EXAM    Cervical spine:  Neck is midline. Normal muscle tone. No focal atrophy is noted. Neck ROM decreased, no pain with flexion, extension, turning right, turning left. Shoulder ROM pain w/ right shoulder extension. Tenderness to palpation right shoulder. Negative Spurling's sign. Negative Tinel's sign. Negative Reyes's sign. Sensation grossly intact to light touch. Lumbar spine:  No rash, ecchymosis, or gross obliquity. No fasciculations. No focal atrophy is noted. Range of motion is pain with extension. Tenderness to palpation right-sided low back-diffuse w/ muscular tension. No tenderness to palpation at the sciatic notch. SI joints non-tender. Trochanters non tender.       Straight leg raise + bilaterally    Sensation grossly intact to light touch. MOTOR:        Biceps  Triceps Deltoids Wrist Ext Wrist Flex Hand Intrin   Right +4/5 +4/5 +4/5 +4/5 +4/5 +4/5   Left +4/5 +4/5 +4/5 +4/5 +4/5 +4/5      Hip Flex Quads Hamstrings Ankle DF EHL Ankle PF   Right +4/5 +4/5 +4/5 +4/5 +4/5 +4/5   Left +4/5 +4/5 +4/5 +4/5 +4/5 +4/5         DTRs are No biceps, triceps, brachioradialis, patella, and Achilles. Ambulation with single point cane. FWB. Antalgic gait        PAST MEDICAL HISTORY   Past Medical History:   Diagnosis Date    Bilateral hip pain     Chronic pain     back; hx of opiod addiction    Depression     Diabetes ([facility] [address]     borderline, no meds-trying to control with diet    DJD (degenerative joint disease)     GERD (gastroesophageal reflux disease)     Hepatitis C 01/2015    +Harvoni rx    Hypertension     Kidney stones     Lymphadenopathy, generalized 12/05/2015    neg bx    Numbness of foot left    resolved per patient 1/29/16    S/P lumbar fusion 2/9/2016    L4/5 LAMINECTOMY/FUSION/TRANSFORAMINAL LUMBAR INTERBODY FUSION (TLIF) by Dr. Lora Zapata on 2/8/16     Unspecified sleep apnea     no cpap machine-pt denies       Past Surgical History:   Procedure Laterality Date    HX APPENDECTOMY  as a child    HX BACK SURGERY  07/2015    HX CERVICAL FUSION  05/18/2017    ACDF C5/6 C6/7    HX COLONOSCOPY  2015    negative    HX LUMBAR FUSION  2/2016    HX ORTHOPAEDIC      denies    HX OTHER SURGICAL  05/2017    cervical fusion    HX TONSILLECTOMY  as a child    REMOVE [facility] 3-10-16    Dr. Kaveh Castillo   . MEDICATIONS      Current Outpatient Prescriptions   Medication Sig Dispense Refill    escitalopram oxalate (LEXAPRO) 10 mg tablet Take 10 mg by mouth daily.  OXcarbazepine (TRILEPTAL) 300 mg tablet Take 300 mg by mouth daily.       methylPREDNISolone (MEDROL, TIFFANY,) 4 mg tablet Per dose pack instructions 1 Dose Pack 0    ketorolac (TORADOL) 15 mg/mL soln injection 4 mL by IntraMUSCular route once for 1 dose. 1 Vial 0    cyclobenzaprine (FLEXERIL) 5 mg tablet Take 1-2 Tabs by mouth three (3) times daily as needed for Muscle Spasm(s). 45 Tab 1    amLODIPine (NORVASC) 10 mg tablet Take 1 Tab by mouth daily. 90 Tab 1    lisinopril (PRINIVIL, ZESTRIL) 20 mg tablet Take 1 Tab by mouth daily. 90 Tab 1    tamsulosin (FLOMAX) 0.4 mg capsule Take 1 Cap by mouth daily (after dinner). 90 Cap 3    DULoxetine (CYMBALTA) 60 mg capsule 1 po daily with food  Indications: CHRONIC MUSCULOSKELETAL PAIN, NEUROPATHIC PAIN 30 Cap 2    omeprazole (PRILOSEC) 20 mg capsule Take 1 Cap by mouth daily. 90 Cap 3        ALLERGIES    Allergies   Allergen Reactions    Nicotine Contact Dermatitis     Nicotine Patch reaction - Contact dermatitis with numbness and tingling also occuring in the left arm and denuding of the skin.  Thimerosal Other (comments)     Contact lens solution, made eyes red and irrated          SOCIAL HISTORY    Social History     Social History    Marital status:      Spouse name: N/A    Number of children: N/A    Years of education: N/A     Occupational History    Not on file.      Social History Main Topics    Smoking status: Current Every Day Smoker     Packs/day: 1.00     Years: 40.00     Types: Cigarettes     Last attempt to quit: 5/12/2017    Smokeless tobacco: Never Used    Alcohol use No    Drug use: Yes     Special: Marijuana, Cocaine      Comment: stopped cocaine 2007, marijuana 5/14/17    Sexual activity: No     Other Topics Concern    Not on file     Social History Narrative       FAMILY HISTORY    Family History   Problem Relation Age of Onset    Diabetes Sister     SLE Sister     Arthritis-osteo Sister     Heart Disease Sister          Livia Costa NP

## 2018-04-26 NOTE — PATIENT INSTRUCTIONS

## 2018-04-26 NOTE — MR AVS SNAPSHOT
[address] [facility] 
 
 
 [address] [facility] [address] 
[phone] Patient: Juan Whitten MRN: [id number] Christian Medley Visit Information Date & Time Provider Department Dept. Phone Encounter #  
 4/26/2018  1:30 PM Jimenez Cary NP [facility] [phone] [id number] Follow-up Instructions Return in about 4 weeks (around 5/24/2018). Your Appointments 5/9/2018  9:30 AM  
Follow Up with Jimenez Cary NP  
[facility] ([address] [place] [address]) Appt Note: fu with HAILEY Stubbs [address] [facility] [address]  
[phone]  
  
   
 [address] [place] [address] Avenue  
  
    
 8/20/2018  8:15 AM  
Office Visit with Ariana Delgado MD  
[facility] [place] [address]) Appt Note: 6 mo f/u  
 [address] [facility], [address] [facility] [address]  
  
   
 [address] [facility], [address] [facility] [address]  
  
    
  
 7/9/2018  3:15 PM  
Any with Zurdo Wayne MD  
[facility] ([address] [facility]) Appt Note: 6 month follow up  
 [address] [facility] [address]  
  
   
 [address] [facility] [address] [address] [address] Upcoming Health Maintenance Date Due ZOSTER VACCINE AGE 60> 6/18/2011 GLAUCOMA SCREENING Q2Y 8/18/2016 FOBT Q 1 YEAR AGE 50-75 12/8/2016 Pneumococcal 65+ Low/Medium Risk (2 of 2 - PPSV23) 8/28/2018 MEDICARE YEARLY EXAM 2/21/2019 DTaP/Tdap/Td series (2 - Td) 12/9/2025 Allergies as of 4/26/2018  Review Complete On: 4/26/2018 By: Cindy Priest LPN Severity Noted Reaction Type Reactions Nicotine Medium 06/11/2015    Contact Dermatitis Nicotine Patch reaction - Contact dermatitis with numbness and tingling also occuring in the left arm and denuding of the skin. Thimerosal  07/21/2015    Other (comments) Contact lens solution, made eyes red and irrated Current Immunizations  Reviewed on 12/9/2015 Name Date Influenza High Dose Vaccine PF 8/28/2017 Influenza Vaccine 10/1/2016, 10/29/2015 Influenza Vaccine PF 10/29/2014 Pneumococcal Conjugate (PCV-13) 8/28/2017 Tdap 12/9/2015 Not reviewed this visit You Were Diagnosed With   
  
 Codes Comments Lumbar radicular pain    -  Primary ICD-10-CM: M54.16 
ICD-9-CM: 724.4 Spinal stenosis of lumbar region with neurogenic claudication     ICD-10-CM: M48.062 
ICD-9-CM: 724.03 Cervical myelopathy (HCC)     ICD-10-CM: G95.9 ICD-9-CM: 721.1 S/P cervical spinal fusion     ICD-10-CM: Z98.1 ICD-9-CM: V45.4 Low back strain, initial encounter     ICD-10-CM: S39.012A ICD-9-CM: 289. 2 Vitals BP Pulse Temp Resp Height(growth percentile) Weight(growth percentile) 147/82 88 98.3 °F (36.8 °C) (Oral) 20 5' 11\" (1.803 m) 222 lb (100.7 kg) BMI Smoking Status 30.96 kg/m2 Current Every Day Smoker BMI and BSA Data Body Mass Index Body Surface Area 30.96 kg/m 2 2.25 m 2 Preferred Pharmacy Pharmacy Name Phone [address] [place] [address], [address] [place] [address] [phone] Your Updated Medication List  
  
   
This list is accurate as of 4/26/18  2:19 PM.  Always use your most recent med list. amLODIPine 10 mg tablet Commonly known as:  [place] Take 1 Tab by mouth daily. cyclobenzaprine 5 mg tablet Commonly known as:  FLEXERIL Take 1-2 Tabs by mouth three (3) times daily as needed for Muscle Spasm(s). DULoxetine 60 mg capsule Commonly known as:  CYMBALTA 1 po daily with food  Indications: CHRONIC MUSCULOSKELETAL PAIN, NEUROPATHIC PAIN  
  
 ketorolac 15 mg/mL Soln injection Commonly known as:  TORADOL  
4 mL by IntraMUSCular route once for 1 dose. LEXAPRO 10 mg tablet Generic drug:  escitalopram oxalate Take 10 mg by mouth daily. lisinopril 20 mg tablet Commonly known as:  Velora [address]ano Take 1 Tab by mouth daily. methylPREDNISolone 4 mg tablet Commonly known as:  MEDROL (TIFFANY) Per dose pack instructions  
  
 omeprazole 20 mg capsule Commonly known as:  PRILOSEC Take 1 Cap by mouth daily. tamsulosin 0.4 mg capsule Commonly known as:  FLOMAX Take 1 Cap by mouth daily (after dinner). TRILEPTAL 300 mg tablet Generic drug:  OXcarbazepine Take 300 mg by mouth daily. Prescriptions Sent to Pharmacy Refills  
 methylPREDNISolone (MEDROL, TIFFANY,) 4 mg tablet 0 Sig: Per dose pack instructions Class: Normal  
 Pharmacy: [address] [place] [address] Ph #: [phone] cyclobenzaprine (FLEXERIL) 5 mg tablet 1 Sig: Take 1-2 Tabs by mouth three (3) times daily as needed for Muscle Spasm(s). Class: Normal  
 Pharmacy: [address] [place] [address] Ph #: [phone] Route: Oral  
  
We Performed the Following AMB POC XRAY, SPINE, CERVICAL; 2 OR 3 [46824 CPT(R)] KETOROLAC TROMETHAMINE INJ [ [facility]] DE THER/PROPH/DIAG INJECTION, SUBCUT/IM [id number] CPT(R)] Follow-up Instructions Return in about 4 weeks (around 5/24/2018). Patient Instructions Preventing Falls: Care Instructions Your Care Instructions Getting around your home safely can be a challenge if you have injuries or health problems that make it easy for you to fall. Loose rugs and furniture in walkways are among the dangers for many older people who have problems walking or who have poor eyesight. People who have conditions such as arthritis, osteoporosis, or dementia also have to be careful not to fall. You can make your home safer with a few simple measures. Follow-up care is a key part of your treatment and safety. Be sure to make and go to all appointments, and call your doctor if you are having problems. It's also a good idea to know your test results and keep a list of the medicines you take. How can you care for yourself at home? Taking care of yourself · You may get dizzy if you do not drink enough water. To prevent dehydration, drink plenty of fluids, enough so that your urine is light yellow or clear like water. Choose water and other caffeine-free clear liquids. If you have kidney, heart, or liver disease and have to limit fluids, talk with your doctor before you increase the amount of fluids you drink. · Exercise regularly to improve your strength, muscle tone, and balance. Walk if you can. Swimming may be a good choice if you cannot walk easily. · Have your vision and hearing checked each year or any time you notice a change. If you have trouble seeing and hearing, you might not be able to avoid objects and could lose your balance. · Know the side effects of the medicines you take. Ask your doctor or pharmacist whether the medicines you take can affect your balance. Sleeping pills or sedatives can affect your balance. · Limit the amount of alcohol you drink. Alcohol can impair your balance and other senses. · Ask your doctor whether calluses or corns on your feet need to be removed. If you wear loose-fitting shoes because of calluses or corns, you can lose your balance and fall. · Talk to your doctor if you have numbness in your feet. Preventing falls at home · Remove raised doorway thresholds, throw rugs, and clutter. Repair loose carpet or raised areas in the floor. · Move furniture and electrical cords to keep them out of walking paths. · Use nonskid floor wax, and wipe up spills right away, especially on ceramic tile floors. · If you use a walker or cane, put rubber tips on it.  If you use crutches, clean the bottoms of them regularly with an abrasive pad, such as steel wool. · Keep your house well lit, especially [address], and outside walkways. Use night-lights in areas such as hallways and bathrooms. Add extra light switches or use remote switches (such as switches that go on or off when you clap your hands) to make it easier to turn lights on if you have to get up during the night. · Install sturdy handrails on stairways. · Move items in your cabinets so that the things you use a lot are on the lower shelves (about waist level). · Keep a cordless phone and a flashlight with new batteries by your bed. If possible, put a phone in each of the main rooms of your house, or carry a cell phone in case you fall and cannot reach a phone. Or, you can wear a device around your neck or wrist. You push a button that sends a signal for help. · Wear low-heeled shoes that fit well and give your feet good support. Use footwear with nonskid soles. Check the heels and soles of your shoes for wear. Repair or replace worn heels or soles. · Do not wear socks without shoes on wood floors. · Walk on the grass when the sidewalks are slippery. If you live in an area that gets snow and ice in the winter, sprinkle salt on slippery steps and sidewalks. Preventing falls in the bath · Install grab bars and nonskid mats inside and outside your shower or tub and near the toilet and sinks. · Use shower chairs and bath benches. · Use a hand-held shower head that will allow you to sit while showering. · Get into a tub or shower by putting the weaker leg in first. Get out of a tub or shower with your strong side first. 
· Repair loose toilet seats and consider installing a raised toilet seat to make getting on and off the toilet easier. · Keep your bathroom door unlocked while you are in the shower. Where can you learn more? Go to http://gomez-jarvis.info/. Enter [phone] in the search box to learn more about \"Preventing Falls: Care Instructions. \" Current as of: May 12, 2017 Content Version: 11.4 © 5070-4403 [employer]. Care instructions adapted under license by [employer] (which disclaims liability or warranty for this information). If you have questions about a medical condition or this instruction, always ask your healthcare professional. [place] [address] any warranty or liability for your use of this information. Introducing [facility] [place] [facility]! Dear Branda Brunner: Thank you for requesting a [employer] account. Our records indicate that you already have an active [employer] account. You can access your account anytime at [email or web address]. [facility]/[employer] Did you know that you can access your hospital and ER discharge instructions at any time in [employer]? You can also review all of your test results from your hospital stay or ER visit. Additional Information If you have questions, please visit the Frequently Asked Questions section of the [employer] website at [email or web address] Remember, [employer] is NOT to be used for urgent needs. For medical emergencies, dial 911. Now available from your iPhone and Android! Please provide this summary of care documentation to your next provider. Your primary care clinician is listed as Sabine Pelaez. If you have any questions after today's visit, please call [phone].

## 2018-05-11 ENCOUNTER — TELEPHONE (OUTPATIENT)
Dept: ORTHOPEDIC SURGERY | Age: 68
End: 2018-05-11

## 2018-05-11 NOTE — TELEPHONE ENCOUNTER
Patient called stating at his appointment scheduled for 05/15/18 with Dr. Miller January he was supposed to review an MRI with him, but no one ever called him to get the MRI set up. I do not see an order in CC either. He is very frustrated and stated he just wants to cancel the appointment altogether since there has been such a lack of communication.

## 2018-05-11 NOTE — TELEPHONE ENCOUNTER
Per the office note, no MRI was ordered. Cervical xray's were done in the office. These were reviewed in the office with HAILEY Berg and Dr. Chavez Nassar reviews the xray's later in the office also. The appointment on 5/15 is his routine one year post op apt with Dr. Chavez Nassar. I would recommend keeping the one year post op as we like to follow up with our patients on the anniversary of the surgery.

## 2018-05-11 NOTE — TELEPHONE ENCOUNTER
Spoke with patient, two patient identifiers verified. Informed patient of HAILEY Hunt message below. Patient thanked me for calling back however, he states NP Morena clearly informed him that she was ordering a MRI C-spine and someone would call him to schedule it. At this point he does not want to come in due to \"us not being in order. \" Patient declined to be put back on the schedule for 5/15 appt for one year post op, because he knows what she told him at the appt.

## 2018-05-14 NOTE — TELEPHONE ENCOUNTER
Spoke with patient, two patient identifiers verified. Patient informed of NP Sweede message below, and offered MRI C-spine order to be placed. Patient declined at this time, stating it should have been done previously. He states he was not trying to be rude, but it seems communication was lost amongst us.

## 2018-05-14 NOTE — TELEPHONE ENCOUNTER
Ok, we discussed it if his symptoms continued to get worse. That is part of why I gave him the Toradol inj and MDP. If he isn't better, then we can order the CS MRI. He still should have kept his appointment with Dr. John Louie.

## 2018-08-08 ENCOUNTER — HOSPITAL ENCOUNTER (OUTPATIENT)
Dept: LAB | Age: 68
Discharge: HOME | End: 2018-08-08
Payer: MEDICAID

## 2018-08-08 ENCOUNTER — OFFICE VISIT (OUTPATIENT)
Dept: INTERNAL MEDICINE CLINIC | Age: 68
End: 2018-08-08

## 2018-08-08 VITALS
HEIGHT: 71 IN | SYSTOLIC BLOOD PRESSURE: 138 MMHG | WEIGHT: 233 LBS | RESPIRATION RATE: 16 BRPM | HEART RATE: 83 BPM | TEMPERATURE: 97.7 F | DIASTOLIC BLOOD PRESSURE: 82 MMHG | BODY MASS INDEX: 32.62 KG/M2 | OXYGEN SATURATION: 97 %

## 2018-08-08 DIAGNOSIS — R73.02 IGT (IMPAIRED GLUCOSE TOLERANCE): ICD-10-CM

## 2018-08-08 DIAGNOSIS — R73.09 ABNORMAL GLUCOSE: ICD-10-CM

## 2018-08-08 DIAGNOSIS — Z23 ENCOUNTER FOR IMMUNIZATION: ICD-10-CM

## 2018-08-08 DIAGNOSIS — I10 ESSENTIAL HYPERTENSION WITH GOAL BLOOD PRESSURE LESS THAN 140/90: ICD-10-CM

## 2018-08-08 DIAGNOSIS — I10 ESSENTIAL HYPERTENSION: ICD-10-CM

## 2018-08-08 DIAGNOSIS — I10 ESSENTIAL HYPERTENSION: Primary | ICD-10-CM

## 2018-08-08 LAB
ALBUMIN SERPL-MCNC: 4 G/DL (ref 3.4–5)
ALBUMIN/GLOB SERPL: 1.3 {RATIO} (ref 0.8–1.7)
ALP SERPL-CCNC: 101 U/L (ref 45–117)
ALT SERPL-CCNC: 18 U/L (ref 16–61)
ANION GAP SERPL CALC-SCNC: 10 MMOL/L (ref 3–18)
AST SERPL-CCNC: 8 U/L (ref 15–37)
BASOPHILS # BLD: 0 K/UL (ref 0–0.1)
BASOPHILS NFR BLD: 0 % (ref 0–2)
BILIRUB SERPL-MCNC: 0.2 MG/DL (ref 0.2–1)
BUN SERPL-MCNC: 17 MG/DL (ref 7–18)
BUN/CREAT SERPL: 15 (ref 12–20)
CALCIUM SERPL-MCNC: 8.4 MG/DL (ref 8.5–10.1)
CHLORIDE SERPL-SCNC: 106 MMOL/L (ref 100–108)
CHOLEST SERPL-MCNC: 186 MG/DL
CO2 SERPL-SCNC: 25 MMOL/L (ref 21–32)
CREAT SERPL-MCNC: 1.11 MG/DL (ref 0.6–1.3)
DIFFERENTIAL METHOD BLD: ABNORMAL
EOSINOPHIL # BLD: 0.2 K/UL (ref 0–0.4)
EOSINOPHIL NFR BLD: 3 % (ref 0–5)
ERYTHROCYTE [DISTWIDTH] IN BLOOD BY AUTOMATED COUNT: 14 % (ref 11.6–14.5)
EST. AVERAGE GLUCOSE BLD GHB EST-MCNC: 123 MG/DL
GLOBULIN SER CALC-MCNC: 3 G/DL (ref 2–4)
GLUCOSE SERPL-MCNC: 111 MG/DL (ref 74–99)
HBA1C MFR BLD: 5.9 % (ref 4.2–5.6)
HCT VFR BLD AUTO: 46.1 % (ref 36–48)
HDLC SERPL-MCNC: 35 MG/DL (ref 40–60)
HDLC SERPL: 5.3 {RATIO} (ref 0–5)
HGB BLD-MCNC: 15.2 G/DL (ref 13–16)
LDLC SERPL CALC-MCNC: 111.4 MG/DL (ref 0–100)
LIPID PROFILE,FLP: ABNORMAL
LYMPHOCYTES # BLD: 1.8 K/UL (ref 0.9–3.6)
LYMPHOCYTES NFR BLD: 33 % (ref 21–52)
MCH RBC QN AUTO: 32.8 PG (ref 24–34)
MCHC RBC AUTO-ENTMCNC: 33 G/DL (ref 31–37)
MCV RBC AUTO: 99.6 FL (ref 74–97)
MONOCYTES # BLD: 0.6 K/UL (ref 0.05–1.2)
MONOCYTES NFR BLD: 10 % (ref 3–10)
NEUTS SEG # BLD: 3 K/UL (ref 1.8–8)
NEUTS SEG NFR BLD: 54 % (ref 40–73)
PLATELET # BLD AUTO: 151 K/UL (ref 135–420)
PMV BLD AUTO: 10.8 FL (ref 9.2–11.8)
POTASSIUM SERPL-SCNC: 4 MMOL/L (ref 3.5–5.5)
PROT SERPL-MCNC: 7 G/DL (ref 6.4–8.2)
RBC # BLD AUTO: 4.63 M/UL (ref 4.7–5.5)
SODIUM SERPL-SCNC: 141 MMOL/L (ref 136–145)
TRIGL SERPL-MCNC: 198 MG/DL (ref ?–150)
VLDLC SERPL CALC-MCNC: 39.6 MG/DL
WBC # BLD AUTO: 5.6 K/UL (ref 4.6–13.2)

## 2018-08-08 PROCEDURE — 36415 COLL VENOUS BLD VENIPUNCTURE: CPT | Performed by: INTERNAL MEDICINE

## 2018-08-08 PROCEDURE — 85025 COMPLETE CBC W/AUTO DIFF WBC: CPT | Performed by: INTERNAL MEDICINE

## 2018-08-08 PROCEDURE — 80053 COMPREHEN METABOLIC PANEL: CPT | Performed by: INTERNAL MEDICINE

## 2018-08-08 PROCEDURE — 83036 HEMOGLOBIN GLYCOSYLATED A1C: CPT | Performed by: INTERNAL MEDICINE

## 2018-08-08 PROCEDURE — 80061 LIPID PANEL: CPT | Performed by: INTERNAL MEDICINE

## 2018-08-08 RX ORDER — ESCITALOPRAM OXALATE 10 MG/1
10 TABLET ORAL DAILY
COMMUNITY
End: 2019-06-13

## 2018-08-08 RX ORDER — AMLODIPINE BESYLATE 10 MG/1
10 TABLET ORAL DAILY
Qty: 90 TAB | Refills: 1 | Status: SHIPPED | OUTPATIENT
Start: 2018-08-08 | End: 2019-03-25 | Stop reason: SDUPTHER

## 2018-08-08 RX ORDER — TRAZODONE HYDROCHLORIDE 50 MG/1
50 TABLET ORAL 2 TIMES DAILY
COMMUNITY
End: 2019-06-13

## 2018-08-08 RX ORDER — LISINOPRIL 20 MG/1
20 TABLET ORAL DAILY
Qty: 90 TAB | Refills: 1 | Status: SHIPPED | OUTPATIENT
Start: 2018-08-08 | End: 2019-02-26 | Stop reason: SDUPTHER

## 2018-08-08 RX ORDER — OMEPRAZOLE 20 MG/1
20 CAPSULE, DELAYED RELEASE ORAL DAILY
Qty: 90 CAP | Refills: 3 | Status: SHIPPED | OUTPATIENT
Start: 2018-08-08 | End: 2019-09-23 | Stop reason: SDUPTHER

## 2018-08-08 NOTE — PROGRESS NOTES
HPI:   F/u visit for routine evaluation of HTN, IGT  Chronic pain is unchanged; sees psychiatry for ongoing depression (improved with rx)  w/o chest pain/abd. discomfort; no dyspnea, cough or pedal edema; denies constitutional complaints of fever, night sweats or wt loss; no evidence of GI/ hemorrhage    ROS is otherwise negative. Past Medical History:   Diagnosis Date    Bilateral hip pain     Chronic pain     back; hx of opiod addiction    Depression     Diabetes (HCC)     borderline, no meds-trying to control with diet    DJD (degenerative joint disease)     GERD (gastroesophageal reflux disease)     Hepatitis C 01/2015    +Harvoni rx    Hypertension     Kidney stones     Lymphadenopathy, generalized 12/05/2015    neg bx    Numbness of foot left    resolved per patient 1/29/16    S/P lumbar fusion 2/9/2016    L4/5 LAMINECTOMY/FUSION/TRANSFORAMINAL LUMBAR INTERBODY FUSION (TLIF) by Dr. Konstantin Lemon on 2/8/16     Unspecified sleep apnea     no cpap machine-pt denies       Past Surgical History:   Procedure Laterality Date    HX APPENDECTOMY  as a child    HX BACK SURGERY  07/2015    HX CERVICAL FUSION  05/18/2017    ACDF C5/6 C6/7    HX COLONOSCOPY  2015    negative    HX LUMBAR FUSION  2/2016    HX ORTHOPAEDIC      denies    HX OTHER SURGICAL  05/2017    cervical fusion    HX TONSILLECTOMY  as a child    REMOVE [facility] 3-10-16    Dr. Kaycee Pepper History     Social History    Marital status:      Spouse name: N/A    Number of children: N/A    Years of education: N/A     Occupational History    Not on file.      Social History Main Topics    Smoking status: Current Every Day Smoker     Packs/day: 1.00     Years: 40.00     Types: Cigarettes     Last attempt to quit: 5/12/2017    Smokeless tobacco: Never Used    Alcohol use No    Drug use: Yes     Special: Marijuana, Cocaine      Comment: stopped cocaine 2007, marijuana 5/14/17    Sexual activity: No     Other Topics Concern    Not on file     Social History Narrative       Allergies   Allergen Reactions    Nicotine Contact Dermatitis     Nicotine Patch reaction - Contact dermatitis with numbness and tingling also occuring in the left arm and denuding of the skin.  Thimerosal Other (comments)     Contact lens solution, made eyes red and irrated       Family History   Problem Relation Age of Onset    Diabetes Sister     SLE Sister     Arthritis-osteo Sister     Heart Disease Sister        Current Outpatient Prescriptions   Medication Sig Dispense Refill    omeprazole (PRILOSEC) 20 mg capsule Take 1 Cap by mouth daily. 90 Cap 3    amLODIPine (NORVASC) 10 mg tablet Take 1 Tab by mouth daily. 90 Tab 1    lisinopril (PRINIVIL, ZESTRIL) 20 mg tablet Take 1 Tab by mouth daily. 90 Tab 1    escitalopram oxalate (LEXAPRO) 10 mg tablet Take 10 mg by mouth daily.  traZODone (DESYREL) 50 mg tablet Take 50 mg by mouth two (2) times a day.  tamsulosin (FLOMAX) 0.4 mg capsule Take 1 Cap by mouth daily (after dinner). 90 Cap 3    OXcarbazepine (TRILEPTAL) 300 mg tablet Take 300 mg by mouth daily.  cyclobenzaprine (FLEXERIL) 5 mg tablet Take 1-2 Tabs by mouth three (3) times daily as needed for Muscle Spasm(s). 45 Tab 1           Visit Vitals    /82 (BP 1 Location: Left arm, BP Patient Position: Sitting)    Pulse 83    Temp 97.7 °F (36.5 °C) (Tympanic)    Resp 16    Ht 5' 11\" (1.803 m)    Wt 233 lb (105.7 kg)    SpO2 97%    BMI 32.5 kg/m2       PE  Well nourished in NAD  HEENT: OP: clear. Neck: supple w/o mass or bruits. Chest: clear. CV: RRR w/o m,r,g; pulses intact. Abd: soft, NT, w/o HSM or mass. Ext: w/o edema. Neuro: NF. Assessment and Plan    Encounter Diagnoses   Name Primary?     Essential hypertension Yes    IGT (impaired glucose tolerance)     Abnormal glucose     Encounter for immunization     Essential hypertension with goal blood pressure less than 140/90      BP @ goal  Labs to assess metabolic parameters (IGT)  Continue dietary/exercise efforts  Pneumovax given  The patient was counseled on the dangers of tobacco use, and was advised to quit. Reviewed strategies to maximize success, including removing cigarettes and smoking materials from environment. No change in rx  OV 6 mos or prn  I have explained plan to patient and the patient verbalizes understanding          Discussed the patient's BMI with him. The BMI follow up plan is as follows:     dietary management education, guidance, and counseling  encourage exercise  monitor weight  prescribed dietary intake    An After Visit Summary was printed and given to the patient.

## 2018-08-08 NOTE — PATIENT INSTRUCTIONS
DASH Diet: Care Instructions  Your Care Instructions    The DASH diet is an eating plan that can help lower your blood pressure. DASH stands for Dietary Approaches to Stop Hypertension. Hypertension is high blood pressure. The DASH diet focuses on eating foods that are high in calcium, potassium, and magnesium. These nutrients can lower blood pressure. The foods that are highest in these nutrients are fruits, vegetables, low-fat dairy products, nuts, seeds, and legumes. But taking calcium, potassium, and magnesium supplements instead of eating foods that are high in those nutrients does not have the same effect. The DASH diet also includes whole grains, fish, and poultry. The DASH diet is one of several lifestyle changes your doctor may recommend to lower your high blood pressure. Your doctor may also want you to decrease the amount of sodium in your diet. Lowering sodium while following the DASH diet can lower blood pressure even further than just the DASH diet alone. Follow-up care is a key part of your treatment and safety. Be sure to make and go to all appointments, and call your doctor if you are having problems. It's also a good idea to know your test results and keep a list of the medicines you take. How can you care for yourself at home? Following the DASH diet  · Eat 4 to 5 servings of fruit each day. A serving is 1 medium-sized piece of fruit, ½ cup chopped or canned fruit, 1/4 cup dried fruit, or 4 ounces (½ cup) of fruit juice. Choose fruit more often than fruit juice. · Eat 4 to 5 servings of vegetables each day. A serving is 1 cup of lettuce or raw leafy vegetables, ½ cup of chopped or cooked vegetables, or 4 ounces (½ cup) of vegetable juice. Choose vegetables more often than vegetable juice. · Get 2 to 3 servings of low-fat and fat-free dairy each day. A serving is 8 ounces of milk, 1 cup of yogurt, or 1 ½ ounces of cheese. · Eat 6 to 8 servings of grains each day.  A serving is 1 slice of bread, 1 ounce of dry cereal, or ½ cup of cooked rice, pasta, or cooked cereal. Try to choose whole-grain products as much as possible. · Limit lean meat, poultry, and fish to 2 servings each day. A serving is 3 ounces, about the size of a deck of cards. · Eat 4 to 5 servings of nuts, seeds, and legumes (cooked dried beans, lentils, and split peas) each week. A serving is 1/3 cup of nuts, 2 tablespoons of seeds, or ½ cup of cooked beans or peas. · Limit fats and oils to 2 to 3 servings each day. A serving is 1 teaspoon of vegetable oil or 2 tablespoons of salad dressing. · Limit sweets and added sugars to 5 servings or less a week. A serving is 1 tablespoon jelly or jam, ½ cup sorbet, or 1 cup of lemonade. · Eat less than 2,300 milligrams (mg) of sodium a day. If you limit your sodium to 1,500 mg a day, you can lower your blood pressure even more. Tips for success  · Start small. Do not try to make dramatic changes to your diet all at once. You might feel that you are missing out on your favorite foods and then be more likely to not follow the plan. Make small changes, and stick with them. Once those changes become habit, add a few more changes. · Try some of the following:  ¨ Make it a goal to eat a fruit or vegetable at every meal and at snacks. This will make it easy to get the recommended amount of fruits and vegetables each day. ¨ Try yogurt topped with fruit and nuts for a snack or healthy dessert. ¨ Add lettuce, tomato, cucumber, and onion to sandwiches. ¨ Combine a ready-made pizza crust with low-fat mozzarella cheese and lots of vegetable toppings. Try using tomatoes, squash, spinach, broccoli, carrots, cauliflower, and onions. ¨ Have a variety of cut-up vegetables with a low-fat dip as an appetizer instead of chips and dip. ¨ Sprinkle sunflower seeds or chopped almonds over salads. Or try adding chopped walnuts or almonds to cooked vegetables.   ¨ Try some vegetarian meals using beans and peas. Add garbanzo or kidney beans to salads. Make burritos and tacos with mashed le beans or black beans. Where can you learn more? Go to http://gomez-jarvis.info/. Enter I995 in the search box to learn more about \"DASH Diet: Care Instructions. \"  Current as of: December 6, 2017  Content Version: 11.7  © 1760-8057 [employer]. Care instructions adapted under license by [employer] (which disclaims liability or warranty for this information). If you have questions about a medical condition or this instruction, always ask your healthcare professional. [address] any warranty or liability for your use of this information. Body Mass Index: Care Instructions  Your Care Instructions    Body mass index (BMI) can help you see if your weight is raising your risk for health problems. It uses a formula to compare how much you weigh with how tall you are. · A BMI lower than 18.5 is considered underweight. · A BMI between 18.5 and 24.9 is considered healthy. · A BMI between 25 and 29.9 is considered overweight. A BMI of 30 or higher is considered obese. If your BMI is in the normal range, it means that you have a lower risk for weight-related health problems. If your BMI is in the overweight or obese range, you may be at increased risk for weight-related health problems, such as high blood pressure, heart disease, stroke, arthritis or joint pain, and diabetes. If your BMI is in the underweight range, you may be at increased risk for health problems such as fatigue, lower protection (immunity) against illness, muscle loss, bone loss, hair loss, and hormone problems. BMI is just one measure of your risk for weight-related health problems. You may be at higher risk for health problems if you are not active, you eat an unhealthy diet, or you drink too much alcohol or use tobacco products.   Follow-up care is a key part of your treatment and safety. Be sure to make and go to all appointments, and call your doctor if you are having problems. It's also a good idea to know your test results and keep a list of the medicines you take. How can you care for yourself at home? · Practice healthy eating habits. This includes eating plenty of fruits, vegetables, whole grains, lean protein, and low-fat dairy. · If your doctor recommends it, get more exercise. Walking is a good choice. Bit by bit, increase the amount you walk every day. Try for at least 30 minutes on most days of the week. · Do not smoke. Smoking can increase your risk for health problems. If you need help quitting, talk to your doctor about stop-smoking programs and medicines. These can increase your chances of quitting for good. · Limit alcohol to 2 drinks a day for men and 1 drink a day for women. Too much alcohol can cause health problems. If you have a BMI higher than 25  · Your doctor may do other tests to check your risk for weight-related health problems. This may include measuring the distance around your waist. A waist measurement of more than 40 inches in men or 35 inches in women can increase the risk of weight-related health problems. · Talk with your doctor about steps you can take to stay healthy or improve your health. You may need to make lifestyle changes to lose weight and stay healthy, such as changing your diet and getting regular exercise. If you have a BMI lower than 18.5  · Your doctor may do other tests to check your risk for health problems. · Talk with your doctor about steps you can take to stay healthy or improve your health. You may need to make lifestyle changes to gain or maintain weight and stay healthy, such as getting more healthy foods in your diet and doing exercises to build muscle. Where can you learn more? Go to http://gomez-jarvis.info/.   Enter S176 in the search box to learn more about \"Body Mass Index: Care Instructions. \"  Current as of: October 13, 2016  Content Version: 11.4  © 2381-1261 [employer], [facility]. Care instructions adapted under license by [employer] (which disclaims liability or warranty for this information). If you have questions about a medical condition or this instruction, always ask your healthcare professional. [place] [address] any warranty or liability for your use of this information.

## 2018-08-08 NOTE — PROGRESS NOTES
1. Have you been to the ER, urgent care clinic since your last visit? Hospitalized since your last visit? No    2. Have you seen or consulted any other health care providers outside of the [address] [place] [address] since your last visit? Include any pap smears or colon screening.  No

## 2018-08-29 ENCOUNTER — OFFICE VISIT (OUTPATIENT)
Dept: HEMATOLOGY | Age: 68
End: 2018-08-29

## 2018-08-29 VITALS
BODY MASS INDEX: 31.64 KG/M2 | SYSTOLIC BLOOD PRESSURE: 162 MMHG | RESPIRATION RATE: 18 BRPM | WEIGHT: 226 LBS | DIASTOLIC BLOOD PRESSURE: 89 MMHG | HEIGHT: 71 IN | HEART RATE: 77 BPM | OXYGEN SATURATION: 97 % | TEMPERATURE: 97.8 F

## 2018-08-29 DIAGNOSIS — B18.2 CHRONIC HEPATITIS C WITHOUT HEPATIC COMA (HCC): Primary | ICD-10-CM

## 2018-08-29 NOTE — PROGRESS NOTES
[address] [facility] MD [place], FACP, Alisson Dougherty, NP Grayce Saint, PA-C Elston Carolina, MD, Grace Khan, Carlos Enrique Stern MD     [address] [place] [address], HAILEY Regan NP        [address], [address] Tammy Delacruz  22.     [phone]     FAX: [address] [facility], [address] [place] [address], [address] [phone]     FAX: [phone]         Patient Care Team:  Tigist Turk MD as PCP - General (Internal Medicine)  Nino Chavarria MD as Physician (Urology)      Problem List  Date Reviewed: 8/8/2018          Codes Class Noted    Low back strain, initial encounter ICD-10-CM: S39.012A  ICD-9-CM: 847.2  4/26/2018        Lumbar radicular pain ICD-10-CM: M54.16  ICD-9-CM: 724.4  3/19/2018        Spinal stenosis of lumbar region with neurogenic claudication ICD-10-CM: M48.062  ICD-9-CM: 724.03  3/19/2018        Recurrent depression ([facility] ICD-10-CM: F33.9  ICD-9-CM: 296.30  1/17/2018        History of opioid abuse ICD-10-CM: Z87.898  ICD-9-CM: 305.53  10/25/2017        IGT (impaired glucose tolerance) ICD-10-CM: R73.02  ICD-9-CM: 790.22  8/28/2017        Depression ICD-10-CM: F32.9  ICD-9-CM: 043  8/28/2017        S/P cervical spinal fusion ICD-10-CM: Z98.1  ICD-9-CM: V45.4  6/1/2017        Cervical myelopathy ([facility] ICD-10-CM: G95.9  ICD-9-CM: 721.1  5/18/2017        Acute pain of right shoulder ICD-10-CM: M25.511  ICD-9-CM: 719.41  4/4/2017        Cervical spinal stenosis ICD-10-CM: M48.02  ICD-9-CM: 723.0  11/29/2016        Bulge of lumbar disc without myelopathy ICD-10-CM: M51.26  ICD-9-CM: 722.10  11/29/2016        Lumbar facet arthropathy ([address] ICD-10-CM: M46.96  ICD-9-CM: 721.3  11/15/2016        Chronic pain ICD-10-CM: G89.29  ICD-9-CM: 338.29  11/15/2016        Neuritis ICD-10-CM: M79.2  ICD-9-CM: 729.2  11/15/2016        Kidney stones ICD-10-CM: N20.0  ICD-9-CM: 592.0  5/10/2016        S/P lumbar fusion ICD-10-CM: Z98.1  ICD-9-CM: V45.4  2/8/2016    Overview Signed 2/9/2016  1:55 PM by Roselyn Veliz     L4/5 LAMINECTOMY/FUSION/TRANSFORAMINAL LUMBAR INTERBODY FUSION (TLIF) by Dr. Aurea Dumont on 2/8/16. Onychomycosis of toenail ICD-10-CM: B35.1  ICD-9-CM: 110.1  11/25/2015        GERD (gastroesophageal reflux disease) ICD-10-CM: K21.9  ICD-9-CM: 530.81  5/1/2014        Chronic hepatitis C without hepatic coma ([facility] ICD-10-CM: B18.2  ICD-9-CM: 070.54  4/3/2014        HTN (hypertension) ICD-10-CM: I10  ICD-9-CM: 401.9  4/2/2014        Back pain ICD-10-CM: M54.9  ICD-9-CM: 724.5  4/2/2014        Prediabetes ICD-10-CM: R73.03  ICD-9-CM: 790.29  4/2/2014                Mr. Chris Herzog returns to the [address] regarding chronic HCV and its management. The HCV has been treated and cured. His active problem list, all pertinent past medical history, medications, liver histology, endoscopic studies, radiologic findings and laboratory findings related to the liver disorder were reviewed with him. The patient is a 80 y.o.  male who was noted to have abnormalities in liver chemistries and subsequently tested positive for chronic HCV in 1/2016. Risk factors for acquiring HCV are IV drug use in 1970s. There was no history of acute incteric hepatitis at the time of these risk factors. Ultrasound and CT scan of the liver was performed in 5/2016. The results of the imaging suggested chronic liver disease. Ultrasound with electrography was performed in May 2016 and was consistent with F3 and possibly F4 disease. A liver biopsy has not been performed. The patient was found to have an inguinal lymph node. This was surgically removed. There was no evidence of malignancy.     The most recent laboratory studies indicate that the liver transaminases are normal, tests of hepatic synthetic and metabolic function are normal, and the platelet count is normal.      The patient has no symptoms which can be attributed to the liver disorder. He completed 12 weeks of treatment with Yuliya Veliz in 11/206. The patient has not experienced problems concentrating, swelling of the abdomen, swelling of the lower extremities, hematemesis, hematochezia. The patient has limitations in functional activities which can be attributed to other medical problems that are not related to the liver disease. ALLERGIES  Allergies   Allergen Reactions    Nicotine Contact Dermatitis     Nicotine Patch reaction - Contact dermatitis with numbness and tingling also occuring in the left arm and denuding of the skin.  Thimerosal Other (comments)     Contact lens solution, made eyes red and irrated       MEDICATIONS  Current Outpatient Prescriptions   Medication Sig    omeprazole (PRILOSEC) 20 mg capsule Take 1 Cap by mouth daily.  amLODIPine (NORVASC) 10 mg tablet Take 1 Tab by mouth daily.  lisinopril (PRINIVIL, ZESTRIL) 20 mg tablet Take 1 Tab by mouth daily.  escitalopram oxalate (LEXAPRO) 10 mg tablet Take 10 mg by mouth daily.  traZODone (DESYREL) 50 mg tablet Take 50 mg by mouth two (2) times a day.  tamsulosin (FLOMAX) 0.4 mg capsule Take 1 Cap by mouth daily (after dinner).  OXcarbazepine (TRILEPTAL) 300 mg tablet Take 300 mg by mouth daily. No current facility-administered medications for this visit. SYSTEM REVIEW NOT RELATED TO LIVER DISEASE OR REVIEWED ABOVE:  Constitution systems: Negative for fever, chills, weight gain, weight loss. Eyes: Negative for visual changes. ENT: Negative for sore throat, painful swallowing. Respiratory: Negative for cough, hemoptysis, SOB. Cardiology: Negative for chest pain, palpitations. GI:  Negative for constipation or diarrhea. : Negative for urinary frequency, dysuria, hematuria, nocturia. Skin: Negative for rash.   Hematology: Negative for easy bruising, blood clots. Musculo-skelatal: Chronic back pain. Neurologic: Negative for headaches, dizziness, vertigo, memory problems not related to HE. Psychology: Negative for anxiety, depression. FAMILY HISTORY:  The father  of MVA. The mother  of unknown cause in her [de-identified]. There is no family history of liver disease. SOCIAL HISTORY:  The patient is . The patient has no children. The patient stopped using tobacco products in 2016. The patient has never consumed significant amounts of alcohol. The patient used to work as a . The patient has not worked since . PHYSICAL EXAMINATION:  Visit Vitals    /89 (BP 1 Location: Left arm, BP Patient Position: Sitting)    Pulse 77    Temp 97.8 °F (36.6 °C) (Tympanic)    Resp 18    Ht 5' 11\" (1.803 m)    Wt 226 lb (102.5 kg)    SpO2 97%    BMI 31.52 kg/m2     General: No acute distress. Eyes: Sclera anicteric. ENT: No oral lesions. Thyroid normal.  Nodes: No adenopathy. Skin: No spider angiomata. No jaundice. No palmar erythema. Respiratory: Lungs clear to auscultation. Cardiovascular: Regular heart rate. No murmurs. No JVD. Abdomen: Soft non-tender. Liver size normal to percussion/palpation. Spleen not palpable. No obvious ascites. Extremities: No edema. No muscle wasting. No gross arthritic changes. Neurologic: Alert and oriented. Cranial nerves grossly intact. No asterixis.     LABORATORY STUDIES:  Liver [place] of [address] [place] [address] 2018   WBC 4.6 - 13.2 K/uL 5.6   ANC 1.8 - 8.0 K/UL 3.0   HGB 13.0 - 16.0 g/dL 15.2    - 420 K/uL 151   INR 0.8 - 1.2    AST 15 - 37 U/L 8 (L)   ALT 16 - 61 U/L 18   Alk Phos 45 - 117 U/L 101   Bili, Total 0.2 - 1.0 MG/DL 0.2   Bili, Direct 0.00 - 0.40 mg/dL    Albumin 3.4 - 5.0 g/dL 4.0   BUN 7.0 - 18 MG/DL 17   Creat 0.6 - 1.3 MG/DL 1.11   Creat (iSTAT) 0.6 - 1.3 MG/DL    Na 136 - 145 mmol/L 141   K 3.5 - 5.5 mmol/L 4.0   Cl 100 - 108 mmol/L 106   CO2 21 - 32 mmol/L 25   Glucose 74 - 99 mg/dL 111 (H)     Liver [place] Latest Ref Rng & Units 11/22/2016 10/27/2016   WBC 3.4 - 10.8 x10E3/uL 7.4    ANC 1.4 - 7.0 x10E3/uL 6.1    HGB 12.6 - 17.7 g/dL 15.8     - 379 x10E3/uL 145 (L)    INR 0.8 - 1.2     AST 0 - 40 IU/L 16    ALT 0 - 44 IU/L 16    Alk Phos 39 - 117 IU/L 68    Bili, Total 0.0 - 1.2 mg/dL 0.4    Bili, Direct 0.00 - 0.40 mg/dL 0.12    Albumin 3.6 - 4.8 g/dL 4.4    BUN 8 - 27 mg/dL 22    Creat 0.76 - 1.27 mg/dL 0.97    Creat (iSTAT) 0.6 - 1.3 MG/DL  1.1   Na 136 - 144 mmol/L 136    K 3.5 - 5.2 mmol/L 4.4    Cl 97 - 106 mmol/L 97    CO2 18 - 29 mmol/L 23    Glucose 65 - 99 mg/dL 130 (H)      SEROLOGIES:  1/2016. Anti-HCV positive, EMILIA negative  Serologies Latest Ref Rng 5/10/2016   Hep A Ab, Total Negative Negative   Hep B Surface Ag Negative Negative   Hep B Core Ab, Total Negative Negative   Hep B Surface AB QL  Non Reactive   Hep C Ab 0.0 - 0.9 s/co ratio    Hep C Genotype  1a   HCV RT-PCR, Quant  [id number]   Ferritin 30 - 400 ng/mL 201   Iron % Saturation 15 - 55 % 28       LIVER HISTOLOGY:  05/2016. TRANSIENT HEPATIC ELASTOGRAPHY:   E Range: 8.17-14.0 kPa  E Mean: 11.37 kPa  E Median: 10.98 kPa  E Std: 2.16 kPa    ENDOSCOPIC PROCEDURES:  Not available or performed    RADIOLOGY:  10/2015. Ultrasound of liver. Echogenic consistent with chronic liver disease. No liver mass lesions. No dilated bile ducts. No ascites. 12/2015. CT scan abdomen without IV contrast.  Normal appearing liver. No liver mass lesions. Normal spleen. No ascites. 5/2016: Ultrasound of the liver. Liver is borderline in size, largest transverse dimension of the right hepatic lobe measuring 18.3 cm.  Diffusely heterogeneous echotexture noted.  No focal mass.  Normal direction of blood flow in the portal vein.  Portal vein measures 1.2 cm.  11/2016:  Ultrasound of liver.   Echogenic consistent with chronic liver disease. No liver mass lesions. No dilated bile ducts. No ascites. 02/2018. CT Abdomen/Pelvis w/wo contrast.  Hepatic steatosis is present. OTHER TESTING:  Not available or performed    ASSESSMENT AND PLAN:  Chronic HCV, genotype 1 A. The most recent laboratory studies indicate that the liver transaminases are normal, alkaline phosphatase is normal, tests of hepatic synthetic and metabolic function are normal, and the platelet count is normal.      HCV. The HCV has been treated and the patient was SVR 12 in 02/2017. He was treated with 12 weeks of Vamshi Montrell. The need to perform an assessment of liver fibrosis was discussed with the patient. Elastography  can assess liver fibrosis and determine if a patient has advanced fibrosis or cirrhosis without the need for liver biopsy. This can also be performed with ultrasound. This was ordered today to be completed prior to his next appointment here. The patient was directed to continue all current medications at the current dosages. There are no contraindications for the patient to take any medications that are necessary for treatment of other medical issues. The patient was counseled regarding alcohol consumption. Vaccination for viral hepatitis A and B is recommended since the patient has no serologic evidence of previous exposure or vaccination with immunity. [address] screening has recently been performed and does not suggest [address] The next liver imaging study will be performed in 10/2018. All of the above issues were discussed with the patient. All questions were answered. The patient expressed a clear understanding of the above. [facility] 87 in 3 months.      EDWINA Elder  Liver [place] [facility]  [facility], [address] [place] [address]   [phone]

## 2018-08-29 NOTE — MR AVS SNAPSHOT
[facility] [place] 
 
 
 [place] [address] [address] [address] 
[phone] Patient: Marisela Alfred MRN: [id number] Angela Caruso Visit Information Date & Time Provider Department Dept. Phone Encounter #  
 8/29/2018  3:00 PM HAILEY Howell 13 of [facility]  Your Appointments 2/6/2019  8:15 AM  
Follow Up with Bonilla Valdez MD  
[address] [facility] Appt Note: 6mo  
 [address], [address] [place] [address]  
  
   
 [address], [address] [place] [address] 7/11/2019  3:15 PM  
Any with Bruno Mcgill MD  
[facility] ([facility]) Appt Note: Return in about 1 year (around 7/9/2019) for OAB. [address] [facility] [address] [facility] 1097 [facility]  
  
   
 [address] [facility] 2301 [facility] [address] [place] [address] Upcoming Health Maintenance Date Due ZOSTER VACCINE AGE 60> 6/18/2011 GLAUCOMA SCREENING Q2Y 8/18/2016 FOBT Q 1 YEAR AGE 50-75 12/8/2016 Influenza Age 5 to Adult 8/1/2018 DTaP/Tdap/Td series (2 - Td) 12/9/2025 Allergies as of 8/29/2018  Review Complete On: 8/29/2018 By: Jamel Mixon Severity Noted Reaction Type Reactions Nicotine Medium 06/11/2015    Contact Dermatitis Nicotine Patch reaction - Contact dermatitis with numbness and tingling also occuring in the left arm and denuding of the skin. Thimerosal  07/21/2015    Other (comments) Contact lens solution, made eyes red and irrated Current Immunizations  Reviewed on 12/9/2015 Name Date Influenza High Dose Vaccine PF 8/28/2017 Influenza Vaccine 10/1/2016, 10/29/2015 Influenza Vaccine PF 10/29/2014 Pneumococcal Conjugate (PCV-13) 8/28/2017 Pneumococcal Polysaccharide (PPSV-23) 8/8/2018 Tdap 12/9/2015 Not reviewed this visit Vitals BP Pulse Temp Resp Height(growth percentile) 162/89 (BP 1 Location: Left arm, BP Patient Position: Sitting) 77 97.8 °F (36.6 °C) (Tympanic) 18 5' 11\" (1.803 m) Weight(growth percentile) SpO2 BMI Smoking Status 226 lb (102.5 kg) 97% 31.52 kg/m2 Current Every Day Smoker BMI and BSA Data Body Mass Index Body Surface Area  
 31.52 kg/m 2 2.27 m 2 Preferred Pharmacy Pharmacy Name Phone [address] [place] [address], [address] [place] [address] [phone] Your Updated Medication List  
  
   
This list is accurate as of 8/29/18  3:07 PM.  Always use your most recent med list. amLODIPine 10 mg tablet Commonly known as:  [place] Conine Take 1 Tab by mouth daily. cyclobenzaprine 5 mg tablet Commonly known as:  FLEXERIL Take 1-2 Tabs by mouth three (3) times daily as needed for Muscle Spasm(s). LEXAPRO 10 mg tablet Generic drug:  escitalopram oxalate Take 10 mg by mouth daily. lisinopril 20 mg tablet Commonly known as:  Barbara Lights Take 1 Tab by mouth daily. omeprazole 20 mg capsule Commonly known as:  PRILOSEC Take 1 Cap by mouth daily. tamsulosin 0.4 mg capsule Commonly known as:  FLOMAX Take 1 Cap by mouth daily (after dinner). traZODone 50 mg tablet Commonly known as:  [place]ro [place] Take 50 mg by mouth two (2) times a day. TRILEPTAL 300 mg tablet Generic drug:  OXcarbazepine Take 300 mg by mouth daily. Introducing [facility]! Dear Mando Bolton: Thank you for requesting a Ucha.se account. Our records indicate that you already have an active Ucha.se account. You can access your account anytime at [email or web address] Did you know that you can access your hospital and ER discharge instructions at any time in Ucha.se? You can also review all of your test results from your hospital stay or ER visit. Additional Information If you have questions, please visit the Frequently Asked Questions section of the [email or web address] website at [email or web address] Remember, Velostack is NOT to be used for urgent needs. For medical emergencies, dial 911. Now available from your iPhone and Android! Please provide this summary of care documentation to your next provider. Your primary care clinician is listed as Claudette Noble. If you have any questions after today's visit, please call [phone].

## 2018-08-30 ENCOUNTER — HOSPITAL ENCOUNTER (OUTPATIENT)
Dept: LAB | Age: 68
Discharge: HOME | End: 2018-08-30

## 2018-08-30 DIAGNOSIS — B18.2 CHRONIC HEPATITIS C WITHOUT HEPATIC COMA (HCC): Primary | ICD-10-CM

## 2018-08-30 LAB — XX-LABCORP SPECIMEN COL,LCBCF: NORMAL

## 2018-08-30 PROCEDURE — 99001 SPECIMEN HANDLING PT-LAB: CPT | Performed by: NURSE PRACTITIONER

## 2018-11-01 ENCOUNTER — OFFICE VISIT (OUTPATIENT)
Dept: INTERNAL MEDICINE CLINIC | Age: 68
End: 2018-11-01

## 2018-11-01 VITALS
WEIGHT: 221 LBS | DIASTOLIC BLOOD PRESSURE: 74 MMHG | BODY MASS INDEX: 30.94 KG/M2 | TEMPERATURE: 97.7 F | SYSTOLIC BLOOD PRESSURE: 130 MMHG | RESPIRATION RATE: 16 BRPM | OXYGEN SATURATION: 96 % | HEART RATE: 85 BPM | HEIGHT: 71 IN

## 2018-11-01 DIAGNOSIS — Z23 ENCOUNTER FOR IMMUNIZATION: ICD-10-CM

## 2018-11-01 DIAGNOSIS — R73.02 IGT (IMPAIRED GLUCOSE TOLERANCE): ICD-10-CM

## 2018-11-01 DIAGNOSIS — I10 ESSENTIAL HYPERTENSION: Primary | ICD-10-CM

## 2018-11-01 NOTE — PATIENT INSTRUCTIONS
DASH Diet: Care Instructions  Your Care Instructions    The DASH diet is an eating plan that can help lower your blood pressure. DASH stands for Dietary Approaches to Stop Hypertension. Hypertension is high blood pressure. The DASH diet focuses on eating foods that are high in calcium, potassium, and magnesium. These nutrients can lower blood pressure. The foods that are highest in these nutrients are fruits, vegetables, low-fat dairy products, nuts, seeds, and legumes. But taking calcium, potassium, and magnesium supplements instead of eating foods that are high in those nutrients does not have the same effect. The DASH diet also includes whole grains, fish, and poultry. The DASH diet is one of several lifestyle changes your doctor may recommend to lower your high blood pressure. Your doctor may also want you to decrease the amount of sodium in your diet. Lowering sodium while following the DASH diet can lower blood pressure even further than just the DASH diet alone. Follow-up care is a key part of your treatment and safety. Be sure to make and go to all appointments, and call your doctor if you are having problems. It's also a good idea to know your test results and keep a list of the medicines you take. How can you care for yourself at home? Following the DASH diet  · Eat 4 to 5 servings of fruit each day. A serving is 1 medium-sized piece of fruit, ½ cup chopped or canned fruit, 1/4 cup dried fruit, or 4 ounces (½ cup) of fruit juice. Choose fruit more often than fruit juice. · Eat 4 to 5 servings of vegetables each day. A serving is 1 cup of lettuce or raw leafy vegetables, ½ cup of chopped or cooked vegetables, or 4 ounces (½ cup) of vegetable juice. Choose vegetables more often than vegetable juice. · Get 2 to 3 servings of low-fat and fat-free dairy each day. A serving is 8 ounces of milk, 1 cup of yogurt, or 1 ½ ounces of cheese. · Eat 6 to 8 servings of grains each day.  A serving is 1 slice of bread, 1 ounce of dry cereal, or ½ cup of cooked rice, pasta, or cooked cereal. Try to choose whole-grain products as much as possible. · Limit lean meat, poultry, and fish to 2 servings each day. A serving is 3 ounces, about the size of a deck of cards. · Eat 4 to 5 servings of nuts, seeds, and legumes (cooked dried beans, lentils, and split peas) each week. A serving is 1/3 cup of nuts, 2 tablespoons of seeds, or ½ cup of cooked beans or peas. · Limit fats and oils to 2 to 3 servings each day. A serving is 1 teaspoon of vegetable oil or 2 tablespoons of salad dressing. · Limit sweets and added sugars to 5 servings or less a week. A serving is 1 tablespoon jelly or jam, ½ cup sorbet, or 1 cup of lemonade. · Eat less than 2,300 milligrams (mg) of sodium a day. If you limit your sodium to 1,500 mg a day, you can lower your blood pressure even more. Tips for success  · Start small. Do not try to make dramatic changes to your diet all at once. You might feel that you are missing out on your favorite foods and then be more likely to not follow the plan. Make small changes, and stick with them. Once those changes become habit, add a few more changes. · Try some of the following:  ? Make it a goal to eat a fruit or vegetable at every meal and at snacks. This will make it easy to get the recommended amount of fruits and vegetables each day. ? Try yogurt topped with fruit and nuts for a snack or healthy dessert. ? Add lettuce, tomato, cucumber, and onion to sandwiches. ? Combine a ready-made pizza crust with low-fat mozzarella cheese and lots of vegetable toppings. Try using tomatoes, squash, spinach, broccoli, carrots, cauliflower, and onions. ? Have a variety of cut-up vegetables with a low-fat dip as an appetizer instead of chips and dip. ? Sprinkle sunflower seeds or chopped almonds over salads. Or try adding chopped walnuts or almonds to cooked vegetables.   ? Try some vegetarian meals using beans and peas. Add garbanzo or kidney beans to salads. Make burritos and tacos with mashed le beans or black beans. Where can you learn more? Go to http://gomez-jarvis.info/. Enter P208 in the search box to learn more about \"DASH Diet: Care Instructions. \"  Current as of: December 6, 2017  Content Version: 11.8  © 2396-1729 [employer] ([employer]). Care instructions adapted under license by [employer] (which disclaims liability or warranty for this information). If you have questions about a medical condition or this instruction, always ask your healthcare professional. [address] any warranty or liability for your use of this information.

## 2018-11-01 NOTE — PROGRESS NOTES
1. Have you been to the ER, urgent care clinic since your last visit? Hospitalized since your last visit? No    2. Have you seen or consulted any other health care providers outside of the [address] [place] [address] since your last visit? Include any pap smears or colon screening.  No

## 2018-11-01 NOTE — PROGRESS NOTES
HPI:   Pt with HTN presents with check list of health tests insurance advised  Labs done 8/2018  Pt is up to date with colo (neg 2015)  Pt continues to smoke but has only mild COPD sxs  Spirometry recommended by his insurance company bur explained that best way to prevent ongoing progressive COPD sxs is to stop smoking  Pt also has IGT (A1C/chol 5.9/186 August 2018)  Sees psychiatry for rx of depression  Chronic pain d/t disc disease    ROS is otherwise negative.     Past Medical History:   Diagnosis Date    Bilateral hip pain     Chronic pain     back; hx of opiod addiction    Depression     Diabetes (HCC)     borderline, no meds-trying to control with diet    DJD (degenerative joint disease)     GERD (gastroesophageal reflux disease)     Hepatitis C 01/2015    +Harvoni rx    Hypertension     Kidney stones     Lymphadenopathy, generalized 12/05/2015    neg bx    Numbness of foot left    resolved per patient 1/29/16    S/P lumbar fusion 2/9/2016    L4/5 LAMINECTOMY/FUSION/TRANSFORAMINAL LUMBAR INTERBODY FUSION (TLIF) by Dr. Morenita Sparrow on 2/8/16     Unspecified sleep apnea     no cpap machine-pt denies       Past Surgical History:   Procedure Laterality Date    HX APPENDECTOMY  as a child    HX BACK SURGERY  07/2015    HX CERVICAL FUSION  05/18/2017    ACDF C5/6 C6/7    HX COLONOSCOPY  2015    negative    HX LUMBAR FUSION  2/2016    HX ORTHOPAEDIC      denies    HX OTHER SURGICAL  05/2017    cervical fusion    HX TONSILLECTOMY  as a child    REMOVE Josette Right 3-10-16    Dr. Julia Fairbanks History     Socioeconomic History    Marital status:      Spouse name: Not on file    Number of children: Not on file    Years of education: Not on file    Highest education level: Not on file   Social Needs    Financial resource strain: Not on file    Food insecurity - worry: Not on file    Food insecurity - inability: Not on file    Transportation needs - medical: Not on file    Transportation needs - non-medical: Not on file   Occupational History    Not on file   Tobacco Use    Smoking status: Current Every Day Smoker     Packs/day: 1.00     Years: 40.00     Pack years: 40.00     Types: Cigarettes     Last attempt to quit: 2017     Years since quittin.4    Smokeless tobacco: Former User   Substance and Sexual Activity    Alcohol use: No     Alcohol/week: 0.0 oz    Drug use: Yes     Types: Marijuana, Cocaine     Comment: stopped cocaine , marijuana 17    Sexual activity: No   Other Topics Concern    Not on file   Social History Narrative    Not on file       Allergies   Allergen Reactions    Nicotine Contact Dermatitis     Nicotine Patch reaction - Contact dermatitis with numbness and tingling also occuring in the left arm and denuding of the skin.  Thimerosal Other (comments)     Contact lens solution, made eyes red and irrated       Family History   Problem Relation Age of Onset    Diabetes Sister     SLE Sister     Arthritis-osteo Sister     Heart Disease Sister        Current Outpatient Medications   Medication Sig Dispense Refill    omeprazole (PRILOSEC) 20 mg capsule Take 1 Cap by mouth daily. 90 Cap 3    amLODIPine (NORVASC) 10 mg tablet Take 1 Tab by mouth daily. 90 Tab 1    lisinopril (PRINIVIL, ZESTRIL) 20 mg tablet Take 1 Tab by mouth daily. 90 Tab 1    escitalopram oxalate (LEXAPRO) 10 mg tablet Take 10 mg by mouth daily.  traZODone (DESYREL) 50 mg tablet Take 50 mg by mouth two (2) times a day.  tamsulosin (FLOMAX) 0.4 mg capsule Take 1 Cap by mouth daily (after dinner). 90 Cap 3    OXcarbazepine (TRILEPTAL) 300 mg tablet Take 300 mg by mouth daily.              Visit Vitals  /74 (BP 1 Location: Left arm, BP Patient Position: Sitting)   Pulse 85   Temp 97.7 °F (36.5 °C) (Tympanic)   Resp 16   Ht 5' 11\" (1.803 m)   Wt 221 lb (100.2 kg)   SpO2 96%   BMI 30.82 kg/m²       PE  Well nourished in NAD  HEENT:   OP: clear.  Neck: supple w/o mass or bruits. Chest: clear. CV: RRR w/o m,r,g; pulses intact. Abd: soft, NT, w/o HSM or mass. Ext: w/o edema. Neuro: NF. Assessment and Plan    Encounter Diagnoses   Name Primary?  Essential hypertension Yes    IGT (impaired glucose tolerance)     Encounter for immunization        HTN - controlled  IGT/hyperlipidemia - continue dietary/exercise efforts; stop smoking  The patient was counseled on the dangers of tobacco use, and was advised to quit. Reviewed strategies to maximize success, including removing cigarettes and smoking materials from environment.   Offered to arrange spirometry but pt declines this testing at this time; flu vaccine given  No change in rx  OV 4 mos or prn  I have explained plan to patient and the patient verbalizes understanding

## 2018-11-02 ENCOUNTER — OFFICE VISIT (OUTPATIENT)
Dept: ORTHOPEDIC SURGERY | Age: 68
End: 2018-11-02

## 2018-11-02 VITALS
SYSTOLIC BLOOD PRESSURE: 135 MMHG | DIASTOLIC BLOOD PRESSURE: 81 MMHG | HEART RATE: 93 BPM | HEIGHT: 71 IN | BODY MASS INDEX: 30.8 KG/M2 | WEIGHT: 220 LBS

## 2018-11-02 DIAGNOSIS — Z98.1 HISTORY OF LUMBAR FUSION: ICD-10-CM

## 2018-11-02 DIAGNOSIS — M48.02 CERVICAL SPINAL STENOSIS: ICD-10-CM

## 2018-11-02 DIAGNOSIS — M48.062 SPINAL STENOSIS OF LUMBAR REGION WITH NEUROGENIC CLAUDICATION: Primary | ICD-10-CM

## 2018-11-02 DIAGNOSIS — Z98.1 S/P CERVICAL SPINAL FUSION: ICD-10-CM

## 2018-11-02 DIAGNOSIS — G89.4 CHRONIC PAIN SYNDROME: ICD-10-CM

## 2018-11-02 RX ORDER — TIAGABINE HYDROCHLORIDE 2 MG/1
2 TABLET, FILM COATED ORAL 2 TIMES DAILY WITH MEALS
Qty: 60 TAB | Refills: 1 | Status: SHIPPED | OUTPATIENT
Start: 2018-11-02 | End: 2019-07-26

## 2018-11-02 NOTE — PROGRESS NOTES
[address]  [address], [address]  [place], [address] [place] [address] Street  Phone: [phone]  Fax: [phone]  PROGRESS NOTE  Patient: Cari Delacruz                MRN: [id number]       SSN: [id number]  YOB: 1950        AGE: 80 y.o. SEX: male  Body mass index is 30.68 kg/m². PCP: Deyanira Shaw MD  11/02/18    Chief Complaint   Patient presents with    Back Pain     lumbar BLE pain. numbnes in LLE/feet. weakness in BLE       HISTORY OF PRESENT ILLNESS:  John Cueva a 78 y.o.  male with history of chronic LBP and BLE pain for 30+ years and radiation of pain to BLE in the L4-5 .  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 His yearly CS x-rays looked good back in April. He had an EMG 01/2017 that showed peripheral sensiromotor neuropathy and BLE Chronic L5 radiculopathy and possible L4 and S1 radiculopathy. Pain is aching, burning, numbing and radiating. Pain is worse with walking, standing and affects sleep and recreational activities. Pain is better with nothing. He had a Left L5-S1 SNRB eight months ago without any benefit. At his last OV: I gave him a Toradol inj followed by MDP for an acute back strain and wanted him to f/o with Dr. Vicente Del Rosario for his yearly eval of his neck surgery and consideration of SCS, that was eight months ago. He never followed up. Today: his neck is doing good. He has some residual numbness on the right but he is improved and doing well. His main issue is his stenotic back pain with BLE pain that is now in multi-level distributions L3-5/S1. He is miserable. He is interested in a SCS. He has known stenosis, moderate to severe.  I think we should update his imaging.      Denies bladder/bowel dysfunction, saddle paresthesia, weakness, gait disturbance, or other neurological deficits. Denies chills, fever,night sweats, unexplained weight loss/weight gain, chest pain, sob or anxiety. Reports no new medical issues or hospitalizations since the last visit. Pt at this time desires to  continue with current care/proceed with medication evaluation/proceed with evaluation of LBP and RLE pain     Radiographs  IMPRESSION: LS MRI Dated 09/19/2017      1. At L4-L5, there has been fusion and posterior decompression, with  degenerative changes still resulting in mild to moderate spinal canal stenosis  and moderate foraminal stenoses. No specific findings to suggest infection. An  enhancing left cauda equina nerve root, similar to prior, possibly postsurgical,  or reactive, of unlikely significance.      -Multilevel advanced degenerative disc disease with multiple disc  bulges/protrusions. Multilevel advanced facet arthropathy. Up to moderate spinal  canal stenosis at L2-L3 and L3-L4. Multilevel moderate to severe foraminal  stenoses, most notably L5-S1. Abutment of several nerve roots as above. Medications: He is on Lexapro and is currently seen by a psychiatrist. Failed Gabapentin and Lyrica. He has had kidney stones in the past and is not a good candidate for Topamax. PMHx: Hep C, GERD, DM, HTN, Kidney stones, hx Drug Addiction      ASSESSMENT   Diagnoses and all orders for this visit:    1. Spinal stenosis of lumbar region with neurogenic claudication  -     MRI LUMB SPINE W WO CONT; Future    2. Cervical spinal stenosis    3. S/P cervical spinal fusion    4. Chronic pain syndrome    5. History of lumbar fusion  -     MRI LUMB SPINE W WO CONT; Future    Other orders  -     tiaGABine (GABITRIL) 2 mg tablet; Take 1 Tab by mouth two (2) times daily (with meals). IMPRESSION AND PLAN:  This is a pt with spinal stenosis.  His neck is doing well. His back has gotten worse. He has tried injections, meds, PT. He doesn't want PM, as he is a recovering addict. He would like to discuss surgery vs SCS. We will update his MRI to evaluate his know stenosis. 1) Pt was given information on Gabitril   2) Continue meds thru Psych  3) Trial of Gabapentin  4) Update LS MRI, eval stenosis for consideration of surgery vs SCS. F/O w/ Temo Avila  4) Mr. Modesto Waters has a reminder for a \"due or due soon\" health maintenance. I have asked that he contact his primary care provider, Mansi Mitchell MD, for follow-up on this health maintenance. 5) We have informed patient to notify us for immediate appointment if he has any worsening neurogical symptoms or if an emergency situation presents, then call 911  6)  has been reviewed and is appropriate  7) Pt will follow-up in 4 wks w/ Temo Avila. Subjective    Work not working, use to [employer]    Smoking Status smoker    Pain Scale: 10 - Worst pain ever/10    Pain Assessment  11/2/2018   Location of Pain Back;Leg   Pain Location Comment -   Location Modifiers Left;Right   Severity of Pain -   Quality of Pain Sharp;Locking   Quality of Pain Comment numbness weakness   Duration of Pain Persistent   Frequency of Pain Constant   Date Pain First Started -   Aggravating Factors -   Aggravating Factors Comment -   Limiting Behavior Some   Relieving Factors Nothing   Relieving Factors Comment -   Result of Injury No         REVIEW OF SYSTEMS  Constitutional: Negative for fever, chills, or weight change. Respiratory: Negative for cough or shortness of breath. Cardiovascular: Negative for chest pain or palpitations. Gastrointestinal: Negative for incontinence, acid reflux, change in bowel habits, or constipation. Genitourinary: Negative for incontinence, dysuria and flank pain. Musculoskeletal: Positive for back and BLE pain. See HPI. Skin: Negative for rash. Neurological:BLE L3-S1  radiculopathy.  See HPI.  Endo/Heme/Allergies: Negative. Psychiatric/Behavioral: Negative. PHYSICAL EXAMINATION  Visit Vitals  /81   Pulse 93   Ht 5' 11\" (1.803 m)   Wt 220 lb (99.8 kg)   BMI 30.68 kg/m²         Accompanied by Self. Constitutional:  Well developed, well nourished, in no acute distress. Psychiatric: Affect and mood are appropriate. Integumentary: No rashes or abrasions noted on exposed areas. Cardiovascular/Peripheral Vascular: +2 radial & pedal pulses. No peripheral edema is noted. Lymphatic:  No evidence of lymphedema. No cervical lymphadenopathy. SPINE/MUSCULOSKELETAL EXAM  Cervical spine:  Neck is midline. Normal muscle tone. No focal atrophy is noted. Neck ROM intact with flexion, extension, turning right, turning left. Shoulder ROM intact. Tenderness to palpation none. Negative Spurling's sign. Negative Tinel's sign. Negative Reyes's sign. Sensation grossly intact to light touch. Lumbar spine:  No rash, ecchymosis, or gross obliquity. No fasciculations. No focal atrophy is noted. Range of motion is pain with extension. Tenderness to palpation bilateral low back L3-Sacrum. No tenderness to palpation at the sciatic notch. SI joints non-tender. Trochanters non tender. Straight leg raise negative    Sensation grossly intact to light touch. MOTOR:     Biceps Triceps Deltoids Wrist Ext Wrist Flex Hand Intrin   Right 5/5 5/5 5/5 5/5 5/5 5/5   Left 5/5 5/5 5/5 5/5 5/5 5/5      Hip Flex Quads Hamstrings Ankle DF EHL Ankle PF   Right +4/5 +4/5 +4/5 +4/5 +4/5 +4/5   Left +4/5 +4/5 +4/5 +4/5 +4/5 +4/5       Ambulation with single point cane. FWB.     Non-antalgic gait  + 's cart        PAST MEDICAL HISTORY   Past Medical History:   Diagnosis Date    Bilateral hip pain     Chronic pain     back; hx of opiod addiction    Depression     Diabetes ([facility] [address]     borderline, no meds-trying to control with diet    DJD (degenerative joint disease)     GERD (gastroesophageal reflux disease)     Hepatitis C 01/2015    +Harvoni rx    Hypertension     Kidney stones     Lymphadenopathy, generalized 12/05/2015    neg bx    Numbness of foot left    resolved per patient 1/29/16    S/P lumbar fusion 2/9/2016    L4/5 LAMINECTOMY/FUSION/TRANSFORAMINAL LUMBAR INTERBODY FUSION (TLIF) by Dr. Bre Berumen on 2/8/16     Unspecified sleep apnea     no cpap machine-pt denies       Past Surgical History:   Procedure Laterality Date    HX APPENDECTOMY  as a child    HX BACK SURGERY  07/2015    HX CERVICAL FUSION  05/18/2017    ACDF C5/6 C6/7    HX COLONOSCOPY  2015    negative    HX LUMBAR FUSION  2/2016    HX ORTHOPAEDIC      denies    HX OTHER SURGICAL  05/2017    cervical fusion    HX TONSILLECTOMY  as a child    [facility]    [facility]. Leslie Monahan   . MEDICATIONS      Current Outpatient Medications   Medication Sig Dispense Refill    tiaGABine (GABITRIL) 2 mg tablet Take 1 Tab by mouth two (2) times daily (with meals). 60 Tab 1    omeprazole (PRILOSEC) 20 mg capsule Take 1 Cap by mouth daily. 90 Cap 3    amLODIPine (NORVASC) 10 mg tablet Take 1 Tab by mouth daily. 90 Tab 1    lisinopril (PRINIVIL, ZESTRIL) 20 mg tablet Take 1 Tab by mouth daily. 90 Tab 1    escitalopram oxalate (LEXAPRO) 10 mg tablet Take 10 mg by mouth daily.  traZODone (DESYREL) 50 mg tablet Take 50 mg by mouth two (2) times a day.  tamsulosin (FLOMAX) 0.4 mg capsule Take 1 Cap by mouth daily (after dinner). 90 Cap 3    OXcarbazepine (TRILEPTAL) 300 mg tablet Take 300 mg by mouth daily. ALLERGIES    Allergies   Allergen Reactions    Nicotine Contact Dermatitis     Nicotine Patch reaction - Contact dermatitis with numbness and tingling also occuring in the left arm and denuding of the skin.     Thimerosal Other (comments)     Contact lens solution, made eyes red and irrated          SOCIAL HISTORY    Social History     Socioeconomic History    Marital status:      Spouse name: Not on file    Number of children: Not on file    Years of education: Not on file    Highest education level: Not on file   Social Needs    Financial resource strain: Not on file    Food insecurity - worry: Not on file    Food insecurity - inability: Not on file    Transportation needs - medical: Not on file   [employer] needs - non-medical: Not on file   Occupational History    Not on file   Tobacco Use    Smoking status: Current Every Day Smoker     Packs/day: 1.00     Years: 40.00     Pack years: 40.00     Types: Cigarettes     Last attempt to quit: 2017     Years since quittin.4    Smokeless tobacco: Former User   Substance and Sexual Activity    Alcohol use: No     Alcohol/week: 0.0 oz    Drug use: Yes     Types: Marijuana, Cocaine     Comment: stopped cocaine , marijuana 18    Sexual activity: No   Other Topics Concern    Not on file   Social History Narrative    Not on file       FAMILY HISTORY    Family History   Problem Relation Age of Onset    Diabetes Sister     SLE Sister     Arthritis-osteo Sister     Heart Disease Sister          Maya Jones NP

## 2018-11-02 NOTE — PATIENT INSTRUCTIONS
Tiagabine (By mouth)   Tiagabine ([id number])  Treats seizures. Brand Name(s): Gabitril   There may be other brand names for this medicine. When This Medicine Should Not Be Used: This medicine is not right for everyone. Do not use it if you had an allergic reaction to tiagabine. How to Use This Medicine:   Tablet  · Take your medicine as directed. Your dose may need to be changed several times to find what works best for you. · It is best to take this medicine with food or milk. · This medicine should come with a Medication Guide. Ask your pharmacist for a copy if you do not have one. · Missed dose: Take a dose as soon as you remember. If it is almost time for your next dose, wait until then and take a regular dose. Do not take extra medicine to make up for a missed dose. If you miss more than one dose, call your doctor. You may need to go back to a lower dose for a short time. · Store the medicine in a closed container at room temperature, away from heat, moisture, and direct light. Drugs and Foods to Avoid:   Ask your doctor or pharmacist before using any other medicine, including over-the-counter medicines, vitamins, and herbal products. · Some medicines can affect how tiagabine works. Tell your doctor if you are using another medicine to treat seizures (including carbamazepine, phenobarbital, phenytoin, primidone, or valproate) or Star's wort. · Tell your doctor if you use anything else that makes you sleepy. Some examples are allergy medicine, narcotic pain medicine, and alcohol. Warnings While Using This Medicine:   · Tell your doctor if you are pregnant or breastfeeding, or if you have liver disease or a history of status epilepticus. · This medicine may cause seizures in people who do not have epilepsy. · This medicine may cause depression or thoughts of suicide. Tell your doctor if you have a history of depression or mental illness. · Do not stop using this medicine suddenly.  Your doctor will need to slowly decrease your dose before you stop it completely. · This medicine may make you dizzy, drowsy, or tired. Do not drive or do anything else that could be dangerous until you know how this medicine affects you. · Keep all medicine out of the reach of children. Never share your medicine with anyone. Possible Side Effects While Using This Medicine:   Call your doctor right away if you notice any of these side effects:  · Allergic reaction: Itching or hives, swelling in your face or hands, swelling or tingling in your mouth or throat, chest tightness, trouble breathing  · Blistering, peeling, red skin rash  · Confusion, problems with balance, walking, or speech  · Feeling depressed, irritable, or restless  · Seizures that happen more often or are worse than usual  · Thoughts of hurting yourself, unusual moods or behaviors  If you notice these less serious side effects, talk with your doctor:   · Trouble sleeping or concentrating  · Weakness, tremors, tiredness, sleepiness  If you notice other side effects that you think are caused by this medicine, tell your doctor. Call your doctor for medical advice about side effects. You may report side effects to FDA at 3-765-FDA-9309  © 2017 [facility] Information is for End User's use only and may not be sold, redistributed or otherwise used for commercial purposes. The above information is an  only. It is not intended as medical advice for individual conditions or treatments. Talk to your doctor, nurse or pharmacist before following any medical regimen to see if it is safe and effective for you.

## 2018-11-16 ENCOUNTER — TELEPHONE (OUTPATIENT)
Dept: ORTHOPEDIC SURGERY | Age: 68
End: 2018-11-16

## 2018-11-23 ENCOUNTER — HOSPITAL ENCOUNTER (OUTPATIENT)
Dept: MRI IMAGING | Age: 68
Discharge: HOME | End: 2018-11-23
Attending: NURSE PRACTITIONER
Payer: MEDICARE

## 2018-11-23 VITALS — BODY MASS INDEX: 31.38 KG/M2 | WEIGHT: 225 LBS

## 2018-11-23 DIAGNOSIS — M48.062 SPINAL STENOSIS OF LUMBAR REGION WITH NEUROGENIC CLAUDICATION: ICD-10-CM

## 2018-11-23 DIAGNOSIS — Z98.1 HISTORY OF LUMBAR FUSION: ICD-10-CM

## 2018-11-23 LAB — CREAT UR-MCNC: 1.1 MG/DL (ref 0.6–1.3)

## 2018-11-23 PROCEDURE — A9575 INJ GADOTERATE MEGLUMI 0.1ML: HCPCS | Performed by: NURSE PRACTITIONER

## 2018-11-23 PROCEDURE — 72158 MRI LUMBAR SPINE W/O & W/DYE: CPT

## 2018-11-23 PROCEDURE — 82565 ASSAY OF CREATININE: CPT

## 2018-11-23 PROCEDURE — 74011636320 HC RX REV CODE- 636/320: Performed by: NURSE PRACTITIONER

## 2018-11-23 RX ADMIN — GADOTERATE MEGLUMINE 20 ML: 376.9 INJECTION INTRAVENOUS at 15:00

## 2018-11-26 ENCOUNTER — HOSPITAL ENCOUNTER (OUTPATIENT)
Dept: ULTRASOUND IMAGING | Age: 68
Discharge: HOME | End: 2018-11-26
Payer: MEDICARE

## 2018-11-26 DIAGNOSIS — B18.2 CHRONIC HEPATITIS C WITHOUT HEPATIC COMA (HCC): ICD-10-CM

## 2018-11-26 PROCEDURE — 76705 ECHO EXAM OF ABDOMEN: CPT

## 2018-11-27 ENCOUNTER — OFFICE VISIT (OUTPATIENT)
Dept: ORTHOPEDIC SURGERY | Age: 68
End: 2018-11-27

## 2018-11-27 VITALS
OXYGEN SATURATION: 98 % | DIASTOLIC BLOOD PRESSURE: 87 MMHG | BODY MASS INDEX: 31.36 KG/M2 | TEMPERATURE: 97.8 F | SYSTOLIC BLOOD PRESSURE: 151 MMHG | HEART RATE: 73 BPM | WEIGHT: 224 LBS | RESPIRATION RATE: 16 BRPM | HEIGHT: 71 IN

## 2018-11-27 DIAGNOSIS — G89.4 CHRONIC PAIN SYNDROME: Primary | ICD-10-CM

## 2018-11-27 DIAGNOSIS — M48.02 CERVICAL SPINAL STENOSIS: ICD-10-CM

## 2018-11-27 DIAGNOSIS — M48.062 SPINAL STENOSIS, LUMBAR REGION WITH NEUROGENIC CLAUDICATION: ICD-10-CM

## 2018-11-27 NOTE — PROGRESS NOTES
[address] 
[address], [address] Phone: [phone] Fax: [phone] PROGRESS NOTE Patient: Shiraz Medel                MRN: [id number]       SSN: [id number] YOB: 1950        AGE: 80 y.o. SEX: male Body mass index is 31.24 kg/m². PCP: Jolly Hutton MD 
11/27/18 Chief Complaint Patient presents with  Back Pain Lower  Hip Pain Bilateral   
 Leg Pain Bilateral   
 Follow-up MRI HISTORY OF PRESENT ILLNESS, RADIOGRAPHS, and PLAN:  
 
HISTORY OF PRESENT ILLNESS:  Mr. Manuela Gu is seen today at the request of Dr. Elizabeth Sarmiento. The patient is seen in followup. The patient is known to me. I performed a previous cervical fusion on him, which he is doing well from, and a lumbar fusion for failed back, from which he had improvement but continues to have neuropathic leg pain. PHYSICAL EXAMINATION:  He is having back and buttock and bilateral leg pain. He has no focal neurologic deficits. IMAGING:  Repeat MRIs demonstrate some residual foraminal stenosis but resolution of his greater central stenosis and a stable fusion. ASSESSMENT/PLAN:  I have discussed the matter previously with him, and he has done a bunch of research and wishes to avail himself to consider spinal cord stimulation. I think that is not an unreasonable approach for him. I think it is best to be evaluated as part of a pain management program, if possible, and should be trialed independent of myself, if at all reasonable. I have discussed this with the patient. I am going to refer him to a pain management specialist for evaluation and trialing. If they think appropriate for a spinal cord stimulator, I would be happy to provide further assistance to him if he desires. We will be making those evaluations necessary. cc:  Dr. Yesenia Ko Past Medical History:  
Diagnosis Date  Bilateral hip pain  Chronic pain   
 back; hx of opiod addiction  Depression  Diabetes ([address] borderline, no meds-trying to control with diet  DJD (degenerative joint disease)  GERD (gastroesophageal reflux disease)  Hepatitis C 01/2015 +[facility] rx  Hypertension  Kidney stones  Lymphadenopathy, generalized 12/05/2015  
 neg bx  Numbness of foot left  
 resolved per patient 1/29/16  S/P lumbar fusion 2/9/2016 L4/5 LAMINECTOMY/FUSION/TRANSFORAMINAL LUMBAR INTERBODY FUSION (TLIF) by Dr. rEum Rothman on 2/8/16  Unspecified sleep apnea   
 no cpap machine-pt denies Family History Problem Relation Age of Onset  Diabetes Sister  SLE Sister  Arthritis-osteo Sister  Heart Disease Sister Current Outpatient Medications Medication Sig Dispense Refill  tiaGABine (GABITRIL) 2 mg tablet Take 1 Tab by mouth two (2) times daily (with meals). 60 Tab 1  
 omeprazole (PRILOSEC) 20 mg capsule Take 1 Cap by mouth daily. 90 Cap 3  
 amLODIPine (NORVASC) 10 mg tablet Take 1 Tab by mouth daily. 90 Tab 1  
 lisinopril (PRINIVIL, ZESTRIL) 20 mg tablet Take 1 Tab by mouth daily. 90 Tab 1  
 escitalopram oxalate (LEXAPRO) 10 mg tablet Take 10 mg by mouth daily.  traZODone (DESYREL) 50 mg tablet Take 50 mg by mouth two (2) times a day.  tamsulosin (FLOMAX) 0.4 mg capsule Take 1 Cap by mouth daily (after dinner). 90 Cap 3  
 OXcarbazepine (TRILEPTAL) 300 mg tablet Take 300 mg by mouth daily. Allergies Allergen Reactions  Nicotine Contact Dermatitis Nicotine Patch reaction - Contact dermatitis with numbness and tingling also occuring in the left arm and denuding of the skin.  Thimerosal Other (comments) Contact lens solution, made eyes red and irrated Past Surgical History:  
Procedure Laterality Date  HX APPENDECTOMY  as a child  HX BACK SURGERY  07/2015  HX CERVICAL FUSION  05/18/2017  ACDF C5/6 C6/7  
  HX COLONOSCOPY    
 negative  HX LUMBAR FUSION  2016  HX ORTHOPAEDIC    
 denies  HX OTHER SURGICAL  2017  
 cervical fusion  HX TONSILLECTOMY  as a child  REMOVE GROIN LYMPH NODES SUPERF Right 3-10-16 Dr. Cristina Gaona Past Medical History:  
Diagnosis Date  Bilateral hip pain  Chronic pain   
 back; hx of opiod addiction  Depression  Diabetes ([facility] borderline, no meds-trying to control with diet  DJD (degenerative joint disease)  GERD (gastroesophageal reflux disease)  Hepatitis C 2015 +[facility] rx  Hypertension  Kidney stones  Lymphadenopathy, generalized 2015  
 neg bx  Numbness of foot left  
 resolved per patient 16  S/P lumbar fusion 2016 L4/5 LAMINECTOMY/FUSION/TRANSFORAMINAL LUMBAR INTERBODY FUSION (TLIF) by Dr. Jaime Sorto on 16  Unspecified sleep apnea   
 no cpap machine-pt denies Social History Socioeconomic History  Marital status:  Spouse name: Not on file  Number of children: Not on file  Years of education: Not on file  Highest education level: Not on file Social Needs  Financial resource strain: Not on file  Food insecurity - worry: Not on file  Food insecurity - inability: Not on file  Transportation needs - medical: Not on file  Transportation needs - non-medical: Not on file Occupational History  Not on file Tobacco Use  Smoking status: Current Every Day Smoker Packs/day: 1.00 Years: 40.00 Pack years: 40.00 Types: Cigarettes Last attempt to quit: 2017 Years since quittin.5  Smokeless tobacco: Former User Substance and Sexual Activity  Alcohol use: No  
  Alcohol/week: 0.0 oz  Drug use: Yes Types: Marijuana, Cocaine Comment: stopped cocaine , marijuana 17  Sexual activity: No  
Other Topics Concern  Not on file Social History Narrative  Not on file REVIEW OF SYSTEMS: 
 CONSTITUTIONAL SYMPTOMS:  Negative. EYES:  Negative. EARS, NOSE, THROAT AND MOUTH:  Negative. CARDIOVASCULAR:  Negative. RESPIRATORY:  Negative. GENITOURINARY: Per HPI. GASTROINTESTINAL:  Per HPI. INTEGUMENTARY (SKIN AND/OR BREAST):  Negative. MUSCULOSKELETAL: Per HPI. ENDOCRINE/RHEUMATOLOGIC:  Negative. NEUROLOGICAL:  Per HPI. HEMATOLOGIC/LYMPHATIC:  Negative. ALLERGIC/IMMUNOLOGIC:  Negative. PSYCHIATRIC:  Negative. PHYSICAL EXAMINATION:  
Visit Vitals /87 Pulse 73 Temp 97.8 °F (36.6 °C) Resp 16 Ht 5' 11\" (1.803 m) Wt 224 lb (101.6 kg) SpO2 98% BMI 31.24 kg/m² PAIN SCALE: 6/10 CONSTITUTIONAL: The patient is in no apparent distress and is alert and oriented x 3. HEENT: Normocephalic. Hearing grossly intact. NECK: Supple and symmetric. no tenderness, or masses were felt. RESPIRATORY: No labored breathing. CARDIOVASCULAR: The carotid pulses were normal. Peripheral pulses were 2+. CHEST: Normal AP diameter and normal contour without any kyphoscoliosis. LYMPHATIC: No lymphadenopathy was appreciated in the neck, axillae or groin. SKIN:   Negative for scars, rashes, lesions, or ulcers on the right upper, right lower, left upper, left lower and trunk. NEUROLOGICAL: Alert and oriented x 3. Ambulation with single point cane. FWB. EXTREMITIES: See musculoskeletal. 
MUSCULOSKELETAL: 
? Head and Neck:  Negative for misalignment, asymmetry, crepitation, defects, tenderness masses or effusions. ? Left Upper Extremity: Inspection, percussion and palpation performed. Reyess sign is negative. ? Right Upper Extremity: Inspection, percussion and palpation performed. Reyess sign is negative. ? Spine, Ribs and Pelvis: Back pain. Short walking tolerance. Inspection, percussion and palpation performed. Negative for misalignment, asymmetry, crepitation, defects, tenderness masses or effusions. ? Left Lower Extremity: Pain. Inspection, percussion and palpation performed. Negative straight leg raise. ? Right Lower Extremity: Pain. Inspection, percussion and palpation performed. Negative straight leg raise. SPINE EXAM:  
 
Cervical spine: Neck is midline. Normal muscle tone. No focal atrophy is noted. Lumbar spine: No rash, ecchymosis, or gross obliquity. No focal atrophy is noted. ASSESSMENT 
  ICD-10-CM ICD-9-CM 1. Chronic pain syndrome G89.4 338.4 2. Spinal stenosis, lumbar region with neurogenic claudication M48.062 724.03 REFERRAL TO PAIN MANAGEMENT 3. Cervical spinal stenosis M48.02 723.0 REFERRAL TO PAIN MANAGEMENT Written by Brittany Mckinnon, as dictated by Aline Mohamud MD. 
 
I, Dr. Aline Mohamud MD, confirm that all documentation is accurate.

## 2018-12-05 ENCOUNTER — OFFICE VISIT (OUTPATIENT)
Dept: FAMILY MEDICINE CLINIC | Facility: CLINIC | Age: 68
End: 2018-12-05

## 2018-12-05 VITALS
HEART RATE: 77 BPM | OXYGEN SATURATION: 92 % | RESPIRATION RATE: 16 BRPM | HEIGHT: 71 IN | DIASTOLIC BLOOD PRESSURE: 86 MMHG | SYSTOLIC BLOOD PRESSURE: 148 MMHG | BODY MASS INDEX: 32.2 KG/M2 | WEIGHT: 230 LBS | TEMPERATURE: 97.8 F

## 2018-12-05 DIAGNOSIS — F32.89 OTHER DEPRESSION: ICD-10-CM

## 2018-12-05 DIAGNOSIS — B17.10 ACUTE HEPATITIS C VIRUS INFECTION WITHOUT HEPATIC COMA: ICD-10-CM

## 2018-12-05 DIAGNOSIS — M25.552 HIP PAIN, BILATERAL: ICD-10-CM

## 2018-12-05 DIAGNOSIS — M25.551 HIP PAIN, BILATERAL: ICD-10-CM

## 2018-12-05 DIAGNOSIS — R73.03 PREDIABETES: ICD-10-CM

## 2018-12-05 DIAGNOSIS — I10 ESSENTIAL HYPERTENSION: Primary | ICD-10-CM

## 2018-12-05 DIAGNOSIS — Z87.891 SMOKING HISTORY: ICD-10-CM

## 2018-12-05 RX ORDER — LIDOCAINE 50 MG/G
PATCH TOPICAL
Qty: 1 EACH | Refills: 0 | Status: SHIPPED | OUTPATIENT
Start: 2018-12-05 | End: 2019-01-10

## 2018-12-05 NOTE — PROGRESS NOTES
HISTORY OF PRESENT ILLNESS Samra Moura is a 80 y.o. male. Our patient is a 80year old male who presents with back and hip pain. The onset was multiple years prior and he has had prior surgical procedures on the lower spine. The symptoms occur constantly, described as a bandlike lower lumbar pain with numbness radiating into the lateral thighs bilaterally. The severity is described as moderately severe. The quality is aching and numbness. The problem is aggravated by walking more than 15 minutes and bending. The onset of symptoms was gradual, improved post surgery , then worsened. Associated symptoms and signs include weakness of the legs. t was suggested that he use a nerve stimulator, which the patient states that  He researched. He discussed it with his spine surgeon and Dr. Ladonna Alba, his PCP, who referred him to pain management prior to consideration of insertion of the device Patient tried Lyrica which he states caused weakness and confusion Patient states that he has a history of lifelong opioid abuse. Patient sees a urologist [facility] [address] for BPH He has a history ofHep C ,did Joe treatment due for follow up Patient sees orthopedic spine center Dr. Kelly Limon, no further operable lesions IWalks about 15 minutes with back pain and dyspnea Has had kdney stones and dehydration Not presently on psychiatric medications Hypertension The history is provided by the patient. This is a chronic problem. The problem has not changed since onset. Pertinent negatives include no chest pain, no orthopnea, no palpitations, no PND, no confusion, no malaise/fatigue, no blurred vision, no headaches and no peripheral edema. There are no associated agents to hypertension. Risk factors include smoking/tobacco exposure, male gender and renal failure. Review of Systems Constitutional: Negative for chills, fever, malaise/fatigue and weight loss. HENT: Negative. Eyes: Negative for blurred vision, double vision and photophobia. Respiratory: Negative. Cardiovascular: Negative for chest pain, palpitations, orthopnea and PND. Gastrointestinal: Negative for blood in stool and melena. There is no history of nausea The patient denies vomiting There is no history of diarrhea or constipation The patient denies heartburn Genitourinary: Negative for flank pain, frequency, hematuria and urgency. The patient denies any dysuria, polyuria or nocturia Skin: Negative. Neurological: Negative. Negative for weakness and headaches. Psychiatric/Behavioral: Negative. Negative for confusion. Visit Vitals /86 Pulse 77 Temp 97.8 °F (36.6 °C) (Oral) Resp 16 Ht 5' 11\" (1.803 m) Wt 230 lb (104.3 kg) SpO2 92% BMI 32.08 kg/m² Active Ambulatory Problems Diagnosis Date Noted  HTN (hypertension) 04/02/2014  Back pain 04/02/2014  Prediabetes 04/02/2014  Chronic hepatitis C without hepatic coma ([facility] [address] 04/03/2014  GERD (gastroesophageal reflux disease) 05/01/2014  Onychomycosis of toenail 11/25/2015  S/P lumbar fusion 02/08/2016  Kidney stones 05/10/2016  Lumbar facet arthropathy 11/15/2016  Chronic pain 11/15/2016  Neuritis 11/15/2016  Cervical spinal stenosis 11/29/2016  Bulge of lumbar disc without myelopathy 11/29/2016  Acute pain of right shoulder 04/04/2017  Cervical myelopathy ([address] 05/18/2017  S/P cervical spinal fusion 06/01/2017  
 IGT (impaired glucose tolerance) 08/28/2017  Depression 08/28/2017  History of opioid abuse 10/25/2017  Recurrent depression ([address] 01/17/2018  Lumbar radicular pain 03/19/2018  Spinal stenosis of lumbar region with neurogenic claudication 03/19/2018  Low back strain, initial encounter 04/26/2018 Resolved Ambulatory Problems Diagnosis Date Noted  Heel pain 04/02/2014  Plantar fasciitis 04/02/2014  Tobacco abuse 08/21/2014  Dehydration 09/02/2014  Acute renal failure ([address] 09/02/2014  UTI (urinary tract infection) 09/02/2014  MEI (acute kidney injury) ([facility] 09/13/2014  Early satiety 09/13/2014  Sciatica 09/17/2014  
 H/O lumbosacral spine surgery 07/22/2015  Steatosis, liver 10/29/2015  Advanced directives, counseling/discussion 11/25/2015 Past Medical History:  
Diagnosis Date  Bilateral hip pain  Chronic pain  Depression  Diabetes ([facility]  DJD (degenerative joint disease)  GERD (gastroesophageal reflux disease)  Hepatitis C 01/2015  Hypertension  Kidney stones  Lymphadenopathy, generalized 12/05/2015  Numbness of foot left  S/P lumbar fusion 2/9/2016  Unspecified sleep apnea Physical Exam  
Constitutional: He is oriented to person, place, and time. He appears well-developed and well-nourished. HENT:  
Head: Normocephalic and atraumatic. Right Ear: External ear normal.  
Left Ear: External ear normal.  
Nose: Nose normal.  
Mouth/Throat: Oropharynx is clear and moist.  
Eyes: Conjunctivae and EOM are normal. Pupils are equal, round, and reactive to light. Right eye exhibits no discharge. No scleral icterus. Neck: Normal range of motion. Neck supple. No JVD present. No thyromegaly present. Cardiovascular: Normal rate, regular rhythm and intact distal pulses. Exam reveals no gallop and no friction rub. No murmur heard. Pulmonary/Chest: No respiratory distress. He has no wheezes. He has no rales. He exhibits no tenderness. Abdominal: He exhibits no distension and no mass. There is no tenderness. There is no rebound and no guarding. Genitourinary:  
Genitourinary Comments: No hernia noted Musculoskeletal: Normal range of motion. He exhibits deformity (Antalgic gait, cannot flex past 45degrees). He exhibits no edema or tenderness (tender mid lumbar area). Lymphadenopathy:  
  He has no cervical adenopathy. Neurological: He is alert and oriented to person, place, and time. No cranial nerve deficit. He exhibits normal muscle tone. Coordination normal.  
Skin: Skin is warm and dry. No rash noted. No erythema. Psychiatric: He has a normal mood and affect. His behavior is normal. Judgment and thought content normal.  
Nursing note and vitals reviewed. MDM Number of Diagnoses or Management Options Acute hepatitis C virus infection without hepatic coma: established, improving Essential hypertension: established, worsening Hip pain, bilateral: established, worsening Other depression: established, improving Prediabetes: new, needed workup Smoking history: established, worsening Amount and/or Complexity of Data Reviewed Clinical lab tests: ordered Risk of Complications, Morbidity, and/or Mortality Management options: moderate ASSESSMENT and PLAN 
  ICD-10-CM ICD-9-CM 1. Essential hypertensionChronic P58 369.2 METABOLIC PANEL, COMPREHENSIVE  
   CBC WITH AUTOMATED DIFF Continue present medications Mild elevation Will increase medications next visit If the elevation continues and the patient is taking the meds regularly 2. PrediabetesChronic M38.21 639.33 METABOLIC PANEL, COMPREHENSIVE  
 BMP ordered 3. Hip pain, bilateral M25.551 719.45   
 M25.552 Patient to call to establish follow up with pain management Lidoderm patch 4. Smoking history Z87.891 V15.82 CBC WITH AUTOMATED DIFF Advised to stop No meds at this time 5. Other depression F32.89 311 Presently not on meds He will call if symptoms worsen 6. Acute hepatitis C virus infection without hepatic coma B17.10 070.51 Post treatment with Eleazar De Dios, due for follow up Will order lft's Follow-up Disposition: 
Return in about 2 months (around 2/5/2019). very strongly urged to quit smoking to reduce cardiovascular risk

## 2018-12-05 NOTE — PATIENT INSTRUCTIONS

## 2019-01-01 NOTE — ADDENDUM NOTE
Addended by: Manuela Madrigal on: 9/15/2017 04:16 PM     Modules accepted: Orders Mom reports bf well. Reinforced feeding cues & diaper counts. Assistance offered prn. Mom verbalized understanding

## 2019-01-10 ENCOUNTER — HOSPITAL ENCOUNTER (OUTPATIENT)
Dept: LAB | Age: 69
Discharge: HOME | End: 2019-01-10
Payer: MEDICARE

## 2019-01-10 ENCOUNTER — OFFICE VISIT (OUTPATIENT)
Dept: HEMATOLOGY | Age: 69
End: 2019-01-10

## 2019-01-10 VITALS
OXYGEN SATURATION: 98 % | TEMPERATURE: 97.3 F | HEIGHT: 71 IN | RESPIRATION RATE: 16 BRPM | BODY MASS INDEX: 31.75 KG/M2 | DIASTOLIC BLOOD PRESSURE: 90 MMHG | WEIGHT: 226.8 LBS | HEART RATE: 73 BPM | SYSTOLIC BLOOD PRESSURE: 165 MMHG

## 2019-01-10 DIAGNOSIS — B18.2 CHRONIC HEPATITIS C WITHOUT HEPATIC COMA (HCC): Primary | ICD-10-CM

## 2019-01-10 DIAGNOSIS — B18.2 CHRONIC HEPATITIS C WITHOUT HEPATIC COMA (HCC): ICD-10-CM

## 2019-01-10 LAB
ALBUMIN SERPL-MCNC: 4 G/DL (ref 3.4–5)
ALBUMIN/GLOB SERPL: 1.3 {RATIO} (ref 0.8–1.7)
ALP SERPL-CCNC: 108 U/L (ref 45–117)
ALT SERPL-CCNC: 18 U/L (ref 16–61)
ANION GAP SERPL CALC-SCNC: 3 MMOL/L (ref 3–18)
AST SERPL-CCNC: 11 U/L (ref 15–37)
BASOPHILS # BLD: 0 K/UL (ref 0–0.1)
BASOPHILS NFR BLD: 0 % (ref 0–2)
BILIRUB DIRECT SERPL-MCNC: 0.1 MG/DL (ref 0–0.2)
BILIRUB SERPL-MCNC: 0.3 MG/DL (ref 0.2–1)
BUN SERPL-MCNC: 15 MG/DL (ref 7–18)
BUN/CREAT SERPL: 14
CALCIUM SERPL-MCNC: 8.5 MG/DL (ref 8.5–10.1)
CHLORIDE SERPL-SCNC: 103 MMOL/L (ref 100–108)
CO2 SERPL-SCNC: 30 MMOL/L (ref 21–32)
CREAT SERPL-MCNC: 1.09 MG/DL (ref 0.6–1.3)
DIFFERENTIAL METHOD BLD: ABNORMAL
EOSINOPHIL # BLD: 0.2 K/UL (ref 0–0.4)
EOSINOPHIL NFR BLD: 3 % (ref 0–5)
ERYTHROCYTE [DISTWIDTH] IN BLOOD BY AUTOMATED COUNT: 12.9 % (ref 11.6–14.5)
GLOBULIN SER CALC-MCNC: 3.1 G/DL (ref 2–4)
GLUCOSE SERPL-MCNC: 76 MG/DL (ref 74–99)
HCT VFR BLD AUTO: 46.4 % (ref 36–48)
HGB BLD-MCNC: 15.8 G/DL (ref 13–16)
LYMPHOCYTES # BLD: 2.2 K/UL (ref 0.9–3.6)
LYMPHOCYTES NFR BLD: 34 % (ref 21–52)
MCH RBC QN AUTO: 32.1 PG (ref 24–34)
MCHC RBC AUTO-ENTMCNC: 34.1 G/DL (ref 31–37)
MCV RBC AUTO: 94.3 FL (ref 74–97)
MONOCYTES # BLD: 0.8 K/UL (ref 0.05–1.2)
MONOCYTES NFR BLD: 12 % (ref 3–10)
NEUTS SEG # BLD: 3.3 K/UL (ref 1.8–8)
NEUTS SEG NFR BLD: 51 % (ref 40–73)
PLATELET # BLD AUTO: 167 K/UL (ref 135–420)
PMV BLD AUTO: 10.4 FL (ref 9.2–11.8)
POTASSIUM SERPL-SCNC: 4.1 MMOL/L (ref 3.5–5.5)
PROT SERPL-MCNC: 7.1 G/DL (ref 6.4–8.2)
RBC # BLD AUTO: 4.92 M/UL (ref 4.7–5.5)
SODIUM SERPL-SCNC: 136 MMOL/L (ref 136–145)
WBC # BLD AUTO: 6.5 K/UL (ref 4.6–13.2)

## 2019-01-10 PROCEDURE — 85025 COMPLETE CBC W/AUTO DIFF WBC: CPT

## 2019-01-10 PROCEDURE — 80076 HEPATIC FUNCTION PANEL: CPT

## 2019-01-10 PROCEDURE — 87522 HEPATITIS C REVRS TRNSCRPJ: CPT

## 2019-01-10 PROCEDURE — 80048 BASIC METABOLIC PNL TOTAL CA: CPT

## 2019-01-10 PROCEDURE — 36415 COLL VENOUS BLD VENIPUNCTURE: CPT

## 2019-01-10 NOTE — PROGRESS NOTES
1. Have you been to the ER, urgent care clinic since your last visit? Hospitalized since your last visit? No    2. Have you seen or consulted any other health care providers outside of the [address] [place] [address] since your last visit? Include any pap smears or colon screening.  No

## 2019-01-11 LAB
HCV RNA SERPL NAA+PROBE-LOG IU: NOT DETECTED HCV LOG 10IU/ML
HCV RNA SERPL PROBE AMP-ACNC: NOT DETECTED HCVIU/ML

## 2019-01-14 ENCOUNTER — TELEPHONE (OUTPATIENT)
Dept: HEMATOLOGY | Age: 69
End: 2019-01-14

## 2019-01-14 NOTE — TELEPHONE ENCOUNTER
Updated on current labs. No HCV RNA is detected. Patient is cured of the HCV. No follow up is necessary. Follow up as scheduled.

## 2019-01-15 NOTE — PROGRESS NOTES
[address] [address], [place], [address], [place], [place]       Marina Minaya, HAILEY Lopez, PABillC    Faviola Mar, ACNP-BC   HAILEY Petersen, [facility]LEY [facility] 136    at [address], [address] Tammy Delacruz  22.    [phone]    FAX: [address] [place] [address], [address]    [phone]    FAX: [phone]       Patient Care Team:  Tiffany Toro MD as PCP - General (Internal Medicine)  Jemima Iniguez MD as Physician (Urology)      Problem List  Date Reviewed: 12/5/2018          Codes Class Noted    Low back strain, initial encounter ICD-10-CM: S39.012A  ICD-9-CM: 847.2  4/26/2018        Lumbar radicular pain ICD-10-CM: M54.16  ICD-9-CM: 724.4  3/19/2018        Spinal stenosis of lumbar region with neurogenic claudication ICD-10-CM: M48.062  ICD-9-CM: 724.03  3/19/2018        Recurrent depression ([facility] ICD-10-CM: F33.9  ICD-9-CM: 296.30  1/17/2018        History of opioid abuse ICD-10-CM: Z87.898  ICD-9-CM: 305.53  10/25/2017        IGT (impaired glucose tolerance) ICD-10-CM: R73.02  ICD-9-CM: 790.22  8/28/2017        Depression ICD-10-CM: F32.9  ICD-9-CM: 692  8/28/2017        S/P cervical spinal fusion ICD-10-CM: Z98.1  ICD-9-CM: V45.4  6/1/2017        Cervical myelopathy ([facility] ICD-10-CM: G95.9  ICD-9-CM: 721.1  5/18/2017        Acute pain of right shoulder ICD-10-CM: M25.511  ICD-9-CM: 719.41  4/4/2017        Cervical spinal stenosis ICD-10-CM: M48.02  ICD-9-CM: 723.0  11/29/2016        Bulge of lumbar disc without myelopathy ICD-10-CM: M51.26  ICD-9-CM: 722.10  11/29/2016        Lumbar facet arthropathy ICD-10-CM: M47.816  ICD-9-CM: 721.3  11/15/2016        Chronic pain ICD-10-CM: G89.29  ICD-9-CM: 338.29 11/15/2016        Neuritis ICD-10-CM: M79.2  ICD-9-CM: 729.2  11/15/2016        Kidney stones ICD-10-CM: N20.0  ICD-9-CM: 592.0  5/10/2016        S/P lumbar fusion ICD-10-CM: Z98.1  ICD-9-CM: V45.4  2/8/2016    Overview Signed 2/9/2016  1:55 PM by Destiny Dover     L4/5 LAMINECTOMY/FUSION/TRANSFORAMINAL LUMBAR INTERBODY FUSION (TLIF) by Dr. Matt Lawrence on 2/8/16. Onychomycosis of toenail ICD-10-CM: B35.1  ICD-9-CM: 110.1  11/25/2015        GERD (gastroesophageal reflux disease) ICD-10-CM: K21.9  ICD-9-CM: 530.81  5/1/2014        Chronic hepatitis C without hepatic coma ([facility] ICD-10-CM: B18.2  ICD-9-CM: 070.54  4/3/2014        HTN (hypertension) ICD-10-CM: I10  ICD-9-CM: 401.9  4/2/2014        Back pain ICD-10-CM: M54.9  ICD-9-CM: 724.5  4/2/2014        Prediabetes ICD-10-CM: R73.03  ICD-9-CM: 790.29  4/2/2014                Mr. Joseline Robertson [facility] regarding chronic HCV and its management. The HCV has been treated and cured. His active problem list, all pertinent past medical history, medications, liver histology, endoscopic studies, radiologic findings and laboratory findings related to the liver disorder were reviewed with him. The patient is a 80 y.o.  male who was noted to have abnormalities in liver chemistries and subsequently tested positive for chronic HCV in 1/2016. Risk factors for acquiring HCV are IV drug use in 1970s. There was no history of acute incteric hepatitis at the time of these risk factors. Ultrasound and CT scan of the liver was performed in 5/2016. The results of the imaging suggested chronic liver disease. Ultrasound with elastography was performed in May 2016 and was consistent with F3 and possibly F4 disease. Shear wave elastography was repeated in 11/2018. This suggests that nearly all the hepatic fibrosis has resolved from the liver and the suggested Metavir fibrosis score is now F1.       A liver biopsy has not been performed. The patient was found to have an inguinal lymph node. This was surgically removed. There was no evidence of malignancy. The most recent laboratory studies indicate that the liver transaminases are normal, tests of hepatic synthetic and metabolic function are normal, and the platelet count is normal.      The patient has no symptoms which can be attributed to the liver disorder. He completed 12 weeks of treatment with Heriberto Mcgarry in 11/206. The patient has not experienced problems concentrating, swelling of the abdomen, swelling of the lower extremities, hematemesis, hematochezia. The patient has limitations in functional activities which can be attributed to other medical problems that are not related to the liver disease. ALLERGIES  Allergies   Allergen Reactions    Nicotine Contact Dermatitis     Nicotine Patch reaction - Contact dermatitis with numbness and tingling also occuring in the left arm and denuding of the skin.  Thimerosal Other (comments)     Contact lens solution, made eyes red and irrated       MEDICATIONS  Current Outpatient Medications   Medication Sig    tamsulosin (FLOMAX) 0.4 mg capsule Take 2 Caps by mouth daily.  tiaGABine (GABITRIL) 2 mg tablet Take 1 Tab by mouth two (2) times daily (with meals).  omeprazole (PRILOSEC) 20 mg capsule Take 1 Cap by mouth daily.  amLODIPine (NORVASC) 10 mg tablet Take 1 Tab by mouth daily.  lisinopril (PRINIVIL, ZESTRIL) 20 mg tablet Take 1 Tab by mouth daily.  escitalopram oxalate (LEXAPRO) 10 mg tablet Take 10 mg by mouth daily.  traZODone (DESYREL) 50 mg tablet Take 50 mg by mouth two (2) times a day.  OXcarbazepine (TRILEPTAL) 300 mg tablet Take 300 mg by mouth daily. No current facility-administered medications for this visit.         SYSTEM REVIEW NOT RELATED TO LIVER DISEASE OR REVIEWED ABOVE:  Constitution systems: Negative for fever, chills, weight gain, weight loss.   Eyes: Negative for visual changes. ENT: Negative for sore throat, painful swallowing. Respiratory: Negative for cough, hemoptysis, SOB. Cardiology: Negative for chest pain, palpitations. GI:  Negative for constipation or diarrhea. : Negative for urinary frequency, dysuria, hematuria, nocturia. Skin: Negative for rash. Hematology: Negative for easy bruising, blood clots. Musculo-skelatal: Chronic back pain. Neurologic: Negative for headaches, dizziness, vertigo, memory problems not related to HE. Psychology: Negative for anxiety, depression. FAMILY HISTORY:  The father  of MVA. The mother  of unknown cause in her [de-identified]. There is no family history of liver disease. SOCIAL HISTORY:  The patient is . The patient has no children. The patient stopped using tobacco products in 2016. The patient has never consumed significant amounts of alcohol. The patient used to work as a . The patient has not worked since . PHYSICAL EXAMINATION:  Visit Vitals  /90 (BP 1 Location: Right arm, BP Patient Position: Sitting)   Pulse 73   Temp 97.3 °F (36.3 °C)   Resp 16   Ht 5' 11\" (1.803 m)   Wt 226 lb 12.8 oz (102.9 kg)   SpO2 98%   BMI 31.63 kg/m²     General: No acute distress. Eyes: Sclera anicteric. ENT: No oral lesions. Thyroid normal.  Nodes: No adenopathy. Skin: No spider angiomata. No jaundice. No palmar erythema. Respiratory: Lungs clear to auscultation. Cardiovascular: Regular heart rate. No murmurs. No JVD. Abdomen: Soft non-tender. Liver size normal to percussion/palpation. Spleen not palpable. No obvious ascites. Extremities: No edema. No muscle wasting. No gross arthritic changes. Neurologic: Alert and oriented. Cranial nerves grossly intact. No asterixis.     LABORATORY STUDIES:  Liver [place] of [address] & Units 1/10/2019   WBC 4.6 - 13.2 K/uL 6.5   ANC 1.8 - 8.0 K/UL 3.3   HGB 13.0 - 16.0 g/dL 15.8    - 420 K/uL 167   INR 0.8 - 1.2    AST 15 - 37 U/L 11 (L)   ALT 16 - 61 U/L 18   Alk Phos 45 - 117 U/L 108   Bili, Total 0.2 - 1.0 MG/DL 0.3   Bili, Direct 0.0 - 0.2 MG/DL 0.1   Albumin 3.4 - 5.0 g/dL 4.0   BUN 7.0 - 18 MG/DL 15   Creat 0.6 - 1.3 MG/DL 1.09   Creat (iSTAT) 0.6 - 1.3 MG/DL    Na 136 - 145 mmol/L 136   K 3.5 - 5.5 mmol/L 4.1   Cl 100 - 108 mmol/L 103   CO2 21 - 32 mmol/L 30   Glucose 74 - 99 mg/dL 76     Liver [place] [facility] Latest Ref Rng & Units 8/8/2018   WBC 4.6 - 13.2 K/uL 5.6   ANC 1.8 - 8.0 K/UL 3.0   HGB 13.0 - 16.0 g/dL 15.2    - 420 K/uL 151   INR 0.8 - 1.2    AST 15 - 37 U/L 8 (L)   ALT 16 - 61 U/L 18   Alk Phos 45 - 117 U/L 101   Bili, Total 0.2 - 1.0 MG/DL 0.2   Bili, Direct 0.00 - 0.40 mg/dL    Albumin 3.4 - 5.0 g/dL 4.0   BUN 7.0 - 18 MG/DL 17   Creat 0.6 - 1.3 MG/DL 1.11   Creat (iSTAT) 0.6 - 1.3 MG/DL    Na 136 - 145 mmol/L 141   K 3.5 - 5.5 mmol/L 4.0   Cl 100 - 108 mmol/L 106   CO2 21 - 32 mmol/L 25   Glucose 74 - 99 mg/dL 111 (H)     SEROLOGIES:  1/2016. Anti-HCV positive, EMILIA negative  Serologies Latest Ref Rng 5/10/2016   Hep A Ab, Total Negative Negative   Hep B Surface Ag Negative Negative   Hep B Core Ab, Total Negative Negative   Hep B Surface AB QL  Non Reactive   Hep C Ab 0.0 - 0.9 s/co ratio    Hep C Genotype  1a   HCV RT-PCR, Quant  [id number]   Ferritin 30 - 400 ng/mL 201   Iron % Saturation 15 - 55 % 28       LIVER HISTOLOGY:  05/2016. TRANSIENT HEPATIC ELASTOGRAPHY:   E Range: 8.17-14.0 kPa  E Mean: 11.37 kPa  E Median: 10.98 kPa  E Std: 2.16 kPa  11/2018. TRANSIENT HEPATIC ELASTOGRAPHY:   E Range: 5.77-7.88 kPa  E Mean: 7.06 kPa  E Median: 7.04 kPa  E Std: 0.88 kPa    ENDOSCOPIC PROCEDURES:  Not available or performed    RADIOLOGY:  10/2015. Ultrasound of liver. Echogenic consistent with chronic liver disease. No liver mass lesions. No dilated bile ducts. No ascites. 12/2015.   CT scan abdomen without IV contrast.  Normal appearing liver. No liver mass lesions. Normal spleen. No ascites. 5/2016: Ultrasound of the liver. Liver is borderline in size, largest transverse dimension of the right hepatic lobe measuring 18.3 cm.  Diffusely heterogeneous echotexture noted.  No focal mass.  Normal direction of blood flow in the portal vein.  Portal vein measures 1.2 cm.  11/2016:  Ultrasound of liver. Echogenic consistent with chronic liver disease. No liver mass lesions. No dilated bile ducts. No ascites. 02/2018. CT Abdomen/Pelvis w/wo contrast.  Hepatic steatosis is present. 11/2018. Ultrasound of the liver. Liver: echotexture : Coarsened, heterogeneous. length: 16.6 cm. Focal findings : No focal lesions are seen within limits of study. OTHER TESTING:  Not available or performed    ASSESSMENT AND PLAN:  Chronic HCV, genotype 1 A. The most recent laboratory studies indicate that the liver transaminases are normal, alkaline phosphatase is normal, tests of hepatic synthetic and metabolic function are normal, and the platelet count is normal.      HCV. The HCV has been treated and the patient was SVR 12 in 02/2017. He was treated with 12 weeks of Sailaja Lazier. Recent shear wave elastography suggests that nearly all the hepatic fibrosis has resolved post eradication of the HCV. Metavir fibrosis score now F1. The patient was directed to continue all current medications at the current dosages. There are no contraindications for the patient to take any medications that are necessary for treatment of other medical issues. The patient was counseled regarding alcohol consumption. Vaccination for viral hepatitis A and B is recommended since the patient has no serologic evidence of previous exposure or vaccination with immunity. [address] screening has recently been performed and does not suggest [address] The next liver imaging study will be performed in 10/2018. All of the above issues were discussed with the patient. All questions were answered. The patient expressed a clear understanding of the above. Follow-up [address] on a PRN basis.     EDWINA Berg  Liver [place] [address]   [phone]

## 2019-02-26 ENCOUNTER — OFFICE VISIT (OUTPATIENT)
Dept: FAMILY MEDICINE CLINIC | Facility: CLINIC | Age: 69
End: 2019-02-26

## 2019-02-26 VITALS
BODY MASS INDEX: 31.69 KG/M2 | DIASTOLIC BLOOD PRESSURE: 84 MMHG | HEIGHT: 71 IN | SYSTOLIC BLOOD PRESSURE: 156 MMHG | HEART RATE: 85 BPM | RESPIRATION RATE: 18 BRPM | WEIGHT: 226.4 LBS | OXYGEN SATURATION: 97 % | TEMPERATURE: 96.9 F

## 2019-02-26 DIAGNOSIS — R51.9 ACUTE NONINTRACTABLE HEADACHE, UNSPECIFIED HEADACHE TYPE: ICD-10-CM

## 2019-02-26 DIAGNOSIS — M25.552 HIP PAIN, BILATERAL: ICD-10-CM

## 2019-02-26 DIAGNOSIS — Z87.891 SMOKING HISTORY: ICD-10-CM

## 2019-02-26 DIAGNOSIS — I10 ESSENTIAL HYPERTENSION: Primary | ICD-10-CM

## 2019-02-26 DIAGNOSIS — B17.10 ACUTE HEPATITIS C VIRUS INFECTION WITHOUT HEPATIC COMA: ICD-10-CM

## 2019-02-26 DIAGNOSIS — Z23 ENCOUNTER FOR IMMUNIZATION: ICD-10-CM

## 2019-02-26 DIAGNOSIS — M25.551 HIP PAIN, BILATERAL: ICD-10-CM

## 2019-02-26 RX ORDER — LISINOPRIL 30 MG/1
30 TABLET ORAL DAILY
Qty: 30 TAB | Refills: 6 | Status: SHIPPED | OUTPATIENT
Start: 2019-02-26 | End: 2019-07-15 | Stop reason: SDUPTHER

## 2019-02-26 RX ORDER — LISINOPRIL 20 MG/1
20 TABLET ORAL DAILY
Qty: 90 TAB | Refills: 1 | Status: SHIPPED | OUTPATIENT
Start: 2019-02-26 | End: 2019-02-26 | Stop reason: DRUGHIGH

## 2019-02-26 RX ORDER — VARENICLINE TARTRATE 0.5 MG/1
0.5 TABLET, FILM COATED ORAL 2 TIMES DAILY
Qty: 60 TAB | Refills: 2 | Status: SHIPPED | OUTPATIENT
Start: 2019-02-26 | End: 2019-05-27

## 2019-02-26 NOTE — PROGRESS NOTES
02/26/19 
8:01 AM 
had concerns including Hypertension (here for f/u); Ear Pain (pt states he has off and on pain in his ears, gum area, HA, trouble eating for 2-3 days ); and Other (would like to get shingrix shot ). HISTORY OF PRESENT ILLNESS This is a 80 y.o. male Hypertension The patient is taking his medications regularly. No problems are noted with the medications. The patient has no , visual changes, chest pain or pressure,dyspnea, orthopnea, abdominal pain, dysuria, weakness, or paresthesias. Ear pain There is left ear and left buccal pain with difficulty swallowing with piercing left earaches. This has been present 3 days. There is pain on chewing. He is edentulous. He smokes but does not chew tobacco. He has had not had this pain previously. It radiates to the high left posterior neck. The hearing is slightly diminished on the left. He has taken no medications for relief Hip pain He has been to pain management. The pain is stable but significant. He was referred there by the ortho surgeon/spine specialist. He has a new group of pain specialists that he will see because of difficulty contacting the first group. The patient states the hip and low back pains limit his ADL's and he has occasional falls. No further surgery has been recommended at this time. Smoking Patches did not help. He is up to 1 and 1/2 pack a day. The patient has some mild NICOLE. He is interested in trying the Chantix. Hep C The patient is considered cured The cirrhosis has improved from stage 3 to stage 1 by abdominal US Current Outpatient Medications:  
  tamsulosin (FLOMAX) 0.4 mg capsule, Take 2 Caps by mouth daily. , Disp: 90 Cap, Rfl: 3 
  omeprazole (PRILOSEC) 20 mg capsule, Take 1 Cap by mouth daily. , Disp: 90 Cap, Rfl: 3 
  amLODIPine (NORVASC) 10 mg tablet, Take 1 Tab by mouth daily. , Disp: 90 Tab, Rfl: 1 
  lisinopril (PRINIVIL, ZESTRIL) 20 mg tablet, Take 1 Tab by mouth daily., Disp: 90 Tab, Rfl: 1 
  tiaGABine (GABITRIL) 2 mg tablet, Take 1 Tab by mouth two (2) times daily (with meals). , Disp: 60 Tab, Rfl: 1   escitalopram oxalate (LEXAPRO) 10 mg tablet, Take 10 mg by mouth daily. , Disp: , Rfl:  
  traZODone (DESYREL) 50 mg tablet, Take 50 mg by mouth two (2) times a day., Disp: , Rfl:  
  OXcarbazepine (TRILEPTAL) 300 mg tablet, Take 300 mg by mouth daily. , Disp: , Rfl:  
Allergies Allergen Reactions  Nicotine Contact Dermatitis Nicotine Patch reaction - Contact dermatitis with numbness and tingling also occuring in the left arm and denuding of the skin.  Thimerosal Other (comments) Contact lens solution, made eyes red and irrated Review of Systems Constitutional: Negative for chills, fever, malaise/fatigue and weight loss. HENT: Positive for ear pain and hearing loss (Diminished on the left). Negative for congestion, ear discharge, sinus pain and tinnitus. Stabbing left facial and scalp pain, intermittent. No inciting activity. Sometimes relieved by holding himself or his neck still. No meds taken for relief Eyes: Negative for blurred vision, double vision and photophobia. Respiratory: Positive for shortness of breath and wheezing. Negative for cough and hemoptysis. Cardiovascular: Negative for chest pain, palpitations, orthopnea and PND. Gastrointestinal: Negative for blood in stool and melena. There is no history of nausea The patient denies vomiting There is no history of diarrhea or constipation The patient denies heartburn Genitourinary: Negative for flank pain, frequency, hematuria and urgency. The patient denies any dysuria, polyuria or nocturia Musculoskeletal: Positive for back pain, falls, joint pain, myalgias and neck pain. Skin: Negative. Neurological: Positive for sensory change (Feet feel heavy). Negative for tingling, tremors, weakness and headaches. Psychiatric/Behavioral: Positive for depression. Negative for substance abuse (prior positive history, none now) and suicidal ideas. Results for orders placed or performed during the hospital encounter of 01/10/19 HCV RT-PCR, QUANT (NON-GRAPH) Result Value Ref Range HCV, IU/mL NOT DETECTED NOTD HCVIU/mL  
 HCV log 10 NOT DETECTED NOTD HCV log 10IU/Ml CBC WITH AUTOMATED DIFF Result Value Ref Range WBC 6.5 4.6 - 13.2 K/uL  
 RBC 4.92 4.70 - 5.50 M/uL  
 HGB 15.8 13.0 - 16.0 g/dL HCT 46.4 36.0 - 48.0 % MCV 94.3 74.0 - 97.0 FL  
 MCH 32.1 24.0 - 34.0 PG  
 MCHC 34.1 31.0 - 37.0 g/dL  
 RDW 12.9 11.6 - 14.5 % PLATELET 794 160 - 643 K/uL MPV 10.4 9.2 - 11.8 FL  
 NEUTROPHILS 51 40 - 73 % LYMPHOCYTES 34 21 - 52 % MONOCYTES 12 (H) 3 - 10 % EOSINOPHILS 3 0 - 5 % BASOPHILS 0 0 - 2 %  
 ABS. NEUTROPHILS 3.3 1.8 - 8.0 K/UL  
 ABS. LYMPHOCYTES 2.2 0.9 - 3.6 K/UL  
 ABS. MONOCYTES 0.8 0.05 - 1.2 K/UL  
 ABS. EOSINOPHILS 0.2 0.0 - 0.4 K/UL  
 ABS. BASOPHILS 0.0 0.0 - 0.1 K/UL  
 DF AUTOMATED HEPATIC FUNCTION PANEL Result Value Ref Range Protein, total 7.1 6.4 - 8.2 g/dL Albumin 4.0 3.4 - 5.0 g/dL Globulin 3.1 2.0 - 4.0 g/dL A-G Ratio 1.3 0.8 - 1.7 Bilirubin, total 0.3 0.2 - 1.0 MG/DL Bilirubin, direct 0.1 0.0 - 0.2 MG/DL Alk. phosphatase 108 45 - 117 U/L  
 AST (SGOT) 11 (L) 15 - 37 U/L  
 ALT (SGPT) 18 16 - 61 U/L  
METABOLIC PANEL, BASIC Result Value Ref Range Sodium 136 136 - 145 mmol/L Potassium 4.1 3.5 - 5.5 mmol/L Chloride 103 100 - 108 mmol/L  
 CO2 30 21 - 32 mmol/L Anion gap 3 3.0 - 18 mmol/L Glucose 76 74 - 99 mg/dL BUN 15 7.0 - 18 MG/DL Creatinine 1.09 0.6 - 1.3 MG/DL  
 BUN/Creatinine ratio 14 GFR est AA >60 >60 ml/min/1.73m2 GFR est non-AA >60 >60 ml/min/1.73m2 Calcium 8.5 8.5 - 10.1 MG/DL Visit Vitals /84 (BP 1 Location: Right arm, BP Patient Position: Sitting) Pulse 85 Temp 96.9 °F (36.1 °C) (Oral) Resp 18 Ht 5' 11\" (1.803 m) Wt 226 lb 6.4 oz (102.7 kg) SpO2 97% BMI 31.58 kg/m² Physical Exam  
Constitutional: He is oriented to person, place, and time. He appears well-developed and well-nourished. HENT:  
Head: Normocephalic and atraumatic. Right Ear: External ear normal.  
Left Ear: External ear normal.  
Nose: Nose normal.  
Mouth/Throat: Oropharynx is clear and moist.  
Mild rhinophyma Eyes: Conjunctivae and EOM are normal. Pupils are equal, round, and reactive to light. Right eye exhibits no discharge. No scleral icterus. Neck: Normal range of motion. Neck supple. No JVD present. No thyromegaly present. Cardiovascular: Normal rate, regular rhythm and intact distal pulses. Exam reveals no gallop and no friction rub. No murmur heard. Pulmonary/Chest: No respiratory distress. He has no wheezes. He has no rales. He exhibits no tenderness. Abdominal: He exhibits no distension and no mass. There is no tenderness. There is no rebound and no guarding. Genitourinary:  
Genitourinary Comments: No hernia noted Musculoskeletal: Normal range of motion. He exhibits deformity (Antalgic gait, cannot flex past 45degrees). He exhibits no edema or tenderness (tender mid lumbar area). Lymphadenopathy:  
  He has no cervical adenopathy. Neurological: He is alert and oriented to person, place, and time. No cranial nerve deficit. He exhibits normal muscle tone. Coordination normal.  
Skin: Skin is warm and dry. No rash noted. No erythema. Psychiatric: He has a normal mood and affect. His behavior is normal. Judgment and thought content normal.  
Nursing note and vitals reviewed. MDM Number of Diagnoses or Management Options Acute hepatitis C virus infection without hepatic coma: established, improving Acute nonintractable headache, unspecified headache type: new, needed workup Encounter for immunization: Essential hypertension: established, worsening Hip pain, bilateral: established, worsening Smoking history: established, worsening Amount and/or Complexity of Data Reviewed Tests in the radiology section of CPT®: ordered Risk of Complications, Morbidity, and/or Mortality Presenting problems: moderate The patient presents with headache with a differential diagnosis of  cluster headache, tension headache, toxic, vascular headache and trigeminal neuralgia, tumor in a smoker, high cervical neuropathy, other Hypertension is not controlled with increase the Lisinopril, follow Smoking continues. will trial Chantix Chronic pain, will follow up with pain management ASSESSMENT and PLAN 
  ICD-10-CM ICD-9-CM 1. Essential hypertension I10 401.9 lisinopril (PRINIVIL, ZESTRIL) 30 mg tablet  
 not controlled wiill increase the Lisinopril 2. Hip pain, bilateral M25.551 719.45   
 M25.552 Continue as outlined by ortho 3. Smoking history Z87.891 V15.82 varenicline (CHANTIX) 0.5 mg tablet Trial of Chantix Failed nicoderm patch 4. Acute hepatitis C virus infection without hepatic coma B17.10 070.51   
 considered cured ,follow 5. Encounter for immunization Z23 V03.89 ZOSTER VACC RECOMBINANT ADJUVANTED Shingrix vaccine 6. Acute nonintractable headache, unspecified headache type R51 784.0 CT HEAD WO CONT  
   CT SPINE CERV WO CONT Differential trigeminal neuralgia, mandible pain, high cervical origin Plan CT head, CT c spine, no contrast  
 
Follow-up Disposition: 
Return in about 3 months (around 5/26/2019). lab results and schedule of future lab studies reviewed with patient

## 2019-02-26 NOTE — PROGRESS NOTES
Rubi Funes is a 80 y.o. male here for f/u Rubi Funes is a 80 y.o. male (: 1950) presenting to address: Chief Complaint Patient presents with  Hypertension  
  here for f/u  Ear Pain  
  pt states he has off and on pain in his ears, gum area, HA, trouble eating for 2-3 days  Other  
  would like to get shingrix shot Vitals:  
 19 0813 Pulse: 85 Resp: 18 Temp: 96.9 °F (36.1 °C) TempSrc: Oral  
SpO2: 97% Weight: 226 lb 6.4 oz (102.7 kg) Height: 5' 11\" (1.803 m) Hearing/Vision: No exam data present Learning Assessment:  
 
Learning Assessment 2016 PRIMARY LEARNER Patient HIGHEST LEVEL OF EDUCATION - PRIMARY LEARNER  GRADUATED HIGH SCHOOL OR GED  
BARRIERS PRIMARY LEARNER NONE  
CO-LEARNER CAREGIVER No  
PRIMARY LANGUAGE ENGLISH  NEED No  
LEARNER PREFERENCE PRIMARY DEMONSTRATION  
  READING  
  LISTENING  
  PICTURES  
  VIDEOS  
ANSWERED BY patient RELATIONSHIP SELF Depression Screening:  
 
3 most recent PHQ Screens 2017 Little interest or pleasure in doing things Nearly every day Feeling down, depressed, irritable, or hopeless Nearly every day Total Score PHQ 2 6 Trouble falling or staying asleep, or sleeping too much - Feeling tired or having little energy - Poor appetite, weight loss, or overeating - Feeling bad about yourself - or that you are a failure or have let yourself or your family down - Trouble concentrating on things such as school, work, reading, or watching TV - Moving or speaking so slowly that other people could have noticed; or the opposite being so fidgety that others notice - Thoughts of being better off dead, or hurting yourself in some way -  
PHQ 9 Score - How difficult have these problems made it for you to do your work, take care of your home and get along with others - Fall Risk Assessment:  
 
Fall Risk Assessment, last 12 mths 2019 Able to walk? Yes Fall in past 12 months? No  
Fall with injury? -  
Number of falls in past 12 months - Fall Risk Score -  
 
Abuse Screening:  
 
Abuse Screening Questionnaire 8/29/2016 Do you ever feel afraid of your partner? Deborah Spaulding Are you in a relationship with someone who physically or mentally threatens you? Deborah Spaulding Is it safe for you to go home? [facility] Coordination of Care Questionaire: 1. Have you been to the ER, urgent care clinic since your last visit? Hospitalized since your last visit? NO 
 
2. Have you seen or consulted any other health care providers outside of the [address] [place] [address] since your last visit? Include any pap smears or colon screening. YEs pain manage Advanced Directive: 1. Do you have an Advanced Directive? NO 
 
2. Would you like information on Advanced Directives?  NO

## 2019-02-26 NOTE — PATIENT INSTRUCTIONS

## 2019-03-08 ENCOUNTER — HOSPITAL ENCOUNTER (OUTPATIENT)
Dept: CT IMAGING | Age: 69
Discharge: HOME | End: 2019-03-08
Attending: EMERGENCY MEDICINE
Payer: MEDICARE

## 2019-03-08 DIAGNOSIS — R51.9 ACUTE NONINTRACTABLE HEADACHE, UNSPECIFIED HEADACHE TYPE: ICD-10-CM

## 2019-03-08 PROCEDURE — 72125 CT NECK SPINE W/O DYE: CPT

## 2019-03-08 PROCEDURE — 70450 CT HEAD/BRAIN W/O DYE: CPT

## 2019-03-08 NOTE — PROGRESS NOTES
Please call the CT of the head and neck show no new changes. Will follow up with him in the office if still symptomatic, or of course, go to the ED if symptoms worsen or new symptoms develop

## 2019-03-25 DIAGNOSIS — I10 ESSENTIAL HYPERTENSION WITH GOAL BLOOD PRESSURE LESS THAN 140/90: ICD-10-CM

## 2019-03-25 RX ORDER — AMLODIPINE BESYLATE 10 MG/1
10 TABLET ORAL DAILY
Qty: 90 TAB | Refills: 6 | Status: SHIPPED | OUTPATIENT
Start: 2019-03-25 | End: 2019-07-26

## 2019-03-25 NOTE — TELEPHONE ENCOUNTER
Last seen 02/26/19  Next appt  05/28/19  Last filled   08/08/18    Requested Prescriptions     Pending Prescriptions Disp Refills    amLODIPine (NORVASC) 10 mg tablet 90 Tab 1     Sig: Take 1 Tab by mouth daily.

## 2019-05-26 NOTE — PROGRESS NOTES
06/04/19  11:44 AM  80 y.o.male  Chief Complaint   Patient presents with    Hypertension     here for f/u    Hypertension  The patient has had no trouble with his medications which he is taking regularly. He has had no chest pain or pressure, nausea, diaphoresis, or weakness    The patient was seen by a visiting nurse from his insurance company. Her evaluation and recommendation form was placed in the media  Leg pain  The patient has leg numbness emanating from the lumbar region through the hips to lateral thighs down the lateral legs. This symptoms are present at rest. There is a component of worsening of the condition when the patient starts walking. The patient can walk 10 minutes, then develops worsening of the pain associated with weakness. The pain improves after 5 minute's rest then worsens after resumption of walking  Tobacco abuse  The patient continues to smoke. He is motivated to stop  Chantix has slowed the amount of cigarettes used although he continues to smoke    GERD  The patient has improved symptoms, less post prandial esophageal     Issues. The patient was last seen on February 26, 2019 for hypertension ear pain hip pain hepatitis C and received the Zostavax. Because of his elevated hypertension he was placed on lisinopril and a trial of Chantix was initiated after a failed trial of the NicoDerm patch the patient had significant neck and shoulder pain in the high cervical region and the plan was to CT the head and the neck. Which has been performed. The primary encounter diagnosis was Essential hypertension with goal blood pressure less than 140/90. Diagnoses of Hip pain, bilateral, Smoking history, Prediabetes, Encounter for immunization, Screening for AAA (abdominal aortic aneurysm), Wheezing, Neuropathy, and Pain in both lower extremities were also pertinent to this visit. Review of Systems   Constitutional: Positive for diaphoresis.  Negative for chills, fever, malaise/fatigue and weight loss. HENT: Positive for ear pain and hearing loss (Diminished on the left). Negative for congestion, ear discharge, sinus pain and tinnitus. Stabbing left facial and scalp pain, intermittent. No inciting activity. Sometimes relieved by holding himself or his neck still. No meds taken for relief   Eyes: Negative for blurred vision, double vision and photophobia. Respiratory: Positive for shortness of breath and wheezing. Negative for cough and hemoptysis. Cardiovascular: Negative for chest pain, palpitations, orthopnea and PND. Gastrointestinal: Negative for blood in stool and melena. There is no history of nausea  The patient denies vomiting  There is no history of diarrhea or constipation  The patient denies heartburn     Genitourinary: Negative for flank pain, frequency, hematuria and urgency. The patient denies any dysuria, polyuria or nocturia   Musculoskeletal: Positive for back pain, falls, joint pain, myalgias and neck pain. Skin: Negative. Neurological: Positive for sensory change (Feet feel heavy). Negative for tingling, tremors, weakness and headaches. Psychiatric/Behavioral: Positive for depression. Negative for substance abuse (prior positive history, none now) and suicidal ideas. family history includes Arthritis-osteo in his sister; Diabetes in his sister; Heart Disease in his sister; SLE in his sister. reports that he has been smoking cigarettes. He has a 60.00 pack-year smoking history. He has quit using smokeless tobacco. He reports that he has current or past drug history. Drugs: Marijuana and Cocaine. He reports that he does not drink alcohol. Vitals:    05/28/19 0747   BP: 117/71   Pulse: 68   Resp: 16   Temp: 96.4 °F (35.8 °C)   TempSrc: Oral   SpO2: 99%   Weight: 215 lb 12.8 oz (97.9 kg)   Height: 5' 11\" (1.803 m)       Physical Exam   Constitutional: He is oriented to person, place, and time.  He appears well-developed and well-nourished. HENT:   Head: Normocephalic and atraumatic. Right Ear: External ear normal.   Left Ear: External ear normal.   Nose: Nose normal.   Mouth/Throat: Oropharynx is clear and moist.   Mild rhinophyma   Eyes: Pupils are equal, round, and reactive to light. Conjunctivae and EOM are normal. Right eye exhibits no discharge. No scleral icterus. Neck: Normal range of motion. Neck supple. No JVD present. No thyromegaly present. Cardiovascular: Normal rate, regular rhythm and intact distal pulses. Exam reveals no gallop and no friction rub. No murmur heard. Pulmonary/Chest: No respiratory distress. He has no wheezes. He has no rales. He exhibits no tenderness. Abdominal: He exhibits no distension and no mass. There is no tenderness. There is no rebound and no guarding. Genitourinary:   Genitourinary Comments: No hernia noted   Musculoskeletal: Normal range of motion. He exhibits deformity (Antalgic gait, cannot flex past 45degrees). He exhibits no edema or tenderness (tender mid lumbar area). Good dorsalis pedis and posterior tibial of both feet. High arches noted the patient has an antalgic gait because of his hips. Lymphadenopathy:     He has no cervical adenopathy. Neurological: He is alert and oriented to person, place, and time. No cranial nerve deficit. He exhibits normal muscle tone. Coordination normal.   Monofilament exam shows no sensation of the toes the sensation picks up in the midfoot. Skin: Skin is warm and dry. No rash noted. No erythema. Psychiatric: He has a normal mood and affect. His behavior is normal. Judgment and thought content normal.   Nursing note and vitals reviewed.       Results for orders placed or performed during the hospital encounter of 01/10/19   HCV RT-PCR, QUANT (NON-GRAPH)   Result Value Ref Range    HCV, IU/mL NOT DETECTED NOTD HCVIU/mL    HCV log 10 NOT DETECTED NOTD HCV log 10IU/Ml   CBC WITH AUTOMATED DIFF   Result Value Ref Range    WBC 6.5 4.6 - 13.2 K/uL    RBC 4.92 4.70 - 5.50 M/uL    HGB 15.8 13.0 - 16.0 g/dL    HCT 46.4 36.0 - 48.0 %    MCV 94.3 74.0 - 97.0 FL    MCH 32.1 24.0 - 34.0 PG    MCHC 34.1 31.0 - 37.0 g/dL    RDW 12.9 11.6 - 14.5 %    PLATELET 616 362 - 296 K/uL    MPV 10.4 9.2 - 11.8 FL    NEUTROPHILS 51 40 - 73 %    LYMPHOCYTES 34 21 - 52 %    MONOCYTES 12 (H) 3 - 10 %    EOSINOPHILS 3 0 - 5 %    BASOPHILS 0 0 - 2 %    ABS. NEUTROPHILS 3.3 1.8 - 8.0 K/UL    ABS. LYMPHOCYTES 2.2 0.9 - 3.6 K/UL    ABS. MONOCYTES 0.8 0.05 - 1.2 K/UL    ABS. EOSINOPHILS 0.2 0.0 - 0.4 K/UL    ABS. BASOPHILS 0.0 0.0 - 0.1 K/UL    DF AUTOMATED     HEPATIC FUNCTION PANEL   Result Value Ref Range    Protein, total 7.1 6.4 - 8.2 g/dL    Albumin 4.0 3.4 - 5.0 g/dL    Globulin 3.1 2.0 - 4.0 g/dL    A-G Ratio 1.3 0.8 - 1.7      Bilirubin, total 0.3 0.2 - 1.0 MG/DL    Bilirubin, direct 0.1 0.0 - 0.2 MG/DL    Alk. phosphatase 108 45 - 117 U/L    AST (SGOT) 11 (L) 15 - 37 U/L    ALT (SGPT) 18 16 - 61 U/L   METABOLIC PANEL, BASIC   Result Value Ref Range    Sodium 136 136 - 145 mmol/L    Potassium 4.1 3.5 - 5.5 mmol/L    Chloride 103 100 - 108 mmol/L    CO2 30 21 - 32 mmol/L    Anion gap 3 3.0 - 18 mmol/L    Glucose 76 74 - 99 mg/dL    BUN 15 7.0 - 18 MG/DL    Creatinine 1.09 0.6 - 1.3 MG/DL    BUN/Creatinine ratio 14      GFR est AA >60 >60 ml/min/1.73m2    GFR est non-AA >60 >60 ml/min/1.73m2    Calcium 8.5 8.5 - 10.1 MG/DL        XR Results (maximum last 3): Results from [place] encounter on 03/07/18   [facility] XR TECHNOLOGIST SERVICE    Impression IMPRESSION:    Please see above. FLUORO TIME: 00.52      [facility]     Results from Hospital Encounter encounter on 05/18/17   NC XR TECHNOLOGIST SERVICE    Impression IMPRESSION:    Please see above. FLUORO TIME: 00.06      [facility]     Results from Hospital Encounter encounter on 12/14/16   NC XR TECHNOLOGIST SERVICE    Impression IMPRESSION:    Please see above.     FLUORO TIME: 00.21    [facility]         CT Results (maximum last 3): Results from [place] encounter on 03/08/19   CT SPINE CERV WO CONT    Impression IMPRESSION:    No fracture or traumatic subluxation. Surgical changes from the placement of artificial disks and stabilization with  metal plates and screw fixation at the C5-6 and C6-7 level. No prevertebral soft tissue swelling. Lung apices are unremarkable. .   CT HEAD WO CONT    Impression IMPRESSION: No acute hemorrhage or midline shift. No evidence for fracture in the calvarium. The visualized paranasal sinuses and mastoid air cells are clear. Results from [place] encounter on 04/12/18   CT HEAD WO CONT    Impression IMPRESSION:                1.  No acute intracranial process. 2.  Chronic small vessel ischemic changes. 3.  No significant interval change. MRI Results (maximum last 3): Results from [place] encounter on 11/23/18   MRI LUMB SPINE W WO CONT    Impression IMPRESSION:    1. No interval change in multilevel degenerative and postsurgical findings of  the lumbar spine.  -No high-grade spinal stenosis  -Persistent mass effect on the crossing left S1 nerve at L5-S1 from a chronic  disc extrusion  -Several levels of notable foraminal stenoses particularly L5-S1 followed by  L4-5, as delineated above and previously    Thank you for enabling us to participate in the care of this patient. Results from [place] encounter on 09/19/17   MRI LUMB SPINE W WO CONT    Impression IMPRESSION:      1. At L4-L5, there has been fusion and posterior decompression, with  degenerative changes still resulting in mild to moderate spinal canal stenosis  and moderate foraminal stenoses. No specific findings to suggest infection. An  enhancing left cauda equina nerve root, similar to prior, possibly postsurgical,  or reactive, of unlikely significance. -Multilevel advanced degenerative disc disease with multiple disc  bulges/protrusions.  Multilevel advanced facet arthropathy. Up to moderate spinal  canal stenosis at L2-L3 and L3-L4. Multilevel moderate to severe foraminal  stenoses, most notably L5-S1. Abutment of several nerve roots as above. -See level by level details above. In general, degenerative changes and degree  of stenosis similar to 2016 MRI. Results from [place] encounter on 04/25/17   MRI SHOULDER RT WO CONT    Impression IMPRESSION[de-identified]    1. Moderately pronounced right shoulder biceps tendinosis with some  tenosynovitis. It is medially subluxed, coursing over degenerative spurring of  the lesser tuberosity. 2. Short length longitudinal split tear within the distal superior  subscapularis. 3. Mild rotator cuff tendinosis of supraspinatus and infraspinatus. 4. Mild to moderate arthritis of the [facility] joint. 5. Incidental lipoma of the deltoid muscle. Thank you for this referral.       Nuclear Medicine Results (maximum last 3): No results found for this or any previous visit. US Results (maximum last 3): Results from [place] encounter on 06/04/19   US EXAM SCREENING AAA    Impression IMPRESSION:    No abdominal aortic aneurysm. Results from [place] encounter on 11/26/18   US ABD LTD W ELASTOGRAPHY    Impression Impression:    1. Right adrenal lesion remaining in keeping with stable, centrally calcified  nodule seen by CT. 2. Enlarged diameter of portal vein, which can be seen in the setting of portal  venous hypertension. 3. Coarsened, heterogeneous echotexture to the liver, as can reflect chronic  liver disease/cirrhosis. Transient hepatic elastography as described above. Results from [place] encounter on 04/25/17   US ABD LTD    Impression Impression:    Liver demonstrates diffuse increased echogenicity which may relate to patient's  hepatitis C. No other abnormality noted. DEXA Results (maximum last 3): No results found for this or any previous visit.     LIZABETH Results (maximum last 3):  No results found for this or any previous visit. IR Results (maximum last 3): No results found for this or any previous visit. VAS/US Results (maximum last 3): Results from [place] encounter on 01/19/17   LOWER EXT ART PVR MULT LEVEL SEG PRESSURES       PET Results (maximum last 3): Results from [facility] encounter on 01/27/16   PET/CT TUMOR IMAGE SKULL THIGH W (INI)        EKG Results     None        MDM  Number of Diagnoses or Management Options  Encounter for immunization:   Essential hypertension with goal blood pressure less than 140/90:   Hip pain, bilateral:   Neuropathy:   Pain in both lower extremities:   Prediabetes:   Screening for AAA (abdominal aortic aneurysm):   Smoking history:   Wheezing:     Diagnoses and all orders for this visit:    1. Essential hypertension with goal blood pressure less than 140/90  Comments:  Chronic problem good control continue present medications    2. Hip pain, bilateral  Comments:  Presently the pain is manageable at home except when he walks continue present medications    3. Smoking history  Comments:  Attempting at this time to stop smoking he is on his first course of Chantix follow    4. Prediabetes  Comments:  Evidence of neuropathy on filament exam differential back from back neuropathy versus prediabetes Neurontin started  Orders:  -      DIABETES FOOT EXAM    5. Encounter for immunization  Comments:  Shingrix vaccine given will research whether or not he needs Prevnar  Orders:  -     varicella-zoster recombinant, PF, (SHINGRIX, PF,) 50 mcg/0.5 mL susr injection; 0.5 mL by IntraMUSCular route once for 1 dose. 6. Screening for AAA (abdominal aortic aneurysm)  Comments:  Ultrasound ordered  Orders:  -     US EXAM SCREENING AAA; Future    7. Wheezing  Comments:  Long-term smoker albuterol started PFTs ordered some dyspnea on exertion  Orders:  -     PFT COMPLETE; Future    8.  Neuropathy  Comments:  Luis Alberto initiated differential diagnosis diabetic versus lumbar neuropathy  Orders:  -     gabapentin (NEURONTIN) 100 mg capsule; Take one at night for 3 nights then take 3 at night  Indications: Neuropathic Pain    9. Pain in both lower extremities  Comments:  Some symptoms consistent with claudication ankle-brachial index ordered  Orders:  -     ANKLE BRACHIAL INDEX; Future  -     albuterol (PROVENTIL HFA, VENTOLIN HFA, PROAIR HFA) 90 mcg/actuation inhaler; Take 1 Puff by inhalation every six (6) hours as needed for Wheezing.

## 2019-05-28 ENCOUNTER — OFFICE VISIT (OUTPATIENT)
Dept: FAMILY MEDICINE CLINIC | Facility: CLINIC | Age: 69
End: 2019-05-28

## 2019-05-28 VITALS
DIASTOLIC BLOOD PRESSURE: 71 MMHG | HEIGHT: 71 IN | SYSTOLIC BLOOD PRESSURE: 117 MMHG | TEMPERATURE: 96.4 F | OXYGEN SATURATION: 99 % | HEART RATE: 68 BPM | BODY MASS INDEX: 30.21 KG/M2 | WEIGHT: 215.8 LBS | RESPIRATION RATE: 16 BRPM

## 2019-05-28 DIAGNOSIS — M25.551 HIP PAIN, BILATERAL: ICD-10-CM

## 2019-05-28 DIAGNOSIS — M79.604 PAIN IN BOTH LOWER EXTREMITIES: ICD-10-CM

## 2019-05-28 DIAGNOSIS — Z87.891 SMOKING HISTORY: ICD-10-CM

## 2019-05-28 DIAGNOSIS — M25.552 HIP PAIN, BILATERAL: ICD-10-CM

## 2019-05-28 DIAGNOSIS — G62.9 NEUROPATHY: ICD-10-CM

## 2019-05-28 DIAGNOSIS — R73.03 PREDIABETES: ICD-10-CM

## 2019-05-28 DIAGNOSIS — I10 ESSENTIAL HYPERTENSION WITH GOAL BLOOD PRESSURE LESS THAN 140/90: Primary | ICD-10-CM

## 2019-05-28 DIAGNOSIS — Z13.6 SCREENING FOR AAA (ABDOMINAL AORTIC ANEURYSM): ICD-10-CM

## 2019-05-28 DIAGNOSIS — R06.2 WHEEZING: ICD-10-CM

## 2019-05-28 DIAGNOSIS — M79.605 PAIN IN BOTH LOWER EXTREMITIES: ICD-10-CM

## 2019-05-28 DIAGNOSIS — Z23 ENCOUNTER FOR IMMUNIZATION: ICD-10-CM

## 2019-05-28 RX ORDER — GABAPENTIN 100 MG/1
CAPSULE ORAL
Qty: 90 CAP | Refills: 6 | Status: SHIPPED | OUTPATIENT
Start: 2019-05-28 | End: 2019-07-11

## 2019-05-28 RX ORDER — ALBUTEROL SULFATE 90 UG/1
1 AEROSOL, METERED RESPIRATORY (INHALATION)
Qty: 1 INHALER | Refills: 6 | Status: SHIPPED | OUTPATIENT
Start: 2019-05-28 | End: 2019-09-05

## 2019-05-28 NOTE — PROGRESS NOTES
Diane Childers is a 80 y.o. male here for f/u      Diane Childers is a 80 y.o. male (: 1950) presenting to address:    Chief Complaint   Patient presents with    Hypertension     here for f/u        There were no vitals filed for this visit. Hearing/Vision:   No exam data present    Learning Assessment:     Learning Assessment 2016   PRIMARY LEARNER Patient   HIGHEST LEVEL OF EDUCATION - PRIMARY LEARNER  GRADUATED HIGH SCHOOL OR GED   BARRIERS PRIMARY LEARNER NONE   CO-LEARNER CAREGIVER No   PRIMARY LANGUAGE ENGLISH    NEED No   LEARNER PREFERENCE PRIMARY DEMONSTRATION     READING     LISTENING     PICTURES     VIDEOS   ANSWERED BY patient   RELATIONSHIP SELF     Depression Screening:     3 most recent PHQ Screens 2017   Little interest or pleasure in doing things Nearly every day   Feeling down, depressed, irritable, or hopeless Nearly every day   Total Score PHQ 2 6   Trouble falling or staying asleep, or sleeping too much -   Feeling tired or having little energy -   Poor appetite, weight loss, or overeating -   Feeling bad about yourself - or that you are a failure or have let yourself or your family down -   Trouble concentrating on things such as school, work, reading, or watching TV -   Moving or speaking so slowly that other people could have noticed; or the opposite being so fidgety that others notice -   Thoughts of being better off dead, or hurting yourself in some way -   PHQ 9 Score -   How difficult have these problems made it for you to do your work, take care of your home and get along with others -     Fall Risk Assessment:     Fall Risk Assessment, last 12 mths 2019   Able to walk? Yes   Fall in past 12 months? Yes   Fall with injury? No   Number of falls in past 12 months 1   Fall Risk Score 1     Abuse Screening:     Abuse Screening Questionnaire 2016   Do you ever feel afraid of your partner?  N   Are you in a relationship with someone who physically or mentally threatens you? N   Is it safe for you to go home? Y     Coordination of Care Questionaire:   1. Have you been to the ER, urgent care clinic since your last visit? Hospitalized since your last visit? NO    2. Have you seen or consulted any other health care providers outside of the [address] [place] [address] since your last visit? Include any pap smears or colon screening. NO    Advanced Directive:   1. Do you have an Advanced Directive? NO    2. Would you like information on Advanced Directives?  NO

## 2019-05-28 NOTE — PATIENT INSTRUCTIONS
Albuterol (By mouth) Albuterol (al-BUE-ter-ol) Treats bronchospasm. Brand Name(s):  
There may be other brand names for this medicine. When This Medicine Should Not Be Used: This medicine is not right for everyone. Do not use it if you had an allergic reaction to albuterol. How to Use This Medicine:  
Tablet, Long Acting Tablet, Liquid · Your doctor will tell you how much of this medicine to use. Do not use more medicine or use it more often than your doctor tells you to. · Measure the oral liquid medicine with a marked measuring spoon, oral syringe, or medicine cup. · Swallow the extended-release tablet whole. Do not crush, break, or chew it. · If you take the extended-release tablet, part of the tablet may pass into your stools. This is normal and is nothing to worry about. · Albuterol is sometimes used with other medicines, such as medicine that you inhale. Use all other medicines your doctor has prescribed as part of your combination treatment. · Missed dose: Take a dose as soon as you remember. If it is almost time for your next dose, wait until then and take a regular dose. Do not take extra medicine to make up for a missed dose. · Store Proventil® and Ventolin® at room temperature in a closed container, away from heat, moisture, and direct light. Store Volmax® in the refrigerator. Do not freeze. Drugs and Foods to Avoid: Ask your doctor or pharmacist before using any other medicine, including over-the-counter medicines, vitamins, and herbal products. · Some medicines can affect how albuterol works. Tell your doctor if you are taking any of the following: ¨ A diuretic (water pill) ¨ An MAO inhibitor (MAOI) or depression medicine within the past 14 days ¨ Blood pressure medicine ¨ Digoxin Warnings While Using This Medicine: · Tell your doctor if you are pregnant or breastfeeding, or if you have heart disease, high blood pressure, heart rhythm problems, seizures, thyroid problems, or diabetes. · Call your doctor if your symptoms do not improve or if they get worse. · Keep all medicine out of the reach of children. Never share your medicine with anyone. Possible Side Effects While Using This Medicine:  
Call your doctor right away if you notice any of these side effects: · Allergic reaction: Itching or hives, swelling in your face or hands, swelling or tingling in your mouth or throat, chest tightness, trouble breathing · Blistering, peeling, red skin rash · Chest pain · Fast, pounding, or uneven heartbeat · Muscle cramps, nausea, vomiting If you notice these less serious side effects, talk with your doctor: · Dry mouth or throat · Shakiness, restlessness, nervousness, excitement, or trouble sleeping If you notice other side effects that you think are caused by this medicine, tell your doctor. Call your doctor for medical advice about side effects. You may report side effects to FDA at 3-132-FDA-8914 © 2017 [facility] Information is for End User's use only and may not be sold, redistributed or otherwise used for commercial purposes. The above information is an  only. It is not intended as medical advice for individual conditions or treatments. Talk to your doctor, nurse or pharmacist before following any medical regimen to see if it is safe and effective for you. Albuterol (By mouth) Albuterol (al-BUE-ter-ol) Treats bronchospasm. Brand Name(s):  
There may be other brand names for this medicine. When This Medicine Should Not Be Used: This medicine is not right for everyone. Do not use it if you had an allergic reaction to albuterol. How to Use This Medicine:  
Tablet, Long Acting Tablet, Liquid · Your doctor will tell you how much of this medicine to use. Do not use more medicine or use it more often than your doctor tells you to.  
· Measure the oral liquid medicine with a marked measuring spoon, oral syringe, or medicine cup. · Swallow the extended-release tablet whole. Do not crush, break, or chew it. · If you take the extended-release tablet, part of the tablet may pass into your stools. This is normal and is nothing to worry about. · Albuterol is sometimes used with other medicines, such as medicine that you inhale. Use all other medicines your doctor has prescribed as part of your combination treatment. · Missed dose: Take a dose as soon as you remember. If it is almost time for your next dose, wait until then and take a regular dose. Do not take extra medicine to make up for a missed dose. · Store Proventil® and Ventolin® at room temperature in a closed container, away from heat, moisture, and direct light. Store Volmax® in the refrigerator. Do not freeze. Drugs and Foods to Avoid: Ask your doctor or pharmacist before using any other medicine, including over-the-counter medicines, vitamins, and herbal products. · Some medicines can affect how albuterol works. Tell your doctor if you are taking any of the following: ¨ A diuretic (water pill) ¨ An MAO inhibitor (MAOI) or depression medicine within the past 14 days ¨ Blood pressure medicine ¨ Digoxin Warnings While Using This Medicine: · Tell your doctor if you are pregnant or breastfeeding, or if you have heart disease, high blood pressure, heart rhythm problems, seizures, thyroid problems, or diabetes. · Call your doctor if your symptoms do not improve or if they get worse. · Keep all medicine out of the reach of children. Never share your medicine with anyone. Possible Side Effects While Using This Medicine:  
Call your doctor right away if you notice any of these side effects: · Allergic reaction: Itching or hives, swelling in your face or hands, swelling or tingling in your mouth or throat, chest tightness, trouble breathing · Blistering, peeling, red skin rash · Chest pain · Fast, pounding, or uneven heartbeat · Muscle cramps, nausea, vomiting If you notice these less serious side effects, talk with your doctor: · Dry mouth or throat · Shakiness, restlessness, nervousness, excitement, or trouble sleeping If you notice other side effects that you think are caused by this medicine, tell your doctor. Call your doctor for medical advice about side effects. You may report side effects to FDA at 1-430-FDA-6490 © 2017 [facility] Information is for End User's use only and may not be sold, redistributed or otherwise used for commercial purposes. The above information is an  only. It is not intended as medical advice for individual conditions or treatments. Talk to your doctor, nurse or pharmacist before following any medical regimen to see if it is safe and effective for you.

## 2019-06-04 ENCOUNTER — HOSPITAL ENCOUNTER (OUTPATIENT)
Dept: VASCULAR SURGERY | Age: 69
Discharge: HOME | End: 2019-06-04
Attending: EMERGENCY MEDICINE
Payer: MEDICARE

## 2019-06-04 ENCOUNTER — HOSPITAL ENCOUNTER (OUTPATIENT)
Dept: ULTRASOUND IMAGING | Age: 69
Discharge: HOME | End: 2019-06-04
Attending: EMERGENCY MEDICINE
Payer: MEDICARE

## 2019-06-04 DIAGNOSIS — Z13.6 SCREENING FOR AAA (ABDOMINAL AORTIC ANEURYSM): ICD-10-CM

## 2019-06-04 DIAGNOSIS — M79.604 PAIN IN BOTH LOWER EXTREMITIES: ICD-10-CM

## 2019-06-04 DIAGNOSIS — M79.605 PAIN IN BOTH LOWER EXTREMITIES: ICD-10-CM

## 2019-06-04 PROCEDURE — 93922 UPR/L XTREMITY ART 2 LEVELS: CPT

## 2019-06-04 PROCEDURE — 76706 US ABDL AORTA SCREEN AAA: CPT

## 2019-06-05 LAB
LEFT ABI: 1.21
LEFT ANTERIOR TIBIAL: 176 MMHG
LEFT ARM BP: 150 MMHG
LEFT POSTERIOR TIBIAL: 188 MMHG
RIGHT ABI: 1.18
RIGHT ANTERIOR TIBIAL: 184 MMHG
RIGHT ARM BP: 156 MMHG
RIGHT POSTERIOR TIBIAL: 182 MMHG

## 2019-06-06 ENCOUNTER — HOSPITAL ENCOUNTER (OUTPATIENT)
Dept: RESPIRATORY THERAPY | Age: 69
Discharge: HOME | End: 2019-06-06
Attending: EMERGENCY MEDICINE
Payer: MEDICARE

## 2019-06-06 DIAGNOSIS — R06.2 WHEEZING: ICD-10-CM

## 2019-06-06 PROCEDURE — 94060 EVALUATION OF WHEEZING: CPT

## 2019-06-06 PROCEDURE — 94726 PLETHYSMOGRAPHY LUNG VOLUMES: CPT

## 2019-06-06 PROCEDURE — 94729 DIFFUSING CAPACITY: CPT

## 2019-06-10 ENCOUNTER — HOSPITAL ENCOUNTER (EMERGENCY)
Age: 69
Discharge: HOME | End: 2019-06-10
Attending: EMERGENCY MEDICINE | Admitting: EMERGENCY MEDICINE
Payer: MEDICARE

## 2019-06-10 ENCOUNTER — APPOINTMENT (OUTPATIENT)
Dept: GENERAL RADIOLOGY | Age: 69
End: 2019-06-10
Attending: EMERGENCY MEDICINE
Payer: MEDICARE

## 2019-06-10 VITALS
OXYGEN SATURATION: 96 % | HEIGHT: 71 IN | SYSTOLIC BLOOD PRESSURE: 138 MMHG | DIASTOLIC BLOOD PRESSURE: 75 MMHG | TEMPERATURE: 97.5 F | RESPIRATION RATE: 18 BRPM | HEART RATE: 85 BPM | WEIGHT: 225 LBS | BODY MASS INDEX: 31.5 KG/M2

## 2019-06-10 DIAGNOSIS — S92.502A CLOSED FRACTURE OF PHALANX OF LEFT FIFTH TOE, INITIAL ENCOUNTER: Primary | ICD-10-CM

## 2019-06-10 PROCEDURE — 99282 EMERGENCY DEPT VISIT SF MDM: CPT

## 2019-06-10 PROCEDURE — 99281 EMR DPT VST MAYX REQ PHY/QHP: CPT

## 2019-06-10 PROCEDURE — 73630 X-RAY EXAM OF FOOT: CPT

## 2019-06-10 PROCEDURE — L1902 AFO ANKLE GAUNTLET PRE OTS: HCPCS

## 2019-06-10 NOTE — ED PROVIDER NOTES
MELQUIADES Tate is a 80 y.o. male ambulatory to ER c/o left foot pain after he hurt it doing yardwork. TERI is unclear. Wearing shoes is painful.     Past Medical History:   Diagnosis Date    Bilateral hip pain     Chronic pain     back; hx of opiod addiction    Depression     Diabetes ([address]     borderline, no meds-trying to control with diet    DJD (degenerative joint disease)     GERD (gastroesophageal reflux disease)     Hepatitis C 01/2015    +Harvoni rx    Hypertension     Kidney stones     Lymphadenopathy, generalized 12/05/2015    neg bx    Numbness of foot left    resolved per patient 1/29/16    S/P lumbar fusion 2/9/2016    L4/5 LAMINECTOMY/FUSION/TRANSFORAMINAL LUMBAR INTERBODY FUSION (TLIF) by Dr. Brayden Wilburn on 2/8/16     Unspecified sleep apnea     no cpap machine-pt denies       Past Surgical History:   Procedure Laterality Date    HX APPENDECTOMY  as a child    HX BACK SURGERY  07/2015    HX CERVICAL FUSION  05/18/2017    ACDF C5/6 C6/7    HX COLONOSCOPY  2015    negative    HX LUMBAR FUSION  2/2016    HX ORTHOPAEDIC      denies    HX OTHER SURGICAL  05/2017    cervical fusion    HX TONSILLECTOMY  as a child    REMOVE [facility] Right 3-10-16    Dr. Rajeev Cano         Family History:   Problem Relation Age of Onset    Diabetes Sister     SLE Sister     Arthritis-osteo Sister     Heart Disease Sister        Social History     Socioeconomic History    Marital status:      Spouse name: Not on file    Number of children: Not on file    Years of education: Not on file    Highest education level: Not on file   Occupational History    Not on file   Social Needs    Financial resource strain: Not on file    Food insecurity:     Worry: Not on file     Inability: Not on file    Transportation needs:     Medical: Not on file     Non-medical: Not on file   Tobacco Use    Smoking status: Current Every Day Smoker     Packs/day: 1.50     Years: 40.00 Pack years: 60.00     Types: Cigarettes     Last attempt to quit: 2017     Years since quittin.0    Smokeless tobacco: Former User   Substance and Sexual Activity    Alcohol use: No     Alcohol/week: 0.0 oz    Drug use: Yes     Types: Marijuana, Cocaine     Comment: stopped cocaine , marijuana 18    Sexual activity: Never   Lifestyle    Physical activity:     Days per week: Not on file     Minutes per session: Not on file    Stress: Not on file   Relationships    Social connections:     Talks on phone: Not on file     Gets together: Not on file     Attends Spiritism service: Not on file     Active member of club or organization: Not on file     Attends meetings of clubs or organizations: Not on file     Relationship status: Not on file    Intimate partner violence:     Fear of current or ex partner: Not on file     Emotionally abused: Not on file     Physically abused: Not on file     Forced sexual activity: Not on file   Other Topics Concern    Not on file   Social History Narrative    Not on file         ALLERGIES: Nicotine and Thimerosal    Review of Systems   Constitutional: Negative. Musculoskeletal: Positive for arthralgias (left foot pain along 5th toe) and gait problem (pain with gait). Skin: Positive for color change (left 5th toe with bruse). Negative for rash and wound. Neurological: Negative for weakness and numbness. All other systems reviewed and are negative. Vitals:    06/10/19 0956   BP: 138/75   Pulse: 85   Resp: 18   Temp: 97.5 °F (36.4 °C)   SpO2: 96%   Weight: 102.1 kg (225 lb)   Height: 5' 11\" (1.803 m)            Physical Exam   Constitutional: He appears well-developed and well-nourished. HENT:   Head: Normocephalic and atraumatic. Eyes: Conjunctivae are normal.   Cardiovascular: Intact distal pulses.    Pulmonary/Chest: Effort normal.   Musculoskeletal:        Right foot: Normal.        Left foot: There is tenderness (5th toe), bony tenderness and swelling. There is normal range of motion (with pain in 5th toe), normal capillary refill, no crepitus, no deformity and no laceration. Antalgic gait, favors right foot  Ankles with nl exam   Nursing note and vitals reviewed. Pt was briefly evaluated in triage and xray was ordered, pt left prior to full evaluation completion. DOMENICO Davis 11:44 AM       MDM  Number of Diagnoses or Management Options  Closed fracture of phalanx of left fifth toe, initial encounter:      Amount and/or Complexity of Data Reviewed  Tests in the radiology section of CPT®: ordered and reviewed (Pt was contacted to discussed results, generic message was left with request to return a call, contact info provided. If pt calls back, he needs to be informed of toe fracture, he needs to ani tape toes and fu with ortho if not better, contacts for ortho are in dc instructions (pt does not have dc papers but can return to ED for them) DOMENICO Ibarra 11:45 AM     PROGRESS NOTES:  1:46 PM   Pt returned, exam completed. DOMENICO Davis  )    Risk of Complications, Morbidity, and/or Mortality  Presenting problems: low  Diagnostic procedures: low  Management options: low    Patient Progress  Patient progress: improved         Medications ordered:   Medications - No data to display     Lab findings:   Labs Reviewed - No data to display     EKG interpretation:   EKG Results     None           X-Ray, CT or other radiology findings or impressions:   XR FOOT LT MIN 3 V   Final Result   IMPRESSION:      Cortical interruption seen on the AP view and the proximal phalanx of the left   fifth toe. The area is not visualized on the other views. Soft tissue swelling around the metatarsophalangeal joint of the left fifth   toe. No other abnormality observed. .               Procedures      Splint Check Note    Patient: Scarlet Gonsales  MRN: [id number]  Date: 6/10/2019  Age:  80 y.o.,      Sex: male    YOB: 1950 Type of Splint: buddy taped, cast shoe    Location: left 4-5 toes, left foot    Applied by DOMENICO Ibarra  neurovascular intact prior to splint placement neurovascular intact after splint placement splint is placed in good position. DOMENICO Ibarra Ana Cristina 10, 2019 1:46 PM     Diagnosis:   1. Closed fracture of phalanx of left fifth toe, initial encounter          Disposition: patient left without completing full evaluation    Follow-up Information     Follow up With Specialties Details Why Contact Info    Klaus Moraes Aruna  Schedule an appointment as soon as possible for a visit in 1 day for further evaluation and treatment [address]    [facility] EMERGENCY DEPT Emergency Medicine  As needed, If symptoms worsen [address] [address]  [phone]          Patient's Medications   Start Taking    No medications on file   Continue Taking    ALBUTEROL (PROVENTIL HFA, VENTOLIN HFA, PROAIR HFA) 90 MCG/ACTUATION INHALER    Take 1 Puff by inhalation every six (6) hours as needed for Wheezing. AMLODIPINE (NORVASC) 10 MG TABLET    Take 1 Tab by mouth daily. ESCITALOPRAM OXALATE (LEXAPRO) 10 MG TABLET    Take 10 mg by mouth daily. GABAPENTIN (NEURONTIN) 100 MG CAPSULE    Take one at night for 3 nights then take 3 at night  Indications: Neuropathic Pain    LISINOPRIL (PRINIVIL, ZESTRIL) 30 MG TABLET    Take 1 Tab by mouth daily. OMEPRAZOLE (PRILOSEC) 20 MG CAPSULE    Take 1 Cap by mouth daily. OXCARBAZEPINE (TRILEPTAL) 300 MG TABLET    Take 300 mg by mouth daily. TAMSULOSIN (FLOMAX) 0.4 MG CAPSULE    Take 2 Caps by mouth daily. TIAGABINE (GABITRIL) 2 MG TABLET    Take 1 Tab by mouth two (2) times daily (with meals). TRAZODONE (DESYREL) 50 MG TABLET    Take 50 mg by mouth two (2) times a day.    These Medications have changed    No medications on file   Stop Taking    No medications on file

## 2019-06-10 NOTE — DISCHARGE INSTRUCTIONS
Patient Education        Broken Toe: Care Instructions  Your Care Instructions  You have broken (fractured) a bone in your toe. This kind of fracture does not need a special cast or brace. \"Ani-taping\" your broken toe to a healthy toe next to it is almost always enough to treat the problem and ease symptoms. The toe may take 4 weeks or more to heal.  You heal best when you take good care of yourself. Eat a variety of healthy foods, and don't smoke. Follow-up care is a key part of your treatment and safety. Be sure to make and go to all appointments, and call your doctor if you are having problems. It's also a good idea to know your test results and keep a list of the medicines you take. How can you care for yourself at home? · Be safe with medicines. Take pain medicines exactly as directed. ? If the doctor gave you a prescription medicine for pain, take it as prescribed. ? If you are not taking a prescription pain medicine, ask your doctor if you can take an over-the-counter medicine. · If your toe is taped to the toe next to it, your doctor has shown you how to change the tape. Protect the skin by putting something soft, such as felt or foam, between your toes before you tape them together. Never tape the toes together skin-to-skin. Your broken toe may need to be ani-taped for 2 to 4 weeks to heal.  · Rest and protect your toe. Do not walk on it until you can do so without too much pain. If the doctor has told you to use crutches, use them as instructed. · Put ice or a cold pack on your toe for 10 to 20 minutes at a time. Try to do this every 1 to 2 hours for the next 3 days (when you are awake) or until the swelling goes down. Put a thin cloth between the ice and your skin. · Prop up your foot on a pillow when you ice it or anytime you sit or lie down. Try to keep it above the level of your heart. This will help reduce swelling. · Make sure you go to your follow-up appointments.  Your doctor will need to check that your toe is healing right. When should you call for help? Call your doctor now or seek immediate medical care if:    · You have severe pain.     · Your toe is cool or pale or changes color.     · You have tingling, weakness, or numbness in your toe.    Watch closely for changes in your health, and be sure to contact your doctor if:    · Pain and swelling get worse.     · You are not getting better as expected. Where can you learn more? Go to http://gomez-jarvis.info/. Enter N424 in the search box to learn more about \"Broken Toe: Care Instructions. \"  Current as of: September 20, 2018  Content Version: 11.9  © 5912-4642 [employer]. Care instructions adapted under license by [employer] (which disclaims liability or warranty for this information). If you have questions about a medical condition or this instruction, always ask your healthcare professional. [place] [address] any warranty or liability for your use of this information. Patient Education        Toe Fracture: Rehab Exercises  Your Care Instructions  Here are some examples of typical rehabilitation exercises for your condition. Start each exercise slowly. Ease off the exercise if you start to have pain. Your doctor or physical therapist will tell you when you can start these exercises and which ones will work best for you. How to do the exercises  Passive toe exercise    1. Sit on the floor, with the heel of your affected foot on the floor. Use one hand to hold your foot steady. 2. Using the thumb and index (pointing) finger of your other hand, slowly bend your toe forward and then backward. Hold each position for about 15 seconds. 3. Repeat 2 to 4 times. Toe curl    1. Sit on the floor, with the heel of your affected foot on the floor. 2. Gently curl your toes forward and then backward. Hold each position for about 6 seconds. 3. Repeat 8 to 12 times.     Towel scrunches    1. Sit in a chair, and place your affected foot on a towel on the floor. 2. Scrunch the towel toward you with your toes. Then use your toes to push the towel back into place. 3. Repeat 8 to 12 times. Lupton pick-ups    1. Put some marbles on the floor next to a cup.  2. Sit in a chair, and use the toes of your affected foot to lift up one marble from the floor at a time. Then try to put the marble in the cup.  3. Repeat 8 to 12 times. Towel stretch    1. Sit with your legs extended and knees straight. 2. Place a towel or belt around your foot just under your toes. 3. Hold both ends of the towel or belt, with your hands above your knees. 4. Pull back with the towel or belt so that your foot stretches toward you. 5. Hold the position for at least 15 to 30 seconds. 6. Repeat 2 to 4 times. Follow-up care is a key part of your treatment and safety. Be sure to make and go to all appointments, and call your doctor if you are having problems. It's also a good idea to know your test results and keep a list of the medicines you take. Where can you learn more? Go to http://gomez-jarvis.info/. Enter [phone] in the search box to learn more about \"Toe Fracture: Rehab Exercises. \"  Current as of: September 20, 2018  Content Version: 11.9  © 5122-4653 [employer]. Care instructions adapted under license by [employer] (which disclaims liability or warranty for this information). If you have questions about a medical condition or this instruction, always ask your healthcare professional. [place] [address] any warranty or liability for your use of this information.

## 2019-06-10 NOTE — ED TRIAGE NOTES
\"Saturday I was doing yard work and fell. I hurt my left foot. Today the pain is so bad I can't even put a shoe on it. \"

## 2019-06-12 DIAGNOSIS — R06.2 WHEEZING: ICD-10-CM

## 2019-06-12 DIAGNOSIS — Z87.891 SMOKING HISTORY: Primary | ICD-10-CM

## 2019-06-12 NOTE — PROGRESS NOTES
Please call. The lung function tests suggest there may be a possible blockage of the upper airway, above the lungs.    Will refer him to a lung specialist  The ultrasond of the abdomen was good; no aneurysm  The circulation blood pressure test was also good

## 2019-06-13 ENCOUNTER — HOSPITAL ENCOUNTER (EMERGENCY)
Age: 69
Discharge: STILL ADMITTED OR ADMINISTRATIVE | End: 2019-06-13
Attending: EMERGENCY MEDICINE
Payer: MEDICAID

## 2019-06-13 ENCOUNTER — HOSPITAL ENCOUNTER (EMERGENCY)
Age: 69
Discharge: HOME | End: 2019-06-13
Attending: EMERGENCY MEDICINE
Payer: MEDICARE

## 2019-06-13 VITALS
HEIGHT: 71 IN | SYSTOLIC BLOOD PRESSURE: 145 MMHG | BODY MASS INDEX: 31.5 KG/M2 | RESPIRATION RATE: 18 BRPM | HEART RATE: 87 BPM | OXYGEN SATURATION: 98 % | DIASTOLIC BLOOD PRESSURE: 81 MMHG | TEMPERATURE: 98.9 F | WEIGHT: 225 LBS

## 2019-06-13 DIAGNOSIS — S05.01XA ABRASION OF RIGHT CORNEA, INITIAL ENCOUNTER: Primary | ICD-10-CM

## 2019-06-13 PROCEDURE — 75810000275 HC EMERGENCY DEPT VISIT NO LEVEL OF CARE

## 2019-06-13 PROCEDURE — 99283 EMERGENCY DEPT VISIT LOW MDM: CPT

## 2019-06-13 RX ORDER — PROPARACAINE HYDROCHLORIDE 5 MG/ML
1 SOLUTION/ DROPS OPHTHALMIC
Status: DISCONTINUED | OUTPATIENT
Start: 2019-06-13 | End: 2019-06-13 | Stop reason: HOSPADM

## 2019-06-13 RX ORDER — CIPROFLOXACIN HYDROCHLORIDE 3.5 MG/ML
1 SOLUTION/ DROPS TOPICAL 4 TIMES DAILY
Qty: 5 ML | Refills: 0 | Status: SHIPPED | OUTPATIENT
Start: 2019-06-13 | End: 2019-06-20

## 2019-06-13 NOTE — ED TRIAGE NOTES
Right eye irritated and tearful.  States he was cutting bushes yesterday when he believes he must have gotten piece in eye

## 2019-06-13 NOTE — DISCHARGE INSTRUCTIONS
1.  Cool compresses for comfort. 2.  Cipro drops for secondary infection. 3.  Ophthalmology follow-up for recheck in 3 to 5 days if not better. 4.  Slit lamp exam demonstrates to transverse corneal abrasions to the mid cornea. Patient Education        Corneal Scratches: Care Instructions  Your Care Instructions    The cornea is the clear surface that covers the front of the eye. When a speck of dirt, a wood chip, an insect, or another object flies into your eye, it can cause a painful scratch on the cornea. Wearing contact lenses too long or rubbing your eyes can also scratch the cornea. Small scratches usually heal in a day or two. Deeper scratches may take longer. If you have had a foreign object removed from your eye or you have a corneal scratch, you will need to watch for infection and vision problems while your eye heals. Follow-up care is a key part of your treatment and safety. Be sure to make and go to all appointments, and call your doctor if you are having problems. It's also a good idea to know your test results and keep a list of the medicines you take. How can you care for yourself at home? · The doctor probably used a medicine during your exam to numb your eye. When it wears off in 30 to 60 minutes, your eye pain may come back. Take pain medicines exactly as directed. ? If the doctor gave you a prescription medicine for pain, take it as prescribed. ? If you are not taking a prescription pain medicine, ask your doctor if you can take an over-the-counter medicine. ? Do not take two or more pain medicines at the same time unless the doctor told you to. Many pain medicines have acetaminophen, which is Tylenol. Too much acetaminophen (Tylenol) can be harmful. · Do not rub your injured eye. Rubbing can make it worse. · Use the prescribed eyedrops or ointment as directed. Be sure the dropper or bottle tip is clean.  To put in eyedrops or ointment:  ? Tilt your head back, and pull your lower eyelid down with one finger. ? Drop or squirt the medicine inside the lower lid. ? Close your eye for 30 to 60 seconds to let the drops or ointment move around. ? Do not touch the ointment or dropper tip to your eyelashes or any other surface. · Do not use your contact lens in your hurt eye until your doctor says you can. Also, do not wear eye makeup until your eye has healed. · Do not drive if you have blurred vision. · Bright light may hurt. Sunglasses can help. · To prevent eye injuries in the future, wear safety glasses or goggles when you work with machines or tools, mow the lawn, or ride a bike or motorcycle. When should you call for help? Call your doctor now or seek immediate medical care if:    · You have signs of an eye infection, such as:  ? Pus or thick discharge coming from the eye.  ? Redness or swelling around the eye.  ? A fever.     · You have new or worse eye pain.     · You have vision changes.     · It feels like there is something in your eye.     · Light hurts your eye.    Watch closely for changes in your health, and be sure to contact your doctor if:    · You do not get better as expected. Where can you learn more? Go to http://gomez-jarvis.info/. Enter D970 in the search box to learn more about \"Corneal Scratches: Care Instructions. \"  Current as of: July 17, 2018  Content Version: 11.9  © 2528-9207 [employer]. Care instructions adapted under license by [employer] (which disclaims liability or warranty for this information). If you have questions about a medical condition or this instruction, always ask your healthcare professional. [place] [address] any warranty or liability for your use of this information.

## 2019-06-20 ENCOUNTER — OFFICE VISIT (OUTPATIENT)
Dept: FAMILY MEDICINE CLINIC | Facility: CLINIC | Age: 69
End: 2019-06-20

## 2019-06-20 VITALS
HEIGHT: 71 IN | TEMPERATURE: 97.6 F | DIASTOLIC BLOOD PRESSURE: 80 MMHG | RESPIRATION RATE: 16 BRPM | BODY MASS INDEX: 31.5 KG/M2 | WEIGHT: 225 LBS | SYSTOLIC BLOOD PRESSURE: 147 MMHG | HEART RATE: 66 BPM | OXYGEN SATURATION: 96 %

## 2019-06-20 NOTE — PROGRESS NOTES
Chief Complaint   Patient presents with    Follow Up Chronic Condition     HTN pain Room 8    Fall     pt states \" that he has been unsteady on his feet

## 2019-06-24 ENCOUNTER — TELEPHONE (OUTPATIENT)
Dept: FAMILY MEDICINE CLINIC | Facility: CLINIC | Age: 69
End: 2019-06-24

## 2019-06-24 NOTE — TELEPHONE ENCOUNTER
Patient called and would like for someone to call him about his  Imaging results  He had done .  Please advise [phone]

## 2019-06-26 ENCOUNTER — OFFICE VISIT (OUTPATIENT)
Dept: ORTHOPEDIC SURGERY | Age: 69
End: 2019-06-26

## 2019-06-26 VITALS
RESPIRATION RATE: 20 BRPM | TEMPERATURE: 98 F | WEIGHT: 217.6 LBS | BODY MASS INDEX: 30.46 KG/M2 | DIASTOLIC BLOOD PRESSURE: 67 MMHG | SYSTOLIC BLOOD PRESSURE: 113 MMHG | HEIGHT: 71 IN | HEART RATE: 88 BPM

## 2019-06-26 DIAGNOSIS — Z98.1 S/P LUMBAR FUSION: ICD-10-CM

## 2019-06-26 DIAGNOSIS — R26.9 GAIT ABNORMALITY: ICD-10-CM

## 2019-06-26 DIAGNOSIS — G89.29 OTHER CHRONIC PAIN: ICD-10-CM

## 2019-06-26 DIAGNOSIS — G62.9 NEUROPATHY: Primary | ICD-10-CM

## 2019-06-26 DIAGNOSIS — M47.816 LUMBAR FACET ARTHROPATHY: ICD-10-CM

## 2019-06-26 DIAGNOSIS — R26.89 BALANCE PROBLEM: ICD-10-CM

## 2019-06-26 DIAGNOSIS — G47.9 SLEEP DISORDER: ICD-10-CM

## 2019-06-26 RX ORDER — PREGABALIN 75 MG/1
75 CAPSULE ORAL 2 TIMES DAILY
Qty: 60 CAP | Refills: 1 | Status: SHIPPED | OUTPATIENT
Start: 2019-06-26 | End: 2019-09-05

## 2019-06-26 NOTE — LETTER
6/26/19 Patient: Magalys Dalal YOB: 1950 Date of Visit: 6/26/2019 Nimesh Brown, [address] [facility] [address] [place] [address] VIA In Basket Dear Nimesh Brown MD, Thank you for referring Mr. Gardenia Aguirre to [address] [place] [address] for evaluation. My notes for this consultation are attached. If you have questions, please do not hesitate to call me. I look forward to following your patient along with you. Sincerely, Lexi Nunes MD

## 2019-06-26 NOTE — PATIENT INSTRUCTIONS
Low Back Arthritis: Exercises  Your Care Instructions  Here are some examples of typical rehabilitation exercises for your condition. Start each exercise slowly. Ease off the exercise if you start to have pain. Your doctor or physical therapist will tell you when you can start these exercises and which ones will work best for you. When you are not being active, find a comfortable position for rest. Some people are comfortable on the floor or a medium-firm bed with a small pillow under their head and another under their knees. Some people prefer to lie on their side with a pillow between their knees. Don't stay in one position for too long. Take short walks (10 to 20 minutes) every 2 to 3 hours. Avoid slopes, hills, and stairs until you feel better. Walk only distances you can manage without pain, especially leg pain. How to do the exercises  Pelvic tilt    1. Lie on your back with your knees bent. 2. \"Brace\" your stomach--tighten your muscles by pulling in and imagining your belly button moving toward your spine. 3. Press your lower back into the floor. You should feel your hips and pelvis rock back. 4. Hold for 6 seconds while breathing smoothly. 5. Relax and allow your pelvis and hips to rock forward. 6. Repeat 8 to 12 times. Back stretches    1. Get down on your hands and knees on the floor. 2. Relax your head and allow it to droop. Round your back up toward the ceiling until you feel a nice stretch in your upper, middle, and lower back. Hold this stretch for as long as it feels comfortable, or about 15 to 30 seconds. 3. Return to the starting position with a flat back while you are on your hands and knees. 4. Let your back sway by pressing your stomach toward the floor. Lift your buttocks toward the ceiling. 5. Hold this position for 15 to 30 seconds. 6. Repeat 2 to 4 times. Follow-up care is a key part of your treatment and safety.  Be sure to make and go to all appointments, and call your doctor if you are having problems. It's also a good idea to know your test results and keep a list of the medicines you take. Where can you learn more? Go to http://gomez-jarvis.info/. Enter H660 in the search box to learn more about \"Low Back Arthritis: Exercises. \"  Current as of: September 20, 2018  Content Version: 11.9  © 7991-4297 [employer]. Care instructions adapted under license by [employer] (which disclaims liability or warranty for this information). If you have questions about a medical condition or this instruction, always ask your healthcare professional. [address] any warranty or liability for your use of this information.

## 2019-06-26 NOTE — PROGRESS NOTES
[facility]  [address] [place], [facility] [address]  Phone: [phone]  Fax: [phone]    Pt's YOB: 1950    ASSESSMENT   Diagnoses and all orders for this visit:    1. Neuropathy  -     pregabalin (LYRICA) 75 mg capsule; Take 1 Cap by mouth two (2) times a day. Max Daily Amount: 150 mg.  -     REFERRAL TO NEUROLOGY    2. Gait abnormality  -     REFERRAL TO NEUROLOGY    3. Lumbar facet arthropathy  -     REFERRAL TO NEUROLOGY    4. Balance problem  -     REFERRAL TO NEUROLOGY    5. Sleep disorder    6. S/P lumbar fusion    7. Other chronic pain         IMPRESSION AND PLAN:  eR Acosta is a 80 y.o. male with history of lumbar pain. He complains of pain across the lower back, particularly when standing and walking for prolonged periods of time. Pt notes that he did not tolerate a previous spinal cord stimulator trial. He reports numbness in the left leg and in both feet and notes a history of neuropathy. Pt takes Neurontin 100 mg 3 tabs QHS with benefit and requests to restart Lyrica at this time. 1) Pt was given information on lumbar arthritis exercises. 2) He was referred to Dr. Rita Reyes for neuro evaluation for neuropathy at his request.. 3) Pt was prescribed Lyrica 75 mg 1 tab BID. 4) Mr. Ashlyn Howell has a reminder for a \"due or due soon\" health maintenance. I have asked that he contact his primary care provider, Mary Huber MD, for follow-up on this health maintenance. 5)  demonstrated consistency with prescribing. Follow-up and Dispositions    · Return in about 6 weeks (around 8/7/2019) for Diagnostic Test follow up, Medication follow up. HISTORY OF PRESENT ILLNESS:  Re Acosta is a 80 y.o. male with history of lumbar pain and presents to the office today for follow up. He complains of pain across the lower back, particularly when standing and walking for prolonged periods of time.  Pt also complains of pain when washing dishes. He reports that his pain is most severe upon waking. Pt notes that he had a spinal cord stimulator trial with Dr. Dale Morris about 1 year ago. When he turned the stimulator on, he felt vibrations extending from the lower back down both legs. Pt notes that he followed up with Dr. Dale Morris the next day and the spinal cord stimulator was removed. He reports numbness in the left leg and in both feet and notes a history of neuropathy. Pt admits that he was heavy drinker about 30 years ago. He reports several falls within the last month. Pt reports once incident where his knees buckled while he was mowing his lawn. He reports some benefit when he recently started Neurontin 100 mg 3 tabs QHS. Pt denies any sedation with the Neurontin and admits to difficulty sleeping. Pt notes that he only sleeps well when he smokes marijuana. Of note, he has taken Neurontin 300 mg and Lyrica in the past and requests to restart Lyrica at this time. Pt at this time desires to proceed with medication evaluation. Of note, he used to be a .      Pain Scale: 5/10    PCP: Cristin Turner MD     Past Medical History:   Diagnosis Date    Bilateral hip pain     Chronic pain     back; hx of opiod addiction    Depression     Diabetes ([address]     borderline, no meds-trying to control with diet    DJD (degenerative joint disease)     GERD (gastroesophageal reflux disease)     Hepatitis C 01/2015    +Harvoni rx    Hypertension     Kidney stones     Lymphadenopathy, generalized 12/05/2015    neg bx    Numbness of foot left    resolved per patient 1/29/16    S/P lumbar fusion 2/9/2016    L4/5 LAMINECTOMY/FUSION/TRANSFORAMINAL LUMBAR INTERBODY FUSION (TLIF) by Dr. Ashly Whyte on 2/8/16     Unspecified sleep apnea     no cpap machine-pt denies        Social History     Socioeconomic History    Marital status:      Spouse name: Not on file    Number of children: Not on file    Years of education: Not on file    Highest education level: Not on file   Occupational History    Not on file   Social Needs    Financial resource strain: Not on file    Food insecurity:     Worry: Not on file     Inability: Not on file    Transportation needs:     Medical: Not on file     Non-medical: Not on file   Tobacco Use    Smoking status: Current Every Day Smoker     Packs/day: 1.50     Years: 40.00     Pack years: 60.00     Types: Cigarettes     Last attempt to quit: 2017     Years since quittin.1    Smokeless tobacco: Former User   Substance and Sexual Activity    Alcohol use: No     Alcohol/week: 0.0 oz    Drug use: Yes     Types: Marijuana, Cocaine     Comment: stopped cocaine , marijuana 17    Sexual activity: Never   Lifestyle    Physical activity:     Days per week: Not on file     Minutes per session: Not on file    Stress: Not on file   Relationships    Social connections:     Talks on phone: Not on file     Gets together: Not on file     Attends Anabaptism service: Not on file     Active member of club or organization: Not on file     Attends meetings of clubs or organizations: Not on file     Relationship status: Not on file    Intimate partner violence:     Fear of current or ex partner: Not on file     Emotionally abused: Not on file     Physically abused: Not on file     Forced sexual activity: Not on file   Other Topics Concern    Not on file   Social History Narrative    Not on file       Current Outpatient Medications   Medication Sig Dispense Refill    pregabalin (LYRICA) 75 mg capsule Take 1 Cap by mouth two (2) times a day. Max Daily Amount: 150 mg. 60 Cap 1    albuterol (PROVENTIL HFA, VENTOLIN HFA, PROAIR HFA) 90 mcg/actuation inhaler Take 1 Puff by inhalation every six (6) hours as needed for Wheezing.  1 Inhaler 6    gabapentin (NEURONTIN) 100 mg capsule Take one at night for 3 nights then take 3 at night  Indications: Neuropathic Pain 90 Cap 6    amLODIPine (NORVASC) 10 mg tablet Take 1 Tab by mouth daily. 90 Tab 6    lisinopril (PRINIVIL, ZESTRIL) 30 mg tablet Take 1 Tab by mouth daily. 30 Tab 6    tamsulosin (FLOMAX) 0.4 mg capsule Take 2 Caps by mouth daily. 90 Cap 3    omeprazole (PRILOSEC) 20 mg capsule Take 1 Cap by mouth daily. 90 Cap 3    tiaGABine (GABITRIL) 2 mg tablet Take 1 Tab by mouth two (2) times daily (with meals). 60 Tab 1       Allergies   Allergen Reactions    Nicotine Contact Dermatitis     Nicotine Patch reaction - Contact dermatitis with numbness and tingling also occuring in the left arm and denuding of the skin.  Thimerosal Other (comments)     Contact lens solution, made eyes red and irrated         REVIEW OF SYSTEMS    Constitutional: Negative for fever, chills, or weight change. Respiratory: Negative for cough or shortness of breath. Cardiovascular: Negative for chest pain or palpitations. Gastrointestinal: Negative for acid reflux, change in bowel habits, or constipation. Genitourinary: Negative for dysuria and flank pain. Musculoskeletal: Positive for lumbar pain. Skin: Negative for rash. Neurological: Negative for headaches or dizziness. Positive for numbness in the left leg and in both feet. Endo/Heme/Allergies: Negative for increased bruising. Psychiatric/Behavioral: Positive for difficulty with sleep. PHYSICAL EXAMINATION  Visit Vitals  /67 (BP 1 Location: Left arm, BP Patient Position: Sitting)   Pulse 88   Temp 98 °F (36.7 °C) (Oral)   Resp 20   Ht 5' 11\" (1.803 m)   Wt 217 lb 9.6 oz (98.7 kg)   BMI 30.35 kg/m²       Constitutional: Awake, alert, and in no acute distress. Neurological: 1+ symmetrical DTRs in the upper extremities. 1+ symmetrical DTRs in the lower extremities. Decreased sensation to light touch in both lower extremities below the knee; otherwise sensation is intact. Negative Mathieu's sign bilaterally. Skin: warm, dry, and intact.    Musculoskeletal: Tenderness to palpation in the lower lumbar region. Moderate pain with extension and axial loading. No pain with internal or external rotation of his hips. Negative straight leg raise bilaterally. Patient ambulates with the assistance of a single point cane. Biceps  Triceps Deltoids Wrist Ext Wrist Flex Hand Intrin   Right +4/5 +4/5 +4/5 +4/5 +4/5 +4/5   Left +4/5 +4/5 +4/5 +4/5 +4/5 +4/5      Hip Flex Quads Hamstrings Ankle DF EHL Ankle PF   Right 4/5 4/5 4/5 4/5 4/5 4/5   Left 4/5 4/5 4/5 4/5 4/5 4/5       IMAGING:    Lumbar spine MRI from 11/23/2018 was personally reviewed with the patient and demonstrated:  Results from [place] encounter on 11/23/18   MRI LUMB SPINE W WO CONT    Narrative MRI lumbar spine with and without IV contrast    HISTORY: to evaluate stenosis ; chronic low back pain radiating to both legs. Prior surgeries, most recently a revision of L4-5 fusion February 2016 related  to postlaminectomy syndrome    COMPARISON: MRI September 2017, CT January 2017, plain films August 2014    TECHNIQUE: Multi-sequence multiplanar pre and postcontrast imaging obtained  centered on the lumbar spine. Contrast used: 20 cc Dotarem    FINDINGS:   L4-5 anterior lateral interbody fusion with bilateral partial facetectomies and  laminectomies. No fracture or bone destruction. No sizable fluid collection. Alignment: Grade 1 degenerative retrolisthesis at each of L1-3, and at L5, as  before  Vertebral body height: Normal  Marrow signal: Unremarkable  Disc spaces: Diffuse desiccation. Moderate to severe narrowing throughout, most  pronounced at L2-3 and L5-S1  Conus: Terminates at upper L2.  -No pathologic intrathecal enhancement    Axial imaging correlation:    T12-L1: Mild disc bulge and facet arthropathy. No significant spinal stenosis. Patent foramina. Unchanged. L1-2: Bilobed disc bulge and listhesis. Some facet arthropathy. Patent canal and  foramina. Unchanged. L2-3: Listhesis with bilobed disc bulge. Superimposed mild caudal disc  extrusion. Bilateral facet arthropathy. Mild concentric spinal stenosis. More  moderate distortion of left lateral recess. Moderate foraminal stenoses. Unchanged. L3-4: Listhesis with broad-based disc osteophyte complex. Minor extrusion caudad  in the midline. Bilateral facet arthropathy. Moderate foraminal stenoses. Unchanged. L4-5: Fusion level. Introduction of spacer from the left. Enhancing granulation  tissue along the left lateral thecal sac. Bilateral facet arthropathy. Minor  spinal stenosis. Moderate to severe right and moderate left foraminal stenosis. Some distortion of perineural fat. Unchanged. L5-S1: Broad-based disc osteophyte complex. Small chronic left paracentral  caudal disc extrusion at the lateral recess. Bilateral facet arthropathy. Minor  spinal stenosis. There is posterior displacement of the crossing left S1 nerve  from the disc pathology. Severe bilateral foraminal stenoses. Unchanged. Other structures: Unremarkable. Impression IMPRESSION:    1. No interval change in multilevel degenerative and postsurgical findings of  the lumbar spine.  -No high-grade spinal stenosis  -Persistent mass effect on the crossing left S1 nerve at L5-S1 from a chronic  disc extrusion  -Several levels of notable foraminal stenoses particularly L5-S1 followed by  L4-5, as delineated above and previously    Thank you for enabling us to participate in the care of this patient. Written by Ev Sampson, as dictated by Duke Rodriges MD.  I, Dr. Duke Rodriges confirm that all documentation is accurate.

## 2019-06-28 ENCOUNTER — HOSPITAL ENCOUNTER (EMERGENCY)
Age: 69
Discharge: HOME | End: 2019-06-28
Attending: EMERGENCY MEDICINE
Payer: MEDICARE

## 2019-06-28 VITALS
RESPIRATION RATE: 16 BRPM | BODY MASS INDEX: 30.68 KG/M2 | HEART RATE: 85 BPM | TEMPERATURE: 98.3 F | SYSTOLIC BLOOD PRESSURE: 132 MMHG | WEIGHT: 220 LBS | OXYGEN SATURATION: 98 % | DIASTOLIC BLOOD PRESSURE: 74 MMHG

## 2019-06-28 DIAGNOSIS — S61.215A LACERATION OF LEFT RING FINGER WITHOUT FOREIGN BODY WITHOUT DAMAGE TO NAIL, INITIAL ENCOUNTER: Primary | ICD-10-CM

## 2019-06-28 PROCEDURE — 99282 EMERGENCY DEPT VISIT SF MDM: CPT

## 2019-06-28 PROCEDURE — 77030018836 HC SOL IRR NACL ICUM -A

## 2019-06-28 PROCEDURE — 77030031132 HC SUT NYL COVD -A

## 2019-06-28 PROCEDURE — 75810000293 HC SIMP/SUPERF WND  RPR

## 2019-06-28 NOTE — ED PROVIDER NOTES
EMERGENCY DEPARTMENT HISTORY AND PHYSICAL EXAM    Date: 6/28/2019  Patient Name: Mariah Monzon    History of Presenting Illness     Chief Complaint   Patient presents with    Laceration         History Provided By: Patient    Additional History (Context): Mariah Monzon is a 80 y.o. male with See below who presents with left ring finger laceration. Patient was trimming hedges and the blade slipped cutting his fingertip. Is up-to-date for his tetanus. Denies numbness weakness. Right-hand-dominant. 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 PCP: Giovany Burciaga MD    Current Outpatient Medications   Medication Sig Dispense Refill    pregabalin (LYRICA) 75 mg capsule Take 1 Cap by mouth two (2) times a day. Max Daily Amount: 150 mg. 60 Cap 1    albuterol (PROVENTIL HFA, VENTOLIN HFA, PROAIR HFA) 90 mcg/actuation inhaler Take 1 Puff by inhalation every six (6) hours as needed for Wheezing. 1 Inhaler 6    gabapentin (NEURONTIN) 100 mg capsule Take one at night for 3 nights then take 3 at night  Indications: Neuropathic Pain 90 Cap 6    amLODIPine (NORVASC) 10 mg tablet Take 1 Tab by mouth daily. 90 Tab 6    lisinopril (PRINIVIL, ZESTRIL) 30 mg tablet Take 1 Tab by mouth daily. 30 Tab 6    tamsulosin (FLOMAX) 0.4 mg capsule Take 2 Caps by mouth daily. 90 Cap 3    tiaGABine (GABITRIL) 2 mg tablet Take 1 Tab by mouth two (2) times daily (with meals). 60 Tab 1    omeprazole (PRILOSEC) 20 mg capsule Take 1 Cap by mouth daily.  80 Cap 3       Past History     Past Medical History:  Past Medical History:   Diagnosis Date    Bilateral hip pain     Chronic pain     back; hx of opiod addiction    Depression     Diabetes (HCC)     borderline, no meds-trying to control with diet    DJD (degenerative joint disease)     GERD (gastroesophageal reflux disease)     Hepatitis C 01/2015    +Harvoni rx    Hypertension     Kidney stones     Lymphadenopathy, generalized 2015    neg bx    Numbness of foot left    resolved per patient 16    S/P lumbar fusion 2016    L4/5 LAMINECTOMY/FUSION/TRANSFORAMINAL LUMBAR INTERBODY FUSION (TLIF) by Dr. Tong Ludwig on 16     Unspecified sleep apnea     no cpap machine-pt denies       Past Surgical History:  Past Surgical History:   Procedure Laterality Date    HX APPENDECTOMY  as a child    HX BACK SURGERY  2015    HX CERVICAL FUSION  2017    ACDF C5/6 C6/7    HX COLONOSCOPY      negative    HX LUMBAR FUSION  2016    HX ORTHOPAEDIC      denies    HX OTHER SURGICAL  2017    cervical fusion    HX TONSILLECTOMY  as a child    REMOVE Josette Right 3-10-16    Dr. Verlin Koyanagi       Family History:  Family History   Problem Relation Age of Onset    Diabetes Sister     SLE Sister     Arthritis-osteo Sister     Heart Disease Sister        Social History:  Social History     Tobacco Use    Smoking status: Current Every Day Smoker     Packs/day: 1.50     Years: 40.00     Pack years: 60.00     Types: Cigarettes     Last attempt to quit: 2017     Years since quittin.1    Smokeless tobacco: Former User   Substance Use Topics    Alcohol use: No     Alcohol/week: 0.0 oz    Drug use: Yes     Types: Marijuana, Cocaine     Comment: stopped cocaine , marijuana 18       Allergies: Allergies   Allergen Reactions    Nicotine Contact Dermatitis     Nicotine Patch reaction - Contact dermatitis with numbness and tingling also occuring in the left arm and denuding of the skin.  Thimerosal Other (comments)     Contact lens solution, made eyes red and irrated         Review of Systems   Review of Systems   Constitutional: Negative for fever. Skin: Positive for wound. Neurological: Negative for weakness and numbness. Hematological: Does not bruise/bleed easily.      All Other Systems Negative  Physical Exam     Vitals:    19 1120   BP: 132/74   Pulse: 85   Resp: 16 Temp: 98.3 °F (36.8 °C)   SpO2: 98%   Weight: 99.8 kg (220 lb)     Physical Exam   Constitutional: Vital signs are normal. He appears well-developed and well-nourished. He is active. Non-toxic appearance. He does not appear ill. No distress. HENT:   Head: Normocephalic and atraumatic. Neck: Normal range of motion. Neck supple. Carotid bruit is not present. No tracheal deviation present. No thyromegaly present. Cardiovascular: Normal rate, regular rhythm and normal heart sounds. Exam reveals no gallop and no friction rub. No murmur heard. Pulmonary/Chest: Effort normal and breath sounds normal. No stridor. No respiratory distress. He has no wheezes. He has no rales. He exhibits no tenderness. Abdominal: Soft. He exhibits no distension and no mass. There is no tenderness. There is no rebound, no guarding and no CVA tenderness. Musculoskeletal: Normal range of motion. Neurological: He is alert. Skin: Skin is warm, dry and intact. He is not diaphoretic. No pallor. Left ring finger pad with 2 linear lacerations, superficial, on the distal palmar pad. Length approximately 1 cm. Bleeding controlled. Psychiatric: He has a normal mood and affect. His speech is normal and behavior is normal. Judgment and thought content normal.   Nursing note and vitals reviewed. Diagnostic Study Results     Labs -   No results found for this or any previous visit (from the past 12 hour(s)). Radiologic Studies -   No orders to display     CT Results  (Last 48 hours)    None        CXR Results  (Last 48 hours)    None            Medical Decision Making   I am the first provider for this patient. I reviewed the vital signs, available nursing notes, past medical history, past surgical history, family history and social history. Vital Signs-Reviewed the patient's vital signs.     Records Reviewed: Nursing Notes    Procedures:  Wound Repair  Date/Time: 6/28/2019 12:05 PM  Performed by: Myrna Vargas provider: Michael Harry  Preparation: skin prepped with Betadine  Pre-procedure re-eval: Immediately prior to the procedure, the patient was reevaluated and found suitable for the planned procedure and any planned medications. Time out: Immediately prior to the procedure a time out was called to verify the correct patient, procedure, equipment, staff and marking as appropriate. .  Location details: left ring finger  Wound length:2.5 cm or less  Anesthesia: local infiltration    Anesthesia:  Local Anesthetic: lidocaine 1% without epinephrine  Anesthetic total: 1 mL  Foreign bodies: no foreign bodies  Irrigation solution: saline  Irrigation method: syringe  Debridement: none  Skin closure: 5-0 nylon  Number of sutures: 8  Technique: simple and interrupted  Approximation: loose  Dressing: tube gauze  Patient tolerance: Patient tolerated the procedure well with no immediate complications  My total time at bedside, performing this procedure was 1-15 minutes. Provider Notes (Medical Decision Making): Sutures placed and dressing applied. Follow-up with PCP in 2 weeks for suture removal.    MED RECONCILIATION:  No current facility-administered medications for this encounter. Current Outpatient Medications   Medication Sig    pregabalin (LYRICA) 75 mg capsule Take 1 Cap by mouth two (2) times a day. Max Daily Amount: 150 mg.    albuterol (PROVENTIL HFA, VENTOLIN HFA, PROAIR HFA) 90 mcg/actuation inhaler Take 1 Puff by inhalation every six (6) hours as needed for Wheezing.  gabapentin (NEURONTIN) 100 mg capsule Take one at night for 3 nights then take 3 at night  Indications: Neuropathic Pain    amLODIPine (NORVASC) 10 mg tablet Take 1 Tab by mouth daily.  lisinopril (PRINIVIL, ZESTRIL) 30 mg tablet Take 1 Tab by mouth daily.  tamsulosin (FLOMAX) 0.4 mg capsule Take 2 Caps by mouth daily.  tiaGABine (GABITRIL) 2 mg tablet Take 1 Tab by mouth two (2) times daily (with meals).     omeprazole (PRILOSEC) 20 mg capsule Take 1 Cap by mouth daily. Disposition:  home    DISCHARGE NOTE:   12:07 PM    Pt has been reexamined. Patient has no new complaints, changes, or physical findings. Care plan outlined and precautions discussed. Results of exam were reviewed with the patient. All medications were reviewed with the patient. All of pt's questions and concerns were addressed. Patient was instructed and agrees to follow up with PCP, as well as to return to the ED upon further deterioration. Patient is ready to go home. Follow-up Information     Follow up With Specialties Details Why Contact Info    Miguel Bonilla MD Internal Medicine Call in 2 weeks For suture removal [address] [address]  [phone]            Current Discharge Medication List          Diagnosis     Clinical Impression:   1.  Laceration of left ring finger without foreign body without damage to nail, initial encounter

## 2019-06-28 NOTE — DISCHARGE INSTRUCTIONS
Patient Education        Cuts on the Hand Closed With Stitches: Care Instructions  Your Care Instructions    A cut on your hand can be on your fingers, your thumb, or the front or back of your hand. Sometimes a cut can injure the tendons, blood vessels, or nerves of your hand. The doctor used stitches to close the cut. Using stitches also helps the cut heal and reduces scarring. The doctor may have given you a splint to help prevent you from moving your hand, fingers, or thumb. If the cut went deep and through the skin, the doctor put in two layers of stitches. The deeper layer brings the deep part of the cut together. These stitches will dissolve and don't need to be removed. The stitches in the upper layer are the ones you see on the cut. You will probably have a bandage. You will need to have the stitches removed, usually in 7 to 14 days. The doctor may suggest that you see a hand specialist if the cut is very deep or if you have trouble moving your fingers or have less feeling in your hand. The doctor has checked you carefully, but problems can develop later. If you notice any problems or new symptoms, get medical treatment right away. Follow-up care is a key part of your treatment and safety. Be sure to make and go to all appointments, and call your doctor if you are having problems. It's also a good idea to know your test results and keep a list of the medicines you take. How can you care for yourself at home? · Keep the cut dry for the first 24 to 48 hours. After this, you can shower if your doctor okays it. Pat the cut dry. · Don't soak the cut, such as in a bathtub. Your doctor will tell you when it's safe to get the cut wet. · If your doctor told you how to care for your cut, follow your doctor's instructions. If you did not get instructions, follow this general advice:  ? After the first 24 to 48 hours, wash around the cut with clean water 2 times a day.  Don't use hydrogen peroxide or alcohol, which can slow healing. ? You may cover the cut with a thin layer of petroleum jelly, such as Vaseline, and a nonstick bandage. ? Apply more petroleum jelly and replace the bandage as needed. · Prop up the sore hand on a pillow anytime you sit or lie down during the next 3 days. Try to keep it above the level of your heart. This will help reduce swelling. · Avoid any activity that could cause your cut to reopen. · Do not remove the stitches on your own. Your doctor will tell you when to come back to have the stitches removed. · Be safe with medicines. Take pain medicines exactly as directed. ? If the doctor gave you a prescription medicine for pain, take it as prescribed. ? If you are not taking a prescription pain medicine, ask your doctor if you can take an over-the-counter medicine. When should you call for help? Call your doctor now or seek immediate medical care if:    · You have new pain, or your pain gets worse.     · The skin near the cut is cold or pale or changes color.     · You have tingling, weakness, or numbness near the cut.     · The cut starts to bleed, and blood soaks through the bandage. Oozing small amounts of blood is normal.     · You have trouble moving the area of the hand near the cut.     · You have symptoms of infection, such as:  ? Increased pain, swelling, warmth, or redness around the cut.  ? Red streaks leading from the cut.  ? Pus draining from the cut.  ? A fever.    Watch closely for changes in your health, and be sure to contact your doctor if:    · You do not get better as expected. Where can you learn more? Go to http://gomez-jarvis.info/. Enter T250 in the search box to learn more about \"Cuts on the Hand Closed With Stitches: Care Instructions. \"  Current as of: September 23, 2018  Content Version: 11.9  © 3983-7256 [employer].  Care instructions adapted under license by [employer] (which disclaims liability or warranty for this information). If you have questions about a medical condition or this instruction, always ask your healthcare professional. [place] [address] any warranty or liability for your use of this information.

## 2019-06-28 NOTE — ED NOTES
Tanya Dumont is a 80 y.o. male that was discharged in stable. Pt was accompanied by self. Pt is driving. The patients diagnosis, condition and treatment were explained to  patient and aftercare instructions were given. The patient verbalized understanding. Patient armband removed and shredded.

## 2019-07-11 ENCOUNTER — TELEPHONE (OUTPATIENT)
Dept: ORTHOPEDIC SURGERY | Age: 69
End: 2019-07-11

## 2019-07-11 NOTE — TELEPHONE ENCOUNTER
Patient states he was referred to a neurologist last month by Dr. Benny Mercado but has not heard back regarding it. He states he has been trying to contact our office for 3 days now and is on hold for over 15 minutes and cannot get through to anyone. He is asking that someone from the office please call him back about this at [phone].

## 2019-07-14 NOTE — PROGRESS NOTES
07/15/19  11:44 AM  68 y.o.male  Chief Complaint   Patient presents with    Suture Removal     Ring finger left hand Room 8   Sutures right ring finger  The patient had these placed in the ED. They are due for removal now. Hypertension  The blood pressure has been low on his readings at home. Today it is also on the low normal side. He states that he gets dizziness on trying to arise. No chest pain or pressure or dyspnea is admitted to. Leg pain  Seen by the spine center, referred to neurology  The patient has leg numbness emanating from the lumbar region through the hips to lateral thighs down the lateral legs. This symptoms are present at rest. There is a component of worsening of the condition when the patient starts walking. Smoking  Will try to stop at the end of the script for smoking cessation medication. The patient continues to smoke. He is motivated to stop  Chantix has slowed the amount of cigarettes used. Due for follow up with pulmonary also  GERD  The patient has improved symptoms, less post prandial esophageal      Encounters  BPH overactive bladder and prostate screening seen 6/28/2019 in the ED for laceration of the left ring finger seen 5/28/2019 for hypertension hip pain smoking history prediabetes wheezing neuropathy pain in both lower extremities    The patient was last seen on February 26, 2019 for hypertension ear pain hip pain hepatitis C and received the Zostavax. Because of his elevated hypertension he was placed on lisinopril and a trial of Chantix was initiated after a failed trial of the NicoDerm patch the patient had significant neck and shoulder pain in the high cervical region and the plan was to CT the head and the neck. Which has been performed.   An x-ray of the foot in [place] [facility] showed an interruption the cortex of the left fifth toe  An ankle-brachial index elevated a PI thought to be secondary to calcification without any significant peripheral vascular disease for this reason it was thought that perhaps his claudication was neuropathic in nature. An ultrasound screening of the and for AAA was negative and he had a CT of the of the cervical spine in March 2019 he had a CT of the head in March 26, 2019 his MRI of the lumbar spine in November 2018 was suspicious for several areas of nerve impingement. He is due to see neurology and pulmonary. The primary encounter diagnosis was Smoking history. Diagnoses of Essential hypertension, Prediabetes, Peripheral polyneuropathy, and Laceration of left ring finger without foreign body with damage to nail, subsequent encounter were also pertinent to this visit. Review of Systems   Constitutional: Positive for diaphoresis. Negative for chills, fever, malaise/fatigue and weight loss. HENT: Positive for ear pain and hearing loss (Diminished on the left). Negative for congestion, ear discharge, sinus pain and tinnitus. Stabbing left facial and scalp pain, intermittent. No inciting activity. Sometimes relieved by holding himself or his neck still. No meds taken for relief   Eyes: Negative for blurred vision, double vision and photophobia. Respiratory: Positive for shortness of breath and wheezing. Negative for cough and hemoptysis. Cardiovascular: Negative for chest pain, palpitations, orthopnea and PND. Gastrointestinal: Negative for blood in stool and melena. There is no history of nausea  The patient denies vomiting  There is no history of diarrhea or constipation  The patient denies heartburn     Genitourinary: Negative for flank pain, frequency, hematuria and urgency. The patient denies any dysuria, polyuria or nocturia   Musculoskeletal: Positive for back pain, falls, joint pain, myalgias and neck pain. Skin: Negative. Neurological: Positive for sensory change (Feet feel heavy). Negative for tingling, tremors, weakness and headaches. Psychiatric/Behavioral: Positive for depression.  Negative for substance abuse (prior positive history, none now) and suicidal ideas. family history includes Arthritis-osteo in his sister; Diabetes in his sister; Heart Disease in his sister; SLE in his sister. reports that he has been smoking cigarettes. He has a 60.00 pack-year smoking history. He has quit using smokeless tobacco. He reports that he has current or past drug history. Drugs: Marijuana and Cocaine. He reports that he does not drink alcohol. Vitals:    07/15/19 0715   BP: 114/72   Pulse: (!) 56   Resp: 16   Temp: 96.3 °F (35.7 °C)   TempSrc: Oral   SpO2: 94%   Weight: 219 lb (99.3 kg)   Height: 5' 11\" (1.803 m)       Physical Exam   Constitutional: He is oriented to person, place, and time. He appears well-developed and well-nourished. HENT:   Head: Normocephalic and atraumatic. Right Ear: External ear normal.   Left Ear: External ear normal.   Nose: Nose normal.   Mouth/Throat: Oropharynx is clear and moist.   Mild rhinophyma   Eyes: Pupils are equal, round, and reactive to light. Conjunctivae and EOM are normal. Right eye exhibits no discharge. No scleral icterus. Neck: Normal range of motion. Neck supple. No JVD present. No thyromegaly present. Cardiovascular: Normal rate, regular rhythm and intact distal pulses. Exam reveals no gallop and no friction rub. No murmur heard. Pulmonary/Chest: Effort normal. No respiratory distress. He has no wheezes. He has no rales. Abdominal: He exhibits no distension and no mass. There is no tenderness. There is no rebound and no guarding. Genitourinary:   Genitourinary Comments: No hernia noted   Musculoskeletal: Normal range of motion. He exhibits deformity (Antalgic gait, cannot flex past 45degrees). He exhibits no edema or tenderness (tender mid lumbar area). Good dorsalis pedis and posterior tibial of both feet. High arches noted the patient has an antalgic gait because of his hips. Lymphadenopathy:     He has no cervical adenopathy. Neurological: He is alert and oriented to person, place, and time. No cranial nerve deficit. He exhibits normal muscle tone. Coordination normal.   Continued decreased sensation in the lower extremities. He has good pulses. Monofilament exam shows no sensation of the toes the sensation picks up in the midfoot. Skin: Skin is warm and dry. No rash noted. No erythema. Psychiatric: He has a normal mood and affect. His behavior is normal. Judgment and thought content normal.   Nursing note and vitals reviewed. Results for orders placed or performed in visit on 07/11/19   PROSTATE SPECIFIC ANTIGEN, TOTAL (PSA)   Result Value Ref Range    Prostate Specific Ag 1.56 0.00 - 4.00 ng/mL   AMB POC PVR, HEMANT,POST-VOID RES,US,NON-IMAGING   Result Value Ref Range    PVR 18 cc   AMB POC URINALYSIS DIP STICK AUTO W/O MICRO   Result Value Ref Range    Color (UA POC) Yellow     Clarity (UA POC) Clear     Glucose (UA POC) Negative Negative    Bilirubin (UA POC) 2+ Negative    Ketones (UA POC) Trace Negative    Specific gravity (UA POC) 1.025 1.001 - 1.035    Blood (UA POC) 2+ Negative    pH (UA POC) 5.5 4.6 - 8.0    Protein (UA POC) Trace Negative    Urobilinogen (UA POC) 1 mg/dL 0.2 - 1    Nitrites (UA POC) Negative Negative    Leukocyte esterase (UA POC) Negative Negative        XR Results (maximum last 3): Results from [place] encounter on 06/10/19   XR FOOT LT MIN 3 V    Impression IMPRESSION:    Cortical interruption seen on the AP view and the proximal phalanx of the left  fifth toe. The area is not visualized on the other views. Soft tissue swelling around the metatarsophalangeal joint of the left fifth  toe. No other abnormality observed. Tiffanie Sanchez Results from [place] encounter on 03/07/18   NC XR TECHNOLOGIST SERVICE    Impression IMPRESSION:    Please see above.     FLUORO TIME: 00.52      [facility]     Results from Hospital Encounter encounter on 05/18/17   NC XR TECHNOLOGIST SERVICE    Impression IMPRESSION:    Please see above. FLUORO TIME: 00.06      [facility]         CT Results (maximum last 3): Results from [place] encounter on 03/08/19   CT SPINE CERV WO CONT    Impression IMPRESSION:    No fracture or traumatic subluxation. Surgical changes from the placement of artificial disks and stabilization with  metal plates and screw fixation at the C5-6 and C6-7 level. No prevertebral soft tissue swelling. Lung apices are unremarkable. .   CT HEAD WO CONT    Impression IMPRESSION: No acute hemorrhage or midline shift. No evidence for fracture in the calvarium. The visualized paranasal sinuses and mastoid air cells are clear. Results from [place] encounter on 04/12/18   CT HEAD WO CONT    Impression IMPRESSION:                1.  No acute intracranial process. 2.  Chronic small vessel ischemic changes. 3.  No significant interval change. MRI Results (maximum last 3): Results from [place] encounter on 11/23/18   MRI LUMB SPINE W WO CONT    Impression IMPRESSION:    1. No interval change in multilevel degenerative and postsurgical findings of  the lumbar spine.  -No high-grade spinal stenosis  -Persistent mass effect on the crossing left S1 nerve at L5-S1 from a chronic  disc extrusion  -Several levels of notable foraminal stenoses particularly L5-S1 followed by  L4-5, as delineated above and previously    Thank you for enabling us to participate in the care of this patient. Results from [place] encounter on 09/19/17   MRI LUMB SPINE W WO CONT    Impression IMPRESSION:      1. At L4-L5, there has been fusion and posterior decompression, with  degenerative changes still resulting in mild to moderate spinal canal stenosis  and moderate foraminal stenoses. No specific findings to suggest infection. An  enhancing left cauda equina nerve root, similar to prior, possibly postsurgical,  or reactive, of unlikely significance.     -Multilevel advanced degenerative disc disease with multiple disc  bulges/protrusions. Multilevel advanced facet arthropathy. Up to moderate spinal  canal stenosis at L2-L3 and L3-L4. Multilevel moderate to severe foraminal  stenoses, most notably L5-S1. Abutment of several nerve roots as above. -See level by level details above. In general, degenerative changes and degree  of stenosis similar to 2016 MRI. Results from [place] encounter on 04/25/17   MRI SHOULDER RT WO CONT    Impression IMPRESSION[de-identified]    1. Moderately pronounced right shoulder biceps tendinosis with some  tenosynovitis. It is medially subluxed, coursing over degenerative spurring of  the lesser tuberosity. 2. Short length longitudinal split tear within the distal superior  subscapularis. 3. Mild rotator cuff tendinosis of supraspinatus and infraspinatus. 4. Mild to moderate arthritis of the [facility] joint. 5. Incidental lipoma of the deltoid muscle. Thank you for this referral.       Nuclear Medicine Results (maximum last 3): No results found for this or any previous visit. US Results (maximum last 3): Results from [place] encounter on 06/04/19   US EXAM SCREENING AAA    Impression IMPRESSION:    No abdominal aortic aneurysm. Results from [place] encounter on 11/26/18   US ABD LTD W ELASTOGRAPHY    Impression Impression:    1. Right adrenal lesion remaining in keeping with stable, centrally calcified  nodule seen by CT. 2. Enlarged diameter of portal vein, which can be seen in the setting of portal  venous hypertension. 3. Coarsened, heterogeneous echotexture to the liver, as can reflect chronic  liver disease/cirrhosis. Transient hepatic elastography as described above. Results from [place] encounter on 04/25/17   US ABD LTD    Impression Impression:    Liver demonstrates diffuse increased echogenicity which may relate to patient's  hepatitis C. No other abnormality noted.        DEXA Results (maximum last 3): No results found for this or any previous visit. LIZABETH Results (maximum last 3): No results found for this or any previous visit. IR Results (maximum last 3): No results found for this or any previous visit. VAS/US Results (maximum last 3): Results from [facility] encounter on 01/19/17   LOWER EXT ART PVR MULT LEVEL SEG PRESSURES       PET Results (maximum last 3): Results from [facility] encounter on 01/27/16   PET/CT TUMOR IMAGE SKULL THIGH W (INI)        EKG Results     None        MDM  Number of Diagnoses or Management Options  Essential hypertension:   Laceration of left ring finger without foreign body with damage to nail, subsequent encounter:   Peripheral polyneuropathy:   Prediabetes:   Smoking history:   Diagnosis management comments: The patient has recorded low blood pressure readings as we have done here. We will decrease his lisinopril slightly. He has a laceration of the left fifth finger and sutures will be removed. He is due for follow-up with neurology for evaluation of the neuropathy. We will evaluate him for prediabetes with a hemoglobin A1c and BMP. Diagnoses and all orders for this visit:    1. Smoking history  Comments:  Patient is at the end of his prescription and states that he will stop at that time. 2. Essential hypertension  Comments:  not controlled wiill increase the Lisinopril  Orders:  -     lisinopril (PRINIVIL, ZESTRIL) 20 mg tablet; Take 1 Tab by mouth daily.  -     HEMOGLOBIN A1C WITH EAG; Future    3. Prediabetes  Comments: This is by history. We will repeat the BMP and hemoglobin A1c  Orders:  -     HEMOGLOBIN A1C WITH EAG; Future    4. Peripheral polyneuropathy  Comments:  Diminished sensation in the lower extremities. Idiopathic secondary to diabetes lumbar disc disease or other etiologies. For neurology evaluation  Orders:  -     CBC WITH AUTOMATED DIFF; Future  -     METABOLIC PANEL, BASIC; Future    5.  Laceration of left ring finger without foreign body with damage to nail, subsequent encounter  Comments:  Sutures removed

## 2019-07-15 ENCOUNTER — OFFICE VISIT (OUTPATIENT)
Dept: FAMILY MEDICINE CLINIC | Facility: CLINIC | Age: 69
End: 2019-07-15

## 2019-07-15 VITALS
RESPIRATION RATE: 16 BRPM | BODY MASS INDEX: 30.66 KG/M2 | SYSTOLIC BLOOD PRESSURE: 114 MMHG | WEIGHT: 219 LBS | TEMPERATURE: 96.3 F | HEART RATE: 56 BPM | HEIGHT: 71 IN | OXYGEN SATURATION: 94 % | DIASTOLIC BLOOD PRESSURE: 72 MMHG

## 2019-07-15 DIAGNOSIS — I10 ESSENTIAL HYPERTENSION: ICD-10-CM

## 2019-07-15 DIAGNOSIS — G62.9 PERIPHERAL POLYNEUROPATHY: ICD-10-CM

## 2019-07-15 DIAGNOSIS — R73.03 PREDIABETES: ICD-10-CM

## 2019-07-15 DIAGNOSIS — Z87.891 SMOKING HISTORY: Primary | ICD-10-CM

## 2019-07-15 DIAGNOSIS — S61.315D LACERATION OF LEFT RING FINGER WITHOUT FOREIGN BODY WITH DAMAGE TO NAIL, SUBSEQUENT ENCOUNTER: ICD-10-CM

## 2019-07-15 RX ORDER — LISINOPRIL 20 MG/1
20 TABLET ORAL DAILY
Qty: 30 TAB | Refills: 6 | Status: SHIPPED | OUTPATIENT
Start: 2019-07-15 | End: 2019-07-26

## 2019-07-15 NOTE — PATIENT INSTRUCTIONS
Learning About Peripheral Neuropathy What is peripheral neuropathy? Peripheral neuropathy is a problem that affects the peripheral nerves. These nerves lead from the spinal cord to other parts of the body. They control your sense of touch, how you feel pain and temperature, and your muscle strength. Most of the time the problem starts in the fingers and toes. As it gets worse, it moves into the limbs. It can cause pain and loss of feeling in the feet, legs, and hands. What causes it? There are several causes: · Diabetes. This is the most common cause. If your blood sugar is too high for too long, it can damage the nerves. · Kidney problems. These can lead to toxic substances in the blood that damage nerves. · Low levels of thyroid hormone (hypothyroidism). This may cause swelling of the tissues around the nerves, which can put pressure on them. · Infectious or inflammatory diseases. Examples are HIV and Guillain-Barré syndrome. These diseases can damage the nerves. · Peripheral nerve injury. A physical injury can damage the nerves. Injuries can be from things like falls, car crashes, or playing sports. · Overusing alcohol and not eating a healthy diet. These can lead to your body not having enough of certain vitamins, such as vitamin B-12. This can damage nerves. · Being exposed to toxic substances. These include lead, mercury, and certain medicines, such as those used for chemotherapy. Sometimes the cause isn't known. What are the symptoms? Symptoms of peripheral neuropathy can occur slowly over time. The most common ones are: · Numbness, tightness, and tingling, especially in the legs, hands, and feet. · Loss of feeling. · Burning, shooting, or stabbing pain in the legs, hands, and feet. Often the pain is worse at night. · Weakness and loss of balance. What can happen if you have it?  
If peripheral neuropathy gets worse, it can lead to a complete lack of feeling in your hands or feet. This can make you more likely to injure them. It may lead to calluses and blisters. It can also lead to bone and joint problems, infection, and ulcers. For instance, small, repeated injuries to the foot may lead to bigger problems. This can happen because you can't feel the injuries. Reduced feeling in the feet can also change your step, leading to bone or joint problems. If untreated, foot problems can become so severe that the foot or lower leg may have to be amputated. But treatment can slow down peripheral neuropathy. And it's a good idea to take care to avoid injury. How is it diagnosed? To diagnose peripheral neuropathy, your doctor will ask you about: 
· Your symptoms. · Your medical history. This may include your use of alcohol, risk of HIV infection, or exposure to toxic substances. · Your family's medical history, including nerve disease. Your doctor may test how well you can feel touch, temperature, and pain. You may also have blood tests. These tests will help the doctor find out if you have conditions that can cause neuropathy. Examples are diabetes, vitamin deficiencies, thyroid disease, and kidney problems. How is it treated? Treatment for peripheral neuropathy can relieve symptoms. This is done by treating the health problem that's causing it. For example, if you have diabetes, keeping your blood sugar within your target range may help. Or maybe your body lacks certain vitamins caused by drinking too much alcohol. In that case, treatment may include eating a healthy diet, taking vitamins, and stopping alcohol use. You may have physical therapy. This can increase muscle strength and help build muscle control. Over-the-counter medicine can relieve mild nerve pain. Your doctor may also prescribe medicine to help with severe pain, numbness, tingling, and weakness.  If you have neuropathy in your feet, it's a good idea to have them checked during each office visit. This can help prevent problems. Some people find that physical therapy, acupuncture, or transcutaneous electrical nerve stimulation (TENS) helps relieve pain. Follow-up care is a key part of your treatment and safety. Be sure to make and go to all appointments, and call your doctor if you are having problems. It's also a good idea to know your test results and keep a list of the medicines you take. Where can you learn more? Go to http://gomez-jarvis.info/. Enter P130 in the search box to learn more about \"Learning About Peripheral Neuropathy. \" Current as of: July 25, 2018 Content Version: 11.9 © 0337-6231 [employer]. Care instructions adapted under license by [employer] (which disclaims liability or warranty for this information). If you have questions about a medical condition or this instruction, always ask your healthcare professional. [address] any warranty or liability for your use of this information.

## 2019-07-22 ENCOUNTER — HOSPITAL ENCOUNTER (OUTPATIENT)
Dept: LAB | Age: 69
Discharge: HOME | End: 2019-07-22
Payer: MEDICAID

## 2019-07-22 DIAGNOSIS — I10 ESSENTIAL HYPERTENSION: ICD-10-CM

## 2019-07-22 DIAGNOSIS — R73.03 PREDIABETES: ICD-10-CM

## 2019-07-22 DIAGNOSIS — G62.9 PERIPHERAL POLYNEUROPATHY: ICD-10-CM

## 2019-07-22 LAB
ANION GAP SERPL CALC-SCNC: 7 MMOL/L (ref 3–18)
BASOPHILS # BLD: 0 K/UL (ref 0–0.1)
BASOPHILS NFR BLD: 1 % (ref 0–2)
BUN SERPL-MCNC: 37 MG/DL (ref 7–18)
BUN/CREAT SERPL: 20 (ref 12–20)
CALCIUM SERPL-MCNC: 8.4 MG/DL (ref 8.5–10.1)
CHLORIDE SERPL-SCNC: 108 MMOL/L (ref 100–111)
CO2 SERPL-SCNC: 21 MMOL/L (ref 21–32)
CREAT SERPL-MCNC: 1.85 MG/DL (ref 0.6–1.3)
DIFFERENTIAL METHOD BLD: ABNORMAL
EOSINOPHIL # BLD: 0.2 K/UL (ref 0–0.4)
EOSINOPHIL NFR BLD: 3 % (ref 0–5)
ERYTHROCYTE [DISTWIDTH] IN BLOOD BY AUTOMATED COUNT: 13.6 % (ref 11.6–14.5)
EST. AVERAGE GLUCOSE BLD GHB EST-MCNC: 131 MG/DL
GLUCOSE SERPL-MCNC: 165 MG/DL (ref 74–99)
HBA1C MFR BLD: 6.2 % (ref 4.2–5.6)
HCT VFR BLD AUTO: 42.8 % (ref 36–48)
HGB BLD-MCNC: 14.5 G/DL (ref 13–16)
LYMPHOCYTES # BLD: 1.7 K/UL (ref 0.9–3.6)
LYMPHOCYTES NFR BLD: 27 % (ref 21–52)
MCH RBC QN AUTO: 32.2 PG (ref 24–34)
MCHC RBC AUTO-ENTMCNC: 33.9 G/DL (ref 31–37)
MCV RBC AUTO: 95.1 FL (ref 74–97)
MONOCYTES # BLD: 0.6 K/UL (ref 0.05–1.2)
MONOCYTES NFR BLD: 10 % (ref 3–10)
NEUTS SEG # BLD: 3.7 K/UL (ref 1.8–8)
NEUTS SEG NFR BLD: 59 % (ref 40–73)
PLATELET # BLD AUTO: 144 K/UL (ref 135–420)
PMV BLD AUTO: 11.7 FL (ref 9.2–11.8)
POTASSIUM SERPL-SCNC: 4.5 MMOL/L (ref 3.5–5.5)
RBC # BLD AUTO: 4.5 M/UL (ref 4.7–5.5)
SODIUM SERPL-SCNC: 136 MMOL/L (ref 136–145)
WBC # BLD AUTO: 6.2 K/UL (ref 4.6–13.2)

## 2019-07-22 PROCEDURE — 36415 COLL VENOUS BLD VENIPUNCTURE: CPT

## 2019-07-22 PROCEDURE — 83036 HEMOGLOBIN GLYCOSYLATED A1C: CPT

## 2019-07-22 PROCEDURE — 80048 BASIC METABOLIC PNL TOTAL CA: CPT

## 2019-07-22 PROCEDURE — 85025 COMPLETE CBC W/AUTO DIFF WBC: CPT

## 2019-07-23 ENCOUNTER — OFFICE VISIT (OUTPATIENT)
Dept: FAMILY MEDICINE CLINIC | Facility: CLINIC | Age: 69
End: 2019-07-23

## 2019-07-23 VITALS
OXYGEN SATURATION: 97 % | HEART RATE: 80 BPM | WEIGHT: 224 LBS | TEMPERATURE: 97.5 F | DIASTOLIC BLOOD PRESSURE: 81 MMHG | BODY MASS INDEX: 31.36 KG/M2 | RESPIRATION RATE: 16 BRPM | HEIGHT: 71 IN | SYSTOLIC BLOOD PRESSURE: 141 MMHG

## 2019-07-23 DIAGNOSIS — R55 SYNCOPE, UNSPECIFIED SYNCOPE TYPE: Primary | ICD-10-CM

## 2019-07-23 NOTE — PROGRESS NOTES
Chief Complaint   Patient presents with    Dizziness     Syncope pt states \" that when his B/P gets low he passes out on Sunday B/P 72/40 last time this happen\"  pt states \" that he gets a dizzy spell at least once a day \" Room 9

## 2019-07-23 NOTE — PROGRESS NOTES
08/04/19  2:54 PM  had concerns including Dizziness (Syncope pt states \" that when his B/P gets low he passes out on Sunday B/P 72/40 last time this happen\"  pt states \" that he gets a dizzy spell at least once a day \" Room 9). HISTORY OF PRESENT ILLNESS   This is a 80 y.o. male   The patient had an episode passed out  Happens once a day. The blood pressure went down to 70 /40  He takes his meds at [facility]  The vision is affected and he sees sunspots. The newest medications was Detrol LA 2 weeks ago. It has happened worse every 5 months. He was taking Flomax also. He can tell when the episodes is going to happen  He sees a pulmonologist tomorrow  He is due to follow up with Dr. Jocelin Mccormack  He has seen a spine specialist  He also has neck stiffness. Current Outpatient Medications:     CHANTIX 0.5 mg tablet, TAKE 1 TABLET BY MOUTH 2 TIMES A DAY, Disp: , Rfl: 2    tolterodine ER (DETROL-LA) 2 mg ER capsule, Take 1 Cap by mouth daily. , Disp: 90 Cap, Rfl: 3    pregabalin (LYRICA) 75 mg capsule, Take 1 Cap by mouth two (2) times a day. Max Daily Amount: 150 mg., Disp: 60 Cap, Rfl: 1    albuterol (PROVENTIL HFA, VENTOLIN HFA, PROAIR HFA) 90 mcg/actuation inhaler, Take 1 Puff by inhalation every six (6) hours as needed for Wheezing., Disp: 1 Inhaler, Rfl: 6    tamsulosin (FLOMAX) 0.4 mg capsule, Take 2 Caps by mouth daily. , Disp: 90 Cap, Rfl: 3    omeprazole (PRILOSEC) 20 mg capsule, Take 1 Cap by mouth daily. , Disp: 90 Cap, Rfl: 3    metoprolol succinate (TOPROL-XL) 50 mg XL tablet, Take 1 Tab by mouth daily. , Disp: 90 Tab, Rfl: 2    Allergies   Allergen Reactions    Nicotine Contact Dermatitis     Nicotine Patch reaction - Contact dermatitis with numbness and tingling also occuring in the left arm and denuding of the skin.     Thimerosal Other (comments)     Contact lens solution, made eyes red and irrated       Active Ambulatory Problems     Diagnosis Date Noted    HTN (hypertension) 04/02/2014    Back pain 04/02/2014    Prediabetes 04/02/2014    Chronic hepatitis C without hepatic coma ([address] 04/03/2014    GERD (gastroesophageal reflux disease) 05/01/2014    Onychomycosis of toenail 11/25/2015    S/P lumbar fusion 02/08/2016    Kidney stones 05/10/2016    Lumbar facet arthropathy 11/15/2016    Chronic pain 11/15/2016    Neuritis 11/15/2016    Cervical spinal stenosis 11/29/2016    Bulge of lumbar disc without myelopathy 11/29/2016    Acute pain of right shoulder 04/04/2017    Cervical myelopathy (HCC) 05/18/2017    S/P cervical spinal fusion 06/01/2017    IGT (impaired glucose tolerance) 08/28/2017    Depression 08/28/2017    History of opioid abuse 10/25/2017    Recurrent depression ([address] 01/17/2018    Lumbar radicular pain 03/19/2018    Spinal stenosis of lumbar region with neurogenic claudication 03/19/2018    Low back strain, initial encounter 04/26/2018     Resolved Ambulatory Problems     Diagnosis Date Noted    Heel pain 04/02/2014    Plantar fasciitis 04/02/2014    Tobacco abuse 08/21/2014    Dehydration 09/02/2014    Acute renal failure ([address] 09/02/2014    UTI (urinary tract infection) 09/02/2014    MEI (acute kidney injury) ([address] 09/13/2014    Early satiety 09/13/2014    Sciatica 09/17/2014    H/O lumbosacral spine surgery 07/22/2015    Steatosis, liver 10/29/2015    Advanced directives, counseling/discussion 11/25/2015     Past Medical History:   Diagnosis Date    Bilateral hip pain     Diabetes ([address]     DJD (degenerative joint disease)     Hepatitis C 01/2015    Hypertension     Lymphadenopathy, generalized 12/05/2015    Numbness of foot left    Unspecified sleep apnea        ROS    Results for orders placed or performed during the hospital encounter of 07/22/19   CBC WITH AUTOMATED DIFF   Result Value Ref Range    WBC 6.2 4.6 - 13.2 K/uL    RBC 4.50 (L) 4.70 - 5.50 M/uL    HGB 14.5 13.0 - 16.0 g/dL    HCT 42.8 36.0 - 48.0 %    MCV 95.1 74.0 - 97.0 FL    MCH 32.2 24.0 - 34.0 PG    MCHC 33.9 31.0 - 37.0 g/dL    RDW 13.6 11.6 - 14.5 %    PLATELET 662 473 - 176 K/uL    MPV 11.7 9.2 - 11.8 FL    NEUTROPHILS 59 40 - 73 %    LYMPHOCYTES 27 21 - 52 %    MONOCYTES 10 3 - 10 %    EOSINOPHILS 3 0 - 5 %    BASOPHILS 1 0 - 2 %    ABS. NEUTROPHILS 3.7 1.8 - 8.0 K/UL    ABS. LYMPHOCYTES 1.7 0.9 - 3.6 K/UL    ABS. MONOCYTES 0.6 0.05 - 1.2 K/UL    ABS. EOSINOPHILS 0.2 0.0 - 0.4 K/UL    ABS. BASOPHILS 0.0 0.0 - 0.1 K/UL    DF AUTOMATED     METABOLIC PANEL, BASIC   Result Value Ref Range    Sodium 136 136 - 145 mmol/L    Potassium 4.5 3.5 - 5.5 mmol/L    Chloride 108 100 - 111 mmol/L    CO2 21 21 - 32 mmol/L    Anion gap 7 3.0 - 18 mmol/L    Glucose 165 (H) 74 - 99 mg/dL    BUN 37 (H) 7.0 - 18 MG/DL    Creatinine 1.85 (H) 0.6 - 1.3 MG/DL    BUN/Creatinine ratio 20 12 - 20      GFR est AA 44 (L) >60 ml/min/1.73m2    GFR est non-AA 37 (L) >60 ml/min/1.73m2    Calcium 8.4 (L) 8.5 - 10.1 MG/DL   HEMOGLOBIN A1C WITH EAG   Result Value Ref Range    Hemoglobin A1c 6.2 (H) 4.2 - 5.6 %    Est. average glucose 131 mg/dL       Visit Vitals  /81   Pulse 80   Temp 97.5 °F (36.4 °C) (Oral)   Resp 16   Ht 5' 11\" (1.803 m)   Wt 224 lb (101.6 kg)   SpO2 97%   BMI 31.24 kg/m²       Physical Exam    MDM    ASSESSMENT and PLAN    ICD-10-CM ICD-9-CM    1. Syncope, unspecified syncope type R55 780.2 TILT TABLE EVAL W/WO DRUG      CARDIAC EVENT MONITOR      REFERRAL TO CARDIOLOGY      CANCELED: CARDIAC HOLTER MONITOR    We will try to do an event monitor and a tilt table test.  Follow-up post      Orders Placed This Encounter    ALacey Pierce Patron ref [address] [facility]     Referral Priority:   Routine     Referral Type:   Consultation     Referral Reason:   Specialty Services Required     Referred to Provider:   Carol Graham MD     Number of Visits Requested:   1     Follow-up and Dispositions    · Return in about 2 months (around 9/23/2019).        lab results and schedule of future lab studies reviewed with patient  very strongly urged to quit smoking to reduce cardiovascular risk      This note was done using dictation software. There may be inadvertent errors present.

## 2019-07-23 NOTE — PATIENT INSTRUCTIONS
Vasovagal Syncope: Care Instructions  Your Care Instructions    Vasovagal syncope (say \"xmk-jro-FKHTripp SHARMA-kuh-pee\")is sudden dizziness or fainting that can be set off by things such as pain, stress, fear, or trauma. You may sweat or feel lightheaded, sick to your stomach, or tingly. The problem causes the heart rate to slow and the blood vessels to widen, or dilate, for a short time. When this happens, blood pools in the lower body, and less blood goes to the brain. You can usually get relief by lying down with your legs raised (elevated). This helps more blood to flow to your brain and may help relieve symptoms like feeling dizzy. Some doctors may recommend a technique that involves tensing your fists and arms. This type of fainting is often easy to predict. For example, it happens to some people when they see blood or have to get a shot. They may feel symptoms before they faint. An episode of vasovagal syncope usually responds well to self-care. Other treatment often isn't needed. But if the fainting keeps happening, your doctor may suggest further treatments. Follow-up care is a key part of your treatment and safety. Be sure to make and go to all appointments, and call your doctor if you are having problems. It's also a good idea to know your test results and keep a list of the medicines you take. How can you care for yourself at home? · Drink plenty of fluids to prevent dehydration. If you have kidney, heart, or liver disease and have to limit fluids, talk with your doctor before you increase your fluid intake. · Try to avoid things that you think may set off vasovagal syncope. · Talk to your doctor about any medicines you take. Some medicines may increase the chance of this condition occurring. · If you feel symptoms, lie down with your legs raised. Talk to your doctor about what to do if your symptoms come back. When should you call for help?   Call 911 anytime you think you may need emergency care. For example, call if:    · You have symptoms of a heart problem. These may include:  ? Chest pain or pressure. ? Severe trouble breathing. ? A fast or irregular heartbeat.    Watch closely for changes in your health, and be sure to contact your doctor if:    · You have more episodes of fainting at home.     · You do not get better as expected. Where can you learn more? Go to http://gomez-jarvis.info/. Enter L754 in the search box to learn more about \"Vasovagal Syncope: Care Instructions. \"  Current as of: September 23, 2018  Content Version: 12.1  © 4763-7316 [employer]. Care instructions adapted under license by [employer] (which disclaims liability or warranty for this information). If you have questions about a medical condition or this instruction, always ask your healthcare professional. [address] any warranty or liability for your use of this information.

## 2019-07-24 ENCOUNTER — OFFICE VISIT (OUTPATIENT)
Dept: PULMONOLOGY | Age: 69
End: 2019-07-24

## 2019-07-24 VITALS
RESPIRATION RATE: 20 BRPM | HEIGHT: 71 IN | OXYGEN SATURATION: 98 % | DIASTOLIC BLOOD PRESSURE: 70 MMHG | WEIGHT: 221.5 LBS | SYSTOLIC BLOOD PRESSURE: 124 MMHG | BODY MASS INDEX: 31.01 KG/M2 | HEART RATE: 78 BPM | TEMPERATURE: 97.7 F

## 2019-07-24 DIAGNOSIS — R06.02 SHORTNESS OF BREATH: Primary | ICD-10-CM

## 2019-07-24 DIAGNOSIS — F17.210 CIGARETTE NICOTINE DEPENDENCE WITHOUT COMPLICATION: ICD-10-CM

## 2019-07-24 DIAGNOSIS — J44.9 COPD, MILD (HCC): ICD-10-CM

## 2019-07-24 NOTE — PROGRESS NOTES
The pt. Is quite SOB continuous. Albuterol MDI made him dizzy so he ceased using it. He snores, restless leg, up and down all night for one reason or another. \"Even the dog quit sleeping with me. \"    He is a former drug addict. He has frequent falls. Fine motor skills \"are declining. \"  \"I drop everything that I . \"

## 2019-07-24 NOTE — PROGRESS NOTES
[facility]  Pulmonary, Critical Care, and Sleep Medicine      Dear Dr. Sylvester Chauhdry,    Chief complaint:  Shortness of breath nicotine dependence    HPI:  Ernie Monreal is 80years old and comes to the office today at your request concerning shortness of breath which he is noted for 5 to 10 years. The patient relates however that he is disabled by other medical issues including arthritis which make it very difficult for him to perform any significant activity but he relates with walking. Gulshan Gee He denies a chronic cough he denies chest pain or significant leg swelling he said he and says he has significant sleep problems especially falling asleep. He relates that he tried albuterol for relief and could not tolerate it due to drug reaction. He also says he is tried stopping smoking with nicotine patches and they irritate his skin. Allergies   Allergen Reactions    Nicotine Contact Dermatitis     Nicotine Patch reaction - Contact dermatitis with numbness and tingling also occuring in the left arm and denuding of the skin.  Thimerosal Other (comments)     Contact lens solution, made eyes red and irrated     Current Outpatient Medications   Medication Sig    lisinopril (PRINIVIL, ZESTRIL) 20 mg tablet Take 1 Tab by mouth daily.  CHANTIX 0.5 mg tablet TAKE 1 TABLET BY MOUTH 2 TIMES A DAY    tolterodine ER (DETROL-LA) 2 mg ER capsule Take 1 Cap by mouth daily.  pregabalin (LYRICA) 75 mg capsule Take 1 Cap by mouth two (2) times a day. Max Daily Amount: 150 mg.    amLODIPine (NORVASC) 10 mg tablet Take 1 Tab by mouth daily.  tamsulosin (FLOMAX) 0.4 mg capsule Take 2 Caps by mouth daily.  omeprazole (PRILOSEC) 20 mg capsule Take 1 Cap by mouth daily.  albuterol (PROVENTIL HFA, VENTOLIN HFA, PROAIR HFA) 90 mcg/actuation inhaler Take 1 Puff by inhalation every six (6) hours as needed for Wheezing.     tiaGABine (GABITRIL) 2 mg tablet Take 1 Tab by mouth two (2) times daily (with meals). No current facility-administered medications for this visit. Past Medical History:   Diagnosis Date    Bilateral hip pain     Chronic pain     back; hx of opiod addiction    Depression     Diabetes (HCC)     borderline, no meds-trying to control with diet    DJD (degenerative joint disease)     GERD (gastroesophageal reflux disease)     Hepatitis C 01/2015    +Harvoni rx    Hypertension     Kidney stones     Lymphadenopathy, generalized 12/05/2015    neg bx    Numbness of foot left    resolved per patient 1/29/16    S/P lumbar fusion 2/9/2016    L4/5 LAMINECTOMY/FUSION/TRANSFORAMINAL LUMBAR INTERBODY FUSION (TLIF) by Dr. Wade Arrieta on 2/8/16     Unspecified sleep apnea     no cpap machine-pt denies   He denies heart disease ulcers tuberculosis cancer COPD but also denies sleep apnea including never having had a sleep study. Past Surgical History:   Procedure Laterality Date    HX APPENDECTOMY  as a child    HX BACK SURGERY  07/2015    HX CERVICAL FUSION  05/18/2017    ACDF C5/6 C6/7    HX COLONOSCOPY  2015    negative    HX LUMBAR FUSION  2/2016    HX ORTHOPAEDIC      denies    HX OTHER SURGICAL  05/2017    cervical fusion    HX TONSILLECTOMY  as a child    REMOVE [facility] 3-10-16    Dr. Haley       Family history: Negative for present complaint       Social History: Smoking cigarettes for greater than 50 years now smoking about 17 cigarettes a day and is retired [employer]    Review of systems:  He denies fever chills says his appetite is poor and his weight fluctuates. He also reports a syncopal episode several years ago without recurrence but has dizzy spells. He has chronic weakness in his left lower extremity and he denies seizures trouble hearing but has visual problems and is being evaluated by ophthalmology.   He also reports problems with swallowing looks such as the food getting stuck and on rare occasions choking on food he denies chronic abdominal pain melena blood in his stools dysuria hematuria rashes and has major arthritic complaints    Physical Exam:  Visit Vitals  /70 (BP 1 Location: Left arm, BP Patient Position: Sitting)   Pulse 78   Temp 97.7 °F (36.5 °C)   Resp 20   Ht 5' 11\" (1.803 m)   Wt 100.5 kg (221 lb 8 oz)   SpO2 98%   BMI 30.89 kg/m²     Well-developed well-nourished  HEENT: pupils equal, reactive, sclera, non-icteric   Oropharynx tongue: normal   Neck: Supple   Lymph Nodes: Supra clavicular and cervical nodes, negative   Chest: Equal symmetrical expansion, no dullness, no wheezes, rales or rubs   Heart: Regular, rhythm without gladis or murmur no carotid bruits  Abdomen: soft, non-tender no masses or organomegaly   Extremities: no cyanosis, clubbing, no edema no calf tenderness  Musculoskeletal: No acute joint inflammation or muscle wasting  Skin: No rash   Neurological: alert, oriented, moves all extremities      LABS:  O2 sat room air at rest 98%  Spirometry 6/9/2019: Mild obstructive lung defect no improvement with bronchodilator      Impression:   Shortness of breath and the cause is not completely clear but the patient's breathing tests indicate only mild lung dysfunction making it unlikely this is the only cause of his shortness of breath.   In view of the fact that he does not tolerate albuterol and is reluctant to take any medicine at this time because he is spirometry is only mildly abnormal medication may not be as helpful as other modalities for treatment such as stopping smoking or improving his arthritis which will make it easier for him to ambulate  Nicotine dependence and the patient at this time finds it very hard to consider stop smoking although he said he would consider hypnotherapy and we will try to arrange this in the future    Plan:  Low-dose CT for malignancy screening and patient with long-term cigarette smoking history (patient advised about the benefits and drawbacks of CT imaging and is agreed to the screening and to yearly follow-up)  Future consideration for hypnotherapy  Follow-up in 3 months or sooner if needed    Tevin Melendez MD , [facility]    CC: Marcia Washington MD    [address] [facility]  [place], [address]     P: [phone]     F: [phone]

## 2019-07-26 ENCOUNTER — OFFICE VISIT (OUTPATIENT)
Dept: CARDIOLOGY CLINIC | Age: 69
End: 2019-07-26

## 2019-07-26 VITALS
DIASTOLIC BLOOD PRESSURE: 62 MMHG | SYSTOLIC BLOOD PRESSURE: 114 MMHG | BODY MASS INDEX: 30.94 KG/M2 | HEIGHT: 71 IN | HEART RATE: 94 BPM | WEIGHT: 221 LBS

## 2019-07-26 DIAGNOSIS — I10 ESSENTIAL HYPERTENSION: ICD-10-CM

## 2019-07-26 DIAGNOSIS — Z72.0 TOBACCO ABUSE: ICD-10-CM

## 2019-07-26 DIAGNOSIS — R55 SYNCOPE, UNSPECIFIED SYNCOPE TYPE: ICD-10-CM

## 2019-07-26 DIAGNOSIS — R42 DIZZINESS: Primary | ICD-10-CM

## 2019-07-26 DIAGNOSIS — R07.89 CHEST TIGHTNESS: ICD-10-CM

## 2019-07-26 RX ORDER — METOPROLOL SUCCINATE 50 MG/1
50 TABLET, EXTENDED RELEASE ORAL DAILY
Qty: 90 TAB | Refills: 2 | Status: SHIPPED | OUTPATIENT
Start: 2019-07-26 | End: 2020-05-25 | Stop reason: SDUPTHER

## 2019-07-26 NOTE — PATIENT INSTRUCTIONS
After the recommended changes have been made in blood pressure medicines, patient advised to keep BP/HR(pulse rate) chart twice daily and bring us results in next 2 weeks or so. Patient may send the results via \"My Chart\" if desired. Please rest for 5-10 minutes before checking blood pressure       Heart-Healthy Diet: Care Instructions  Your Care Instructions    A heart-healthy diet has lots of vegetables, fruits, nuts, beans, and whole grains, and is low in salt. It limits foods that are high in saturated fat, such as meats, cheeses, and fried foods. It may be hard to change your diet, but even small changes can lower your risk of heart attack and heart disease. Follow-up care is a key part of your treatment and safety. Be sure to make and go to all appointments, and call your doctor if you are having problems. It's also a good idea to know your test results and keep a list of the medicines you take. How can you care for yourself at home? Watch your portions  · Learn what a serving is. A \"serving\" and a \"portion\" are not always the same thing. Make sure that you are not eating larger portions than are recommended. For example, a serving of pasta is ½ cup. A serving size of meat is 2 to 3 ounces. A 3-ounce serving is about the size of a deck of cards. Measure serving sizes until you are good at [place]" them. Keep in mind that restaurants often serve portions that are 2 or 3 times the size of one serving. · To keep your energy level up and keep you from feeling hungry, eat often but in smaller portions. · Eat only the number of calories you need to stay at a healthy weight. If you need to lose weight, eat fewer calories than your body burns (through exercise and other physical activity). Eat more fruits and vegetables  · Eat a variety of fruit and vegetables every day. Dark green, deep orange, red, or yellow fruits and vegetables are especially good for you.  Examples include spinach, carrots, peaches, and berries. · Keep carrots, celery, and other veggies handy for snacks. Buy fruit that is in season and store it where you can see it so that you will be tempted to eat it. · Cook dishes that have a lot of veggies in them, such as stir-fries and soups. Limit saturated and trans fat  · Read food labels, and try to avoid saturated and trans fats. They increase your risk of heart disease. Trans fat is found in many processed foods such as cookies and crackers. · Use olive or canola oil when you cook. Try cholesterol-lowering spreads, such as Benecol or Take Control. · Bake, broil, grill, or steam foods instead of frying them. · Choose lean meats instead of high-fat meats such as hot dogs and sausages. Cut off all visible fat when you prepare meat. · Eat fish, skinless poultry, and meat alternatives such as soy products instead of high-fat meats. Soy products, such as tofu, may be especially good for your heart. · Choose low-fat or fat-free milk and dairy products. Eat fish  · Eat at least two servings of fish a week. Certain fish, such as salmon and tuna, contain omega-3 fatty acids, which may help reduce your risk of heart attack. Eat foods high in fiber  · Eat a variety of grain products every day. Include whole-grain foods that have lots of fiber and nutrients. Examples of whole-grain foods include oats, whole wheat bread, and brown rice. · Buy whole-grain breads and cereals, instead of white bread or pastries. Limit salt and sodium  · Limit how much salt and sodium you eat to help lower your blood pressure. · Taste food before you salt it. Add only a little salt when you think you need it. With time, your taste buds will adjust to less salt. · Eat fewer snack items, fast foods, and other high-salt, processed foods. Check food labels for the amount of sodium in packaged foods. · Choose low-sodium versions of canned goods (such as soups, vegetables, and beans).   Limit sugar  · Limit drinks and foods with added sugar. These include candy, desserts, and soda pop. Limit alcohol  · Limit alcohol to no more than 2 drinks a day for men and 1 drink a day for women. Too much alcohol can cause health problems. When should you call for help? Watch closely for changes in your health, and be sure to contact your doctor if:    · You would like help planning heart-healthy meals. Where can you learn more? Go to http://gomez-jarvis.info/. Enter V137 in the search box to learn more about \"Heart-Healthy Diet: Care Instructions. \"  Current as of: July 22, 2018  Content Version: 12.1  © 3426-5564 [employer], [employer]. Care instructions adapted under license by [employer] (which disclaims liability or warranty for this information). If you have questions about a medical condition or this instruction, always ask your healthcare professional. [address] any warranty or liability for your use of this information.

## 2019-07-26 NOTE — PROGRESS NOTES
HISTORY OF PRESENT ILLNESS  Jeannie Hale is a 80 y.o. male. Referred for dizziness and syncope    New Patient   The history is provided by the patient and medical records. The current episode started more than 1 week ago. The problem has been gradually worsening. Associated symptoms include chest pain. Pertinent negatives include no shortness of breath. Nothing aggravates the symptoms. The symptoms are relieved by rest. He has tried nothing for the symptoms. Dizziness   The history is provided by the patient. This is a chronic problem. The current episode started more than 1 week ago. The problem occurs daily. The problem has been gradually worsening. Associated symptoms include chest pain. Pertinent negatives include no shortness of breath. He has tried nothing for the symptoms.      Family History   Problem Relation Age of Onset    Diabetes Sister     SLE Sister     Arthritis-osteo Sister     Heart Disease Sister        Past Medical History:   Diagnosis Date    Bilateral hip pain     Chronic pain     back; hx of opiod addiction    Depression     Diabetes ([address]     borderline, no meds-trying to control with diet    DJD (degenerative joint disease)     GERD (gastroesophageal reflux disease)     Hepatitis C 01/2015    +Harvoni rx    Hypertension     Kidney stones     Lymphadenopathy, generalized 12/05/2015    neg bx    Numbness of foot left    resolved per patient 1/29/16    S/P lumbar fusion 2/9/2016    L4/5 LAMINECTOMY/FUSION/TRANSFORAMINAL LUMBAR INTERBODY FUSION (TLIF) by Dr. Sandra Stroud on 2/8/16     Unspecified sleep apnea     no cpap machine-pt denies       Past Surgical History:   Procedure Laterality Date    HX APPENDECTOMY  as a child    HX BACK SURGERY  07/2015    HX CERVICAL FUSION  05/18/2017    ACDF C5/6 C6/7    HX COLONOSCOPY  2015    negative    HX LUMBAR FUSION  2/2016    HX ORTHOPAEDIC      denies    HX OTHER SURGICAL  05/2017    cervical fusion    HX TONSILLECTOMY  as a child    REMOVE [place] Right 3-10-16    Dr. Josue Muñiz History     Tobacco Use    Smoking status: Current Every Day Smoker     Packs/day: 1.00     Years: 40.00     Pack years: 40.00     Types: Cigarettes     Start date: 4/24/1966    Smokeless tobacco: Former User    Tobacco comment: 1 pack per day   Substance Use Topics    Alcohol use: No     Alcohol/week: 0.0 standard drinks       Allergies   Allergen Reactions    Nicotine Contact Dermatitis     Nicotine Patch reaction - Contact dermatitis with numbness and tingling also occuring in the left arm and denuding of the skin.  Thimerosal Other (comments)     Contact lens solution, made eyes red and irrated       Prior to Admission medications    Medication Sig Start Date End Date Taking? Authorizing Provider   metoprolol succinate (TOPROL-XL) 50 mg XL tablet Take 1 Tab by mouth daily. 7/26/19  Yes Allison Page NP   CHANTIX 0.5 mg tablet TAKE 1 TABLET BY MOUTH 2 TIMES A DAY 6/17/19  Yes Provider, Ye   tolterodine ER (DETROL-LA) 2 mg ER capsule Take 1 Cap by mouth daily. 7/11/19  Yes Kasandra Shipman MD   pregabalin (LYRICA) 75 mg capsule Take 1 Cap by mouth two (2) times a day. Max Daily Amount: 150 mg. 6/26/19  Yes Jaden Hdz MD   albuterol (PROVENTIL HFA, VENTOLIN HFA, PROAIR HFA) 90 mcg/actuation inhaler Take 1 Puff by inhalation every six (6) hours as needed for Wheezing. 5/28/19  Yes Lacretia Duane, MD   tamsulosin (FLOMAX) 0.4 mg capsule Take 2 Caps by mouth daily. 12/28/18  Yes Kasandra Shipman MD   omeprazole (PRILOSEC) 20 mg capsule Take 1 Cap by mouth daily. 8/8/18  Yes Verito Macdonald MD         Visit Vitals  /62   Pulse 94   Ht 5' 11\" (1.803 m)   Wt 100.2 kg (221 lb)   BMI 30.82 kg/m²       Review of Systems   Constitutional: Negative for malaise/fatigue. Respiratory: Negative for cough, shortness of breath and wheezing.     Cardiovascular: Positive for chest pain. Negative for palpitations, orthopnea, claudication, leg swelling and PND. Gastrointestinal: Negative for nausea and vomiting. Musculoskeletal: Negative for falls. Neurological: Positive for dizziness and loss of consciousness. Endo/Heme/Allergies: Does not bruise/bleed easily. Physical Exam   Constitutional: He is oriented to person, place, and time. He appears well-developed and well-nourished. Neck: No JVD present. Cardiovascular: Normal rate, regular rhythm, normal heart sounds and intact distal pulses. Exam reveals no gallop and no friction rub. No murmur heard. Pulmonary/Chest: Effort normal and breath sounds normal. No respiratory distress. He has no wheezes. He has no rales. He exhibits no tenderness. Musculoskeletal: He exhibits no edema. Neurological: He is alert and oriented to person, place, and time. Skin: Skin is warm and dry. Psychiatric: He has a normal mood and affect. Nursing note and vitals reviewed. ASSESSMENT and PLAN    Mr. Shawn Carpio has a reminder for a \"due or due soon\" health maintenance. I have asked that he contact his primary care provider for follow-up on this health maintenance. No flowsheet data found. Assessment         ICD-10-CM ICD-9-CM    1. Dizziness R42 780.4 AMB POC EKG ROUTINE W/ 12 LEADS, INTER & REP      ECHO ADULT COMPLETE    Likely orthostatic   2. Syncope, unspecified syncope type R55 780.2    3. Essential hypertension I10 401.9 ECHO ADULT COMPLETE    will d/c Norvasc and lisinopril due to orthostatic b/p and begin Toprol XL   4. Tobacco abuse Z72.0 305.1     Encouraged smoking cesssation. Continue chantix   5. Chest tightness R07.89 786.59 NUCLEAR CARDIAC STRESS TEST    NST to evaluate due to risk factors     7/2019  Referred for dizziness and one episode of syncope. He c/o episodes of dizziness that occur with activity or rest.  During episodes his b/p drops to 70/40s and he feels as if his sight is closing in.   He reports occasional chest tightness with episodes. Orthostatic b/p may be related to autonomic dysfunction with h/o back problems. Will d/c norvasc and lisinopril and begin Toprol XL 50 mg/ day. He is to monitor home b/p and bring chart to f/u appt. Will obtain echo and NST. Medications Discontinued During This Encounter   Medication Reason    tiaGABine (GABITRIL) 2 mg tablet     lisinopril (PRINIVIL, ZESTRIL) 20 mg tablet Other    amLODIPine (NORVASC) 10 mg tablet Other       Orders Placed This Encounter    AMB POC EKG ROUTINE W/ 12 LEADS, INTER & REP     Order Specific Question:   Reason for Exam:     Answer:   Dizziness    metoprolol succinate (TOPROL-XL) 50 mg XL tablet     Sig: Take 1 Tab by mouth daily. Dispense:  90 Tab     Refill:  2       Follow-up and Dispositions    · Return in about 6 weeks (around 9/6/2019). I have independently evaluated taken history and examined the patient. All relevant labs and testing data's are reviewed. Care plan discussed and updated after review.   Joel Regan MD

## 2019-07-29 ENCOUNTER — OFFICE VISIT (OUTPATIENT)
Dept: NEUROLOGY | Age: 69
End: 2019-07-29

## 2019-07-29 ENCOUNTER — HOSPITAL ENCOUNTER (OUTPATIENT)
Dept: LAB | Age: 69
Discharge: HOME | End: 2019-07-29

## 2019-07-29 VITALS
DIASTOLIC BLOOD PRESSURE: 70 MMHG | HEART RATE: 74 BPM | OXYGEN SATURATION: 98 % | RESPIRATION RATE: 22 BRPM | HEIGHT: 71 IN | SYSTOLIC BLOOD PRESSURE: 124 MMHG | TEMPERATURE: 98.3 F | WEIGHT: 222.8 LBS | BODY MASS INDEX: 31.19 KG/M2

## 2019-07-29 DIAGNOSIS — G62.9 NEUROPATHY: ICD-10-CM

## 2019-07-29 DIAGNOSIS — M54.42 CHRONIC BILATERAL LOW BACK PAIN WITH BILATERAL SCIATICA: ICD-10-CM

## 2019-07-29 DIAGNOSIS — M54.16 LUMBAR RADICULAR PAIN: Primary | ICD-10-CM

## 2019-07-29 DIAGNOSIS — G89.29 CHRONIC BILATERAL LOW BACK PAIN WITH BILATERAL SCIATICA: ICD-10-CM

## 2019-07-29 DIAGNOSIS — M54.41 CHRONIC BILATERAL LOW BACK PAIN WITH BILATERAL SCIATICA: ICD-10-CM

## 2019-07-29 DIAGNOSIS — G60.3 IDIOPATHIC PROGRESSIVE NEUROPATHY: ICD-10-CM

## 2019-07-29 DIAGNOSIS — M79.2 NEURITIS: ICD-10-CM

## 2019-07-29 LAB — XX-LABCORP SPECIMEN COL,LCBCF: NORMAL

## 2019-07-29 PROCEDURE — 99001 SPECIMEN HANDLING PT-LAB: CPT

## 2019-07-29 NOTE — LETTER
7/29/19 Patient: Charito Graham YOB: 1950 Date of Visit: 7/29/2019 Contreras Prabhakar, [address] [place] [address] VIA In Basket Dear Contreras Prabhakar MD, Thank you for referring Mr. Imtiaz Macdonald to i  for evaluation. My notes for this consultation are attached. If you have questions, please do not hesitate to call me. I look forward to following your patient along with you.  
 
 
Sincerely, 
 
Belkis Woodard MD

## 2019-07-29 NOTE — PROGRESS NOTES
Malgorzata Mccartney is a 80 y.o. male new patient in today to discuss neuropathy, gait abnormality and lumbar arthropathy; referred by Magno Hamilton MD. Patient has c/o falling frequently, dropping things and leg weakness. Learning assessment previously completed 8/29/2016; primary language is [place].

## 2019-07-29 NOTE — PROGRESS NOTES
Stefanie Blackburn is a 80 y.o., right handed male, with an established history of hypertension, pre-diabetes, who comes in complaining of pain in the legs, and falling on a regular basis. He's fallen in the last month at least 4 times. In the last month he's broken his toes, received numerous stitches and abraded his corneas. He has low back pain that radiates down into the  Buttocks bilaterally. It doesn't seem to radiate further down the legs. He has lack of sensation in both feet. Intermittently he'll have sharp stabbing sensations in the bottoms of the feet. He's had some tingling of the feet. He seems to lose control of the legs also. He gets lightheaded on a regular basis, especially when he's moving. He's lost consciousness on one occasion while at [employer]. He's gotten severely lightheaded and requires to go down to his knee or he'll lose consciousness. Of note is he's been feeling the pain and numbness in the feet for the last 2 years. He did notice some difficulty walking uneven ground as long as 5 years ago. He's dropped his exercise regimen from 10 miles to hardly walking safely at this time. Social History; he's single, lives alone. He smokes 1 PPD. He denies alcohol use. Intermittently smokes marijuana. Retired [employer]. Family History; mother cause of death unknown. Father  5 in car accident. Sibling  from Leukemia. Sister with Lupus, diabetes, rheumatoid arthritis. Current Outpatient Medications   Medication Sig Dispense Refill    metoprolol succinate (TOPROL-XL) 50 mg XL tablet Take 1 Tab by mouth daily. 90 Tab 2    CHANTIX 0.5 mg tablet TAKE 1 TABLET BY MOUTH 2 TIMES A DAY  2    tolterodine ER (DETROL-LA) 2 mg ER capsule Take 1 Cap by mouth daily. 90 Cap 3    pregabalin (LYRICA) 75 mg capsule Take 1 Cap by mouth two (2) times a day.  Max Daily Amount: 150 mg. 60 Cap 1    albuterol (PROVENTIL HFA, VENTOLIN HFA, PROAIR HFA) 90 mcg/actuation inhaler Take 1 Puff by inhalation every six (6) hours as needed for Wheezing. 1 Inhaler 6    tamsulosin (FLOMAX) 0.4 mg capsule Take 2 Caps by mouth daily. 90 Cap 3    omeprazole (PRILOSEC) 20 mg capsule Take 1 Cap by mouth daily. 80 Cap 3       Past Medical History:   Diagnosis Date    Bilateral hip pain     Chronic pain     back; hx of opiod addiction    Depression     Diabetes (HCC)     borderline, no meds-trying to control with diet    DJD (degenerative joint disease)     GERD (gastroesophageal reflux disease)     Hepatitis C 01/2015    +Harvoni rx    Hypertension     Kidney stones     Lymphadenopathy, generalized 12/05/2015    neg bx    Numbness of foot left    resolved per patient 1/29/16    S/P lumbar fusion 2/9/2016    L4/5 LAMINECTOMY/FUSION/TRANSFORAMINAL LUMBAR INTERBODY FUSION (TLIF) by Dr. Sandra Stroud on 2/8/16     Unspecified sleep apnea     no cpap machine-pt denies       Past Surgical History:   Procedure Laterality Date    HX APPENDECTOMY  as a child    HX BACK SURGERY  07/2015    HX CERVICAL FUSION  05/18/2017    ACDF C5/6 C6/7    HX COLONOSCOPY  2015    negative    HX LUMBAR FUSION  2/2016    HX ORTHOPAEDIC      denies    HX OTHER SURGICAL  05/2017    cervical fusion    HX TONSILLECTOMY  as a child    REMOVE [facility] Right 3-10-16    Dr. Kyle Roberts       Allergies   Allergen Reactions    Nicotine Contact Dermatitis     Nicotine Patch reaction - Contact dermatitis with numbness and tingling also occuring in the left arm and denuding of the skin.     Thimerosal Other (comments)     Contact lens solution, made eyes red and irrated       Patient Active Problem List   Diagnosis Code    HTN (hypertension) I10    Back pain M54.9    Prediabetes R73.03    Chronic hepatitis C without hepatic coma (HCC) B18.2    GERD (gastroesophageal reflux disease) K21.9    Onychomycosis of toenail B35.1    S/P lumbar fusion Z98.1    Kidney stones N20.0    Lumbar facet arthropathy M47.816    Chronic pain G89.29    Neuritis M79.2    Cervical spinal stenosis M48.02    Bulge of lumbar disc without myelopathy M51.26    Acute pain of right shoulder M25.511    Cervical myelopathy (HCC) G95.9    S/P cervical spinal fusion Z98.1    IGT (impaired glucose tolerance) R73.02    Depression F32.9    History of opioid abuse Z87.898    Recurrent depression (HCC) F33.9    Lumbar radicular pain M54.16    Spinal stenosis of lumbar region with neurogenic claudication M48.062    Low back strain, initial encounter S39.012A         Review of Systems:   As above otherwise 11 point review of systems negative including;   Constitutional no fever or chills  Skin denies rash or itching  HENT  Denies tinnitus, hearing lose  Eyes denies diplopia vision lose  Respiratory denies shortness of breath  Cardiovascular denies chest pain, dyspnea on exertion  Gastrointestinal denies nausea, vomiting, diarrhea, constipation  Genitourinary denies incontinence  Musculoskeletal denies joint pain or swelling  Endocrine denies weight change  Hematology denies easy bruising or bleeding   Neurological as above in HPI      PHYSICAL EXAMINATION:      VITAL SIGNS:    Visit Vitals  /70 (BP 1 Location: Left arm, BP Patient Position: Sitting)   Pulse 74   Temp 98.3 °F (36.8 °C) (Oral)   Resp 22   Ht 5' 11\" (1.803 m)   Wt 101.1 kg (222 lb 12.8 oz)   SpO2 98%   BMI 31.07 kg/m²       GENERAL: The patient is well developed, well nourished, and in no apparent distress. EXTREMITIES: No clubbing, cyanosis, or edema is identified. Pulses 2+ and symmetrical.  Muscle tone is normal.  HEAD:   Ear, nose, and throat appear to be without trauma. The patient is normocephalic. NEUROLOGIC EXAMINATION    MENTAL STATUS: The patient is awake, alert, and oriented x 4. Fund of knowledge is adequate. Speech is fluent and memory appears to be intact, both long and short term.     CRANIAL NERVES: II - Visual fields are full to confrontation. Funduscopic examination reveals flat disks bilaterally. Pupils are both 2 mm and briskly reactive to light and accommodation. III, IV, VI - Extraocular movements are intact and there is no nystagmus. V - Facial sensation is intact to pinprick and light touch. VII - Face is symmetrical.   VIII - Hearing is present. IX, X, XII- Palate rises symmetrically. Gag is present. Tongue is in the midline. XI - Shoulder shrugging and head turning intact  MOTOR:  The patient is 5/5 in all four limbs without any drift. Fine finger movements are symmetrical.  Isolated motor group testing reveals no focal abnormalities. Tone is normal.  Sensory examination is depressed to pin light touch and vibratory sense to the ankle bilaterally. Reflexes are trace and symmetrical. Plantars are down going. Cerebellar examination reveals no gross ataxia or dysmetria. Gait is antalgic, limping off of the right leg more than the right. Final result (Exam End: 3/8/2019 09:12) Provider Status: Reviewed   Study Result     EXAM: CT HEAD WO CONT     INDICATION: new headache     COMPARISON: Comparison is made with a prior study dated April 12, 2018. .     TECHNIQUE: Unenhanced CT of the head was performed using 5 mm images. Brain and  bone windows were generated. CT dose reduction was achieved through use of a  standardized protocol tailored for this examination and automatic exposure  control for dose modulation.      FINDINGS:  The ventricles and sulci are normal in size, shape and configuration and  midline. There is no significant white matter disease. There is no intracranial  hemorrhage, extra-axial collection, mass, mass effect or midline shift. The  basilar cisterns are open. No acute infarct is identified. The bone windows  demonstrate no abnormalities. The visualized portions of the paranasal sinuses  and mastoid air cells are clear.     IMPRESSION  IMPRESSION: No acute hemorrhage or midline shift.     No evidence for fracture in the calvarium. The visualized paranasal sinuses and mastoid air cells are clear. Final result (Exam End: 11/23/2018 15:10) Provider Status: Reviewed   Study Result     MRI lumbar spine with and without IV contrast     HISTORY: to evaluate stenosis ; chronic low back pain radiating to both legs. Prior surgeries, most recently a revision of L4-5 fusion February 2016 related  to postlaminectomy syndrome     COMPARISON: MRI September 2017, CT January 2017, plain films August 2014     TECHNIQUE: Multi-sequence multiplanar pre and postcontrast imaging obtained  centered on the lumbar spine.      Contrast used: 20 cc Dotarem     FINDINGS:   L4-5 anterior lateral interbody fusion with bilateral partial facetectomies and  laminectomies. No fracture or bone destruction. No sizable fluid collection. Alignment: Grade 1 degenerative retrolisthesis at each of L1-3, and at L5, as  before  Vertebral body height: Normal  Marrow signal: Unremarkable  Disc spaces: Diffuse desiccation. Moderate to severe narrowing throughout, most  pronounced at L2-3 and L5-S1  Conus: Terminates at upper L2.  -No pathologic intrathecal enhancement     Axial imaging correlation:     T12-L1: Mild disc bulge and facet arthropathy. No significant spinal stenosis. Patent foramina. Unchanged.     L1-2: Bilobed disc bulge and listhesis. Some facet arthropathy. Patent canal and  foramina. Unchanged.     L2-3: Listhesis with bilobed disc bulge. Superimposed mild caudal disc  extrusion. Bilateral facet arthropathy. Mild concentric spinal stenosis. More  moderate distortion of left lateral recess. Moderate foraminal stenoses. Unchanged.     L3-4: Listhesis with broad-based disc osteophyte complex. Minor extrusion caudad  in the midline. Bilateral facet arthropathy. Moderate foraminal stenoses. Unchanged.     L4-5: Fusion level. Introduction of spacer from the left.  Enhancing granulation  tissue along the left lateral thecal sac. Bilateral facet arthropathy. Minor  spinal stenosis. Moderate to severe right and moderate left foraminal stenosis. Some distortion of perineural fat. Unchanged.     L5-S1: Broad-based disc osteophyte complex. Small chronic left paracentral  caudal disc extrusion at the lateral recess. Bilateral facet arthropathy. Minor  spinal stenosis. There is posterior displacement of the crossing left S1 nerve  from the disc pathology. Severe bilateral foraminal stenoses. Unchanged.     Other structures: Unremarkable.        IMPRESSION  IMPRESSION:     1. No interval change in multilevel degenerative and postsurgical findings of  the lumbar spine.  -No high-grade spinal stenosis  -Persistent mass effect on the crossing left S1 nerve at L5-S1 from a chronic  disc extrusion  -Several levels of notable foraminal stenoses particularly L5-S1 followed by  L4-5, as delineated above and previously     Thank you for enabling us to participate in the care of this patient.               I have reviewed the above imagines myself.        CBC:   Lab Results   Component Value Date/Time    WBC 6.2 07/22/2019 08:49 AM    RBC 4.50 (L) 07/22/2019 08:49 AM    HGB 14.5 07/22/2019 08:49 AM    HCT 42.8 07/22/2019 08:49 AM    PLATELET 733 29/48/1028 08:49 AM     BMP:   Lab Results   Component Value Date/Time    Glucose 165 (H) 07/22/2019 08:49 AM    Sodium 136 07/22/2019 08:49 AM    Potassium 4.5 07/22/2019 08:49 AM    Chloride 108 07/22/2019 08:49 AM    CO2 21 07/22/2019 08:49 AM    BUN 37 (H) 07/22/2019 08:49 AM    Creatinine 1.85 (H) 07/22/2019 08:49 AM    Calcium 8.4 (L) 07/22/2019 08:49 AM     CMP:   Lab Results   Component Value Date/Time    Glucose 165 (H) 07/22/2019 08:49 AM    Sodium 136 07/22/2019 08:49 AM    Potassium 4.5 07/22/2019 08:49 AM    Chloride 108 07/22/2019 08:49 AM    CO2 21 07/22/2019 08:49 AM    BUN 37 (H) 07/22/2019 08:49 AM    Creatinine 1.85 (H) 07/22/2019 08:49 AM    Calcium 8.4 (L) 07/22/2019 08:49 AM    Anion gap 7 07/22/2019 08:49 AM    BUN/Creatinine ratio 20 07/22/2019 08:49 AM    Alk. phosphatase 108 01/10/2019 04:55 PM    Protein, total 7.1 01/10/2019 04:55 PM    Albumin 4.0 01/10/2019 04:55 PM    Globulin 3.1 01/10/2019 04:55 PM    A-G Ratio 1.3 01/10/2019 04:55 PM     Coagulation:   Lab Results   Component Value Date/Time    Prothrombin time 10.7 02/22/2017 12:00 AM    INR 1.0 02/22/2017 12:00 AM     Cardiac markers:   Lab Results   Component Value Date/Time    CK 50 09/13/2014 11:13 AM    CK-MB Index 3.4 09/13/2014 11:13 AM      7/22/2019  8:28 PM - Asad, Lab In [facility]     Component Value Flag Ref Range Units Status   Hemoglobin A1c 6.2  High   4.2 - 5.6 % Final   Comment:   (NOTE)   HbA1C Interpretive Ranges   <5.7              Normal   5.7 - 6.4         Consider Prediabetes   >6.5              Consider Diabetes          Impression: What sounds like a peripheral neuropathy in this man who has risk factors including possible early diabetes. Quite frankly his globin A1c is been elevated but not severely so. That the neuropathy he has points of his early diabetes but I would like to look for some other causes. Plan: Full work-up for neuropathy including blood work as well as an EMG. See him back in a couple weeks. PLEASE NOTE:   This document has been produced using voice recognition software. Unrecognized errors in transcription may be present.

## 2019-08-02 ENCOUNTER — NURSE NAVIGATOR (OUTPATIENT)
Dept: OTHER | Age: 69
End: 2019-08-02

## 2019-08-02 NOTE — NURSE NAVIGATOR
Referring Provider: Aimee Courtney MD      Lung Cancer Risk Profile:   Age: 77  Gender: Male  Height: 71\"  Weight: 221#    Smoking History:  Smoking Status: current use  # years smokin  # years quit: 0  Packs/day: 1  Pack years: 48    Patient discussed smoking cessation with PCP: Yes, per patient report    Patient participated in shared decision making process with PCP: Yes, per provider note    Patient is currently experiencing symptoms: No, per patient report    If yes what symptoms:     Co-Morbidities:      Cancer History:      Additional Risk Factors:      Exposure to second hand smoke      Patient's smoking history discussed via phone. Patient meets LDCT lung cancer screening criteria.  Call transferred to central scheduling to schedule exam.      [place]randN, RN, [id number] [facility] Nurse Navigator

## 2019-08-06 ENCOUNTER — HOSPITAL ENCOUNTER (OUTPATIENT)
Dept: CT IMAGING | Age: 69
Discharge: HOME | End: 2019-08-06
Attending: INTERNAL MEDICINE
Payer: MEDICARE

## 2019-08-06 DIAGNOSIS — F17.210 CIGARETTE NICOTINE DEPENDENCE WITHOUT COMPLICATION: ICD-10-CM

## 2019-08-06 PROCEDURE — G0297 LDCT FOR LUNG CA SCREEN: HCPCS

## 2019-08-13 ENCOUNTER — HOSPITAL ENCOUNTER (OUTPATIENT)
Dept: CT IMAGING | Age: 69
Discharge: HOME | End: 2019-08-13
Attending: UROLOGY
Payer: MEDICARE

## 2019-08-13 DIAGNOSIS — D49.4 BLADDER TUMOR: ICD-10-CM

## 2019-08-13 LAB — CREAT UR-MCNC: 1 MG/DL (ref 0.6–1.3)

## 2019-08-13 PROCEDURE — 82565 ASSAY OF CREATININE: CPT

## 2019-08-13 PROCEDURE — 74011636320 HC RX REV CODE- 636/320: Performed by: UROLOGY

## 2019-08-13 PROCEDURE — 74178 CT ABD&PLV WO CNTR FLWD CNTR: CPT

## 2019-08-13 RX ADMIN — IOPAMIDOL 100 ML: 755 INJECTION, SOLUTION INTRAVENOUS at 07:28

## 2019-08-28 ENCOUNTER — HOSPITAL ENCOUNTER (OUTPATIENT)
Dept: GENERAL RADIOLOGY | Age: 69
Discharge: HOME | End: 2019-08-28
Payer: MEDICAID

## 2019-08-28 ENCOUNTER — OFFICE VISIT (OUTPATIENT)
Dept: NEUROLOGY | Age: 69
End: 2019-08-28

## 2019-08-28 ENCOUNTER — HOSPITAL ENCOUNTER (OUTPATIENT)
Dept: LAB | Age: 69
Discharge: HOME | End: 2019-08-28

## 2019-08-28 DIAGNOSIS — G62.9 NEUROPATHY: Primary | ICD-10-CM

## 2019-08-28 DIAGNOSIS — Z01.818 PRE-OP TESTING: ICD-10-CM

## 2019-08-28 DIAGNOSIS — G60.3 IDIOPATHIC PROGRESSIVE NEUROPATHY: ICD-10-CM

## 2019-08-28 LAB — XX-LABCORP SPECIMEN COL,LCBCF: NORMAL

## 2019-08-28 PROCEDURE — 71046 X-RAY EXAM CHEST 2 VIEWS: CPT

## 2019-08-28 PROCEDURE — 99001 SPECIMEN HANDLING PT-LAB: CPT

## 2019-08-28 NOTE — LETTER
8/28/2019 4:12 PM 
 
Patient:  Constantino Rangel YOB: 1950 Date of Visit: 8/28/2019 Dear Sho Moctezuma MD 
[address] [place] [address] VIA In Basket 
 : Thank you for referring Mr. Cathi Solares to me for evaluation/treatment. Below are the relevant portions of my assessment and plan of care. If you have questions, please do not hesitate to call me. I look forward to following Mr. Garcia along with you.  
 
 
 
Sincerely, 
 
 
Reny Shannon MD

## 2019-08-28 NOTE — PROGRESS NOTES
[address] AT [facility]  OFFICE PROCEDURE PROGRESS NOTE        Chart reviewed for the following:   Galen Franks MD, have reviewed the History, Physical and updated the Allergic reactions for [id number] performed immediately prior to start of procedure:   Galen Franks MD, have performed the following reviews on [address] prior to the start of the procedure:            * Patient was identified by name and date of birth   * Agreement on procedure being performed was verified  * Risks and Benefits explained to the patient  * Procedure site verified and marked as necessary  * Patient was positioned for comfort  * Consent was signed and verified     Time: 4:11 PM    Date of procedure: 8/28/2019    Procedure performed by:  Paris Cedeño MD    Provider assisted by: Iraida Lopez MA    Patient assisted by: self    How tolerated by patient: tolerated the procedure well with no complications    Post Procedural Pain Scale: 0 - No Hurt    Comments: none    Patient: Paula Ricks     ID: [id number] Physician: Vidal Gaviria. Korin Villalpando MD   Gender: Male Ref Phys: Vidal Gaviria. Korin Villalpando MD   Handedness: Iraida Lopez     Study Date: August 28, 2019         Patient History: 80-year-old man with increasing difficulty with walking especially weak in the left leg.     Nerve Conduction Studies  Anti Sensory Summary Table     Stim Site NR Peak (ms) Norm Peak (ms) O-P Amp (µV) Norm O-P Amp Dist (cm) Max (m/s)   Left Sural Anti Sensory (Ankle)   Calf    3.3  1.8 >5 14.0 50.0   Site 2    3.4  2.0      Right Sural Anti Sensory (Ankle)   Calf    4.1  9.3 >5 14.0 42.4   Site 2    4.5  4.9        Motor Summary Table     Stim Site NR Onset (ms) Norm Onset (ms) O-P Amp (mV) Norm O-P Amp Dist (cm) Max (m/s) Norm Max (m/s)   Left Peroneal Motor (EDB)   Ankle    3.5 <6.5 1.1 >2.0 38.0 36.9 >44   Fibula (Head)    13.8  0.6  10.0 71.4    Pop Fossa    15.2  0.6       Right Peroneal Motor (EDB)   Ankle    3.8 <6.5 1.2 >2.0 38.0 42.7 >44   Fibula (Head)    12.7  1.0  10.0 52.6    Pop Fossa    14.6  1.1       Left Tibial Motor (Abd Vargas Brev)   Ankle    4.0 <5.8 3.4 >4.0 48.0 40.7 >41   Knee    15.8  1.0       Right Tibial Motor (Abd Vargas Brev)   Ankle    3.3 <5.8 0.9 >4.0 49.0 41.9 >41   Knee    15.0  0.6           EMG     Side Muscle Nerve Root Ins Act Fibs Psw Fasc Amp Dur Poly Recrt Int [place] Radish Comment   Left AntTibialis Dp Br Fibular L4-5 Nml Nml Nml None Nml Nml 0 Nml Nml    Left MedGastroc   Nml Nml Nml None Nml Nml 0 Nml Nml    Left VastusMed Femoral L2-4 Nml Nml Nml None Nml Nml 0 Nml Nml    Left ExtHallLong Dp Br Fibular L5, S1 Nml Nml Nml None Nml Nml 0 Nml Nml    Left BicepsFemS Sciatic L5-S1 Nml Nml Nml None Nml Nml 0 Nml Nml    Left Lumbo Parasp Up Rami L1-2 Nml Nml Nml          Left Lumbo Parasp Mid Rami L3-4 Nml Nml Nml          Left Lumbo Parasp Low Rami L5-S1 Nml Nml Nml            NCS/EMG FINDINGS:     Evaluation of the Left peroneal motor and the Right peroneal motor nerves showed reduced amplitude (L1.1, R1.2 mV) and decreased conduction velocity (Fibula (Head)-Ankle, L36.9, R42.7 m/s).  The Left tibial motor nerve showed reduced amplitude (3.4 mV) and decreased conduction velocity (Knee-Ankle, 40.7 m/s).  The Right tibial motor and the Left sural sensory nerves showed reduced amplitude (R0.9, L1.8 µV).  The Right sural sensory nerve was unremarkable. 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 ___________________________  Alonzo Javed MD          Waveforms:

## 2019-09-05 ENCOUNTER — OFFICE VISIT (OUTPATIENT)
Dept: NEUROLOGY | Age: 69
End: 2019-09-05

## 2019-09-05 ENCOUNTER — TELEPHONE (OUTPATIENT)
Dept: FAMILY MEDICINE CLINIC | Facility: CLINIC | Age: 69
End: 2019-09-05

## 2019-09-05 ENCOUNTER — HOME HEALTH ADMISSION (OUTPATIENT)
Dept: HOME HEALTH SERVICES | Facility: HOME HEALTH | Age: 69
End: 2019-09-05

## 2019-09-05 VITALS
TEMPERATURE: 97.6 F | DIASTOLIC BLOOD PRESSURE: 60 MMHG | HEIGHT: 71 IN | BODY MASS INDEX: 30.8 KG/M2 | HEART RATE: 72 BPM | SYSTOLIC BLOOD PRESSURE: 120 MMHG | OXYGEN SATURATION: 97 % | WEIGHT: 220 LBS | RESPIRATION RATE: 18 BRPM

## 2019-09-05 DIAGNOSIS — G62.9 NEUROPATHY: Primary | ICD-10-CM

## 2019-09-05 DIAGNOSIS — E53.8 VITAMIN B 12 DEFICIENCY: ICD-10-CM

## 2019-09-05 DIAGNOSIS — R29.6 FALLS FREQUENTLY: ICD-10-CM

## 2019-09-05 NOTE — PROGRESS NOTES
[facility]  [address] [facility], [address], [place], [address]  [address] Atul Aguirre, [address]  Office:  [phone]  Fax: [phone]  Chief Complaint   Patient presents with    Back Pain     follow up neuropathy       This is a 78-year-old male who presents for follow-up neuropathy. Endorses numbness to both great toes. Endorses intermittent sharp pains in the lower extremities. He says they can happen on a daily basis. Started years ago. Was previously on Lyrica and Neurontin in the past.  He tells me that he is not interested in trying any new medications at this time. Endorses history of collapsing. He said he passed out in a [employer] 2-3 months ago. Endorses LOC. He said at that time he was having issues with his blood pressure dropping low. Reports BP readings as low as \"60/40. \" He has cut back on his prescriptions. Seen by a [facility] [place]. Per documentation dizziness was thought to be related to orthostatic hypotension and his medications were adjusted. Now maintained on Metoprolol only. He tells me in the past he received B 12 injections. He lives alone. He said he has not eaten a \"square meal\" since his wife passed away. He wants to meet with a nutritionist regarding his diet. No other concerns at this time. History of multiple back surgeries with Dr. Scarlett Alston. Last surgery three years ago.     Past Medical History:   Diagnosis Date    Bilateral hip pain     Chronic pain     back; hx of opiod addiction    Depression     Diabetes (HCC)     borderline, no meds-trying to control with diet    DJD (degenerative joint disease)     GERD (gastroesophageal reflux disease)     Hepatitis C 01/2015    +Harvoni rx    Hypertension     Kidney stones     Lymphadenopathy, generalized 12/05/2015    neg bx    Numbness of foot left    resolved per patient 1/29/16    S/P lumbar fusion 2/9/2016    L4/5 LAMINECTOMY/FUSION/TRANSFORAMINAL LUMBAR INTERBODY FUSION (TLIF) by Dr. Sumanth Elizabeth on 2/8/16     Unspecified sleep apnea     no cpap machine-pt denies       Past Surgical History:   Procedure Laterality Date    HX APPENDECTOMY  as a child    HX BACK SURGERY  07/2015    HX CERVICAL FUSION  05/18/2017    ACDF C5/6 C6/7    HX COLONOSCOPY  2015    negative    HX LUMBAR FUSION  2/2016    HX ORTHOPAEDIC      denies    HX OTHER SURGICAL  05/2017    cervical fusion    HX TONSILLECTOMY  as a child    REMOVE Josette Right 3-10-16    Dr. Curtis Zuniga       Current Outpatient Medications   Medication Sig Dispense Refill    tamsulosin (FLOMAX) 0.4 mg capsule Take 2 Caps by mouth daily. 90 Cap 0    metoprolol succinate (TOPROL-XL) 50 mg XL tablet Take 1 Tab by mouth daily. 90 Tab 2    omeprazole (PRILOSEC) 20 mg capsule Take 1 Cap by mouth daily. 90 Cap 3        Allergies   Allergen Reactions    Nicotine Contact Dermatitis     Nicotine Patch reaction - Contact dermatitis with numbness and tingling also occuring in the left arm and denuding of the skin.     Thimerosal Other (comments)     Contact lens solution, made eyes red and irrated       Social History     Tobacco Use    Smoking status: Current Every Day Smoker     Packs/day: 1.00     Years: 40.00     Pack years: 40.00     Types: Cigarettes     Start date: 4/24/1966    Smokeless tobacco: Former User    Tobacco comment: 1 pack per day   Substance Use Topics    Alcohol use: No     Alcohol/week: 0.0 standard drinks    Drug use: Yes     Types: Marijuana     Comment: stopped cocaine 2007       Family History   Problem Relation Age of Onset    Diabetes Sister     SLE Sister     Arthritis-osteo Sister     Heart Disease Sister        Review of Systems  GENERAL: Denies fever or fatigue  CARDIAC: No CP or SOB  PULMONARY: No cough of SOB  MUSCULOSKELETAL: No new joint pain  NEURO: SEE HPI    Examination  Visit Vitals  /60 (BP 1 Location: Left arm, BP Patient Position: Sitting)   Pulse 72   Temp 97.6 °F (36.4 °C)   Resp 18   Ht 5' 11\" (1.803 m)   Wt 99.8 kg (220 lb)   SpO2 97%   BMI 30.68 kg/m²       This is a very pleasant 71-year-old male. He is alert and in no apparent distress. Full fund of knowledge. Speech is clear. Oriented x3, EOM's are full, PERRL, no facial asymmetries. Strength and tone are normal in the uppers. Diminished strength to b/l psoas. DTR's +2, wide based stance. Antalgic gait. Positive Romberg. Postural instability. Uses cane for assistance. Impression/Plan  Romy Aponte is a 77 y.o. male whose history and physical are consistent with neuropathy, frequent falls, and history of loss of consciousness. Patient presents for follow-up. 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 Patient endorses frequent falls. Last fall two days ago. He tells me he has passed out at [employer] several months ago. We discussed with history of LOC Ashlyn is no driving until 6 months LOC free. Patient verbalized understanding. Patient denies wanting to try Cymbalta for neuropathic pain. Nuvia Velázquez LPN will coordinate with his primary care provider's office for scheduling B12 injections. Most recent B12 level 291 requiring supplementation. Due to frequent falls and a diagnosis of mixed sensory and motor neuropathy to the lower extremities patient is a good candidate for physical therapy. With his interesting history of loss of consciousness within the past several months would be prudent to have an at home skilled nursing evaluation for at home physical/occupational therapy. Transportation is an issue and recommendation is for at home services. Maintain 3-month follow-up or sooner as needed. All questions addressed and patient is agreeable with plan of care. Diagnoses and all orders for this visit:    1.  Neuropathy  -     REFERRAL TO HOME HEALTH    2. Vitamin B 12 deficiency  -     REFERRAL TO HOME HEALTH    3. Falls frequently  -     REFERRAL TO HOME HEALTH      Total time: 30 min   Counseling / coordination time: 25 min   > 50% counseling / coordination?: Yes re: symptoms, management, medications, answering questions, safety, and plan of care. Signed By: David Mccormick NP    This note will not be viewable in [address] PLEASE NOTE:   Portions of this document may have been produced using voice recognition software. Unrecognized errors in transcription may be present.

## 2019-09-05 NOTE — TELEPHONE ENCOUNTER
----- Message from David Mccormick NP sent at 9/5/2019 10:42 AM EDT -----  Please call his PCP's office regarding B12 injections. Thank you.

## 2019-09-05 NOTE — TELEPHONE ENCOUNTER
They called stating the patient B12 is low and Anastacio Zayas NP would like for you to order B12 injections. Labs are in the computer. B12 results was 291.

## 2019-09-05 NOTE — PATIENT INSTRUCTIONS
Preventing Falls: After Your Visit   Your Care Instructions   Getting around your home safely can be a challenge if you have injuries or health problems that make it easy for you to fall. Loose rugs and furniture in walkways are among the dangers for many older people who have problems walking or who have poor eyesight. People who have conditions such as arthritis, osteoporosis, or dementia also have to be careful not to fall. You can make your home safer with a few simple measures. Follow-up care is a key part of your treatment and safety. Be sure to make and go to all appointments, and call your doctor if you are having problems. It's also a good idea to know your test results and keep a list of the medicines you take. How can you care for yourself at home? Taking care of yourself   You may get dizzy if you do not drink enough water. To prevent dehydration, drink plenty of fluids, enough so that your urine is light yellow or clear like water. Choose water and other caffeine-free clear liquids. If you have kidney, heart, or liver disease and have to limit fluids, talk with your doctor before you increase the amount of fluids you drink. Exercise regularly to improve your strength, muscle tone, and balance. Walk if you can. Swimming may be a good choice if you cannot walk easily. Have your vision and hearing checked each year or any time you notice a change. If you have trouble seeing and hearing, you might not be able to avoid objects and could lose your balance. Know the side effects of the medicines you take. Ask your doctor or pharmacist whether the medicines you take can affect your balance. Sleeping pills or sedatives can affect your balance. Limit the amount of alcohol you drink. Alcohol can impair your balance and other senses. Ask your doctor whether calluses or corns on your feet need to be removed.  If you wear loose-fitting shoes because of calluses or corns, you can lose your balance and fall.   Talk to your doctor if you have numbness in your feet. Preventing falls at home   Remove raised doorway thresholds, throw rugs, and clutter. Repair loose carpet or raised areas in the floor. Move furniture and electrical cords to keep them out of walking paths. Use nonskid floor wax, and wipe up spills right away, especially on ceramic tile floors. If you use a walker or cane, put rubber tips on it. If you use crutches, clean the bottoms of them regularly with an abrasive pad, such as steel wool. Keep your house well lit, especially stairways, porches, and outside walkways. Use night-lights in areas such as hallways and bathrooms. Add extra light switches or use remote switches (such as switches that go on or off when you clap your hands) to make it easier to turn lights on if you have to get up during the night. Install sturdy handrails on stairways. Move items in your cabinets so that the things you use a lot are on the lower shelves (about waist level). Keep a cordless phone and a flashlight with new batteries by your bed. If possible, put a phone in each of the main rooms of your house, or carry a cell phone in case you fall and cannot reach a phone. Or, you can wear a device around your neck or wrist. You push a button that sends a signal for help. Wear low-heeled shoes that fit well and give your feet good support. Use footwear with nonskid soles. Check the heels and soles of your shoes for wear. Repair or replace worn heels or soles. Do not wear socks without shoes on wood floors. Walk on the grass when the sidewalks are slippery. If you live in an area that gets snow and ice in the winter, sprinkle salt on slippery steps and sidewalks. Preventing falls in the bath   Install grab bars and nonskid mats inside and outside your shower or tub and near the toilet and sinks. Use shower chairs and bath benches. Use a hand-held shower head that will allow you to sit while showering. Get into a tub or shower by putting the weaker leg in first. Get out of a tub or shower with your strong side first.   Repair loose toilet seats and consider installing a raised toilet seat to make getting on and off the toilet easier. Keep your bathroom door unlocked while you are in the shower. Where can you learn more? Go to RxApps.be   Enter G117 in the search box to learn more about \"Preventing Falls: After Your Visit. \"    © 4145-3122 Healthwise, Incorporated. Care instructions adapted under license by [employer] (which disclaims liability or warranty for this information). This care instruction is for use with your licensed healthcare professional. If you have questions about a medical condition or this instruction, always ask your healthcare professional. [address] any warranty or liability for your use of this information. Content Version: 10.1.975450; Current as of: May 15, 2013              Thank you for choosing [employer] and [facility] for your     care. You may receive a survey about your visit. We appreciate you taking time     to complete this survey as we use your feedback to improve our services. We     realize we are not perfect, but we strive to provide excellent care.

## 2019-09-05 NOTE — PROGRESS NOTES
Chief Complaint   Patient presents with    Back Pain     follow up neuropathy       Jacinto Oh presents today for   Chief Complaint   Patient presents with    Back Pain     follow up neuropathy       Is someone accompanying this pt? no    Is the patient using any DME equipment during OV? no    Depression Screening:  3 most recent PHQ Screens 8/28/2017   Little interest or pleasure in doing things Nearly every day   Feeling down, depressed, irritable, or hopeless Nearly every day   Total Score PHQ 2 6   Trouble falling or staying asleep, or sleeping too much -   Feeling tired or having little energy -   Poor appetite, weight loss, or overeating -   Feeling bad about yourself - or that you are a failure or have let yourself or your family down -   Trouble concentrating on things such as school, work, reading, or watching TV -   Moving or speaking so slowly that other people could have noticed; or the opposite being so fidgety that others notice -   Thoughts of being better off dead, or hurting yourself in some way -   PHQ 9 Score -   How difficult have these problems made it for you to do your work, take care of your home and get along with others -       Learning Assessment:  Learning Assessment 8/29/2016   PRIMARY LEARNER Patient   HIGHEST LEVEL OF EDUCATION - PRIMARY LEARNER  GRADUATED HIGH SCHOOL OR GED   BARRIERS PRIMARY LEARNER NONE   CO-LEARNER CAREGIVER No   PRIMARY LANGUAGE ENGLISH    NEED No   LEARNER PREFERENCE PRIMARY DEMONSTRATION     READING     LISTENING     PICTURES     VIDEOS   ANSWERED BY patient   RELATIONSHIP SELF       Abuse Screening:  Abuse Screening Questionnaire 8/29/2016   Do you ever feel afraid of your partner? N   Are you in a relationship with someone who physically or mentally threatens you? N   Is it safe for you to go home? Y       Fall Risk  Fall Risk Assessment, last 12 mths 9/5/2019   Able to walk? Yes   Fall in past 12 months? Yes   Fall with injury?  No Number of falls in past 12 months 8 or more   Fall Risk Score 8         Coordination of Care:  1. Have you been to the ER, urgent care clinic since your last visit? Hospitalized since your last visit? no    2. Have you seen or consulted any other health care providers outside of the [address] [place] [address] since your last visit? Include any pap smears or colon screening.  no

## 2019-09-06 ENCOUNTER — HOME CARE VISIT (OUTPATIENT)
Dept: HOME HEALTH SERVICES | Facility: HOME HEALTH | Age: 69
End: 2019-09-06

## 2019-09-06 ENCOUNTER — TELEPHONE (OUTPATIENT)
Dept: NEUROLOGY | Age: 69
End: 2019-09-06

## 2019-09-09 DIAGNOSIS — R29.6 FALLS FREQUENTLY: ICD-10-CM

## 2019-09-09 DIAGNOSIS — G62.9 NEUROPATHY: Primary | ICD-10-CM

## 2019-09-10 ENCOUNTER — HOSPITAL ENCOUNTER (OUTPATIENT)
Dept: LAB | Age: 69
Discharge: HOME | End: 2019-09-10

## 2019-09-10 LAB — XX-LABCORP SPECIMEN COL,LCBCF: NORMAL

## 2019-09-10 PROCEDURE — 99001 SPECIMEN HANDLING PT-LAB: CPT

## 2019-09-12 LAB
ALBUMIN SERPL ELPH-MCNC: 3.7 G/DL (ref 2.9–4.4)
ALBUMIN/GLOB SERPL: 1.4 {RATIO} (ref 0.7–1.7)
ALPHA1 GLOB SERPL ELPH-MCNC: 0.2 G/DL (ref 0–0.4)
ALPHA2 GLOB SERPL ELPH-MCNC: 0.8 G/DL (ref 0.4–1)
B-GLOBULIN SERPL ELPH-MCNC: 0.9 G/DL (ref 0.7–1.3)
BASOPHILS # BLD AUTO: 0 X10E3/UL (ref 0–0.2)
BASOPHILS NFR BLD AUTO: 1 %
EOSINOPHIL # BLD AUTO: 0.1 X10E3/UL (ref 0–0.4)
EOSINOPHIL NFR BLD AUTO: 2 %
ERYTHROCYTE [DISTWIDTH] IN BLOOD BY AUTOMATED COUNT: 13.2 % (ref 12.3–15.4)
ERYTHROCYTE [SEDIMENTATION RATE] IN BLOOD BY WESTERGREN METHOD: 9 MM/HR (ref 0–30)
FOLATE SERPL-MCNC: 9.3 NG/ML
GAMMA GLOB SERPL ELPH-MCNC: 0.8 G/DL (ref 0.4–1.8)
GLOBULIN SER CALC-MCNC: 2.7 G/DL (ref 2.2–3.9)
HCT VFR BLD AUTO: 41.8 % (ref 37.5–51)
HGB BLD-MCNC: 14.8 G/DL (ref 13–17.7)
IMM GRANULOCYTES # BLD AUTO: 0.1 X10E3/UL (ref 0–0.1)
IMM GRANULOCYTES NFR BLD AUTO: 1 %
LYMPHOCYTES # BLD AUTO: 2.3 X10E3/UL (ref 0.7–3.1)
LYMPHOCYTES NFR BLD AUTO: 37 %
M PROTEIN SERPL ELPH-MCNC: NORMAL G/DL
MCH RBC QN AUTO: 32 PG (ref 26.6–33)
MCHC RBC AUTO-ENTMCNC: 35.4 G/DL (ref 31.5–35.7)
MCV RBC AUTO: 91 FL (ref 79–97)
MONOCYTES # BLD AUTO: 0.5 X10E3/UL (ref 0.1–0.9)
MONOCYTES NFR BLD AUTO: 8 %
NEUTROPHILS # BLD AUTO: 3.1 X10E3/UL (ref 1.4–7)
NEUTROPHILS NFR BLD AUTO: 51 %
PLATELET # BLD AUTO: 124 X10E3/UL (ref 150–450)
PLEASE NOTE, 011150: NORMAL
PROT SERPL-MCNC: 6.4 G/DL (ref 6–8.5)
RBC # BLD AUTO: 4.62 X10E6/UL (ref 4.14–5.8)
SPECIMEN STATUS REPORT, ROLRST: NORMAL
TSH SERPL DL<=0.005 MIU/L-ACNC: 0.38 UIU/ML (ref 0.45–4.5)
VIT B12 SERPL-MCNC: 226 PG/ML (ref 232–1245)
WBC # BLD AUTO: 6.1 X10E3/UL (ref 3.4–10.8)

## 2019-09-16 DIAGNOSIS — E53.8 LOW SERUM VITAMIN B12: Primary | ICD-10-CM

## 2019-09-16 NOTE — TELEPHONE ENCOUNTER
Pt last Vitamin B12 lab was on 09/10/2019 with Dr. Marita Mcgarry and results levels were 226 which is belopw range.

## 2019-09-17 DIAGNOSIS — E53.8 LOW VITAMIN B12 LEVEL: Primary | ICD-10-CM

## 2019-09-17 RX ORDER — LANOLIN ALCOHOL/MO/W.PET/CERES
1000 CREAM (GRAM) TOPICAL DAILY
Qty: 60 TAB | Refills: 6 | Status: SHIPPED | OUTPATIENT
Start: 2019-09-17 | End: 2020-03-18 | Stop reason: SDDI

## 2019-09-17 NOTE — TELEPHONE ENCOUNTER
I called and spoke with pt and pt verified name and d. o.b pt was made aware that the medication Vitamin B 12 was sent into the pharmacy for pt. Pt verbalized understanding. \"

## 2019-09-18 ENCOUNTER — OFFICE VISIT (OUTPATIENT)
Dept: CARDIOLOGY CLINIC | Age: 69
End: 2019-09-18

## 2019-09-18 ENCOUNTER — HOSPITAL ENCOUNTER (OUTPATIENT)
Dept: PHYSICAL THERAPY | Age: 69
Discharge: HOME | End: 2019-09-18
Payer: MEDICARE

## 2019-09-18 VITALS
BODY MASS INDEX: 30.74 KG/M2 | WEIGHT: 219.6 LBS | DIASTOLIC BLOOD PRESSURE: 88 MMHG | HEART RATE: 77 BPM | HEIGHT: 71 IN | SYSTOLIC BLOOD PRESSURE: 180 MMHG

## 2019-09-18 DIAGNOSIS — I10 ESSENTIAL HYPERTENSION: ICD-10-CM

## 2019-09-18 DIAGNOSIS — G62.9 NEUROPATHY: ICD-10-CM

## 2019-09-18 DIAGNOSIS — R07.89 CHEST TIGHTNESS: Primary | ICD-10-CM

## 2019-09-18 DIAGNOSIS — R55 SYNCOPE, UNSPECIFIED SYNCOPE TYPE: ICD-10-CM

## 2019-09-18 PROCEDURE — 97162 PT EVAL MOD COMPLEX 30 MIN: CPT

## 2019-09-18 PROCEDURE — 97530 THERAPEUTIC ACTIVITIES: CPT

## 2019-09-18 RX ORDER — HYDROCODONE BITARTRATE AND ACETAMINOPHEN 5; 325 MG/1; MG/1
TABLET ORAL
COMMUNITY
End: 2020-03-12

## 2019-09-18 NOTE — PROGRESS NOTES
1. Have you been to the ER, urgent care clinic since your last visit? Hospitalized since your last visit? Yes When: 09/2019 [facility]: [facility] Reason for visit: Bladder Biopsy    2. Have you seen or consulted any other health care providers outside of the [address] [place] [address] since your last visit? Include any pap smears or colon screening.  No

## 2019-09-18 NOTE — PROGRESS NOTES
PT DAILY TREATMENT NOTE/NEURO EVAL 10-18    Patient Name: Ramon Rai  Date:2019  : 1950  [x]  Patient  Verified  Payor: [employer] MEDICARE / Plan: [employer] [address] / Product Type: Managed Care Medicare /    In time:1110  Out time:1152  Total Treatment Time (min): 42  Visit #: 1 of -    Medicare/BCBS Only   Total Timed Codes (min):  20 1:1 Treatment Time:  20     Treatment Area: Polyneuropathy, unspecified [G62.9]  Repeated falls [R29.6]    SUBJECTIVE  Pain Level (0-10 scale): 4-5/10 low back pain  []constant []intermittent []improving []worsening []no change since onset    Any medication changes, allergies to medications, adverse drug reactions, diagnosis change, or new procedure performed?: [x] No    [] Yes (see summary sheet for update)  Subjective functional status/changes:     Patient is a pleasant 77year-old who present with chief complaint of frequent falls, decreased LE strength, and peripheral neuropathy. He has near falls 2-3 times a day and has bruises on B UE's from catching himself. His left knee guanaco at least once per day           OBJECTIVE/EXAMINATION        22 min [x]Eval                  []Re-Eval       20 min Therapeutic Activity:  []  See flow sheet : FGA, 5x sit to stand, walking with heel toe gait pattern, educated on proper foot wear and home safety to reduce falls risk, educated on practice balance exercises at home in a corner of room with chair in front, educated on anodyne    Rationale: increase ROM, increase strength, improve balance and increase proprioception  to improve the patients ability to increase ease of ambulation.               With   [] TE   [] TA   [] neuro   [] other: Patient Education: [x] Review HEP    [] Progressed/Changed HEP based on:   [] positioning   [] body mechanics   [] transfers   [] heat/ice application    [] other:      Other Objective/Functional Measures:     Physical Therapy Evaluation  Neurologic    Posture: [] Poor    [x] Fair    [] Good    Describe:       Gait: [] Normal    [x] Abnormal    Device: SPC     Describe: scuffing heels    Balance/ Equilibrium:                  Standing Balance: Static:   [] Good    [x] Fair    [] Poor     Dynamic:   [] Good    [x] Fair    [] Poor        Protective Extension:  [x] Present    [] Delayed    [] Absent        Single Leg Stance:         Eyes Open  Eyes Closed   L <2-3 seconds L    R  3-5 seconds R               Optional Tests:       Dynamic Gait Index (24pt scale): Functional Gait Assessment (30pt scale):16/30       Cuello Balance Scale (56pt scale): Other test /comments:  /80     Pain Level (0-10 scale) post treatment: 4-5/10    ASSESSMENT/Changes in Function: See POC. Patient will continue to benefit from skilled PT services to modify and progress therapeutic interventions, address functional mobility deficits, address ROM deficits, address strength deficits, analyze and cue movement patterns and address imbalance/dizziness to attain remaining goals.      [x]  See Plan of Care  []  See progress note/recertification  []  See Discharge Summary         Progress towards goals / Updated goals:  See POC    PLAN  []  Upgrade activities as tolerated     [x]  Continue plan of care  []  Update interventions per flow sheet       []  Discharge due to:_  []  Other:_      Ravi Oliver PT 9/18/2019  11:11 AM

## 2019-09-18 NOTE — PROGRESS NOTES
HISTORY OF PRESENT ILLNESS  Milo Khan is a 77 y.o. male. Dizziness   The history is provided by the patient. This is a chronic problem. The current episode started more than 1 week ago. The problem occurs daily. The problem has been gradually worsening. Pertinent negatives include no chest pain, no abdominal pain, no headaches and no shortness of breath. He has tried nothing for the symptoms. Family History   Problem Relation Age of Onset    Diabetes Sister     SLE Sister    [facility] Arthritis-osteo Sister     Heart Disease Sister        Past Medical History:   Diagnosis Date    Bilateral hip pain     Chronic pain     back; hx of opiod addiction    Depression     Diabetes ([address]     borderline, no meds-trying to control with diet    DJD (degenerative joint disease)     GERD (gastroesophageal reflux disease)     Hepatitis C 01/2015    +Harvoni rx    Hypertension     Kidney stones     Lymphadenopathy, generalized 12/05/2015    neg bx    Numbness of foot left    resolved per patient 1/29/16    S/P lumbar fusion 2/9/2016    L4/5 LAMINECTOMY/FUSION/TRANSFORAMINAL LUMBAR INTERBODY FUSION (TLIF) by Dr. Jason Arce on 2/8/16     Unspecified sleep apnea     no cpap machine-pt denies       Past Surgical History:   Procedure Laterality Date    HX APPENDECTOMY  as a child    HX BACK SURGERY  07/2015    HX CERVICAL FUSION  05/18/2017    ACDF C5/6 C6/7    HX COLONOSCOPY  2015    negative    HX LUMBAR FUSION  2/2016    HX ORTHOPAEDIC      denies    HX OTHER SURGICAL  05/2017    cervical fusion    HX TONSILLECTOMY  as a child    REMOVE [facility] Right 3-10-16    Dr. Kathia Oh       Social History     Tobacco Use    Smoking status: Current Every Day Smoker     Packs/day: 1.00     Years: 40.00     Pack years: 40.00     Types: Cigarettes     Start date: 4/24/1966    Smokeless tobacco: Former User    Tobacco comment: 1 pack per day   Substance Use Topics    Alcohol use:  No Alcohol/week: 0.0 standard drinks       Allergies   Allergen Reactions    Nicotine Contact Dermatitis     Nicotine Patch reaction - Contact dermatitis with numbness and tingling also occuring in the left arm and denuding of the skin.  Thimerosal Other (comments)     Contact lens solution, made eyes red and irrated       Prior to Admission medications    Medication Sig Start Date End Date Taking? Authorizing Provider   HYDROcodone-acetaminophen (NORCO) 5-325 mg per tablet Take  by mouth. Yes Provider, Historical   cyanocobalamin (VITAMIN B12) 500 mcg tablet Take 2 Tabs by mouth daily. 9/17/19  Yes Elvis Salvador MD   tamsulosin (FLOMAX) 0.4 mg capsule Take 2 Caps by mouth daily. 8/21/19  Yes Kasandra Shipman MD   metoprolol succinate (TOPROL-XL) 50 mg XL tablet Take 1 Tab by mouth daily. 7/26/19  Yes Allison Page NP   omeprazole (PRILOSEC) 20 mg capsule Take 1 Cap by mouth daily. 8/8/18  Yes Lui Anne MD         Visit Vitals  /88   Pulse 77   Ht 5' 11\" (1.803 m)   Wt 99.6 kg (219 lb 9.6 oz)   BMI 30.63 kg/m²       Review of Systems   Constitutional: Negative for chills, fever and malaise/fatigue. HENT: Negative for nosebleeds. Eyes: Negative for blurred vision and double vision. Respiratory: Negative for cough, hemoptysis, sputum production, shortness of breath and wheezing. Cardiovascular: Negative for chest pain, palpitations, orthopnea, claudication, leg swelling and PND. Gastrointestinal: Negative for abdominal pain, heartburn, nausea and vomiting. Musculoskeletal: Negative for falls and myalgias. Skin: Negative for rash. Neurological: Positive for dizziness and loss of consciousness. Negative for weakness and headaches. Endo/Heme/Allergies: Does not bruise/bleed easily. Physical Exam   Constitutional: He is oriented to person, place, and time. He appears well-developed and well-nourished. Neck: No JVD present.    Cardiovascular: Normal rate, regular rhythm, normal heart sounds and intact distal pulses. Exam reveals no gallop and no friction rub. No murmur heard. Pulmonary/Chest: Effort normal and breath sounds normal. No respiratory distress. He has no wheezes. He has no rales. He exhibits no tenderness. Musculoskeletal: He exhibits no edema. Neurological: He is alert and oriented to person, place, and time. Skin: Skin is warm and dry. Psychiatric: He has a normal mood and affect. Nursing note and vitals reviewed. ASSESSMENT and PLAN    Mr. Lexi Burgos has a reminder for a \"due or due soon\" health maintenance. I have asked that he contact his primary care provider for follow-up on this health maintenance. No flowsheet data found. Interpretation Summary 8/2019       · Left Ventricle: Normal cavity size and systolic function (ejection fraction normal). Mild concentric hypertrophy. Estimated left ventricular ejection fraction is 56 - 60%. No regional wall motion abnormality noted. Mild (grade 1) left ventricular diastolic dysfunction. · Right Ventricle: Normal right ventricular size and function. · Mitral Valve: Mild mitral valve regurgitation. Procedure Conclusion 8/2019  Nuclear Stress Test     Negative myocardial perfusion imaging. Myocardial perfusion imaging supports a low risk stress test.   There is no prior study available for comparison. .         Assessment         ICD-10-CM ICD-9-CM    1. Chest tightness R07.89 786.59     Better. Symptoms have resolved. Negative stress test monitor clinically   2. Essential hypertension I10 401.9     Elevated in office. Blood pressure is better controlled at home in 130s. Recent change in medication with improvement of dizziness   3. Syncope, unspecified syncope type R55 780.2     No recurrence. Likely orthostatic. Improved after discontinuing lisinopril and amlodipine and switching to metoprolol   4. Neuropathy G62.9 355.9     Likely cause for orthostatic hypotension.   No recurrence after changing medication     7/2019  Referred for dizziness and one episode of syncope. He c/o episodes of dizziness that occur with activity or rest.  During episodes his b/p drops to 70/40s and he feels as if his sight is closing in. He reports occasional chest tightness with episodes. Orthostatic b/p may be related to autonomic dysfunction with h/o back problems. Will d/c norvasc and lisinopril and begin Toprol XL 50 mg/ day. He is to monitor home b/p and bring chart to f/u appt. Will obtain echo and NST.  9/2019  Cardiac status stable improved chest tightness. No recurrence of dizziness or syncope after discontinuing amlodipine and lisinopril. Toprol has helped with blood pressure and symptoms. There are no discontinued medications. No orders of the defined types were placed in this encounter. Follow-up and Dispositions    · Return in about 6 months (around 3/18/2020) for bp chart at home.            Martin Mckinnon MD

## 2019-09-18 NOTE — PROGRESS NOTES
[facility]  Abril Melendez  [address] [facility]  [phone] fax    Plan of Care/ Statement of Necessity for Physical Therapy Services    Patient name: Milo Khan Start of Care: 2019   Referral source: Talia Flores NP : [id number]    Medical Diagnosis: Polyneuropathy, unspecified [G62.9]  Repeated falls [R29.6]  Payor: Norma Mcgrath / Plan: [employer] [address] / Product Type: [employer] Medicare /  Onset Date:chronic, worsening     Treatment Diagnosis: balance impairment   Prior Hospitalization: see medical history Provider#: [id number]   Medications: Verified on Patient summary List    Comorbidities: anxiety/depression, arthritis, high blood pressure, back pain    Prior Level of Function: brick/      The Plan of Care and following information is based on the information from the initial evaluation. Assessment/ key information: Patient is a pleasant 77year-old who present with chief complaint of frequent falls, decreased LE strength, and peripheral neuropathy. He has near falls 2-3 times a day and has bruises on B UE's from catching himself. His left knee guanaco at least once per day. Pt is currently ambulating with SPC and scuffing heels. He presents with impaired static balance; challenged with MSR. Right single limb stance 3-5 seconds and left SLS 2-3 seconds. Pt with loss of protective sensation in both feet as indicated from monofilament testing. Patient scored 16/30 on functional gait assessment. Patient will benefit from skilled PT to address balance, strength, and gait in order to increase ease/safety with ADL's.     Evaluation Complexity History HIGH Complexity :3+ comorbidities / personal factors will impact the outcome/ POC ; Examination MEDIUM Complexity : 3 Standardized tests and measures addressing body structure, function, activity limitation and / or participation in recreation  ;Presentation MEDIUM Complexity : Evolving with changing characteristics  ; Clinical Decision Making MEDIUM Complexity : FOTO score of 26-74  Overall Complexity Rating: MEDIUM  Problem List: pain affecting function, decrease ROM, decrease strength, impaired gait/ balance, decrease ADL/ functional abilitiies and decrease flexibility/ joint mobility   Treatment Plan may include any combination of the following: Therapeutic exercise, Therapeutic activities, Neuromuscular re-education, Physical agent/modality, Gait/balance training, Manual therapy, Patient education and Functional mobility training  Patient / Family readiness to learn indicated by: trying to perform skills  Persons(s) to be included in education: patient (P)  Barriers to Learning/Limitations: None  Patient Goal (s): feel more stable  Patient Self Reported Health Status: fair  Rehabilitation Potential: good    Short Term Goals: To be accomplished in 2 weeks1. Patient will be compliant with HEP to improve balance and strength in order to increase ease/safety of ADL's. Long Term Goals: To be accomplished in 8 weeks:  1. Patient will maintain MSR for 30 seconds without evidence of instability in order to increase ease/safety of daily activities. 2. Patient will maintain B SLS for 10 seconds or greater to improve stability during single limb support phase of gait. 3. Patient will score at least 20/30 on functional gait assessment to indicate improved dynamic balance. 4. Patient will increase FOTO score to 40/100 in order to indicate improvement in function and QOL. Frequency / Duration: Patient to be seen 2-3 times per week for 8 weeks.     Patient/ Caregiver education and instruction: Diagnosis, prognosis, exercises and other safety tips to reduce falls risk   [x]  Plan of care has been reviewed with PTA    Certification Period: 9/18/2019 to 11/16/2019  Joel Chi PT 9/18/2019 11:55 AM    ________________________________________________________________________    I certify that the above Therapy Services are being furnished while the patient is under my care. I agree with the treatment plan and certify that this therapy is necessary.     Physician's Signature:____________Date:_________TIME:________    ** Signature, Date and Time must be completed for valid certification **    Please sign and return to [facility]  [facility] [facility] [employer]  [address] [place] [address]  [phone] fax

## 2019-09-22 ENCOUNTER — HOSPITAL ENCOUNTER (EMERGENCY)
Age: 69
Discharge: HOME | End: 2019-09-22
Attending: EMERGENCY MEDICINE
Payer: MEDICARE

## 2019-09-22 VITALS
DIASTOLIC BLOOD PRESSURE: 87 MMHG | TEMPERATURE: 98.1 F | HEART RATE: 86 BPM | HEIGHT: 71 IN | SYSTOLIC BLOOD PRESSURE: 154 MMHG | BODY MASS INDEX: 31.36 KG/M2 | WEIGHT: 224 LBS | RESPIRATION RATE: 18 BRPM | OXYGEN SATURATION: 98 %

## 2019-09-22 DIAGNOSIS — M77.8 ELBOW TENDONITIS: Primary | ICD-10-CM

## 2019-09-22 PROCEDURE — 99282 EMERGENCY DEPT VISIT SF MDM: CPT

## 2019-09-22 RX ORDER — PREDNISONE 10 MG/1
TABLET ORAL
Qty: 1 PACKAGE | Refills: 0 | Status: SHIPPED | OUTPATIENT
Start: 2019-09-22 | End: 2019-11-26

## 2019-09-22 NOTE — ED PROVIDER NOTES
[facility]  Emergency Department Treatment Report        7:23 PM Tierra Richey is a 77 y.o. male with a history of arthritis who presents to ED with left elbow pain and a tingling numbness from the elbow to the fourth and fifth digit on the left hand. Patient states he has had tendinitis in the past and is used a band around his elbow. No injury has been reported no numbness or tingling to any other extremity no weakness no focal weakness no headache no blurred vision. No other complaints, associated symptoms or modifying factors at this time. PCP: Senait Donovan MD      The history is provided by the patient. No  was used.         Past Medical History:   Diagnosis Date    Bilateral hip pain     Chronic pain     back; hx of opiod addiction    Depression     Diabetes (HCC)     borderline, no meds-trying to control with diet    DJD (degenerative joint disease)     GERD (gastroesophageal reflux disease)     Hepatitis C 01/2015    +Harvoni rx    Hypertension     Kidney stones     Lymphadenopathy, generalized 12/05/2015    neg bx    Numbness of foot left    resolved per patient 1/29/16    S/P lumbar fusion 2/9/2016    L4/5 LAMINECTOMY/FUSION/TRANSFORAMINAL LUMBAR INTERBODY FUSION (TLIF) by Dr. Consuelo Arthur on 2/8/16     Unspecified sleep apnea     no cpap machine-pt denies       Past Surgical History:   Procedure Laterality Date    HX APPENDECTOMY  as a child    HX BACK SURGERY  07/2015    HX CERVICAL FUSION  05/18/2017    ACDF C5/6 C6/7    HX COLONOSCOPY  2015    negative    HX LUMBAR FUSION  2/2016    HX ORTHOPAEDIC      denies    HX OTHER SURGICAL  05/2017    cervical fusion    HX TONSILLECTOMY  as a child    REMOVE [facility] Right 3-10-16    Dr. Sol Gaona         Family History:   Problem Relation Age of Onset    Diabetes Sister     SLE Sister     Arthritis-osteo Sister     Heart Disease Sister        Social History Socioeconomic History    Marital status:      Spouse name: Not on file    Number of children: Not on file    Years of education: Not on file    Highest education level: Not on file   Occupational History    Not on file   Social Needs    Financial resource strain: Not on file    Food insecurity:     Worry: Not on file     Inability: Not on file    Transportation needs:     Medical: Not on file     Non-medical: Not on file   Tobacco Use    Smoking status: Current Every Day Smoker     Packs/day: 1.00     Years: 40.00     Pack years: 40.00     Types: Cigarettes     Start date: 4/24/1966    Smokeless tobacco: Former User    Tobacco comment: 1 pack per day   Substance and Sexual Activity    Alcohol use: No     Alcohol/week: 0.0 standard drinks    Drug use: Yes     Types: Marijuana     Comment: stopped cocaine 2007    Sexual activity: Never   Lifestyle    Physical activity:     Days per week: Not on file     Minutes per session: Not on file    Stress: Not on file   Relationships    Social connections:     Talks on phone: Not on file     Gets together: Not on file     Attends Yazidism service: Not on file     Active member of club or organization: Not on file     Attends meetings of clubs or organizations: Not on file     Relationship status: Not on file    Intimate partner violence:     Fear of current or ex partner: Not on file     Emotionally abused: Not on file     Physically abused: Not on file     Forced sexual activity: Not on file   Other Topics Concern    Not on file   Social History Narrative    Not on file         ALLERGIES: Nicotine and Thimerosal    Review of Systems   Constitutional: Negative for fever. Musculoskeletal: Positive for arthralgias. Negative for gait problem, joint swelling, neck pain and neck stiffness. Neurological: Negative for dizziness, weakness and numbness. All other systems reviewed and are negative.       Vitals:    09/22/19 1832   BP: 154/87   Pulse: 86   Resp: 18   Temp: 98.1 °F (36.7 °C)   SpO2: 98%   Weight: 101.6 kg (224 lb)   Height: 5' 11\" (1.803 m)            Physical Exam   Constitutional: He is oriented to person, place, and time. He appears well-developed and well-nourished. HENT:   Head: Normocephalic and atraumatic. Eyes: Pupils are equal, round, and reactive to light. Conjunctivae and EOM are normal.   Neck: Normal range of motion. Neck supple. Cardiovascular: Normal rate and regular rhythm. Pulmonary/Chest: Effort normal and breath sounds normal.   Abdominal: Soft. Bowel sounds are normal.   Musculoskeletal: Normal range of motion. He exhibits tenderness. He exhibits no edema or deformity. Arms:  Neurological: He is alert and oriented to person, place, and time. He has normal strength and normal reflexes. No cranial nerve deficit or sensory deficit. He displays a negative Romberg sign. GCS eye subscore is 4. GCS verbal subscore is 5. GCS motor subscore is 6. Skin: Skin is warm and dry. Psychiatric: He has a normal mood and affect. His behavior is normal. Judgment and thought content normal.   Nursing note and vitals reviewed. MDM  Number of Diagnoses or Management Options  Elbow tendonitis: minor  Diagnosis management comments: I do not suspect any neurological disorder no weakness no focal weakness no numbness to any other extremity no facial droop no headache no blurred vision. I do not suspect an acute stroke at this time I suspect a tendinitis of his elbow he has had this in the past is felt like this in the past I will prescribe prednisone and he will follow-up with his primary care. Patient informed if worse to return to the emergency department as soon as possible.        Amount and/or Complexity of Data Reviewed  Review and summarize past medical records: yes  Independent visualization of images, tracings, or specimens: yes    Risk of Complications, Morbidity, and/or Mortality  Presenting problems: low  Diagnostic procedures: low  Management options: low    Patient Progress  Patient progress: stable         Procedures            Vitals:  Patient Vitals for the past 12 hrs:   Temp Pulse Resp BP SpO2   09/22/19 1832 98.1 °F (36.7 °C) 86 18 154/87 98 %        Disposition:    Diagnosis:   1. Elbow tendonitis        Disposition: to Home      Follow-up Information     Follow up With Specialties Details Why Contact Info    Eric Velazquez MD Internal Medicine In 2 days  [address] [id number] [phone]             Patient's Medications   Start Taking    PREDNISONE (STERAPRED DS) 10 MG DOSE PACK    6 day dose pack per package instructions   Continue Taking    CYANOCOBALAMIN (VITAMIN B12) 500 MCG TABLET    Take 2 Tabs by mouth daily. HYDROCODONE-ACETAMINOPHEN (NORCO) 5-325 MG PER TABLET    Take  by mouth. METOPROLOL SUCCINATE (TOPROL-XL) 50 MG XL TABLET    Take 1 Tab by mouth daily. OMEPRAZOLE (PRILOSEC) 20 MG CAPSULE    Take 1 Cap by mouth daily. TAMSULOSIN (FLOMAX) 0.4 MG CAPSULE    Take 2 Caps by mouth daily. These Medications have changed    No medications on file   Stop Taking    No medications on file       Return to the ER if you are unable to obtain referral as directed. Varinder Amanda  results have been reviewed with him. He has been counseled regarding his diagnosis, treatment, and plan. He verbally conveys understanding and agreement of the signs, symptoms, diagnosis, treatment and prognosis and additionally agrees to follow up as discussed. He also agrees with the care-plan and conveys that all of his questions have been answered. I have also provided discharge instructions for him that include: educational information regarding their diagnosis and treatment, and list of reasons why they would want to return to the ED prior to their follow-up appointment, should his condition change.         Jose Torres ENP-C,FNP-C      Dragon voice recognition was used to generate this report, which may have resulted in some phonetic based errors in grammar and contents.  Even though attempts were made to correct all the mistakes, some may have been missed, and remained in the body of the document

## 2019-09-23 ENCOUNTER — TELEPHONE (OUTPATIENT)
Dept: FAMILY MEDICINE CLINIC | Facility: CLINIC | Age: 69
End: 2019-09-23

## 2019-09-23 RX ORDER — OMEPRAZOLE 20 MG/1
20 CAPSULE, DELAYED RELEASE ORAL DAILY
Qty: 90 CAP | Refills: 6 | Status: SHIPPED | OUTPATIENT
Start: 2019-09-23 | End: 2020-11-03

## 2019-09-23 NOTE — TELEPHONE ENCOUNTER
Patient called to notify office he will be changing PCPs. Declined to give name of provider he will see.

## 2019-09-25 ENCOUNTER — OFFICE VISIT (OUTPATIENT)
Dept: FAMILY MEDICINE CLINIC | Facility: CLINIC | Age: 69
End: 2019-09-25

## 2019-09-25 ENCOUNTER — HOSPITAL ENCOUNTER (OUTPATIENT)
Dept: PHYSICAL THERAPY | Age: 69
Discharge: HOME | End: 2019-09-25
Payer: MEDICARE

## 2019-09-25 VITALS
RESPIRATION RATE: 18 BRPM | TEMPERATURE: 96.7 F | DIASTOLIC BLOOD PRESSURE: 90 MMHG | HEART RATE: 81 BPM | HEIGHT: 71 IN | OXYGEN SATURATION: 98 % | WEIGHT: 222 LBS | SYSTOLIC BLOOD PRESSURE: 160 MMHG | BODY MASS INDEX: 31.08 KG/M2

## 2019-09-25 DIAGNOSIS — M77.9 TENDONITIS: ICD-10-CM

## 2019-09-25 DIAGNOSIS — Z13.39 SCREENING FOR ALCOHOLISM: ICD-10-CM

## 2019-09-25 DIAGNOSIS — Z00.00 MEDICARE ANNUAL WELLNESS VISIT, SUBSEQUENT: ICD-10-CM

## 2019-09-25 DIAGNOSIS — Z13.6 SCREENING FOR CARDIOVASCULAR CONDITION: Primary | ICD-10-CM

## 2019-09-25 DIAGNOSIS — Z23 ENCOUNTER FOR IMMUNIZATION: ICD-10-CM

## 2019-09-25 PROCEDURE — 97110 THERAPEUTIC EXERCISES: CPT

## 2019-09-25 NOTE — PROGRESS NOTES
Rm:8    Chief Complaint   Patient presents with   [facility] Follow Up     left arm pain     Depression Screening:  3 most recent [facility] Screens 8/28/2017 6/23/2016 6/7/2016 6/30/2015 8/20/2014   Little interest or pleasure in doing things Nearly every day Not at all Several days Not at all Nearly every day   Feeling down, depressed, irritable, or hopeless Nearly every day Not at all Several days Not at all More than half the days   Total Score PHQ 2 6 0 2 0 5   Trouble falling or staying asleep, or sleeping too much - - - - Nearly every day   Feeling tired or having little energy - - - - Nearly every day   Poor appetite, weight loss, or overeating - - - - More than half the days   Feeling bad about yourself - or that you are a failure or have let yourself or your family down - - - - Nearly every day   Trouble concentrating on things such as school, work, reading, or watching TV - - - - Several days   Moving or speaking so slowly that other people could have noticed; or the opposite being so fidgety that others notice - - - - More than half the days   Thoughts of being better off dead, or hurting yourself in some way - - - - Not at all   PHQ 9 Score - - - - 19   How difficult have these problems made it for you to do your work, take care of your home and get along with others - - - - Very difficult       Learning Assessment:  Learning Assessment 8/29/2016 7/21/2015 6/30/2015 6/11/2015 1/26/2015   PRIMARY LEARNER Patient Patient Patient Patient Patient   HIGHEST LEVEL OF EDUCATION - PRIMARY LEARNER  GRADUATED HIGH SCHOOL OR GED - SOME COLLEGE GRADUATED HIGH SCHOOL OR GED -   BARRIERS PRIMARY LEARNER NONE NONE - - -   CO-LEARNER CAREGIVER No - - - -   PRIMARY LANGUAGE ENGLISH ENGLISH ENGLISH ENGLISH ENGLISH    NEED No - - - -   LEARNER PREFERENCE PRIMARY DEMONSTRATION READING READING LISTENING DEMONSTRATION     READING - - READING READING     LISTENING - - - -     PICTURES - - - -     VIDEOS - - - -   ANSWERED BY patient patient Patient patient patient   RELATIONSHIP SELF SELF SELF SELF SELF       Abuse Screening:  Abuse Screening Questionnaire 8/29/2016   Do you ever feel afraid of your partner? N   Are you in a relationship with someone who physically or mentally threatens you? N   Is it safe for you to go home? Y       Health Maintenance reviewed and discussed per provider: yes     Coordination of Care:    1. Have you been to the ER, urgent care clinic since your last visit? Hospitalized since your last visit? yes    2. Have you seen or consulted any other health care providers outside of the [address] [place] [address] since your last visit? Include any pap smears or colon screening.  no

## 2019-09-25 NOTE — PROGRESS NOTES
PT DAILY TREATMENT NOTE 10-18    Patient Name: Birdie Beard  Date:2019  : 1950  [x]  Patient  Verified  Payor: [place] [employer]ICARE / Plan: [address] / Product Type: Managed Care Medicare /    In time:1000  Out time:1059  Total Treatment Time (min): 61  Visit #: 2 of     Medicare/BCBS Only   Total Timed Codes (min):  29 1:1 Treatment Time:  29       Treatment Area: Polyneuropathy, unspecified [G62.9]    SUBJECTIVE  Pain Level (0-10 scale): 5/10  Any medication changes, allergies to medications, adverse drug reactions, diagnosis change, or new procedure performed?: [x] No    [] Yes (see summary sheet for update)  Subjective functional status/changes:   [] No changes reported  Pt stated that he is on edge so much about falling and spilling that he is completely worn out by the end of the day    OBJECTIVE    Modality rationale: decrease pain to improve the patients ability to increase sensation in B feet and lower legs   Min Type Additional Details    [] Estim:  []Unatt       []IFC  []Premod                        []Other:  []w/ice   []w/heat  Position:  Location:    [] Estim: []Att    []TENS instruct  []NMES                    []Other:  []w/US   []w/ice   []w/heat  Position:  Location:    []  Traction: [] Cervical       []Lumbar                       [] Prone          []Supine                       []Intermittent   []Continuous Lbs:  [] before manual  [] after manual    []  Ultrasound: []Continuous   [] Pulsed                           []1MHz   []3MHz W/cm2:  Location:    []  Iontophoresis with dexamethasone         Location: [] Take home patch   [] In clinic   30  NO CHARGE []  Ice     []  heat  []  Ice massage  []  Laser   [x]  Anodyne Position: supine  Location:B LE    []  Laser with stim  []  Other:  Position:  Location:    []  Vasopneumatic Device Pressure:       [] lo [] med [] hi   Temperature: [] lo [] med [] hi   [x] Skin assessment post-treatment:  [x]intact []redness- no adverse reaction    []redness  adverse reaction:     29 min Therapeutic Exercise:  [x] See flow sheet :   Rationale: increase ROM, increase strength, improve coordination, improve balance and increase proprioception to improve the patients ability to increase ease with ADLs and decrease fall risk    With   [x] TE   [] TA   [] neuro   [] other: Patient Education: [x] Review HEP    [] Progressed/Changed HEP based on:   [] positioning   [] body mechanics   [] transfers   [] heat/ice application    [] other:      Other Objective/Functional Measures:   Had significant difficulty with static balance  No difficulty with small vibha step overs  Tolerated Anodyne without difficulty and has slight decrease in pain     Pain Level (0-10 scale) post treatment: 4/10    ASSESSMENT/Changes in Function:   Initiated therex today per flow sheet. Pt put forth good effort with all exercises. Pt reported practicing static balance at home    Patient will continue to benefit from skilled PT services to modify and progress therapeutic interventions, address functional mobility deficits, address ROM deficits, address strength deficits, analyze and cue movement patterns, analyze and modify body mechanics/ergonomics, address imbalance/dizziness and instruct in home and community integration to attain remaining goals. [x]  See Plan of Care  []  See progress note/recertification  []  See Discharge Summary         Progress towards goals / Updated goals:  Short Term Goals: To be accomplished in 2 weeks1. Patient will be compliant with HEP to improve balance and strength in order to increase ease/safety of ADL's. Long Term Goals: To be accomplished in 8 weeks:  1. Patient will maintain MSR for 30 seconds without evidence of instability in order to increase ease/safety of daily activities.    2. Patient will maintain B SLS for 10 seconds or greater to improve stability during single limb support phase of gait. 3. Patient will score at least 20/30 on functional gait assessment to indicate improved dynamic balance. 4. Patient will increase FOTO score to 40/100 in order to indicate improvement in function and QOL.      PLAN  []  Upgrade activities as tolerated     [x]  Continue plan of care  []  Update interventions per flow sheet       []  Discharge due to:_  []  Other:_      Peri Chan, PTA 9/25/2019  10:02 AM    Future Appointments   Date Time Provider Jaime Lilli   9/25/2019  4:45 PM [facility]   9/27/2019 10:00 AM Michael Calhoun, PT [id number] [facility]   9/30/2019  8:00 AM Dossie Peoples, PTA [facility]CPTP[facility]   10/2/2019  8:00 AM Huang [place], PTA [facility]CPTPB [facility]   10/4/2019  8:30 AM Naima Prajapati, PT [id number] [facility]   10/7/2019  8:00 AM Haung [place], PTA MMCPTPB [facility]   10/7/2019  2:00 PM Nyla Gallegos MD [facility] KATHY SCHED   10/9/2019  8:00 AM Huang [place], PTA MMCPTPB [facility]   10/11/2019  8:00 AM Naima Prajapati, PT [id number] [facility]   10/14/2019  8:00 AM Dossie Peoples, PTA MMCPTPB [facility]   10/16/2019  8:00 AM Huang [place], PTA MMCPTPB [facility]   10/18/2019  8:00 AM Naima Prajapati, PT [id number] [facility]   10/21/2019  8:00 AM Huang [place], PTA MMCPTPB [facility]   10/23/2019  8:00 AM Huang [place], PTA MMCPTPB [facility]   10/25/2019  8:00 AM Naima Prajapati, PT OTPSFUR [facility]   10/28/2019  8:00 AM Michael Calhoun, PT [id number] [facility]   10/30/2019  8:00 AM Reina Bowman PTA MMCPTPB [facility]   11/1/2019  8:00 AM Naima Prajapati, PT [id number] [facility]   12/5/2019  8:30 AM Natasha Perkins NP [id number]   3/18/2020  8:30 AM Farhat Kay MD [address] [place] [address]

## 2019-09-25 NOTE — PROGRESS NOTES
This is the Subsequent Medicare Annual Wellness Exam, performed 12 months or more after the Initial AWV or the last Subsequent AWV    I have reviewed the patient's medical history in detail and updated the computerized patient record. History     Past Medical History:   Diagnosis Date    Bilateral hip pain     Chronic pain     back; hx of opiod addiction    Depression     Diabetes ([address]     borderline, no meds-trying to control with diet    DJD (degenerative joint disease)     GERD (gastroesophageal reflux disease)     Hepatitis C 01/2015    +Harvoni rx    Hypertension     Kidney stones     Lymphadenopathy, generalized 12/05/2015    neg bx    Numbness of foot left    resolved per patient 1/29/16    S/P lumbar fusion 2/9/2016    L4/5 LAMINECTOMY/FUSION/TRANSFORAMINAL LUMBAR INTERBODY FUSION (TLIF) by Dr. Jason Arce on 2/8/16     Unspecified sleep apnea     no cpap machine-pt denies      Past Surgical History:   Procedure Laterality Date    HX APPENDECTOMY  as a child    HX BACK SURGERY  07/2015    HX CERVICAL FUSION  05/18/2017    ACDF C5/6 C6/7    HX COLONOSCOPY  2015    negative    HX LUMBAR FUSION  2/2016    HX ORTHOPAEDIC      denies    HX OTHER SURGICAL  05/2017    cervical fusion    HX TONSILLECTOMY  as a child    REMOVE [facility] Right 3-10-16    Dr. Kathia Oh     Current Outpatient Medications   Medication Sig Dispense Refill    omeprazole (PRILOSEC) 20 mg capsule Take 1 Cap by mouth daily. 90 Cap 6    predniSONE (STERAPRED DS) 10 mg dose pack 6 day dose pack per package instructions 1 Package 0    cyanocobalamin (VITAMIN B12) 500 mcg tablet Take 2 Tabs by mouth daily. 60 Tab 6    tamsulosin (FLOMAX) 0.4 mg capsule Take 2 Caps by mouth daily. 90 Cap 0    metoprolol succinate (TOPROL-XL) 50 mg XL tablet Take 1 Tab by mouth daily. 90 Tab 2    HYDROcodone-acetaminophen (NORCO) 5-325 mg per tablet Take  by mouth.        Allergies   Allergen Reactions    Nicotine Contact Dermatitis     Nicotine Patch reaction - Contact dermatitis with numbness and tingling also occuring in the left arm and denuding of the skin.     Thimerosal Other (comments)     Contact lens solution, made eyes red and irrated     Family History   Problem Relation Age of Onset    Diabetes Sister     SLE Sister     Arthritis-osteo Sister     Heart Disease Sister      Social History     Tobacco Use    Smoking status: Current Every Day Smoker     Packs/day: 1.00     Years: 40.00     Pack years: 40.00     Types: Cigarettes     Start date: 4/24/1966    Smokeless tobacco: Former User    Tobacco comment: 1 pack per day   Substance Use Topics    Alcohol use: No     Alcohol/week: 0.0 standard drinks     Patient Active Problem List   Diagnosis Code    HTN (hypertension) I10    Back pain M54.9    Prediabetes R73.03    Chronic hepatitis C without hepatic coma (HCC) B18.2    GERD (gastroesophageal reflux disease) K21.9    Onychomycosis of toenail B35.1    S/P lumbar fusion Z98.1    Kidney stones N20.0    Lumbar facet arthropathy M47.816    Chronic pain G89.29    Neuritis M79.2    Cervical spinal stenosis M48.02    Bulge of lumbar disc without myelopathy M51.26    Acute pain of right shoulder M25.511    Cervical myelopathy ([address] G95.9    S/P cervical spinal fusion Z98.1    IGT (impaired glucose tolerance) R73.02    Depression F32.9    History of opioid abuse Z87.898    Recurrent depression ([address] F33.9    Lumbar radicular pain M54.16    Spinal stenosis of lumbar region with neurogenic claudication M48.062    Low back strain, initial encounter S39.012A       Depression Risk Factor Screening:     3 most recent PHQ Screens 8/28/2017   Little interest or pleasure in doing things Nearly every day   Feeling down, depressed, irritable, or hopeless Nearly every day   Total Score PHQ 2 6   Trouble falling or staying asleep, or sleeping too much -   Feeling tired or having little energy -   Poor appetite, weight loss, or overeating -   Feeling bad about yourself - or that you are a failure or have let yourself or your family down -   Trouble concentrating on things such as school, work, reading, or watching TV -   Moving or speaking so slowly that other people could have noticed; or the opposite being so fidgety that others notice -   Thoughts of being better off dead, or hurting yourself in some way -   PHQ 9 Score -   How difficult have these problems made it for you to do your work, take care of your home and get along with others -     Alcohol Risk Factor Screening: You do not drink alcohol or very rarely. Functional Ability and Level of Safety:   Hearing Loss  Hearing is good. He has bathroom rails and tools for putting on shoes and socks  Activities of Daily Living  The home contains: no safety equipment. Patient does total self care    Fall Risk  Fall Risk Assessment, last 12 mths 9/25/2019   Able to walk? Yes   Fall in past 12 months?  No   Fall with injury? -   Number of falls in past 12 months -   Fall Risk Score -       Abuse Screen  Patient is not abused    Cognitive Screening   Evaluation of Cognitive Function:  Has your family/caregiver stated any concerns about your memory: no  Normal    Patient Care Team   Patient Care Team:  Arthur Stoddard MD as PCP - General (Emergency Medicine)  Lucian Krabbe, MD as Physician (Urology)  Oscar Valladares MD (Neurology)  Marvin Stacy CNA as Referring Provider        Health Maintenance Due   Topic Date Due    Shingrix Vaccine Age 50> (1 of 2) 08/18/2001    GLAUCOMA SCREENING Q2Y  08/18/2016    FOBT Q 1 YEAR AGE 50-75  12/08/2016    MEDICARE YEARLY EXAM  02/21/2019    Influenza Age 5 to Adult  08/01/2019

## 2019-09-25 NOTE — PROGRESS NOTES
09/25/19  2:54 PM  had no chief complaint listed for this encounter. HISTORY OF PRESENT ILLNESS   This is a 77 y.o. male being seen in follow-up post ED visit  The patient was seen in the ED elbow pain diagnosed with elbow tendinitis  Elbow tendinitis  She was placedon prednisone with less constant pain. He mows a lawn once a week    LOC  The patient was seen by cardiology on 9/18/2019 for dizziness he underwent a nuclear stress test on 9/20/2019 it was read as a negative myocardial perfusion imaging study which supported a low risk stress test it was thought that he will be monitored medically. He had had no recurrence of the syncope it was thought to be most likely Ortho static he was improved after discontinuing the lisinopril and Norvasc and switching to metoprolol. Hypertension  The blood pressure has been in the normal range. His blood pressure medications have been changed to just a beta-blocker at this time. He has had no more syncopal episodes  The patient has had no problem with the medication. The patient has no headaches, visual changes, chest pain or pressure,dyspnea, orthopnea, abdominal pain, dysuria, weakness, or paresthesias. BP Readings from Last 3 Encounters:   09/25/19 160/90   09/22/19 154/87   09/18/19 180/88     Lab Results   Component Value Date/Time    Sodium 140 08/28/2019 11:29 AM    Potassium 4.5 08/28/2019 11:29 AM    Chloride 103 08/28/2019 11:29 AM    CO2 23 08/28/2019 11:29 AM    Anion gap 7 07/22/2019 08:49 AM    Glucose 97 08/28/2019 11:29 AM    BUN 12 08/28/2019 11:29 AM    Creatinine 1.12 08/28/2019 11:29 AM    BUN/Creatinine ratio 11 08/28/2019 11:29 AM    GFR est AA 78 08/28/2019 11:29 AM    GFR est non-AA 67 08/28/2019 11:29 AM    Calcium 9.0 08/28/2019 11:29 AM     EKG Results     None        08/21/19   ECHO ADULT COMPLETE 08/21/2019 8/21/2019    Narrative · Left Ventricle: Normal cavity size and systolic function (ejection   fraction normal).  Mild concentric hypertrophy. Estimated left ventricular   ejection fraction is 56 - 60%. No regional wall motion abnormality noted. Mild (grade 1) left ventricular diastolic dysfunction. · Right Ventricle: Normal right ventricular size and function. · Mitral Valve: Mild mitral valve regurgitation. Signed by: Gordon Mejia MD     [facility] CAD CHF Meds             metoprolol succinate (TOPROL-XL) 50 mg XL tablet (Taking) Take 1 Tab by mouth daily. Current Outpatient Medications:     omeprazole (PRILOSEC) 20 mg capsule, Take 1 Cap by mouth daily. , Disp: 90 Cap, Rfl: 6    predniSONE (STERAPRED DS) 10 mg dose pack, 6 day dose pack per package instructions, Disp: 1 Package, Rfl: 0    HYDROcodone-acetaminophen (NORCO) 5-325 mg per tablet, Take  by mouth., Disp: , Rfl:     cyanocobalamin (VITAMIN B12) 500 mcg tablet, Take 2 Tabs by mouth daily. , Disp: 60 Tab, Rfl: 6    tamsulosin (FLOMAX) 0.4 mg capsule, Take 2 Caps by mouth daily. , Disp: 90 Cap, Rfl: 0    metoprolol succinate (TOPROL-XL) 50 mg XL tablet, Take 1 Tab by mouth daily. , Disp: 90 Tab, Rfl: 2    Allergies   Allergen Reactions    Nicotine Contact Dermatitis     Nicotine Patch reaction - Contact dermatitis with numbness and tingling also occuring in the left arm and denuding of the skin.     Thimerosal Other (comments)     Contact lens solution, made eyes red and irrated       Active Ambulatory Problems     Diagnosis Date Noted    HTN (hypertension) 04/02/2014    Back pain 04/02/2014    Prediabetes 04/02/2014    [facility] C without [facility] 04/03/2014    GERD (gastroesophageal reflux disease) 05/01/2014    Onychomycosis of toenail 11/25/2015    S/P lumbar fusion 02/08/2016    Kidney stones 05/10/2016    Lumbar facet arthropathy 11/15/2016    Chronic pain 11/15/2016    Neuritis 11/15/2016    Cervical spinal stenosis 11/29/2016    Bulge of lumbar disc without myelopathy 11/29/2016    Acute pain of right shoulder 04/04/2017    Cervical myelopathy (HCC) 05/18/2017    S/P cervical spinal fusion 06/01/2017    IGT (impaired glucose tolerance) 08/28/2017    Depression 08/28/2017    History of opioid abuse 10/25/2017    Recurrent depression ([address] 01/17/2018    Lumbar radicular pain 03/19/2018    Spinal stenosis of lumbar region with neurogenic claudication 03/19/2018    Low back strain, initial encounter 04/26/2018     Resolved Ambulatory Problems     Diagnosis Date Noted    Heel pain 04/02/2014    Plantar fasciitis 04/02/2014    Tobacco abuse 08/21/2014    Dehydration 09/02/2014    Acute renal failure ([address] 09/02/2014    UTI (urinary tract infection) 09/02/2014    MEI (acute kidney injury) ([address] 09/13/2014    Early satiety 09/13/2014    Sciatica 09/17/2014    H/O lumbosacral spine surgery 07/22/2015    Steatosis, liver 10/29/2015    Advanced directives, counseling/discussion 11/25/2015     Past Medical History:   Diagnosis Date    Bilateral hip pain     Diabetes ([address]     DJD (degenerative joint disease)     Hepatitis C 01/2015    Hypertension     Lymphadenopathy, generalized 12/05/2015    Numbness of foot left    Unspecified sleep apnea        Review of Systems   Constitutional: Positive for diaphoresis. Negative for chills, fever, malaise/fatigue and weight loss. HENT: Positive for ear pain and hearing loss (Diminished on the left). Negative for congestion, ear discharge, sinus pain and tinnitus. Stabbing left facial and scalp pain, intermittent. No inciting activity. Sometimes relieved by holding himself or his neck still. No meds taken for relief   Eyes: Negative for blurred vision, double vision and photophobia. Respiratory: Positive for shortness of breath and wheezing. Negative for cough and hemoptysis. Cardiovascular: Negative for chest pain, palpitations, orthopnea and PND. Gastrointestinal: Negative for blood in stool and melena.         There is no history of nausea  The patient denies vomiting  There is no history of diarrhea or constipation  The patient denies heartburn     Genitourinary: Negative for flank pain, frequency, hematuria and urgency. The patient denies any dysuria, polyuria or nocturia   Musculoskeletal: Positive for back pain, falls, joint pain, myalgias and neck pain. Skin: Negative. Neurological: Positive for sensory change (Feet feel heavy). Negative for tingling, tremors, weakness and headaches. Psychiatric/Behavioral: Positive for depression. Negative for substance abuse (prior positive history, none now) and suicidal ideas. Results for orders placed or performed during the hospital encounter of 09/10/19   [facility] SPECIMEN COL   Result Value Ref Range    [facility]. Specimens collected/sent to [email or web address]. Please direct inquiries to ([phone]). There were no vitals taken for this visit. Physical Exam   Constitutional: He is oriented to person, place, and time. He appears well-developed and well-nourished. HENT:   Head: Normocephalic and atraumatic. Right Ear: External ear normal.   Left Ear: External ear normal.   Nose: Nose normal.   Mouth/Throat: Oropharynx is clear and moist.   Mild rhinophyma   Eyes: Pupils are equal, round, and reactive to light. Conjunctivae and EOM are normal. Right eye exhibits no discharge. No scleral icterus. Neck: Normal range of motion. Neck supple. No JVD present. No thyromegaly present. Cardiovascular: Normal rate, regular rhythm and intact distal pulses. Exam reveals no gallop and no friction rub. No murmur heard. Pulmonary/Chest: Effort normal. No respiratory distress. He has no wheezes. He has no rales. Abdominal: He exhibits no distension and no mass. There is no tenderness. There is no rebound and no guarding. Genitourinary:   Genitourinary Comments: No hernia noted   Musculoskeletal: Normal range of motion.  He exhibits deformity (Antalgic gait, cannot flex past 45degrees). He exhibits no edema or tenderness (tender mid lumbar area). Good dorsalis pedis and posterior tibial of both feet. High arches noted the patient has an antalgic gait because of his hips. Lymphadenopathy:     He has no cervical adenopathy. Neurological: He is alert and oriented to person, place, and time. No cranial nerve deficit. He exhibits normal muscle tone. Coordination normal.   Continued decreased sensation in the lower extremities. He has good pulses. Monofilament exam shows no sensation of the toes the sensation picks up in the midfoot. Skin: Skin is warm and dry. No rash noted. No erythema. Psychiatric: He has a normal mood and affect. His behavior is normal. Judgment and thought content normal.   Nursing note and vitals reviewed. There are no diagnoses linked to this encounter. Diagnoses and all orders for this visit:      1. Tendonitis  Comments:  Continue present medications of the steroid. Call in 1 week if not improved would adjust medication perhaps using gabapentin. lab results and schedule of future lab studies reviewed with patient  very strongly urged to quit smoking to reduce cardiovascular risk      This note was done using dictation software. There may be inadvertent errors present.

## 2019-09-25 NOTE — PATIENT INSTRUCTIONS
Medicare provides coverage of a one-time preventive ultrasound screening for the early detection of an AAA for eligible beneficiaries who meet the following criteria: ? The beneficiary receives a referral for an ultrasound screening for AAA as a result of an IPPE; 
? The beneficiary receives a referral from a provider or supplier who is authorized to provide covered ultrasound diagnostic services ? The beneficiary has not been previously furnished an ultrasound screening for AAA under the Medicare Program 
The beneficiary is included in at least one of the following risk categories:  
 
? The beneficiary has a family history of AAAs ? The beneficiary is a man 72 through 76years of age who has smoked at least 100 cigarettes in his lifetime; or ? The beneficiary manifests other risk factors in a beneficiary category recommended for ultrasound screening by the [facility] Demetrius Veronica (USPSTF) regarding AAAs, as specified by the  of Health and [employer] through the national coverage determination process. Important Note Only Medicare beneficiaries who receive a referral for the ultrasound screening for AAA as a result of the IPPE will be covered for this benefit. Medicare provides coverage for the ultrasound screening for AAA as a Medicare Part B benefit. The coinsurance or copayment applies to this benefit. The Medicare Part B deductible is waived. For dates of service on or after January 1, 2011, both the coinsurance or copayment and deductible are waived. NOTE: The Medicare Part B deductible does not apply to [facility]) services. Medicare Wellness Visit, Male The best way to live healthy is to have a lifestyle where you eat a well-balanced diet, exercise regularly, limit alcohol use, and quit all forms of tobacco/nicotine, if applicable. Regular preventive services are another way to keep healthy.  Preventive services (vaccines, screening tests, monitoring & exams) can help personalize your care plan, which helps you manage your own care. Screening tests can find health problems at the earliest stages, when they are easiest to treat. [address] García follows the current, evidence-based guidelines published by the [facility] Demetrius Martin ([facility]) when recommending preventive services for our patients. Because we follow these guidelines, sometimes recommendations change over time as research supports it. (For example, a prostate screening blood test is no longer routinely recommended for men with no symptoms.) Of course, you and your doctor may decide to screen more often for some diseases, based on your risk and co-morbidities (chronic disease you are already diagnosed with). Preventive services for you include: - Medicare offers their members a free annual wellness visit, which is time for you and your primary care provider to discuss and plan for your preventive service needs. Take advantage of this benefit every year! 
-All adults over age 72 should receive the recommended pneumonia vaccines. Current USPSTF guidelines recommend a series of two vaccines for the best pneumonia protection.  
-All adults should have a flu vaccine yearly and an ECG.  All adults age 61 and older should receive a shingles vaccine once in their lifetime.   
-All adults age 38-68 who are overweight should have a diabetes screening test once every three years.  
-Other screening tests & preventive services for persons with diabetes include: an eye exam to screen for diabetic retinopathy, a kidney function test, a foot exam, and stricter control over your cholesterol.  
-Cardiovascular screening for adults with routine risk involves an electrocardiogram (ECG) at intervals determined by the provider.  
-Colorectal cancer screening should be done for adults age 54-65 with no increased risk factors for colorectal cancer. There are a number of acceptable methods of screening for this type of cancer. Each test has its own benefits and drawbacks. Discuss with your provider what is most appropriate for you during your annual wellness visit. The different tests include: colonoscopy (considered the best screening method), a fecal occult blood test, a fecal DNA test, and sigmoidoscopy. 
-All adults born between [facility] should be screened once for Hepatitis C. 
-An Abdominal Aortic Aneurysm (AAA) Screening is recommended for men age 73-68 who has ever smoked in their lifetime. Here is a list of your current Health Maintenance items (your personalized list of preventive services) with a due date: 
Health Maintenance Due Topic Date Due  Shingles Vaccine (1 of 2) 08/18/2001  Glaucoma Screening   08/18/2016  Stool testing for trace blood  12/08/2016 [employer] Annual Well Visit  02/21/2019  Flu Vaccine  08/01/2019 Advance Care Planning: Care Instructions Your Care Instructions It can be hard to live with an illness that cannot be cured. But if your health is getting worse, you may want to make decisions about end-of-life care. Planning for the end of your life does not mean that you are giving up. It is a way to make sure that your wishes are met. Clearly stating your wishes can make it easier for your loved ones. Making plans while you are still able may also ease your mind and make your final days less stressful and more meaningful. Follow-up care is a key part of your treatment and safety. Be sure to make and go to all appointments, and call your doctor if you are having problems. It's also a good idea to know your test results and keep a list of the medicines you take. What can you do to plan for the end of life? · You can bring these issues up with your doctor. You do not need to wait until your doctor starts the conversation.  You might start with \"I would not be willing to live with . Kay Llanes \" When you complete this sentence it helps your doctor understand your wishes. · Talk openly and honestly with your doctor. This is the best way to understand the decisions you will need to make as your health changes. Know that you can always change your mind. · Ask your doctor about commonly used life-support measures. These include tube feedings, breathing machines, and fluids given through a vein (IV). Understanding these treatments will help you decide whether you want them. · You may choose to have these life-supporting treatments for a limited time. This allows a trial period to see whether they will help you. You may also decide that you want your doctor to take only certain measures to keep you alive. It is important to spell out these conditions so that your doctor and family understand them. · Talk to your doctor about how long you are likely to live. He or she may be able to give you an idea of what usually happens with your specific illness. · Think about preparing papers that state your wishes. This way there will not be any confusion about what you want. You can change your instructions at any time. Which papers should you prepare? Advance directives are legal papers that tell doctors how you want to be cared for at the end of your life. You do not need a  to write these papers. Ask your doctor or your state health department for information on how to write your advance directives. They may have the forms for each of these types of papers. Make sure your doctor has a copy of these on file, and give a copy to a family member or close friend. · Consider a do-not-resuscitate order (DNR). This order asks that no extra treatments be done if your heart stops or you stop breathing. Extra treatments may include cardiopulmonary resuscitation (CPR), electrical shock to restart your heart, or a machine to breathe for you.  If you decide to have a DNR order, ask your doctor to explain and write it. Place the order in your home where everyone can easily see it. · Consider a living will. A living will explains your wishes about life support and other treatments at the end of your life if you become unable to speak for yourself. Living shaver tell doctors to use or not use treatments that would keep you alive. You must have one or two witnesses or a notary present when you sign this form. · Consider a durable power of  for health care. This allows you to name a person to make decisions about your care if you are not able to. Most people ask a close friend or family member. Talk to this person about the kinds of treatments you want and those that you do not want. Make sure this person understands your wishes. These legal papers are simple to change. Tell your doctor what you want to change, and ask him or her to make a note in your medical file. Give your family updated copies of the papers. Where can you learn more? Go to http://gomez-jarvis.info/. Enter P184 in the search box to learn more about \"Advance Care Planning: Care Instructions. \" Current as of: November 17, 2016 Content Version: 11.3 © 4435-3045 [employer]. Care instructions adapted under license by [employer] (which disclaims liability or warranty for this information). If you have questions about a medical condition or this instruction, always ask your healthcare professional. [place] [address] any warranty or liability for your use of this information. Simon Trevizo 7[address] What is a living will? A living will is a legal form you use to write down the kind of care you want at the end of your life. It is used by the health professionals who will treat you if you aren't able to decide for yourself.  
If you put your wishes in writing, your loved ones and others will know what kind of care you want. They won't need to guess. This can ease your mind and be helpful to others. A living will is not the same as an estate or property will. An estate will explains what you want to happen with your money and property after you die. Is a living will a legal document? A living will is a legal document. Each state has its own laws about living shaver. If you move to another state, make sure that your living will is legal in the state where you now live. Or you might use a universal form that has been approved by many states. This kind of form can sometimes be completed and stored online. Your electronic copy will then be available wherever you have a connection to the Internet. In most cases, doctors will respect your wishes even if you have a form from a different state. · You don't need an  to complete a living will. But legal advice can be helpful if your state's laws are unclear, your health history is complicated, or your family can't agree on what should be in your living will. · You can change your living will at any time. Some people find that their wishes about end-of-life care change as their health changes. · In addition to making a living will, think about completing a medical power of  form. This form lets you name the person you want to make end-of-life treatment decisions for you (your \"health care agent\") if you're not able to. Many hospitals and nursing homes will give you the forms you need to complete a living will and a medical power of . · Your living will is used only if you can't make or communicate decisions for yourself anymore. If you become able to make decisions again, you can accept or refuse any treatment, no matter what you wrote in your living will. · Your state may offer an online registry. This is a place where you can store your living will online so the doctors and nurses who need to treat you can find it right away. What should you think about when creating a living will? Talk about your end-of-life wishes with your family members and your doctor. Let them know what you want. That way the people making decisions for you won't be surprised by your choices. Think about these questions as you make your living will: · Do you know enough about life support methods that might be used? If not, talk to your doctor so you know what might be done if you can't breathe on your own, your heart stops, or you're unable to swallow. · What things would you still want to be able to do after you receive life-support methods? Would you want to be able to walk? To speak? To eat on your own? To live without the help of machines? · If you have a choice, where do you want to be cared for? In your home? At a hospital or nursing home? · Do you want certain Mormonism practices performed if you become very ill? · If you have a choice at the end of your life, where would you prefer to die? At home? In a hospital or nursing home? Somewhere else? · Would you prefer to be buried or cremated? · Do you want your organs to be donated after you die? What should you do with your living will? · Make sure that your family members and your health care agent have copies of your living will. · Give your doctor a copy of your living will to keep in your medical record. If you have more than one doctor, make sure that each one has a copy. · You may want to put a copy of your living will where it can be easily found. Where can you learn more? Go to http://gomez-jarvis.info/. Enter L478 in the search box to learn more about \"Learning About Living [place]. \" Current as of: August 8, 2016 Content Version: 11.3 © 6161-3352 [employer]wise, [employer]. Care instructions adapted under license by [employer] (which disclaims liability or warranty for this information).  If you have questions about a medical condition or this instruction, always ask your healthcare professional. [address] any warranty or liability for your use of this information. Vaccine Information Statement Influenza (Flu) Vaccine (Inactivated or Recombinant): What You Need to Know Many Vaccine Information Statements are available in Georgian and other languages. See www.immunize.org/vis Hojas de información sobre vacunas están disponibles en español y en muchos otros idiomas. Visite www.immunize.org/vis 1. Why get vaccinated? Influenza vaccine can prevent influenza (flu). Flu is a contagious disease that spreads around the [facility] every year, usually between October and May. Anyone can get the flu, but it is more dangerous for some people. Infants and young children, people 72years of age and older, pregnant women, and people with certain health conditions or a weakened immune system are at greatest risk of flu complications. Pneumonia, bronchitis, sinus infections and ear infections are examples of flu-related complications. If you have a medical condition, such as heart disease, cancer or diabetes, flu can make it worse. Flu can cause fever and chills, sore throat, muscle aches, fatigue, cough, headache, and runny or stuffy nose. Some people may have vomiting and diarrhea, though this is more common in children than adults. Each year thousands of people in the [facility] die from flu, and many more are hospitalized. Flu vaccine prevents millions of illnesses and flu-related visits to the doctor each year. 2. Influenza vaccines CDC recommends everyone 10months of age and older get vaccinated every flu season. Children 6 months through 6years of age may need 2 doses during a single flu season. Everyone else needs only 1 dose each flu season. It takes about 2 weeks for protection to develop after vaccination. There are many flu viruses, and they are always changing.  Each year a new flu vaccine is made to protect against three or four viruses that are likely to cause disease in the upcoming flu season. Even when the vaccine doesnt exactly match these viruses, it may still provide some protection. Influenza vaccine does not cause flu. Influenza vaccine may be given at the same time as other vaccines. 3. Talk with your health care provider Tell your vaccine provider if the person getting the vaccine: 
 Has had an allergic reaction after a previous dose of influenza vaccine, or has any severe, life-threatening allergies.  Has ever had Guillain-Barré Syndrome (also called GBS). In some cases, your health care provider may decide to postpone influenza vaccination to a future visit. People with minor illnesses, such as a cold, may be vaccinated. People who are moderately or severely ill should usually wait until they recover before getting influenza vaccine. Your health care provider can give you more information. 4. Risks of a reaction  Soreness, redness, and swelling where shot is given, fever, muscle aches, and headache can happen after influenza vaccine.  There may be a very small increased risk of Guillain-Barré Syndrome (GBS) after inactivated influenza vaccine (the flu shot). Maribeth Holcomb children who get the flu shot along with pneumococcal vaccine (PCV13), and/or DTaP vaccine at the same time might be slightly more likely to have a seizure caused by fever. Tell your health care provider if a child who is getting flu vaccine has ever had a seizure. People sometimes faint after medical procedures, including vaccination. Tell your provider if you feel dizzy or have vision changes or ringing in the ears. As with any medicine, there is a very remote chance of a vaccine causing a severe allergic reaction, other serious injury, or death. 5. What if there is a serious problem?  
 
An allergic reaction could occur after the vaccinated person leaves the clinic. If you see signs of a severe allergic reaction (hives, swelling of the face and throat, difficulty breathing, a fast heartbeat, dizziness, or weakness), call 9-1-1 and get the person to the nearest hospital. 
 
For other signs that concern you, call your health care provider. Adverse reactions should be reported to the Vaccine Adverse Event Reporting System (VAERS). Your health care provider will usually file this report, or you can do it yourself. Visit the VAERS website at www.vaers. [facility].gov or call 5-189.982.4581. VAERS is only for reporting reactions, and VAERS staff do not give medical advice. 6. The National Vaccine Injury Compensation Program 
 
The [facility] Vaccine Injury Compensation Program (VICP) is a federal program that was created to compensate people who may have been injured by certain vaccines. Visit the VICP website at www.[facility].gov/vaccinecompensation or call 6-920.702.1219 to learn about the program and about filing a claim. There is a time limit to file a claim for compensation. 7. How can I learn more?  Ask your health care provider.  Call your local or state health department.  Contact the Centers for Disease Control and Prevention (CDC): 
- Call 2-324.716.1340 (7-752-KVI-INFO) or 
- Visit CDCs influenza website at www.cdc.gov/flu Vaccine Information Statement (Interim) Inactivated Influenza Vaccine 8/15/2019 
42 LEIGH Rasmussen [id number] Department of [facility] and [employer] Centers for Disease Control and Prevention Office Use Only Medicare Wellness Visit, Male The best way to live healthy is to have a lifestyle where you eat a well-balanced diet, exercise regularly, limit alcohol use, and quit all forms of tobacco/nicotine, if applicable. Regular preventive services are another way to keep healthy. Preventive services (vaccines, screening tests, monitoring & exams) can help personalize your care plan, which helps you manage your own care. Screening tests can find health problems at the earliest stages, when they are easiest to treat. [address] García follows the current, evidence-based guidelines published by the [facility] States Demetrius Martin ([facility]) when recommending preventive services for our patients. Because we follow these guidelines, sometimes recommendations change over time as research supports it. (For example, a prostate screening blood test is no longer routinely recommended for men with no symptoms.) Of course, you and your doctor may decide to screen more often for some diseases, based on your risk and co-morbidities (chronic disease you are already diagnosed with). Preventive services for you include: - Medicare offers their members a free annual wellness visit, which is time for you and your primary care provider to discuss and plan for your preventive service needs. Take advantage of this benefit every year! 
-All adults over age 72 should receive the recommended pneumonia vaccines. Current USPSTF guidelines recommend a series of two vaccines for the best pneumonia protection.  
-All adults should have a flu vaccine yearly and an ECG. All adults age 61 and older should receive a shingles vaccine once in their lifetime.   
-All adults age 38-68 who are overweight should have a diabetes screening test once every three years.  
-Other screening tests & preventive services for persons with diabetes include: an eye exam to screen for diabetic retinopathy, a kidney function test, a foot exam, and stricter control over your cholesterol.  
-Cardiovascular screening for adults with routine risk involves an electrocardiogram (ECG) at intervals determined by the provider.  
-Colorectal cancer screening should be done for adults age 54-65 with no increased risk factors for colorectal cancer. There are a number of acceptable methods of screening for this type of cancer.  Each test has its own benefits and drawbacks. Discuss with your provider what is most appropriate for you during your annual wellness visit. The different tests include: colonoscopy (considered the best screening method), a fecal occult blood test, a fecal DNA test, and sigmoidoscopy. 
-All adults born between [facility] should be screened once for Hepatitis C. 
-An Abdominal Aortic Aneurysm (AAA) Screening is recommended for men age 73-68 who has ever smoked in their lifetime. Here is a list of your current Health Maintenance items (your personalized list of preventive services) with a due date: 
Health Maintenance Due Topic Date Due  Shingles Vaccine (1 of 2) 08/18/2001  Glaucoma Screening   08/18/2016  Stool testing for trace blood  12/08/2016 [employer] Annual Well Visit  02/21/2019  Flu Vaccine  08/01/2019 Vaccine Information Statement Influenza (Flu) Vaccine (Inactivated or Recombinant): What You Need to Know Many Vaccine Information Statements are available in Bulgarian and other languages. See www.immunize.org/vis Hojas de información sobre vacunas están disponibles en español y en muchos otros idiomas. Visite www.immunize.org/vis 1. Why get vaccinated? Influenza vaccine can prevent influenza (flu). Flu is a contagious disease that spreads around the [place] every year, usually between October and May. Anyone can get the flu, but it is more dangerous for some people. Infants and young children, people 72years of age and older, pregnant women, and people with certain health conditions or a weakened immune system are at greatest risk of flu complications. Pneumonia, bronchitis, sinus infections and ear infections are examples of flu-related complications. If you have a medical condition, such as heart disease, cancer or diabetes, flu can make it worse.  
 
Flu can cause fever and chills, sore throat, muscle aches, fatigue, cough, headache, and runny or stuffy nose. Some people may have vomiting and diarrhea, though this is more common in children than adults. Each year thousands of people in the [facility] die from flu, and many more are hospitalized. Flu vaccine prevents millions of illnesses and flu-related visits to the doctor each year. 2. Influenza vaccines CDC recommends everyone 10months of age and older get vaccinated every flu season. Children 6 months through 6years of age may need 2 doses during a single flu season. Everyone else needs only 1 dose each flu season. It takes about 2 weeks for protection to develop after vaccination. There are many flu viruses, and they are always changing. Each year a new flu vaccine is made to protect against three or four viruses that are likely to cause disease in the upcoming flu season. Even when the vaccine doesnt exactly match these viruses, it may still provide some protection. Influenza vaccine does not cause flu. Influenza vaccine may be given at the same time as other vaccines. 3. Talk with your health care provider Tell your vaccine provider if the person getting the vaccine: 
 Has had an allergic reaction after a previous dose of influenza vaccine, or has any severe, life-threatening allergies.  Has ever had Guillain-Barré Syndrome (also called GBS). In some cases, your health care provider may decide to postpone influenza vaccination to a future visit. People with minor illnesses, such as a cold, may be vaccinated. People who are moderately or severely ill should usually wait until they recover before getting influenza vaccine. Your health care provider can give you more information. 4. Risks of a reaction  Soreness, redness, and swelling where shot is given, fever, muscle aches, and headache can happen after influenza vaccine.  
 There may be a very small increased risk of Guillain-Barré Syndrome (GBS) after inactivated influenza vaccine (the flu shot). Jojo Sadler children who get the flu shot along with pneumococcal vaccine (PCV13), and/or DTaP vaccine at the same time might be slightly more likely to have a seizure caused by fever. Tell your health care provider if a child who is getting flu vaccine has ever had a seizure. People sometimes faint after medical procedures, including vaccination. Tell your provider if you feel dizzy or have vision changes or ringing in the ears. As with any medicine, there is a very remote chance of a vaccine causing a severe allergic reaction, other serious injury, or death. 5. What if there is a serious problem? An allergic reaction could occur after the vaccinated person leaves the clinic. If you see signs of a severe allergic reaction (hives, swelling of the face and throat, difficulty breathing, a fast heartbeat, dizziness, or weakness), call 9-1-1 and get the person to the nearest hospital. 
 
For other signs that concern you, call your health care provider. Adverse reactions should be reported to the Vaccine Adverse Event Reporting System (VAERS). Your health care provider will usually file this report, or you can do it yourself. Visit the VAERS website at www.vaers. hhs.gov or call 3-341.518.4021. VAERS is only for reporting reactions, and VAERS staff do not give medical advice. 6. The National Vaccine Injury Compensation Program 
 
The [employer] Vaccine Injury Compensation Program (VICP) is a federal program that was created to compensate people who may have been injured by certain vaccines. Visit the VICP website at www.hrsa.gov/vaccinecompensation or call 7-277.749.8539 to learn about the program and about filing a claim. There is a time limit to file a claim for compensation. 7. How can I learn more?  Ask your health care provider.  Call your local or state health department.  Contact the Centers for Disease Control and Prevention (CDC): 
- Call 1-336.814.9523 (1-800-CDC-INFO) or 
- Visit CDCs influenza website at www.cdc.gov/flu Vaccine Information Statement (Interim) Inactivated Influenza Vaccine 8/15/2019 
42 LEIGH Briggs 469RY-62 [employer] [facility] and [employer] Centers for Disease Control and Prevention Office Use Only Preventing Falls: Care Instructions Your Care Instructions Getting around your home safely can be a challenge if you have injuries or health problems that make it easy for you to fall. Loose rugs and furniture in walkways are among the dangers for many older people who have problems walking or who have poor eyesight. People who have conditions such as arthritis, osteoporosis, or dementia also have to be careful not to fall. You can make your home safer with a few simple measures. Follow-up care is a key part of your treatment and safety. Be sure to make and go to all appointments, and call your doctor if you are having problems. It's also a good idea to know your test results and keep a list of the medicines you take. How can you care for yourself at home? Taking care of yourself · You may get dizzy if you do not drink enough water. To prevent dehydration, drink plenty of fluids, enough so that your urine is light yellow or clear like water. Choose water and other caffeine-free clear liquids. If you have kidney, heart, or liver disease and have to limit fluids, talk with your doctor before you increase the amount of fluids you drink. · Exercise regularly to improve your strength, muscle tone, and balance. Walk if you can. Swimming may be a good choice if you cannot walk easily. · Have your vision and hearing checked each year or any time you notice a change. If you have trouble seeing and hearing, you might not be able to avoid objects and could lose your balance. · Know the side effects of the medicines you take. Ask your doctor or pharmacist whether the medicines you take can affect your balance. Sleeping pills or sedatives can affect your balance. · Limit the amount of alcohol you drink. Alcohol can impair your balance and other senses. · Ask your doctor whether calluses or corns on your feet need to be removed. If you wear loose-fitting shoes because of calluses or corns, you can lose your balance and fall. · Talk to your doctor if you have numbness in your feet. Preventing falls at home · Remove raised doorway thresholds, throw rugs, and clutter. Repair loose carpet or raised areas in the floor. · Move furniture and electrical cords to keep them out of walking paths. · Use nonskid floor wax, and wipe up spills right away, especially on ceramic tile floors. · If you use a walker or cane, put rubber tips on it. If you use crutches, clean the bottoms of them regularly with an abrasive pad, such as steel wool. · Keep your house well lit, especially [place], and outside walkways. Use night-lights in areas such as hallways and bathrooms. Add extra light switches or use remote switches (such as switches that go on or off when you clap your hands) to make it easier to turn lights on if you have to get up during the night. · Install sturdy handrails on stairways. · Move items in your cabinets so that the things you use a lot are on the lower shelves (about waist level). · Keep a cordless phone and a flashlight with new batteries by your bed. If possible, put a phone in each of the main rooms of your house, or carry a cell phone in case you fall and cannot reach a phone. Or, you can wear a device around your neck or wrist. You push a button that sends a signal for help. · Wear low-heeled shoes that fit well and give your feet good support. Use footwear with nonskid soles.  Check the heels and soles of your shoes for wear. Repair or replace worn heels or soles. · Do not wear socks without shoes on wood floors. · Walk on the grass when the sidewalks are slippery. If you live in an area that gets snow and ice in the winter, sprinkle salt on slippery steps and sidewalks. Preventing falls in the bath · Install grab bars and nonskid mats inside and outside your shower or tub and near the toilet and sinks. · Use shower chairs and bath benches. · Use a hand-held shower head that will allow you to sit while showering. · Get into a tub or shower by putting the weaker leg in first. Get out of a tub or shower with your strong side first. 
· Repair loose toilet seats and consider installing a raised toilet seat to make getting on and off the toilet easier. · Keep your bathroom door unlocked while you are in the shower. Where can you learn more? Go to http://gomez-jarvis.info/. Enter [phone] in the search box to learn more about \"Preventing Falls: Care Instructions. \" Current as of: March 16, 2018 Content Version: 11.8 © 9635-0721 [employer]. Care instructions adapted under license by [employer] (which disclaims liability or warranty for this information). If you have questions about a medical condition or this instruction, always ask your healthcare professional. [place] [address] any warranty or liability for your use of this information.

## 2019-09-27 ENCOUNTER — APPOINTMENT (OUTPATIENT)
Dept: PHYSICAL THERAPY | Age: 69
End: 2019-09-27
Payer: MEDICARE

## 2019-09-30 ENCOUNTER — APPOINTMENT (OUTPATIENT)
Dept: PHYSICAL THERAPY | Age: 69
End: 2019-09-30
Payer: MEDICARE

## 2019-10-02 ENCOUNTER — APPOINTMENT (OUTPATIENT)
Dept: PHYSICAL THERAPY | Age: 69
End: 2019-10-02

## 2019-10-04 ENCOUNTER — APPOINTMENT (OUTPATIENT)
Dept: PHYSICAL THERAPY | Age: 69
End: 2019-10-04

## 2019-10-04 ENCOUNTER — HOSPITAL ENCOUNTER (OUTPATIENT)
Dept: ULTRASOUND IMAGING | Age: 69
Discharge: HOME | End: 2019-10-04
Attending: EMERGENCY MEDICINE

## 2019-10-04 DIAGNOSIS — Z13.6 SCREENING FOR CARDIOVASCULAR CONDITION: ICD-10-CM

## 2019-10-07 ENCOUNTER — APPOINTMENT (OUTPATIENT)
Dept: PHYSICAL THERAPY | Age: 69
End: 2019-10-07

## 2019-10-09 ENCOUNTER — APPOINTMENT (OUTPATIENT)
Dept: PHYSICAL THERAPY | Age: 69
End: 2019-10-09

## 2019-10-11 ENCOUNTER — APPOINTMENT (OUTPATIENT)
Dept: PHYSICAL THERAPY | Age: 69
End: 2019-10-11

## 2019-10-14 ENCOUNTER — APPOINTMENT (OUTPATIENT)
Dept: PHYSICAL THERAPY | Age: 69
End: 2019-10-14

## 2019-10-16 ENCOUNTER — APPOINTMENT (OUTPATIENT)
Dept: PHYSICAL THERAPY | Age: 69
End: 2019-10-16

## 2019-10-18 ENCOUNTER — APPOINTMENT (OUTPATIENT)
Dept: PHYSICAL THERAPY | Age: 69
End: 2019-10-18

## 2019-10-21 ENCOUNTER — APPOINTMENT (OUTPATIENT)
Dept: PHYSICAL THERAPY | Age: 69
End: 2019-10-21

## 2019-10-22 PROBLEM — N39.41 URGENCY INCONTINENCE: Status: ACTIVE | Noted: 2019-10-22

## 2019-10-23 ENCOUNTER — APPOINTMENT (OUTPATIENT)
Dept: PHYSICAL THERAPY | Age: 69
End: 2019-10-23

## 2019-10-23 PROBLEM — C67.9 MALIGNANT NEOPLASM OF URINARY BLADDER (HCC): Status: ACTIVE | Noted: 2019-09-11

## 2019-10-23 PROBLEM — R39.12 BENIGN PROSTATIC HYPERPLASIA WITH WEAK URINARY STREAM: Status: ACTIVE | Noted: 2019-09-11

## 2019-10-23 PROBLEM — R31.0 GROSS HEMATURIA: Status: ACTIVE | Noted: 2019-09-11

## 2019-10-23 PROBLEM — N40.1 BENIGN PROSTATIC HYPERPLASIA WITH WEAK URINARY STREAM: Status: ACTIVE | Noted: 2019-09-11

## 2019-10-25 ENCOUNTER — APPOINTMENT (OUTPATIENT)
Dept: PHYSICAL THERAPY | Age: 69
End: 2019-10-25

## 2019-10-27 DIAGNOSIS — Z87.891 SMOKING HISTORY: Primary | ICD-10-CM

## 2019-10-27 RX ORDER — VARENICLINE TARTRATE 0.5 MG/1
TABLET, FILM COATED ORAL
Refills: 2 | OUTPATIENT
Start: 2019-10-27

## 2019-10-27 RX ORDER — VARENICLINE TARTRATE 1 MG/1
TABLET, FILM COATED ORAL
Qty: 60 TAB | Refills: 2 | Status: SHIPPED | OUTPATIENT
Start: 2019-10-27 | End: 2020-01-25

## 2019-10-28 ENCOUNTER — HOSPITAL ENCOUNTER (OUTPATIENT)
Dept: GENERAL RADIOLOGY | Age: 69
Discharge: HOME | End: 2019-10-28
Payer: MEDICARE

## 2019-10-28 ENCOUNTER — HOSPITAL ENCOUNTER (OUTPATIENT)
Dept: PREADMISSION TESTING | Age: 69
Discharge: HOME | End: 2019-10-28
Payer: MEDICARE

## 2019-10-28 ENCOUNTER — APPOINTMENT (OUTPATIENT)
Dept: PHYSICAL THERAPY | Age: 69
End: 2019-10-28

## 2019-10-28 DIAGNOSIS — C67.9 MALIGNANT NEOPLASM OF URINARY BLADDER, UNSPECIFIED SITE (HCC): ICD-10-CM

## 2019-10-28 DIAGNOSIS — R31.0 GROSS HEMATURIA: ICD-10-CM

## 2019-10-28 DIAGNOSIS — I10 HYPERTENSION, UNSPECIFIED TYPE: ICD-10-CM

## 2019-10-28 DIAGNOSIS — N39.41 URGE INCONTINENCE OF URINE: ICD-10-CM

## 2019-10-28 DIAGNOSIS — D49.4 BLADDER TUMOR: ICD-10-CM

## 2019-10-28 LAB
ANION GAP SERPL CALC-SCNC: 8 MMOL/L (ref 3–18)
BUN SERPL-MCNC: 11 MG/DL (ref 7–18)
BUN/CREAT SERPL: 10 (ref 12–20)
CALCIUM SERPL-MCNC: 8.9 MG/DL (ref 8.5–10.1)
CHLORIDE SERPL-SCNC: 108 MMOL/L (ref 100–111)
CO2 SERPL-SCNC: 24 MMOL/L (ref 21–32)
CREAT SERPL-MCNC: 1.11 MG/DL (ref 0.6–1.3)
ERYTHROCYTE [DISTWIDTH] IN BLOOD BY AUTOMATED COUNT: 13.1 % (ref 11.6–14.5)
GLUCOSE SERPL-MCNC: 100 MG/DL (ref 74–99)
HCT VFR BLD AUTO: 48.2 % (ref 36–48)
HGB BLD-MCNC: 16.9 G/DL (ref 13–16)
MCH RBC QN AUTO: 32.4 PG (ref 24–34)
MCHC RBC AUTO-ENTMCNC: 35.1 G/DL (ref 31–37)
MCV RBC AUTO: 92.3 FL (ref 74–97)
PLATELET # BLD AUTO: 135 K/UL (ref 135–420)
PMV BLD AUTO: 11.3 FL (ref 9.2–11.8)
POTASSIUM SERPL-SCNC: 4 MMOL/L (ref 3.5–5.5)
RBC # BLD AUTO: 5.22 M/UL (ref 4.7–5.5)
SODIUM SERPL-SCNC: 140 MMOL/L (ref 136–145)
WBC # BLD AUTO: 6.9 K/UL (ref 4.6–13.2)

## 2019-10-28 PROCEDURE — 85027 COMPLETE CBC AUTOMATED: CPT

## 2019-10-28 PROCEDURE — 93005 ELECTROCARDIOGRAM TRACING: CPT

## 2019-10-28 PROCEDURE — 80048 BASIC METABOLIC PNL TOTAL CA: CPT

## 2019-10-28 PROCEDURE — 87086 URINE CULTURE/COLONY COUNT: CPT

## 2019-10-28 PROCEDURE — 36415 COLL VENOUS BLD VENIPUNCTURE: CPT

## 2019-10-28 PROCEDURE — 71046 X-RAY EXAM CHEST 2 VIEWS: CPT

## 2019-10-29 LAB
BACTERIA SPEC CULT: NORMAL
SERVICE CMNT-IMP: NORMAL

## 2019-10-30 ENCOUNTER — APPOINTMENT (OUTPATIENT)
Dept: PHYSICAL THERAPY | Age: 69
End: 2019-10-30

## 2019-11-01 ENCOUNTER — APPOINTMENT (OUTPATIENT)
Dept: PHYSICAL THERAPY | Age: 69
End: 2019-11-01

## 2019-11-11 LAB
ATRIAL RATE: 75 BPM
CALCULATED P AXIS, ECG09: 35 DEGREES
CALCULATED R AXIS, ECG10: 11 DEGREES
CALCULATED T AXIS, ECG11: 6 DEGREES
DIAGNOSIS, 93000: NORMAL
P-R INTERVAL, ECG05: 166 MS
Q-T INTERVAL, ECG07: 390 MS
QRS DURATION, ECG06: 74 MS
QTC CALCULATION (BEZET), ECG08: 435 MS
VENTRICULAR RATE, ECG03: 75 BPM

## 2019-11-15 NOTE — PROGRESS NOTES
[facility] Eduardo Jeronimo  [address] [facility]  [phone] fax    Physical Therapy Discharge Summary    Patient name: Yaritza Portillo Start of Care: 2019   Referral source: Nathan HAILEY Hernandez : 1950               Medical Diagnosis: Polyneuropathy, unspecified [G62.9]  Repeated falls [R29.6]  Payor: [address] [place] [address],9D / Plan: EATON / Product Type: [employer] Medicare /  Onset Date:chronic, worsening               Treatment Diagnosis: balance impairment   Prior Hospitalization: see medical history Provider#: [id number]   Medications: Verified on Patient summary List    Comorbidities: anxiety/depression, arthritis, high blood pressure, back pain    Prior Level of Function: brick/  Visits from Start of Care: 2    Missed Visits: 1    Reporting Period : 2019 to 2019    Summary of Care:  Goal:Patient will maintain MSR for 30 seconds without evidence of instability in order to increase ease/safety of daily activities.   Status at last note/certification: LOB  Status at discharge: not met    Goal:Patient will maintain B SLS for 10 seconds or greater to improve stability during single limb support phase of gait. Status at last note/certification: 2-3 seconds   Status at discharge: not met    Goal:Patient will score at least 20/30 on functional gait assessment to indicate improved dynamic balance.   Status at last note/certification: 10/00  Status at discharge: not met    Goal:Patient will increase FOTO score to 40/100 in order to indicate improvement in function and QOL.    Status at last note/certification:   Status at discharge: not met      ASSESSMENT/RECOMMENDATIONS: Patient has only attended 1 therapy session following evaluation. Pt canceled all future appointments due to undergoing treatment for bladder tumor.      [x]Discontinue therapy: []Patient has reached or is progressing toward set goals      [x]Patient is non-compliant or has abdicated      []Due to lack of appreciable progress towards set goals    Shreyas Bower, PT 11/15/2019 1:08 PM

## 2019-11-26 PROBLEM — C67.4 MALIGNANT NEOPLASM OF POSTERIOR WALL OF URINARY BLADDER (HCC): Status: ACTIVE | Noted: 2019-09-11

## 2019-12-29 ENCOUNTER — HOSPITAL ENCOUNTER (EMERGENCY)
Age: 69
Discharge: LEFT BEFORE DISCHARGE | End: 2019-12-29
Payer: MEDICARE

## 2019-12-29 PROCEDURE — 75810000275 HC EMERGENCY DEPT VISIT NO LEVEL OF CARE

## 2019-12-30 PROBLEM — E86.0 DEHYDRATION: Status: ACTIVE | Noted: 2019-12-30

## 2019-12-31 ENCOUNTER — HOSPITAL ENCOUNTER (EMERGENCY)
Age: 69
Discharge: HOME | End: 2019-12-31
Attending: EMERGENCY MEDICINE
Payer: MEDICARE

## 2019-12-31 VITALS
SYSTOLIC BLOOD PRESSURE: 157 MMHG | WEIGHT: 220 LBS | OXYGEN SATURATION: 97 % | HEIGHT: 71 IN | TEMPERATURE: 97.6 F | HEART RATE: 72 BPM | RESPIRATION RATE: 24 BRPM | DIASTOLIC BLOOD PRESSURE: 80 MMHG | BODY MASS INDEX: 30.8 KG/M2

## 2019-12-31 DIAGNOSIS — T45.1X5D CHEMOTHERAPY ADVERSE REACTION, SUBSEQUENT ENCOUNTER: Primary | ICD-10-CM

## 2019-12-31 DIAGNOSIS — R53.81 MALAISE AND FATIGUE: ICD-10-CM

## 2019-12-31 DIAGNOSIS — D69.6 THROMBOCYTOPENIA (HCC): ICD-10-CM

## 2019-12-31 DIAGNOSIS — R53.83 MALAISE AND FATIGUE: ICD-10-CM

## 2019-12-31 LAB
ANION GAP SERPL CALC-SCNC: 5 MMOL/L (ref 3–18)
APPEARANCE UR: CLEAR
ATRIAL RATE: 73 BPM
BACTERIA URNS QL MICRO: ABNORMAL /HPF
BASOPHILS # BLD: 0 K/UL (ref 0–0.1)
BASOPHILS NFR BLD: 0 % (ref 0–2)
BILIRUB UR QL: NEGATIVE
BUN SERPL-MCNC: 28 MG/DL (ref 7–18)
BUN/CREAT SERPL: 25 (ref 12–20)
CALCIUM SERPL-MCNC: 8.3 MG/DL (ref 8.5–10.1)
CALCULATED P AXIS, ECG09: 43 DEGREES
CALCULATED R AXIS, ECG10: 21 DEGREES
CALCULATED T AXIS, ECG11: 15 DEGREES
CHLORIDE SERPL-SCNC: 108 MMOL/L (ref 100–111)
CO2 SERPL-SCNC: 27 MMOL/L (ref 21–32)
COLOR UR: YELLOW
CREAT SERPL-MCNC: 1.13 MG/DL (ref 0.6–1.3)
DIAGNOSIS, 93000: NORMAL
DIFFERENTIAL METHOD BLD: ABNORMAL
EOSINOPHIL # BLD: 0 K/UL (ref 0–0.4)
EOSINOPHIL NFR BLD: 0 % (ref 0–5)
EPITH CASTS URNS QL MICRO: ABNORMAL /LPF (ref 0–5)
ERYTHROCYTE [DISTWIDTH] IN BLOOD BY AUTOMATED COUNT: 13.1 % (ref 11.6–14.5)
GLUCOSE SERPL-MCNC: 129 MG/DL (ref 74–99)
GLUCOSE UR STRIP.AUTO-MCNC: NEGATIVE MG/DL
HCT VFR BLD AUTO: 42.9 % (ref 36–48)
HGB BLD-MCNC: 15.5 G/DL (ref 13–16)
HGB UR QL STRIP: NEGATIVE
KETONES UR QL STRIP.AUTO: NEGATIVE MG/DL
LEUKOCYTE ESTERASE UR QL STRIP.AUTO: ABNORMAL
LYMPHOCYTES # BLD: 0.9 K/UL (ref 0.9–3.6)
LYMPHOCYTES NFR BLD: 27 % (ref 21–52)
MCH RBC QN AUTO: 31.8 PG (ref 24–34)
MCHC RBC AUTO-ENTMCNC: 36.1 G/DL (ref 31–37)
MCV RBC AUTO: 88.1 FL (ref 74–97)
MONOCYTES # BLD: 0.3 K/UL (ref 0.05–1.2)
MONOCYTES NFR BLD: 9 % (ref 3–10)
NEUTS SEG # BLD: 2 K/UL (ref 1.8–8)
NEUTS SEG NFR BLD: 64 % (ref 40–73)
NITRITE UR QL STRIP.AUTO: NEGATIVE
P-R INTERVAL, ECG05: 156 MS
PH UR STRIP: 6 [PH] (ref 5–8)
PLATELET # BLD AUTO: 30 K/UL (ref 135–420)
PLATELET COMMENTS,PCOM: ABNORMAL
PMV BLD AUTO: 12.3 FL (ref 9.2–11.8)
POTASSIUM SERPL-SCNC: 3.7 MMOL/L (ref 3.5–5.5)
PROT UR STRIP-MCNC: 30 MG/DL
Q-T INTERVAL, ECG07: 398 MS
QRS DURATION, ECG06: 76 MS
QTC CALCULATION (BEZET), ECG08: 438 MS
RBC # BLD AUTO: 4.87 M/UL (ref 4.7–5.5)
RBC #/AREA URNS HPF: NEGATIVE /HPF (ref 0–5)
RBC MORPH BLD: ABNORMAL
SODIUM SERPL-SCNC: 140 MMOL/L (ref 136–145)
SP GR UR REFRACTOMETRY: 1.02 (ref 1–1.03)
UROBILINOGEN UR QL STRIP.AUTO: 1 EU/DL (ref 0.2–1)
VENTRICULAR RATE, ECG03: 73 BPM
WBC # BLD AUTO: 3.2 K/UL (ref 4.6–13.2)
WBC URNS QL MICRO: ABNORMAL /HPF (ref 0–4)

## 2019-12-31 PROCEDURE — 93005 ELECTROCARDIOGRAM TRACING: CPT

## 2019-12-31 PROCEDURE — 81001 URINALYSIS AUTO W/SCOPE: CPT

## 2019-12-31 PROCEDURE — 99284 EMERGENCY DEPT VISIT MOD MDM: CPT

## 2019-12-31 PROCEDURE — 85025 COMPLETE CBC W/AUTO DIFF WBC: CPT

## 2019-12-31 PROCEDURE — 80048 BASIC METABOLIC PNL TOTAL CA: CPT

## 2019-12-31 NOTE — ED PROVIDER NOTES
EMERGENCY DEPARTMENT HISTORY AND PHYSICAL EXAM    11:43 AM      Date: 12/31/2019  Patient Name: Vani Ahmadi    History of Presenting Illness     Chief Complaint   Patient presents with    Dizziness         History Provided By: Patient  Location/Duration/Severity/Modifying factors   78 y/o M with hx COPD and bladder cancer on cisplatin (first and only dose 12/20) with malaise, dizziness, N/V/D, and mild confusion. Seen at [address] [place] [address] yesterday for same and found to have platelets of 22. Was supposed to be admitted and left AMA. Now still feels bad and thinks he should have stayed. Reports fever prior to [place] visit and cough/congestion. N/V/D have resolved today as well as fever and respiratory symptoms. States his head feels foggy. Symptoms have been going on at least 4-5 days since chemo administration. PCP: Jodi Capellan MD    Current Facility-Administered Medications   Medication Dose Route Frequency Provider Last Rate Last Dose    sodium chloride 0.9 % bolus infusion 1,000 mL  1,000 mL IntraVENous ONCE Luci Roberts, Soalnge Liu MD         Current Outpatient Medications   Medication Sig Dispense Refill    varenicline (CHANTIX) 1 mg tablet 1 p.o. twice daily 60 Tab 2    oxybutynin chloride XL (DITROPAN XL) 10 mg CR tablet Take 1 Tab by mouth daily. 90 Tab 3    omeprazole (PRILOSEC) 20 mg capsule Take 1 Cap by mouth daily. 90 Cap 6    HYDROcodone-acetaminophen (NORCO) 5-325 mg per tablet Take  by mouth.  cyanocobalamin (VITAMIN B12) 500 mcg tablet Take 2 Tabs by mouth daily. 60 Tab 6    tamsulosin (FLOMAX) 0.4 mg capsule Take 2 Caps by mouth daily. 90 Cap 0    metoprolol succinate (TOPROL-XL) 50 mg XL tablet Take 1 Tab by mouth daily.  90 Tab 2       Past History     Past Medical History:  Past Medical History:   Diagnosis Date    Bilateral hip pain     Chronic pain     back; hx of opiod addiction    Depression     Diabetes (HCC)     borderline, no meds-trying to control with diet    DJD (degenerative joint disease)     GERD (gastroesophageal reflux disease)     Hepatitis C 01/2015    +Harvoni rx    Hypertension     Kidney stones     Lymphadenopathy, generalized 12/05/2015    neg bx    Numbness of foot left    resolved per patient 1/29/16    S/P lumbar fusion 2/9/2016    L4/5 LAMINECTOMY/FUSION/TRANSFORAMINAL LUMBAR INTERBODY FUSION (TLIF) by Dr. Donahue General on 2/8/16     Unspecified sleep apnea     no cpap machine-pt denies       Past Surgical History:  Past Surgical History:   Procedure Laterality Date    HX APPENDECTOMY  as a child    HX BACK SURGERY  07/2015    HX CERVICAL FUSION  05/18/2017    ACDF C5/6 C6/7    HX COLONOSCOPY  2015    negative    HX LUMBAR FUSION  2/2016    HX ORTHOPAEDIC      denies    HX OTHER SURGICAL  05/2017    cervical fusion    HX TONSILLECTOMY  as a child    REMOVE Josette Right 3-10-16    Dr. Luba Goodwin       Family History:  Family History   Problem Relation Age of Onset    Diabetes Sister     SLE Sister     Arthritis-osteo Sister     Heart Disease Sister        Social History:  Social History     Tobacco Use    Smoking status: Current Every Day Smoker     Packs/day: 0.25     Years: 40.00     Pack years: 10.00     Types: Cigarettes     Start date: 4/24/1966    Smokeless tobacco: Former User    Tobacco comment: 1 pack per day   Substance Use Topics    Alcohol use: No     Alcohol/week: 0.0 standard drinks    Drug use: Yes     Types: Marijuana     Comment: last use a couple days ago as of 12/30/19       Allergies: Allergies   Allergen Reactions    Nicotine Contact Dermatitis     Nicotine Patch reaction - Contact dermatitis with numbness and tingling also occuring in the left arm and denuding of the skin.     Thimerosal Other (comments)     Contact lens solution, made eyes red and irrated         Review of Systems     Review of Systems   Constitutional: Positive for activity change, appetite change, fatigue and fever.   HENT: Positive for congestion and rhinorrhea. Eyes: Negative for photophobia and visual disturbance. Respiratory: Positive for cough and shortness of breath. Cardiovascular: Negative for chest pain and leg swelling. Gastrointestinal: Positive for diarrhea, nausea and vomiting. Negative for abdominal pain and blood in stool. Genitourinary: Negative for dysuria and hematuria. Musculoskeletal: Negative for gait problem, neck pain and neck stiffness. Neurological: Positive for dizziness and light-headedness. Negative for facial asymmetry, weakness, numbness and headaches. Hematological: Bruises/bleeds easily. Physical Exam   There were no vitals taken for this visit. Physical Exam  Vitals signs and nursing note reviewed. Constitutional:       General: He is not in acute distress. Appearance: Normal appearance. HENT:      Head: Normocephalic and atraumatic. Right Ear: External ear normal.      Left Ear: External ear normal.      Mouth/Throat:      Mouth: Mucous membranes are dry. Pharynx: Oropharynx is clear. Eyes:      Extraocular Movements: Extraocular movements intact. Conjunctiva/sclera: Conjunctivae normal.      Pupils: Pupils are equal, round, and reactive to light. Neck:      Musculoskeletal: Neck supple. Cardiovascular:      Rate and Rhythm: Normal rate and regular rhythm. Pulses: Normal pulses. Heart sounds: No murmur. Pulmonary:      Effort: Pulmonary effort is normal.      Breath sounds: Wheezing present. Comments: Trace expiratory wheezes in bases  Abdominal:      General: There is no distension. Palpations: Abdomen is soft. Tenderness: There is no tenderness. Musculoskeletal:         General: No swelling or tenderness. Skin:     General: Skin is warm and dry. Capillary Refill: Capillary refill takes less than 2 seconds. Neurological:      General: No focal deficit present.       Mental Status: He is alert and oriented to person, place, and time. Cranial Nerves: No cranial nerve deficit. Sensory: No sensory deficit. Motor: No weakness. Coordination: Coordination normal.   Psychiatric:         Mood and Affect: Mood normal.         Behavior: Behavior normal.           Diagnostic Study Results     Labs -  Recent Results (from the past 12 hour(s))   METABOLIC PANEL, BASIC    Collection Time: 12/31/19 11:14 AM   Result Value Ref Range    Sodium 140 136 - 145 mmol/L    Potassium 3.7 3.5 - 5.5 mmol/L    Chloride 108 100 - 111 mmol/L    CO2 27 21 - 32 mmol/L    Anion gap 5 3.0 - 18 mmol/L    Glucose 129 (H) 74 - 99 mg/dL    BUN 28 (H) 7.0 - 18 MG/DL    Creatinine 1.13 0.6 - 1.3 MG/DL    BUN/Creatinine ratio 25 (H) 12 - 20      GFR est AA >60 >60 ml/min/1.73m2    GFR est non-AA >60 >60 ml/min/1.73m2    Calcium 8.3 (L) 8.5 - 10.1 MG/DL       Radiologic Studies -   No orders to display         Medical Decision Making   I am the first provider for this patient. I reviewed the vital signs, available nursing notes, past medical history, past surgical history, family history and social history. Vital Signs-Reviewed the patient's vital signs. Records Reviewed: Nursing Notes, Old Medical Records, Previous electrocardiograms, Previous Radiology Studies and Previous Laboratory Studies (Time of Review: 11:43 AM)    ED Course: Progress Notes, Reevaluation, and Consults:         Provider Notes (Medical Decision Making):   MDM  76 y/o M with malaise and thrombocytopenia s/p chemo administration. Patient well-appearing, afebrile, hemodynamically stable. No respiratory distress. Trace expiratory wheeze on exam consistent with known COPD. MEI noted yesterday has resolved and platelet count has improved to 30 with downtrending of Hgb and wbc, therefore not due to hemoconcentration. Hyponatremia has also resolved. UA unremarkable; no urinary symptoms. Normal neuro exam and patient able to ambulate without difficulty. Feels somewhat better after IVF. Discussed with Dr. Ирина Toney, patient's oncologist, who is reassured by lab improvement and stable VS and recommends outpatient follow up as scheduled Friday. Patient given return precautions and understands and agrees with plan. Diagnosis     Clinical Impression:   1. Chemotherapy adverse reaction, subsequent encounter    2. Thrombocytopenia ([address]    3. Malaise and fatigue        Disposition: Discharged    Follow-up Information    None          Patient's Medications   Start Taking    No medications on file   Continue Taking    CYANOCOBALAMIN (VITAMIN B12) 500 MCG TABLET    Take 2 Tabs by mouth daily. HYDROCODONE-ACETAMINOPHEN (NORCO) 5-325 MG PER TABLET    Take  by mouth. METOPROLOL SUCCINATE (TOPROL-XL) 50 MG XL TABLET    Take 1 Tab by mouth daily. OMEPRAZOLE (PRILOSEC) 20 MG CAPSULE    Take 1 Cap by mouth daily. OXYBUTYNIN CHLORIDE XL (DITROPAN XL) 10 MG CR TABLET    Take 1 Tab by mouth daily. TAMSULOSIN (FLOMAX) 0.4 MG CAPSULE    Take 2 Caps by mouth daily. VARENICLINE (CHANTIX) 1 MG TABLET    1 p.o. twice daily   These Medications have changed    No medications on file   Stop Taking    No medications on file     Disclaimer: Sections of this note are dictated using utilizing voice recognition software. Minor typographical errors may be present. If questions arise, please do not hesitate to contact me or call our department.

## 2019-12-31 NOTE — DISCHARGE INSTRUCTIONS
Patient Education        Thrombocytopenia: Care Instructions  Your Care Instructions    Thrombocytopenia is a low number of platelets in the blood. Platelets are the cells that help blood clot. If you don't have enough of them, your blood cannot clot well. So it is harder to stop bleeding. You may have low platelets because your bone marrow does not make them. Or your body's defenses (immune system) may destroy them. Having an enlarged spleen can also reduce the number of platelets in your blood. This is because they can get trapped in the enlarged spleen. Some diseases or medicines may also cause low platelets. But platelets may go back to normal levels if the disease is treated or the medicine is stopped. You may not need treatment if your problem is mild. If you do need treatment, you may have platelets added to your blood. Or you may get medicine to stop the loss of platelets or help your body make them. Follow-up care is a key part of your treatment and safety. Be sure to make and go to all appointments, and call your doctor if you are having problems. It's also a good idea to know your test results and keep a list of the medicines you take. How can you care for yourself at home? · Be safe with medicines. Take your medicines exactly as prescribed. Call your doctor if you think you are having a problem with your medicine. · Do not take aspirin or anti-inflammatory medicines unless your doctor says it is okay. Examples are ibuprofen (Advil, Motrin) and naproxen (Aleve). They may increase the risk of bleeding. · Avoid contact sports or activities that could cause you to fall. When should you call for help? Call 911 anytime you think you may need emergency care. For example, call if:    · You passed out (lost consciousness).     · You have signs of severe bleeding, which includes:  ? You have a severe headache that is different from past headaches.   ? You vomit blood or what looks like coffee grounds. ? Your stools are maroon or very bloody.    Call your doctor now or seek immediate medical care if:    · You are dizzy or lightheaded, or you feel like you may faint.     · You have abnormal bleeding, such as:  ? A nosebleed that you can't easily stop. ? Your stools are black and look like tar, or they have streaks of blood. ? You have blood in your urine. ? You have joint pain. ? You have bruises or blood spots under your skin.    Watch closely for changes in your health, and be sure to contact your doctor if:    · You do not get better as expected. Where can you learn more? Go to http://gomez-jarvis.info/. Enter D354 in the search box to learn more about \"Thrombocytopenia: Care Instructions. \"  Current as of: March 28, 2019  Content Version: 12.2  © 2789-2600 [employer]. Care instructions adapted under license by [employer] (which disclaims liability or warranty for this information). If you have questions about a medical condition or this instruction, always ask your healthcare professional. [place] [address] any warranty or liability for your use of this information. Patient Education        Weakness: Care Instructions  Your Care Instructions    Weakness is a lack of physical or muscle strength. You may feel that you need to make extra effort to move your arms, legs, or other muscles. Generalized weakness means that you feel weak in most areas of your body. Another type of weakness may affect just one muscle or group of muscles. You may feel weak and tired after you have done too much activity, such as taking an extra-long hike. This is not a serious problem. It often goes away on its own. Feeling weak can also be caused by medical conditions like thyroid problems, depression, or a virus. Sometimes the cause can be serious. Your doctor may want to do more tests to try to find the cause of the weakness.   The doctor has checked you carefully, but problems can develop later. If you notice any problems or new symptoms, get medical treatment right away. Follow-up care is a key part of your treatment and safety. Be sure to make and go to all appointments, and call your doctor if you are having problems. It's also a good idea to know your test results and keep a list of the medicines you take. How can you care for yourself at home? · Rest when you feel tired. · Be safe with medicines. If your doctor prescribed medicine, take it exactly as prescribed. Call your doctor if you think you are having a problem with your medicine. You will get more details on the specific medicines your doctor prescribes. · Do not skip meals. Eating a balanced diet may increase your energy level. · Get some physical activity every day, but do not get too tired. When should you call for help? Call your doctor now or seek immediate medical care if:    · You have new or worse weakness.     · You are dizzy or lightheaded, or you feel like you may faint.    Watch closely for changes in your health, and be sure to contact your doctor if:    · You do not get better as expected. Where can you learn more? Go to http://gomez-jarvis.info/. Enter 398 [id number] [id number] in the search box to learn more about \"Weakness: Care Instructions. \"  Current as of: June 26, 2019  Content Version: 12.2  © 4946-7358 [employer]. Care instructions adapted under license by [employer] (which disclaims liability or warranty for this information). If you have questions about a medical condition or this instruction, always ask your healthcare professional. [address] any warranty or liability for your use of this information.

## 2019-12-31 NOTE — ED TRIAGE NOTES
Per EMS, Patient is feeling dizzy and dehydrated after chemo, Dec 20th. He hasn't eaten or drank a lot since then.

## 2020-03-01 NOTE — TELEPHONE ENCOUNTER
Spoke with Cindy Joy, [employer], informed of recommendation per NP Bibeault for patient to start B12 injections, lab results in media. 89 y/o man known to us from office, dx'd w gastric MALToma 2013, bone marrow and repeat gastric bx 2017 c/w Chronic Lymphocytic Leukemia/Small Lymphocytic Lymphoma, under serial monitoring w EGD and PETCT, has not required treatment. Also hx B12 deficiency.  Adm w left empyema, hx URI sx Jan 2020  Post IR pigtail insertion w minimal output and tx to ICU for dornase/alteplase adm w resultant good output  Now w progressive worsening of GUS    on admission also w prolonged INR, improved w Vitamin K  CBC without further decr of H/H but in view of worsening renal function may start see deterioration again over next few days  continue treatment of acute illness

## 2020-03-18 ENCOUNTER — OFFICE VISIT (OUTPATIENT)
Dept: CARDIOLOGY CLINIC | Age: 70
End: 2020-03-18

## 2020-03-18 VITALS
WEIGHT: 213 LBS | HEIGHT: 71 IN | DIASTOLIC BLOOD PRESSURE: 87 MMHG | OXYGEN SATURATION: 97 % | SYSTOLIC BLOOD PRESSURE: 137 MMHG | HEART RATE: 86 BPM | TEMPERATURE: 96.9 F | BODY MASS INDEX: 29.82 KG/M2

## 2020-03-18 DIAGNOSIS — R42 DIZZINESS: ICD-10-CM

## 2020-03-18 DIAGNOSIS — I10 ESSENTIAL HYPERTENSION: Primary | ICD-10-CM

## 2020-03-18 DIAGNOSIS — Z01.810 PREOP CARDIOVASCULAR EXAM: ICD-10-CM

## 2020-03-18 NOTE — PROGRESS NOTES
HISTORY OF PRESENT ILLNESS  Parvez Ferguson is a 77 y.o. male. Dizziness   The history is provided by the patient. This is a chronic problem. The current episode started more than 1 week ago. The problem occurs daily. The problem has been gradually worsening. Pertinent negatives include no chest pain, no abdominal pain, no headaches and no shortness of breath. He has tried nothing for the symptoms. Hypertension   The history is provided by the patient. This is a chronic problem. The problem occurs constantly. The problem has not changed since onset. Pertinent negatives include no chest pain, no abdominal pain, no headaches and no shortness of breath.      Family History   Problem Relation Age of Onset    Diabetes Sister     SLE Sister     Arthritis-osteo Sister     Heart Disease Sister        Past Medical History:   Diagnosis Date    Bilateral hip pain     Chronic pain     back; hx of opiod addiction    Depression     Diabetes ([address]     borderline, no meds-trying to control with diet    DJD (degenerative joint disease)     GERD (gastroesophageal reflux disease)     Hepatitis C 01/2015    +Harvoni rx    Hypertension     Kidney stones     Lymphadenopathy, generalized 12/05/2015    neg bx    Numbness of foot left    resolved per patient 1/29/16    S/P lumbar fusion 2/9/2016    L4/5 LAMINECTOMY/FUSION/TRANSFORAMINAL LUMBAR INTERBODY FUSION (TLIF) by Dr. Zuleyka Huber on 2/8/16     Unspecified sleep apnea     no cpap machine-pt denies       Past Surgical History:   Procedure Laterality Date    HX APPENDECTOMY  as a child    HX BACK SURGERY  07/2015    HX CERVICAL FUSION  05/18/2017    ACDF C5/6 C6/7    HX COLONOSCOPY  2015    negative    HX LUMBAR FUSION  2/2016    HX ORTHOPAEDIC      denies    HX OTHER SURGICAL  05/2017    cervical fusion    HX TONSILLECTOMY  as a child    [facility]10[facility]    Dr. Gallagher Scriptmanuel       Social History     Tobacco Use    Smoking status: Current Every Day Smoker     Packs/day: 1.00     Years: 40.00     Pack years: 40.00     Types: Cigarettes     Start date: 4/24/1966    Smokeless tobacco: Former User    Tobacco comment: 1 pack per day   Substance Use Topics    Alcohol use: No     Alcohol/week: 0.0 standard drinks       Allergies   Allergen Reactions    Nicotine Contact Dermatitis     Nicotine Patch reaction - Contact dermatitis with numbness and tingling also occuring in the left arm and denuding of the skin.  Thimerosal Other (comments)     Contact lens solution, made eyes red and irrated       Prior to Admission medications    Medication Sig Start Date End Date Taking? Authorizing Provider   omeprazole (PRILOSEC) 20 mg capsule Take 1 Cap by mouth daily. 9/23/19  Yes Mary Dumont MD   metoprolol succinate (TOPROL-XL) 50 mg XL tablet Take 1 Tab by mouth daily. 7/26/19  Yes Allison Page, NP         Visit Vitals  /87 (BP 1 Location: Left arm, BP Patient Position: Sitting)   Pulse 86   Temp 96.9 °F (36.1 °C) (Oral)   Ht 5' 11\" (1.803 m)   Wt 96.6 kg (213 lb)   SpO2 97%   BMI 29.71 kg/m²       Review of Systems   Constitutional: Negative for chills, fever and malaise/fatigue. HENT: Negative for nosebleeds. Eyes: Negative for blurred vision and double vision. Respiratory: Negative for cough, hemoptysis, sputum production, shortness of breath and wheezing. Cardiovascular: Negative for chest pain, palpitations, orthopnea, claudication, leg swelling and PND. Gastrointestinal: Negative for abdominal pain, heartburn, nausea and vomiting. Musculoskeletal: Negative for falls and myalgias. Skin: Negative for rash. Neurological: Positive for dizziness and loss of consciousness. Negative for weakness and headaches. Endo/Heme/Allergies: Does not bruise/bleed easily. Physical Exam   Constitutional: He is oriented to person, place, and time. He appears well-developed and well-nourished. Neck: No JVD present. Cardiovascular: Normal rate, regular rhythm, normal heart sounds and intact distal pulses. Exam reveals no gallop and no friction rub. No murmur heard. Pulmonary/Chest: Effort normal and breath sounds normal. No respiratory distress. He has no wheezes. He has no rales. He exhibits no tenderness. Musculoskeletal:         General: No edema. Neurological: He is alert and oriented to person, place, and time. Skin: Skin is warm and dry. Psychiatric: He has a normal mood and affect. Nursing note and vitals reviewed. ASSESSMENT and PLAN    Mr. Torres Quevedo has a reminder for a \"due or due soon\" health maintenance. I have asked that he contact his primary care provider for follow-up on this health maintenance. No flowsheet data found. Interpretation Summary 8/2019       · Left Ventricle: Normal cavity size and systolic function (ejection fraction normal). Mild concentric hypertrophy. Estimated left ventricular ejection fraction is 56 - 60%. No regional wall motion abnormality noted. Mild (grade 1) left ventricular diastolic dysfunction. · Right Ventricle: Normal right ventricular size and function. · Mitral Valve: Mild mitral valve regurgitation. Procedure Conclusion 8/2019  Nuclear Stress Test     Negative myocardial perfusion imaging. Myocardial perfusion imaging supports a low risk stress test.   There is no prior study available for comparison. .   I have personally reviewed patients ekg done at other facility. Within normal limit12/2019   I Have personally reviewed recent relevant labs available and discussed with patient  3/2020    Assessment         ICD-10-CM ICD-9-CM    1. Essential hypertension I10 401.9     Stable continue current medical management   2. Dizziness R42 780.4     Orthostatic hypotension likely from neuropathy stable monitor   3. Preop cardiovascular exam Z01.810 V72.81     Stable cardiac status.   Okay to proceed with urological procedure as planned with low cardiac risk     7/2019  Referred for dizziness and one episode of syncope. He c/o episodes of dizziness that occur with activity or rest.  During episodes his b/p drops to 70/40s and he feels as if his sight is closing in. He reports occasional chest tightness with episodes. Orthostatic b/p may be related to autonomic dysfunction with h/o back problems. Will d/c norvasc and lisinopril and begin Toprol XL 50 mg/ day. He is to monitor home b/p and bring chart to f/u appt. Will obtain echo and NST.  9/2019  Cardiac status stable improved chest tightness. No recurrence of dizziness or syncope after discontinuing amlodipine and lisinopril. Toprol has helped with blood pressure and symptoms. Medications Discontinued During This Encounter   Medication Reason    oxybutynin chloride XL (DITROPAN XL) 10 mg CR tablet Non-Compliance    cyanocobalamin (VITAMIN B12) 500 mcg tablet Non-Compliance    tamsulosin (FLOMAX) 0.4 mg capsule Non-Compliance       No orders of the defined types were placed in this encounter. Follow-up and Dispositions    · Return in about 1 year (around 3/18/2021).            Oren Hobbs MD

## 2020-03-18 NOTE — PROGRESS NOTES
1. Have you been to the ER, urgent care clinic since your last visit? Hospitalized since your last visit? Yes When: 12/31/19 Where: [facility] Reason for visit: Dizziness    2. Have you seen or consulted any other health care providers outside of the [facility] since your last visit? Include any pap smears or colon screening.  Yes Where: [facility] Reason for visit: Chemotherapy

## 2020-03-22 PROBLEM — R11.0 NAUSEA: Status: ACTIVE | Noted: 2020-01-03

## 2020-03-22 PROBLEM — D69.6 THROMBOCYTOPENIA (HCC): Status: ACTIVE | Noted: 2020-01-19

## 2020-04-29 ENCOUNTER — HOSPITAL ENCOUNTER (OUTPATIENT)
Dept: LAB | Age: 70
Discharge: HOME | End: 2020-04-29

## 2020-04-29 PROBLEM — R78.81 BACTEREMIA: Status: ACTIVE | Noted: 2020-04-12

## 2020-04-29 LAB — XX-LABCORP SPECIMEN COL,LCBCF: NORMAL

## 2020-04-29 PROCEDURE — 99001 SPECIMEN HANDLING PT-LAB: CPT

## 2020-05-25 DIAGNOSIS — I10 ESSENTIAL HYPERTENSION: Primary | ICD-10-CM

## 2020-05-26 RX ORDER — METOPROLOL SUCCINATE 50 MG/1
50 TABLET, EXTENDED RELEASE ORAL DAILY
Qty: 90 TAB | Refills: 2 | Status: SHIPPED | OUTPATIENT
Start: 2020-05-26 | End: 2020-08-25

## 2020-05-26 NOTE — TELEPHONE ENCOUNTER
Requested Prescriptions     Pending Prescriptions Disp Refills    metoprolol succinate (TOPROL-XL) 50 mg XL tablet 90 Tab 2     Sig: Take 1 Tab by mouth daily.

## 2020-06-15 ENCOUNTER — PATIENT OUTREACH (OUTPATIENT)
Dept: FAMILY MEDICINE CLINIC | Facility: CLINIC | Age: 70
End: 2020-06-15

## 2020-06-24 ENCOUNTER — OFFICE VISIT (OUTPATIENT)
Dept: FAMILY MEDICINE CLINIC | Age: 70
End: 2020-06-24

## 2020-06-24 VITALS
TEMPERATURE: 96.9 F | BODY MASS INDEX: 28.14 KG/M2 | RESPIRATION RATE: 16 BRPM | OXYGEN SATURATION: 97 % | SYSTOLIC BLOOD PRESSURE: 137 MMHG | HEART RATE: 67 BPM | DIASTOLIC BLOOD PRESSURE: 65 MMHG | WEIGHT: 201 LBS | HEIGHT: 71 IN

## 2020-06-24 DIAGNOSIS — K21.9 GASTROESOPHAGEAL REFLUX DISEASE, ESOPHAGITIS PRESENCE NOT SPECIFIED: ICD-10-CM

## 2020-06-24 DIAGNOSIS — I10 ESSENTIAL HYPERTENSION: ICD-10-CM

## 2020-06-24 DIAGNOSIS — Z01.818 PREOPERATIVE EXAMINATION: Primary | ICD-10-CM

## 2020-06-24 NOTE — PROGRESS NOTES
Yue Guerra is a 77 y.o. male presenting today for Pre-op Exam (caract surgery right eye )  . HPI:  Yue Guerra presents to the office today for preoperative examination. Notes he feels well. He is scheduled for phaco with intraocular lens implantation. Has a previous medical history for hypertension and GERD. He denies any chest pain, palpitation, or lower extremity edema negative for cough, fever, loss of smell or taste or GI upset. He is compliant with taking his medication daily and his BP is stable. ROS    Constitutional: No apparent distress noted  General- negative for fever, chills or fatigue  Eyes- negative visual changes  CV- denies chest pain, palpitation  Pul: negative cough or SOB  GI: negative nausea, flank pain, diarrhea, constipation  Urinary:- No dysuria or polyuria  MS- negative myalgia, negative joint pain  Neuro- negative headache, dizziness or weakness  Skin- negative for rashes or lesions. Psych- denies any anxiety or depression    Allergies   Allergen Reactions    Nicotine Contact Dermatitis     Nicotine Patch reaction - Contact dermatitis with numbness and tingling also occuring in the left arm and denuding of the skin.  Thimerosal Other (comments)     Contact lens solution, made eyes red and irrated       Current Outpatient Medications   Medication Sig Dispense Refill    metoprolol succinate (TOPROL-XL) 50 mg XL tablet Take 1 Tab by mouth daily. 90 Tab 2    omeprazole (PRILOSEC) 20 mg capsule Take 1 Cap by mouth daily.  [address]       Past Medical History:   Diagnosis Date    Bilateral hip pain     Chronic pain     back; hx of opiod addiction    Depression     Diabetes (HCC)     borderline, no meds-trying to control with diet    DJD (degenerative joint disease)     GERD (gastroesophageal reflux disease)     Hepatitis C 01/2015    +Joe rx    Hypertension     Kidney stones     Lymphadenopathy, generalized 12/05/2015    neg bx    Numbness of foot left    resolved per patient 1/29/16    S/P lumbar fusion 2/9/2016    L4/5 LAMINECTOMY/FUSION/TRANSFORAMINAL LUMBAR INTERBODY FUSION (TLIF) by Dr. Darling Bashir on 2/8/16     Unspecified sleep apnea     no cpap machine-pt denies       Past Surgical History:   Procedure Laterality Date    HX APPENDECTOMY  as a child    HX BACK SURGERY  07/2015    HX CERVICAL FUSION  05/18/2017    ACDF C5/6 C6/7    HX COLONOSCOPY  2015    negative    HX LUMBAR FUSION  2/2016    HX ORTHOPAEDIC      denies    HX OTHER SURGICAL  05/2017    cervical fusion    HX TONSILLECTOMY  as a child    REMOVE GROIN LYMPH NODES SUPERF Right 3-10-16    Dr. Gage Esquivel History     Socioeconomic History    Marital status:      Spouse name: Not on file    Number of children: Not on file    Years of education: Not on file    Highest education level: Not on file   Occupational History    Not on file   Social Needs    Financial resource strain: Not on file    Food insecurity     Worry: Not on file     Inability: Not on file    Transportation needs     Medical: Not on file     Non-medical: Not on file   Tobacco Use    Smoking status: Current Every Day Smoker     Packs/day: 1.00     Years: 40.00     Pack years: 40.00     Types: Cigarettes     Start date: 4/24/1966    Smokeless tobacco: Former User    Tobacco comment: 1 pack per day   Substance and Sexual Activity    Alcohol use: No     Alcohol/week: 0.0 standard drinks    Drug use: Yes     Types: Marijuana    Sexual activity: Never   Lifestyle    Physical activity     Days per week: Not on file     Minutes per session: Not on file    Stress: Not on file   Relationships    Social connections     Talks on phone: Not on file     Gets together: Not on file     Attends Restorationism service: Not on file     Active member of club or organization: Not on file     Attends meetings of clubs or organizations: Not on file     Relationship status: Not on file    Intimate partner violence     Fear of current or ex partner: Not on file     Emotionally abused: Not on file     Physically abused: Not on file     Forced sexual activity: Not on file   Other Topics Concern    Not on file   Social History Narrative    Not on file       Patient does have an advanced directive on file    Vitals:    06/24/20 1313   BP: 137/65   Pulse: 67   Resp: 16   Temp: 96.9 °F (36.1 °C)   TempSrc: Oral   SpO2: 97%   Weight: 201 lb (91.2 kg)   Height: 5' 11\" (1.803 m)   PainSc:   0 - No pain       Physical Exam      Physical Exam  Vitals signs and nursing note reviewed. Constitutional:       Appearance: Normal appearance. HENT:      Right Ear: Tympanic membrane normal.      Left Ear: Tympanic membrane normal.   Neck:      Musculoskeletal: Normal range of motion and neck supple. Cardiovascular:      Rate and Rhythm: Normal rate and regular rhythm. Pulses: Normal pulses. Heart sounds: Normal heart sounds. Pulmonary:      Effort: Pulmonary effort is normal.      Breath sounds: Normal breath sounds. Abdominal:      General: Bowel sounds are normal. There is no distension. Palpations: Abdomen is soft. Tenderness: There is no abdominal tenderness. Musculoskeletal:         General: No swelling or tenderness. Skin:     General: Skin is warm and dry. Neurological:      Mental Status: He is alert and oriented to person, place, and time. Mental status is at baseline. Psychiatric:         Mood and Affect: Mood normal.       Hospital Outpatient Visit on 04/29/2020   Component Date Value Ref Range Status    [facility]. 04/29/2020 Specimens collected/sent to [facility]. Please direct inquiries to ([phone]).     Final   Office Visit on 04/29/2020   Component Date Value Ref Range Status    Glucose 04/29/2020 108* 65 - 99 mg/dL Final    BUN 04/29/2020 21  8 - 27 mg/dL Final    Creatinine 04/29/2020 1.33* 0.76 - 1.27 mg/dL Final    GFR est non-AA 04/29/2020 55* >59 mL/min/1.73 Final    GFR est AA 04/29/2020 63  >59 mL/min/1.73 Final    BUN/Creatinine ratio 04/29/2020 16  10 - 24 Final    Sodium 04/29/2020 140  134 - 144 mmol/L Final    Potassium 04/29/2020 4.7  3.5 - 5.2 mmol/L Final    Chloride 04/29/2020 103  96 - 106 mmol/L Final    CO2 04/29/2020 19* 20 - 29 mmol/L Final    Calcium 04/29/2020 9.2  8.6 - 10.2 mg/dL Final    Protein, total 04/29/2020 6.6  6.0 - 8.5 g/dL Final    Albumin 04/29/2020 4.1  3.8 - 4.8 g/dL Final    GLOBULIN, TOTAL 04/29/2020 2.5  1.5 - 4.5 g/dL Final    A-G Ratio 04/29/2020 1.6  1.2 - 2.2 Final    Bilirubin, total 04/29/2020 0.3  0.0 - 1.2 mg/dL Final    Alk. phosphatase 04/29/2020 91  39 - 117 IU/L Final    AST (SGOT) 04/29/2020 12  0 - 40 IU/L Final    ALT (SGPT) 04/29/2020 9  0 - 44 IU/L Final    WBC 04/29/2020 7.3  3.4 - 10.8 x10E3/uL Final    RBC 04/29/2020 3.85* 4.14 - 5.80 x10E6/uL Final    HGB 04/29/2020 12.2* 13.0 - 17.7 g/dL Final    HCT 04/29/2020 36.5* 37.5 - 51.0 % Final    MCV 04/29/2020 95  79 - 97 fL Final    MCH 04/29/2020 31.7  26.6 - 33.0 pg Final    MCHC 04/29/2020 33.4  31.5 - 35.7 g/dL Final    RDW 04/29/2020 13.3  11.6 - 15.4 % Final    PLATELET 01/27/5270 953  150 - 450 x10E3/uL Final    Urine Culture, Routine 04/29/2020 *  Final                    Value:Klebsiella pneumoniae  Greater than 100,000 colony forming units per mL      Blood culture result 04/29/2020 No aerobic or anaerobic growth in five days. Final       .No results found for any visits on 06/24/20. Assessment / Plan:      ICD-10-CM ICD-9-CM    1. Preoperative examination Z01.818 V72.84    2. Essential hypertension I10 401.9    3. Gastroesophageal reflux disease, esophagitis presence not specified K21.9 530.81        Patient is optimized for surgery        I asked the patient if he  had any questions and answered his  questions.   The patient stated that he understands the treatment plan and agrees with the treatment plan    This document was created with a voice activated dictation system and may contain transcription errors.

## 2020-09-16 ENCOUNTER — HOSPITAL ENCOUNTER (OUTPATIENT)
Dept: PHYSICAL THERAPY | Age: 70
Discharge: HOME | End: 2020-09-16
Payer: MEDICARE

## 2020-09-16 PROCEDURE — 97530 THERAPEUTIC ACTIVITIES: CPT

## 2020-09-16 PROCEDURE — 97162 PT EVAL MOD COMPLEX 30 MIN: CPT

## 2020-09-16 NOTE — PROGRESS NOTES
PT DAILY TREATMENT NOTE 10-18    Patient Name: Bernadine Eubanks  Date:2020  : 1950  [x]  Patient  Verified  Payor: [facility] MEDICARE / Plan: [facility] COMMUNITY PLAN MCR / Product Type: Managed Care Medicare /    In time:9:50  Out time:10:25  Total Treatment Time (min): 35  Visit #: 1 of 10      Medicare/BCBS Only   Total Timed Codes (min):  23 1:1 Treatment Time:  35       Treatment Area: Malignant neoplasm of bladder, unspecified [C67.9]     SUBJECTIVE  Pain Level (0-10 scale): 4/10  Any medication changes, allergies to medications, adverse drug reactions, diagnosis change, or new procedure performed?: [x] No    [] Yes (see summary sheet for update)  Subjective functional status/changes:   [x] See paper initial evaluation form in patient chart. OBJECTIVE    12 min [x]Eval                  []Re-Eval     23 min Therapeutic Activity:  [] See flow sheet : diagnosis; prognosis; HEP given and reviewed with Pt   Rationale: increase ROM and increase strength to improve the patients ability to improve posture, standing/amb tolerance    With   [] TE   [x] TA   [] neuro   [] other: Patient Education: [x] Review HEP    [] Progressed/Changed HEP based on:   [] positioning   [] body mechanics   [] transfers   [] heat/ice application    [] other:      Other Objective/Functional Measures: FOTO 40 pts     Pain Level (0-10 scale) post treatment: 4/10    ASSESSMENT/Changes in Function:     Patient will continue to benefit from skilled PT services to address functional mobility deficits, address ROM deficits, address strength deficits, analyze and address soft tissue restrictions, analyze and cue movement patterns, analyze and modify body mechanics/ergonomics, assess and modify postural abnormalities, address imbalance/dizziness and instruct in home and community integration to attain remaining goals.      [x]  See Plan of Care  []  See progress note/recertification  []  See Discharge Summary         PLAN  []  Upgrade activities as tolerated     [x]  Continue plan of care  []  Update interventions per flow sheet       []  Discharge due to:_  []  Other:_      Oscar Lentz PT 9/16/2020  9:32 AM

## 2020-09-16 NOTE — PROGRESS NOTES
[facility] BRONWYN SOTOMAYOR [facility], [address] [place] [address]  [phone] fax  Plan of Care/ Statement of Necessity for Physical Therapy Services    Patient name: Sosa Nava Start of Care: 2020   Referral source: Jessi Stallworth NP : 1950    Medical Diagnosis: Muscle weakness (generalized) [M62.81]  Malignant neoplasm of bladder, unspecified [C67.9]  Payor: 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 Assessment/ key information: Pt is a 72 y.o. male who presents with c/o generalized muscle weakness in arms and legs, pain in left foot radiating up to hip and low back with paresthesias in left toes. Pt has complicated medical history but most recently had chemotherapy and surgery to remove urinary bladder due to Bladder CA and has been deconditioned ever since. Pt is limited in standing/amb tolerance, ability to perform all household chores, cook without breaks, and in activity tolerance due to decreased stamina. Upon exam, Pt exhibited slouched posture, impaired lumbar, right shoulder mobility, decreased B hip/knee strength, poor core/glute activation, inflexibility in B quads/HS, reproducing Pt's pain, difficulty with sit to stand transfers, and impaired standing balance.   Pt would benefit from skilled PT to address above deficits to improve Pt's function with less pain and transition to independent management at [facility]. Evaluation Complexity History HIGH Complexity :3+ comorbidities / personal factors will impact the outcome/ POC ; Examination MEDIUM Complexity : 3 Standardized tests and measures addressing body structure, function, activity limitation and / or participation in recreation  ;Presentation MEDIUM Complexity : Evolving with changing characteristics  ; Clinical Decision Making MEDIUM Complexity : FOTO score of 26-74  Overall Complexity Rating: MEDIUM  Problem List: pain affecting function, decrease ROM, decrease strength, impaired gait/ balance, decrease ADL/ functional abilitiies, decrease activity tolerance, decrease flexibility/ joint mobility and decrease transfer abilities   Treatment Plan may include any combination of the following: Therapeutic exercise, Therapeutic activities, Neuromuscular re-education, Physical agent/modality, Gait/balance training, Manual therapy, Aquatic therapy, Patient education, Functional mobility training and Stair training  Patient / Family readiness to learn indicated by: asking questions and interest  Persons(s) to be included in education: patient (P)  Barriers to Learning/Limitations: None  Patient Goal (s): Better mobility and function.   Patient Self Reported Health Status: fair  Rehabilitation Potential: good    Short Term Goals: To be accomplished in 1 weeks:  Goal: Pt to be compliant with initial HEP to improve posture and LE strength. Status at last note/certification: Established and reviewed with Pt  Long Term Goals: To be accomplished in 5 weeks:  Goal: Pt to increase left hip strength to 4/5 grossly to negotiate 4 steps with reciprocal pattern without difficulty for ease entering home, community buildings.   Status at last note/certification: 3+/5 grossly  Goal: Pt to report no difficulty with walking one block to increase activity tolerance within the community. Status at last note/certification: limited a lot in performing  Goal: Pt to perform 5 time sit to stand test in 30 seconds to show improved LE strength and endurance for ease of transfers and activity in home. Status at last note/certification: 1 minute 5 seconds  Goal: Pt to report < 5/10 pain at worst to increase ease with ADLs. Status at last note/certification: 33/51 pain at worst  Goal: Pt to report FOTO score of 56 pts to show improved function and quality of life. Status at last note/certification: FOTO 40 pts     Frequency / Duration: Patient to be seen 2 times per week for 5 weeks. Patient/ Caregiver education and instruction: Diagnosis, prognosis, exercises   [x]  Plan of care has been reviewed with PTA    Certification Period: 9/16/20 - 10/15/20  Nannette Lentz, PT 9/16/2020 9:32 AM  _____________________________________________________________________  I certify that the above Therapy Services are being furnished while the patient is under my care. I agree with the treatment plan and certify that this therapy is necessary.     Physician's Signature:____________Date:_________TIME:________    ** Signature, Date and Time must be completed for valid certification **    Please sign and return to [facility] [address]ON[address]  [phone] fax

## 2020-09-18 ENCOUNTER — HOSPITAL ENCOUNTER (OUTPATIENT)
Dept: PHYSICAL THERAPY | Age: 70
Discharge: HOME | End: 2020-09-18
Payer: MEDICARE

## 2020-09-18 PROCEDURE — 97530 THERAPEUTIC ACTIVITIES: CPT

## 2020-09-18 PROCEDURE — 97110 THERAPEUTIC EXERCISES: CPT

## 2020-09-18 PROCEDURE — 97112 NEUROMUSCULAR REEDUCATION: CPT

## 2020-09-18 NOTE — PROGRESS NOTES
PT DAILY TREATMENT NOTE 10-18    Patient Name: Keyshawn Card  Date:2020  : 1950  [x]  Patient  Verified  Payor: [facility] MEDICARE / Plan: [facility] COMMUNITY PLAN MCR / Product Type: Managed Care Medicare /    In time:8:59  Out time:9:45  Total Treatment Time (min): 45  Visit #: 2 of 10    Medicare/BCBS Only   Total Timed Codes (min):  45 1:1 Treatment Time:  45       Treatment Area: Muscle weakness (generalized) [M62.81]  Malignant neoplasm of bladder, unspecified [C67.9]    SUBJECTIVE  Pain Level (0-10 scale): 4  Any medication changes, allergies to medications, adverse drug reactions, diagnosis change, or new procedure performed?: [x] No    [] Yes (see summary sheet for update)  Subjective functional status/changes:   [] No changes reported  \"I did my exercise at home. \"    OBJECTIVE    Modality rationale: decrease edema, decrease inflammation, decrease pain and increase tissue extensibility to improve the patients ability to perform ADL    Min Type Additional Details    [] Estim:  []Unatt       []IFC  []Premod                        []Other:  []w/ice   []w/heat  Position:  Location:    [] Estim: []Att    []TENS instruct  []NMES                    []Other:  []w/US   []w/ice   []w/heat  Position:  Location:    []  Traction: [] Cervical       []Lumbar                       [] Prone          []Supine                       []Intermittent   []Continuous Lbs:  [] before manual  [] after manual    []  Ultrasound: []Continuous   [] Pulsed                           []1MHz   []3MHz W/cm2:  Location:    []  Iontophoresis with dexamethasone         Location: [] Take home patch   [] In clinic    []  Ice     []  heat  []  Ice massage  []  Laser   []  Anodyne Position:  Location:    []  Laser with stim  []  Other:  Position:  Location:    []  Vasopneumatic Device Pressure:       [] lo [] med [] hi   Temperature: [] lo [] med [] hi   [x] Skin assessment post-treatment:  [x]intact []redness- no adverse reaction []redness  adverse reaction:      min []Eval                  []Re-Eval       23 min Therapeutic Exercise:  [x] See flow sheet :   Rationale: increase ROM and increase strength to improve the patients ability to perform ADL     12 min Therapeutic Activity:  [x]  See flow sheet :   Rationale: increase ROM and increase strength  to improve the patients ability to perform ADL     10 min Neuromuscular Re-education:  [x]  See flow sheet :   Rationale: increase ROM and increase strength  to improve the patients ability to perform ADL      min Manual Therapy:     Rationale: decrease pain, increase ROM, increase tissue extensibility and decrease edema  to perform ADL      min Gait Training:  ___ feet with ___ device on level surfaces with ___ level of assist   Rationale: With   [x] TE   [] TA   [] neuro   [] other: Patient Education: [x] Review HEP    [] Progressed/Changed HEP based on:   [] positioning   [] body mechanics   [] transfers   [] heat/ice application    [] other:      Other Objective/Functional Measures:      Pain Level (0-10 scale) post treatment: 4    ASSESSMENT/Changes in Function: Pt was challenged with MSR EC and slightly challenged with Rhomberg EC. Overall pt responded fairly well with strengthening there ex today. Patient will continue to benefit from skilled PT services to address functional mobility deficits, address ROM deficits, address strength deficits, analyze and address soft tissue restrictions and analyze and cue movement patterns to attain remaining goals. [x]  See Plan of Care  []  See progress note/recertification  []  See Discharge Summary         Progress towards goals / Updated goals:  Short Term Goals: To be accomplished in 1 weeks:  Goal: Pt to be compliant with initial HEP to improve posture and LE strength. Met 9/18/20  Status at last note/certification: Established and reviewed with Pt  Long Term Goals:  To be accomplished in 5 weeks:  Goal: Pt to increase left hip strength to 4/5 grossly to negotiate 4 steps with reciprocal pattern without difficulty for ease entering home, community buildings. Status at last note/certification: 3+/5 grossly  Goal: Pt to report no difficulty with walking one block to increase activity tolerance within the community. Status at last note/certification: limited a lot in performing  Goal: Pt to perform 5 time sit to stand test in 30 seconds to show improved LE strength and endurance for ease of transfers and activity in home. Status at last note/certification: 1 minute 5 seconds  Goal: Pt to report < 5/10 pain at worst to increase ease with ADLs. Status at last note/certification: 51/88 pain at worst  Goal: Pt to report FOTO score of 56 pts to show improved function and quality of life.   Status at last note/certification: FOTO 40 pts     PLAN  []  Upgrade activities as tolerated     [x]  Continue plan of care  []  Update interventions per flow sheet       []  Discharge due to:_  []  Other:_      Charito Nieves, PTA 9/18/2020  9:08 AM    Future Appointments   Date Time Provider Jaime Reeder   9/22/2020  9:00 AM [facility]   9/24/2020  9:00 AM Lise Lentz, [facility]   9/29/2020 10:30 AM [facility]   10/1/2020  9:00 AM Mary Carmen May Dylon, [facility]   10/6/2020 10:30 AM Mary Carmen May Dylon, PT [facility]   10/8/2020  9:45 AM [facility]   10/13/2020 10:30 AM Lise Lentz, PT [facility]   10/15/2020 10:30 AM Mary Carmen May Dylon, PT [facility]   11/20/2020  9:40 AM Bruno Salamanca NP [facility]   3/17/2021  8:30 AM Renée Crystal MD [address] [place] [address]

## 2020-09-22 ENCOUNTER — HOSPITAL ENCOUNTER (OUTPATIENT)
Dept: PHYSICAL THERAPY | Age: 70
Discharge: HOME | End: 2020-09-22
Payer: MEDICARE

## 2020-09-22 PROCEDURE — 97112 NEUROMUSCULAR REEDUCATION: CPT

## 2020-09-22 PROCEDURE — 97110 THERAPEUTIC EXERCISES: CPT

## 2020-09-22 NOTE — PROGRESS NOTES
PT DAILY TREATMENT NOTE 10-18    Patient Name: Ankur Herman  Date:2020  : 1950  [x]  Patient  Verified  Payor: [facility] MEDICARE / Plan: [facility] COMMUNITY PLAN MCR / Product Type: Managed Care Medicare /    In time:9:00  Out time:  Total Treatment Time (min): 43  Visit #: 3 of 10    Medicare/BCBS Only   Total Timed Codes (min):  43 1:1 Treatment Time:  43       Treatment Area: Muscle weakness (generalized) [M62.81]  Malignant neoplasm of bladder, unspecified [C67.9]    SUBJECTIVE  Pain Level (0-10 scale): 4  Any medication changes, allergies to medications, adverse drug reactions, diagnosis change, or new procedure performed?: [x] No    [] Yes (see summary sheet for update)  Subjective functional status/changes:   [] No changes reported  Yesterday the pain was bad, up to a 6-7/10      OBJECTIVE    33 min Therapeutic Exercise:  [] See flow sheet :   Rationale: increase strength and improve coordination to improve the patients ability to improve activity tolerance, stability to return to PLOF    10 min Neuromuscular Re-education:  []  See flow sheet :   Rationale: increase strength, improve coordination, improve balance and increase proprioception  to improve the patients ability to increase stability for safe community ambulation, functional tasks          With   [] TE   [] TA   [] neuro   [] other: Patient Education: [x] Review HEP    [] Progressed/Changed HEP based on:   [] positioning   [] body mechanics   [] transfers   [] heat/ice application    [] other:      Other Objective/Functional Measures:   Progressed balance activities to foam with EO/EC      Pain Level (0-10 scale) post treatment: 6    ASSESSMENT/Changes in Function: increasing left LE pain limiting ability to perform standing hip extension. WB on left LE increasing pain and effecting 1/2 stance with left LE on floor.   No LOB, self corrects with finger tip touch     Patient will continue to benefit from skilled PT services to modify and progress therapeutic interventions, address functional mobility deficits, address ROM deficits, address strength deficits, analyze and address soft tissue restrictions, analyze and cue movement patterns, analyze and modify body mechanics/ergonomics, assess and modify postural abnormalities, address imbalance/dizziness and instruct in home and community integration to attain remaining goals. []  See Plan of Care  []  See progress note/recertification  []  See Discharge Summary         Progress towards goals / Updated goals:  Goal: Pt to be compliant with initial HEP to improve posture and LE strength. Met 9/18/20  Status at last note/certification: Established and reviewed with Pt  Goal Met  Long Term Goals: To be accomplished in 5 weeks:  Goal: Pt to increase left hip strength to 4/5 grossly to negotiate 4 steps with reciprocal pattern without difficulty for ease entering home, community buildings. Status at last note/certification: 3+/5 grossly  Goal: Pt to report no difficulty with walking one block to increase activity tolerance within the community. Status at last note/certification: limited a lot in performing  Goal: Pt to perform 5 time sit to stand test in 30 seconds to show improved LE strength and endurance for ease of transfers and activity in home. Status at last note/certification: 1 minute 5 seconds  Goal: Pt to report < 5/10 pain at worst to increase ease with ADLs. Status at last note/certification: 10/10 pain at worst  Goal: Pt to report FOTO score of 56 pts to show improved function and quality of life.   Status at last note/certification: OIOP 53 MMB    Assess at MD note       PLAN  [x]  Upgrade activities as tolerated     []  Continue plan of care  []  Update interventions per flow sheet       []  Discharge due to:_  []  Other:_      Trevor Singh, CHIKA 9/22/2020  9:00 AM    Future Appointments   Date Time Provider Jaime Reeder   9/24/2020  9:00 AM Fernie Lentz, PT [id number]   9/29/2020 10:30 AM Mathis Dandy [facility]   10/1/2020  9:00 AM Markel Lentz, [facility]   10/6/2020 10:30 AM Markel Lentz, [facility]   10/8/2020  9:45 AM Mathis Dandy [facility]   10/13/2020 10:30 AM Sekou President, [facility]   10/15/2020 10:30 AM Markel Lentz, [facility]   11/20/2020  9:40 AM Italo Brown NP [facility]   3/17/2021  8:30 AM Octavio Rodgers MD [facility]

## 2020-09-28 ENCOUNTER — OFFICE VISIT (OUTPATIENT)
Dept: ORTHOPEDIC SURGERY | Age: 70
End: 2020-09-28
Payer: MEDICARE

## 2020-09-28 VITALS
HEIGHT: 71 IN | OXYGEN SATURATION: 98 % | TEMPERATURE: 97.2 F | RESPIRATION RATE: 24 BRPM | HEART RATE: 88 BPM | DIASTOLIC BLOOD PRESSURE: 92 MMHG | BODY MASS INDEX: 29.54 KG/M2 | SYSTOLIC BLOOD PRESSURE: 172 MMHG | WEIGHT: 211 LBS

## 2020-09-28 DIAGNOSIS — M48.062 SPINAL STENOSIS, LUMBAR REGION WITH NEUROGENIC CLAUDICATION: ICD-10-CM

## 2020-09-28 DIAGNOSIS — G62.9 NEUROPATHY: Primary | ICD-10-CM

## 2020-09-28 PROCEDURE — 99213 OFFICE O/P EST LOW 20 MIN: CPT | Performed by: NURSE PRACTITIONER

## 2020-09-28 PROCEDURE — G8753 SYS BP > OR = 140: HCPCS | Performed by: NURSE PRACTITIONER

## 2020-09-28 PROCEDURE — G8427 DOCREV CUR MEDS BY ELIG CLIN: HCPCS | Performed by: NURSE PRACTITIONER

## 2020-09-28 PROCEDURE — 3017F COLORECTAL CA SCREEN DOC REV: CPT | Performed by: NURSE PRACTITIONER

## 2020-09-28 PROCEDURE — G9717 DOC PT DX DEP/BP F/U NT REQ: HCPCS | Performed by: NURSE PRACTITIONER

## 2020-09-28 PROCEDURE — 1101F PT FALLS ASSESS-DOCD LE1/YR: CPT | Performed by: NURSE PRACTITIONER

## 2020-09-28 PROCEDURE — G8419 CALC BMI OUT NRM PARAM NOF/U: HCPCS | Performed by: NURSE PRACTITIONER

## 2020-09-28 PROCEDURE — G8755 DIAS BP > OR = 90: HCPCS | Performed by: NURSE PRACTITIONER

## 2020-09-28 PROCEDURE — G8536 NO DOC ELDER MAL SCRN: HCPCS | Performed by: NURSE PRACTITIONER

## 2020-09-28 RX ORDER — TERBINAFINE HYDROCHLORIDE 250 MG/1
250 TABLET ORAL DAILY
COMMUNITY
End: 2021-04-01

## 2020-09-28 RX ORDER — PREGABALIN 75 MG/1
75 CAPSULE ORAL 2 TIMES DAILY
Qty: 60 CAP | Refills: 0 | Status: SHIPPED | OUTPATIENT
Start: 2020-09-28 | End: 2020-10-26 | Stop reason: DRUGHIGH

## 2020-09-28 NOTE — PATIENT INSTRUCTIONS
Pregabalin (By mouth) Pregabalin (pre-GA-ba-sabiha) Treats nerve and muscle pain, including fibromyalgia. Also treats seizures. Brand Name(s): Lyrica There may be other brand names for this medicine. When This Medicine Should Not Be Used: This medicine is not right for everyone. Do not use it if you had an allergic reaction to pregabalin. How to Use This Medicine:  
Capsule, Liquid · Take your medicine as directed. Your dose may need to be changed several times to find what works best for you. · Measure the oral liquid medicine with a marked measuring spoon, oral syringe, or medicine cup. · This medicine should come with a Medication Guide. Ask your pharmacist for a copy if you do not have one. · Missed dose: Take a dose as soon as you remember. If it is almost time for your next dose, wait until then and take a regular dose. Do not take extra medicine to make up for a missed dose. · Store the medicine in a closed container at room temperature, away from heat, moisture, and direct light. Drugs and Foods to Avoid: Ask your doctor or pharmacist before using any other medicine, including over-the-counter medicines, vitamins, and herbal products. · Some medicines can affect how pregabalin works. Tell your doctor if you are using any of the following: ¨ Diabetes medicine that you take by mouth (pioglitazone, rosiglitazone) ¨ An ACE inhibitor (benazepril, enalapril, lisinopril, quinapril, ramipril) · Tell your doctor if you use anything else that makes you sleepy. Some examples are allergy medicine, narcotic pain medicine, and alcohol. Warnings While Using This Medicine: · Tell your doctor if you are pregnant or breastfeeding, or if you have kidney disease, heart failure, heart rhythm problems, angioedema, a bleeding disorder, or a low blood platelet count. Tell your doctor if you have a history of alcohol or drug abuse, depression, or other mood problems. · This medicine may cause the following problems:  
¨ Serious allergic reactions ¨ Suicidal thoughts · This medicine may make you dizzy or drowsy. It may also cause blurry or double vision. Do not drive or do anything that could be dangerous until you know how this medicine affects you. · Do not stop using this medicine suddenly. Your doctor will need to slowly decrease your dose before you stop it completely. · Your doctor will check your progress and the effects of this medicine at regular visits. Keep all appointments. · Keep all medicine out of the reach of children. Never share your medicine with anyone. Possible Side Effects While Using This Medicine:  
Call your doctor right away if you notice any of these side effects: · Allergic reaction: Itching or hives, swelling in your face or hands, swelling or tingling in your mouth or throat, chest tightness, trouble breathing · Blistering, peeling, red skin rash · Blurry or double vision · Fever, chills, cough, sore throat, and body aches · Muscle pain, tenderness, or weakness, fever, or general ill feeling · Rapid weight gain, swelling in your hands, ankles, or feet · Severe dizziness or drowsiness · Sudden mood changes, unusual thoughts or behavior such as extreme happiness or depression · Suicidal thoughts · Swelling in your throat, head, or neck · Uneven heartbeat · Unusual bleeding, bruising, or weakness If you notice these less serious side effects, talk with your doctor: · Confusion, trouble concentrating · Constipation · Dry mouth If you notice other side effects that you think are caused by this medicine, tell your doctor. Call your doctor for medical advice about side effects. You may report side effects to FDA at 4-827-FDA-9036 © 2017 [facility] Information is for End User's use only and may not be sold, redistributed or otherwise used for commercial purposes. The above information is an  only. It is not intended as medical advice for individual conditions or treatments. Talk to your doctor, nurse or pharmacist before following any medical regimen to see if it is safe and effective for you.

## 2020-09-28 NOTE — PROGRESS NOTES
Heidy Poster presents today for   Chief Complaint   Patient presents with    Back Pain     lower    Hip Pain     left    Leg Pain     left       Is someone accompanying this pt? no    Is the patient using any DME equipment during OV? Yes, cane    Depression Screening:  3 most recent PHQ Screens 8/28/2017   Little interest or pleasure in doing things Nearly every day   Feeling down, depressed, irritable, or hopeless Nearly every day   Total Score PHQ 2 6   Trouble falling or staying asleep, or sleeping too much -   Feeling tired or having little energy -   Poor appetite, weight loss, or overeating -   Feeling bad about yourself - or that you are a failure or have let yourself or your family down -   Trouble concentrating on things such as school, work, reading, or watching TV -   Moving or speaking so slowly that other people could have noticed; or the opposite being so fidgety that others notice -   Thoughts of being better off dead, or hurting yourself in some way -   PHQ 9 Score -   How difficult have these problems made it for you to do your work, take care of your home and get along with others -       Learning Assessment:  Learning Assessment 8/29/2016   PRIMARY LEARNER Patient   HIGHEST LEVEL OF EDUCATION - PRIMARY LEARNER  GRADUATED HIGH SCHOOL OR GED   BARRIERS PRIMARY LEARNER NONE   CO-LEARNER CAREGIVER No   PRIMARY LANGUAGE ENGLISH    NEED No   LEARNER PREFERENCE PRIMARY DEMONSTRATION     READING     LISTENING     PICTURES     VIDEOS   ANSWERED BY patient   RELATIONSHIP SELF       Abuse Screening:  Abuse Screening Questionnaire 8/29/2016   Do you ever feel afraid of your partner? N   Are you in a relationship with someone who physically or mentally threatens you? N   Is it safe for you to go home? Y       Fall Risk  Fall Risk Assessment, last 12 mths 6/24/2020   Able to walk? Yes   Fall in past 12 months?  No   Fall with injury? -   Number of falls in past 12 months -   Fall Risk Score -       Coordination of Care:  1. Have you been to the ER, urgent care clinic since your last visit? no  Hospitalized since your last visit? no    2. Have you seen or consulted any other health care providers outside of the [address] [place] [address] since your last visit? Yes, pcp Include any pap smears or colon screening.  no

## 2020-09-28 NOTE — PROGRESS NOTES
[address], [address]  [facility], [address]  Phone: [phone]  Fax: [phone]  PROGRESS NOTE  Patient: Johann Motta                MRN: [id number]       SSN: [id number]  YOB: 1950        AGE: 72 y.o. SEX: male  Body mass index is 29.43 kg/m². PCP: Leo Duke MD  09/28/20    Chief Complaint   Patient presents with    Back Pain     lower    Hip Pain     left    Leg Pain     left       HISTORY OF PRESENT ILLNESS:  Era Miracle a 72 y.o.  male with history of chronic LBP and BLE pain for 30+ years and radiation of pain to BLE in the L4-5 distribution. 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  He had an EMG 01/2017 that showed peripheral sensiromotor neuropathy and BLE Chronic L5 radiculopathy and possible L4 and S1 radiculopathy. He has failed a SCS trial.  At last OV he saw Dr. Justine Smiley 6/2019 and was referred to neurology. Today, he comes in with his pain remaining unchanged. He is not on any neuropathics. He is also having some L hip painpain is aching, burning and numbing, pain is worse with walking and affects standing and recreational activities. Pain is better with relaxation. Denies bladder/bowel dysfunction, saddle paresthesia, weakness, gait disturbance, or other neurological deficits. Medications: none. PMHx: bladder cancer w/ surgery-has urostomy bag, opioid abuse-in remission x15 yrs, HTN, GFR 55 in 4/2020      ASSESSMENT   Diagnoses and all orders for this visit:    1. Neuropathy  -     pregabalin (Lyrica) 75 mg capsule; Take 1 Cap by mouth two (2) times a day. Max Daily Amount: 150 mg.  -     REFERRAL TO PAIN MANAGEMENT    2.  Spinal stenosis, lumbar region with neurogenic claudication  -     pregabalin (Lyrica) 75 mg capsule; Take 1 Cap by mouth two (2) times a day. Max Daily Amount: 150 mg.  -     REFERRAL TO PAIN MANAGEMENT         IMPRESSION AND PLAN:  This is a pt with stenosis and chronic, permanent nerve damage. He is not a surgical candidate.     > Pt was given information on Lyrica   > Re-start Lyrica  > Refer to PM  > Mr. Marisela Hoover has a reminder for a \"due or due soon\" health maintenance. I have asked that he contact his primary care provider, Susen Barthel, MD, for follow-up on this health maintenance.  > We have informed patient to notify us for immediate appointment if he has any worsening neurogical symptoms or if an emergency situation presents, then call 911  >  has been reviewed and is appropriate  > Pt will follow-up in 4 WKS for med check. Subjective    Pain Scale: 10 - Worst pain ever/10    Pain Assessment  9/28/2020   Location of Pain Back;Leg;Hip   Pain Location Comment -   Location Modifiers Left   Severity of Pain 10   Quality of Pain Sharp; Other (Comment); Aching   Quality of Pain Comment shooting, n/t to left leg   Duration of Pain Persistent   Frequency of Pain Constant   Date Pain First Started -   Aggravating Factors Other (Comment); Walking   Aggravating Factors Comment sitting too long   Limiting Behavior Yes   Relieving Factors Other (Comment)   Relieving Factors Comment stand, change position   Result of Injury No         REVIEW OF SYSTEMS  Constitutional: Negative for fever, chills, or weight change. Respiratory: Negative for cough or shortness of breath. Cardiovascular: Negative for chest pain or palpitations. Gastrointestinal: Negative for incontinence, acid reflux, change in bowel habits, or constipation. Genitourinary: Negative for incontinence, dysuria and flank pain. Musculoskeletal: Positive for back and LLE, L hip pain pain. See HPI. Skin: Negative for rash. Neurological:LLE  radiculopathy.  See HPI.  Endo/Heme/Allergies: Negative. Psychiatric/Behavioral: Negative. PHYSICAL EXAMINATION  Visit Vitals  BP (!) 172/92 (BP 1 Location: Right arm, BP Patient Position: Sitting) Comment: pt asymptomatic, NP aware   Pulse 88   Temp 97.2 °F (36.2 °C) (Skin)   Resp 24   Ht 5' 11\" (1.803 m)   Wt 211 lb (95.7 kg)   SpO2 98% Comment: RA   BMI 29.43 kg/m²         Accompanied by self. Constitutional:  Well developed, well nourished, in no acute distress. Psychiatric: Affect and mood are appropriate. Integumentary: No rashes or abrasions noted on exposed areas. Cardiovascular/Peripheral Vascular: +2 radial & pedal pulses. No peripheral edema is noted. Lymphatic:  No evidence of lymphedema. No cervical lymphadenopathy. SPINE/MUSCULOSKELETAL EXAM     Lumbar spine:  No rash, ecchymosis, or gross obliquity. No fasciculations. No focal atrophy is noted. Range of motion is decreased with flexion, extension. Tenderness to palpation low back L L3-5 and L hip. No tenderness to palpation at the sciatic notch. SI joints non-tender. Trochanters non tender. Straight leg raise neg  Hip Impingement slight + L    Sensation grossly intact to light touch. MOTOR:     Hip Flex Quads Hamstrings Ankle DF EHL Ankle PF   Right +4/5 +4/5 +4/5 +4/5 +4/5 +4/5   Left +4/5 +4/5 +4/5 +4/5 +4/5 +4/5     SEVERE HAMSTRING TENSION LLE      Ambulation with single point cane.  FWB.    + 'S CART        PAST MEDICAL HISTORY   Past Medical History:   Diagnosis Date    Bilateral hip pain     Chronic pain     back; hx of opiod addiction    Depression     Diabetes (HCC)     borderline, no meds-trying to control with diet    DJD (degenerative joint disease)     GERD (gastroesophageal reflux disease)     Hepatitis C 01/2015    +Harvoni rx    Hypertension     Kidney stones     Lymphadenopathy, generalized 12/05/2015    neg bx    Numbness of foot left    resolved per patient 1/29/16    S/P lumbar fusion 2/9/2016 L4/5 LAMINECTOMY/FUSION/TRANSFORAMINAL LUMBAR INTERBODY FUSION (TLIF) by Dr. Zavaleta Case on 2/8/16     Unspecified sleep apnea     no cpap machine-pt denies       Past Surgical History:   Procedure Laterality Date    HX APPENDECTOMY  as a child    HX BACK SURGERY  07/2015    HX CERVICAL FUSION  05/18/2017    ACDF C5/6 C6/7    HX COLONOSCOPY  2015    negative    HX LUMBAR FUSION  2/2016    HX ORTHOPAEDIC      denies    HX OTHER SURGICAL  05/2017    cervical fusion    HX TONSILLECTOMY  as a child    REMOVE Josette Right 3-10-16    Dr. Guillaume Person   . MEDICATIONS      Current Outpatient Medications   Medication Sig Dispense Refill    terbinafine HCL (LamISIL) 250 mg tablet Take 250 mg by mouth daily.  pregabalin (Lyrica) 75 mg capsule Take 1 Cap by mouth two (2) times a day. Max Daily Amount: 150 mg. 60 Cap 0    omeprazole (PRILOSEC) 20 mg capsule Take 1 Cap by mouth daily. 90 Cap 6        ALLERGIES    Allergies   Allergen Reactions    Nicotine Contact Dermatitis     Nicotine Patch reaction - Contact dermatitis with numbness and tingling also occuring in the left arm and denuding of the skin.     Thimerosal Other (comments)     Contact lens solution, made eyes red and irrated          SOCIAL HISTORY    Social History     Socioeconomic History    Marital status:      Spouse name: Not on file    Number of children: Not on file    Years of education: Not on file    Highest education level: Not on file   Occupational History    Not on file   Social Needs    Financial resource strain: Not on file    Food insecurity     Worry: Not on file     Inability: Not on file    Transportation needs     Medical: Not on file     Non-medical: Not on file   Tobacco Use    Smoking status: Current Every Day Smoker     Packs/day: 1.00     Years: 40.00     Pack years: 40.00     Types: Cigarettes     Start date: 4/24/1966    Smokeless tobacco: Former User    Tobacco comment: 1 pack per day   Substance and Sexual Activity    Alcohol use: No     Alcohol/week: 0.0 standard drinks    Drug use: Yes     Types: Marijuana    Sexual activity: Never   Lifestyle    Physical activity     Days per week: Not on file     Minutes per session: Not on file    Stress: Not on file   Relationships    Social connections     Talks on phone: Not on file     Gets together: Not on file     Attends [employer] service: Not on file     Active member of club or organization: Not on file     Attends meetings of clubs or organizations: Not on file     Relationship status: Not on file    Intimate partner violence     Fear of current or ex partner: Not on file     Emotionally abused: Not on file     Physically abused: Not on file     Forced sexual activity: Not on file   Other Topics Concern    Not on file   Social History Narrative    Not on file       FAMILY HISTORY    Family History   Problem Relation Age of Onset    Diabetes Sister     SLE Sister     Arthritis-osteo Sister     Heart Disease Sister          Jae Augustin NP

## 2020-09-29 ENCOUNTER — HOSPITAL ENCOUNTER (OUTPATIENT)
Dept: PHYSICAL THERAPY | Age: 70
Discharge: HOME | End: 2020-09-29
Payer: MEDICARE

## 2020-09-29 PROCEDURE — 97110 THERAPEUTIC EXERCISES: CPT

## 2020-09-29 PROCEDURE — 97112 NEUROMUSCULAR REEDUCATION: CPT

## 2020-09-29 NOTE — PROGRESS NOTES
PT DAILY TREATMENT NOTE 10-18    Patient Name: Ramone Adjutant  Date:2020  : 1950  [x]  Patient  Verified  Payor: [facility] MEDICARE / Plan: [facility] COMMUNITY PLAN MCR / Product Type: Managed Care Medicare /    In time:10:30  Out time: 11;25  Total Treatment Time (min): 55  Visit #: 4 of 10    Medicare/BCBS Only   Total Timed Codes (min):  55 1:1 Treatment Time:  55       Treatment Area: Muscle weakness (generalized) [M62.81]  Malignant neoplasm of bladder, unspecified [C67.9]    SUBJECTIVE  Pain Level (0-10 scale): 6-7  Any medication changes, allergies to medications, adverse drug reactions, diagnosis change, or new procedure performed?: [] No    [x] Yes (see summary sheet for update)  Subjective functional status/changes:   [] No changes reported  Dr. Montse Cat added Lyrica to address my nerve pain Just started last night. I apologize if I'm emotional   But my Katelin Morley mom was ketty horrible MVA last night is on Life Support. OBJECTIVE      45 min Therapeutic Exercise:  [] See flow sheet :   Rationale: increase ROM, increase strength and improve coordination to improve the patients ability to resume walking tolerance, ease of ADL's    10 min Neuromuscular Re-education:  []  See flow sheet :   Rationale: increase strength, improve coordination, improve balance and increase proprioception  to improve the patients ability to increase stability for safe ambulation, functional tasks          With   [] TE   [] TA   [] neuro   [] other: Patient Education: [x] Review HEP    [] Progressed/Changed HEP based on:   [] positioning   [] body mechanics   [] transfers   [] heat/ice application    [] other:      Other Objective/Functional Measures:   Pt very emotional today. Stated that his Katelin Morley mother was in a traumatic MVA last night and is on Life Support. Pain Level (0-10 scale) post treatment: 6    ASSESSMENT/Changes in Function: Able to increase reps without reports of increased pain.   Pt highly motivated and attempts standing ex's without UE support,but frequently using finger tip touch for balance checks    Patient will continue to benefit from skilled PT services to modify and progress therapeutic interventions, address functional mobility deficits, address ROM deficits, address strength deficits, analyze and address soft tissue restrictions, analyze and cue movement patterns, analyze and modify body mechanics/ergonomics, assess and modify postural abnormalities, address imbalance/dizziness and instruct in home and community integration to attain remaining goals. []  See Plan of Care  []  See progress note/recertification  []  See Discharge Summary         Progress towards goals / Updated goals:  Goal: Pt to be compliant with initial HEP to improve posture and LE strength. Met 9/18/20  Status at last note/certification: Established and reviewed with Pt  Goal Met  Long Term Goals: To be accomplished in 5 weeks:  Goal: Pt to increase left hip strength to 4/5 grossly to negotiate 4 steps with reciprocal pattern without difficulty for ease entering home, community buildings. Status at last note/certification: 3+/5 grossly  Goal: Pt to report no difficulty with walking one block to increase activity tolerance within the community. Status at last note/certification: limited a lot in performing  Not Met  Hasn't seen much change. Still gets fatigued and uses SPC for support  Goal: Pt to perform 5 time sit to stand test in 30 seconds to show improved LE strength and endurance for ease of transfers and activity in home. Status at last note/certification: 1 minute 5 seconds  Goal: Pt to report < 5/10 pain at worst to increase ease with ADLs. Status at last note/certification: 10/10 pain at worst  Progressing:  Still has flares  Just started Lyrica yesterday and he states that pain is better  Attributes it to his medication  Goal: Pt to report FOTO score of 56 pts to show improved function and quality of life.   Status at last note/certification: UJJX 20 ILZ    Assess at MD note    PLAN  [x]  Upgrade activities as tolerated     []  Continue plan of care  []  Update interventions per flow sheet       []  Discharge due to:_  []  Other:_      Jayda Quinteros, PTA 9/29/2020  10:36 AM    Future Appointments   Date Time Provider Jaime Paki   10/1/2020  9:00 AM Nilda Lentz Ards, PT [facility]   10/6/2020 10:30 AM Nilda Lentz Ards, PT [facility]   10/8/2020  9:45 AM Jamin, [address] [place] [address]   10/12/2020  1:30 PM Virgil Barragan, PT [facility]   10/15/2020 10:30 AM Nilda Lentz Ards, PT [facility]   10/26/2020  8:15 AM Koko Beverly NP [facility] BS AMB   11/20/2020  9:40 AM Carol Dakin, NP [facility]   3/17/2021  8:30 AM Carlos Enrique Maya MD [facility] BS AMB

## 2020-10-01 ENCOUNTER — HOSPITAL ENCOUNTER (OUTPATIENT)
Dept: PHYSICAL THERAPY | Age: 70
Discharge: HOME | End: 2020-10-01
Payer: MEDICARE

## 2020-10-01 PROCEDURE — 97112 NEUROMUSCULAR REEDUCATION: CPT

## 2020-10-01 PROCEDURE — 97110 THERAPEUTIC EXERCISES: CPT

## 2020-10-01 NOTE — PROGRESS NOTES
PT DAILY TREATMENT NOTE 10-18    Patient Name: Mckinley Sheehan  Date:10/1/2020  : 1950  [x]  Patient  Verified  Payor: Arminda Gonzalez / Plan: [facility] COMMUNITY PLAN MCR / Product Type: Managed Care Medicare /    In time:9:00  Out time:9:45  Total Treatment Time (min):45   Visit #: 5 of 10    Medicare/BCBS Only   Total Timed Codes (min): 45  1:1 Treatment Time: 45        Treatment Area: Muscle weakness (generalized) [M62.81]  Malignant neoplasm of bladder, unspecified [C67.9]    SUBJECTIVE  Pain Level (0-10 scale): 5/10  Any medication changes, allergies to medications, adverse drug reactions, diagnosis change, or new procedure performed?: [] No    [x] Yes (see summary sheet for update)  Subjective functional status/changes:   [] No changes reported  \"I'm feeling pretty good. I have some pain but I've been doing much better. I'm doing the HEP 3x/day. \"      OBJECTIVE    35 min Therapeutic Exercise:  [] See flow sheet :   Rationale: increase ROM, increase strength and improve coordination to improve the patients ability to resume walking tolerance, ease of ADLs    10 min Neuromuscular Re-education:  []  See flow sheet :   Rationale: increase strength, improve coordination, improve balance and increase proprioception  to improve the patients ability to           With   [] TE   [] TA   [] neuro   [] other: Patient Education: [x] Review HEP    [] Progressed/Changed HEP based on:   [] positioning   [] body mechanics   [] transfers   [] heat/ice application    [] other:      Other Objective/Functional Measures: Performed exercises with increase in repetitions. Reassessed goals. Pain Level (0-10 scale) post treatment: 5/10    ASSESSMENT/Changes in Function: Pt continues to have pain and weakness in LEs but reports improvements in strength and stamina since start of care. Pt exhibits improvements in ability to perform sit to stand transfers without UE support and increased ease with household ADLs.   Pt still limited with walking tolerance due to fatigue. Patient will continue to benefit from skilled PT services to modify and progress therapeutic interventions, address functional mobility deficits, address ROM deficits, address strength deficits, analyze and address soft tissue restrictions, analyze and cue movement patterns, analyze and modify body mechanics/ergonomics, assess and modify postural abnormalities, address imbalance/dizziness and instruct in home and community integration to attain remaining goals. []  See Plan of Care  []  See progress note/recertification  []  See Discharge Summary         Progress towards goals / Updated goals:  Goal: Pt to be compliant with initial HEP to improve posture and LE strength.   Status at last note/certification: Established and reviewed with Pt  Current status: met - Pt reports compliance  Long Term Goals: To be accomplished in 5 weeks:  Goal: Pt to increase left hip strength to 4/5 grossly to negotiate 4 steps with reciprocal pattern without difficulty for ease entering home, community buildings. Status at last note/certification: 3+/5 grossly  Current status: reassess at next visit  Goal: Pt to report no difficulty with walking one block to increase activity tolerance within the community. Status at last note/certification: limited a lot in performing  Current status: not met - still limited a lot due to fatigue; needs SPC for support  Goal: Pt to perform 5 time sit to stand test in 30 seconds to show improved LE strength and endurance for ease of transfers and activity in home. Status at last note/certification: 1 minute 5 seconds  Current status: progressing - 38 seconds  Goal: Pt to report < 5/10 pain at worst to increase ease with ADLs.   Status at last note/certification: 10/10 pain at worst  Current status: progressing - 5/10 pain on average but has episodes of 10/10 but less frequent and doesn't last as long when occurs  Goal: Pt to report FOTO score of 56 pts to show improved function and quality of life.   Status at last note/certification: OZHD 77 FUR    Current status: reassess at MD note    PLAN  [x]  Upgrade activities as tolerated     []  Continue plan of care  []  Update interventions per flow sheet       []  Discharge due to:_  []  Other:_      Facundo Lentz, PT 10/1/2020  10:36 AM    Future Appointments   Date Time Provider Jaime Reeder   10/6/2020 10:30 AM [facility]   10/7/2020  9:00 AM [facility] Open[facility]   10/8/2020  9:45 AM Cintia Jerry Ymca [facility]   10/12/2020  1:30 PM Amy Jett, PT [facility]   10/15/2020 10:30 AM Facundo Lentz, PT [facility]   10/26/2020  8:15 AM Rosy Mccormack NP [facility] AMB   11/20/2020  9:40 AM Cornelio Palomares NP [facility] OpenPM   3/17/2021  8:30 AM Diane Medrano MD [facility] BS AMB

## 2020-10-08 ENCOUNTER — HOSPITAL ENCOUNTER (OUTPATIENT)
Dept: PHYSICAL THERAPY | Age: 70
Discharge: HOME | End: 2020-10-08
Payer: MEDICARE

## 2020-10-08 PROCEDURE — 97112 NEUROMUSCULAR REEDUCATION: CPT

## 2020-10-08 PROCEDURE — 97110 THERAPEUTIC EXERCISES: CPT

## 2020-10-08 NOTE — PROGRESS NOTES
PT DAILY TREATMENT NOTE 10-18    Patient Name: Ilya Choudhury  Date:10/8/2020  : 1950  [x]  Patient  Verified  Payor: Arminda Gonzalez / Plan: [facility] COMMUNITY PLAN MCR / Product Type: Managed Care Medicare /    In time: 2:10   Out time: 2:49  Total Treatment Time (min): 39  Visit #: 6 of 10    Medicare/BCBS Only   Total Timed Codes (min):  39 1:1 Treatment Time:  39       Treatment Area: Muscle weakness (generalized) [M62.81]  Malignant neoplasm of bladder, unspecified [C67.9]    SUBJECTIVE  Pain Level (0-10 scale):  4/10  Any medication changes, allergies to medications, adverse drug reactions, diagnosis change, or new procedure performed?: [x] No    [] Yes (see summary sheet for update)  Subjective functional status/changes:   [] No changes reported  Patient stated that he has been feeling pretty good, and thinks the lyrica has been helping with his nerve pain. OBJECTIVE    24 min Therapeutic Exercise:  [x] See flow sheet :   Rationale: increase ROM and increase strength to improve the patients ability to perform ADLs safely and efficiently     15 min Neuromuscular Re-education:  [x]  See flow sheet : balance acts    Rationale: increase strength, improve coordination, improve balance and increase proprioception  to improve the patients ability to ambulate in community safely and efficiently       With   [] TE   [] TA   [] neuro   [] other: Patient Education: [x] Review HEP    [] Progressed/Changed HEP based on:   [] positioning   [] body mechanics   [] transfers   [] heat/ice application    [] other:      Other Objective/Functional Measures: Patient fatigue with exercises and therapist offered many times to reduce reps or take a rest break, but patient stated he felt fine and didn't need a rest break.        Pain Level (0-10 scale) post treatment: 4/10     ASSESSMENT/Changes in Function:  Patient exhibits mild instability when performing standing exercises and attempting to perform without UE support. Patient relies heavily on visual feedback and is challenged with balance activities that involve eyes closed. Patient mildly winded during exercises so assessed O2 and HR. O2: 97 HR: 92. Patient reported no increase in pain at end of the session and felt fine. Patient will continue to benefit from skilled PT services to modify and progress therapeutic interventions, address functional mobility deficits, address ROM deficits, address strength deficits, analyze and address soft tissue restrictions, analyze and cue movement patterns, analyze and modify body mechanics/ergonomics, assess and modify postural abnormalities and address imbalance/dizziness to attain remaining goals. []  See Plan of Care  []  See progress note/recertification  []  See Discharge Summary         Progress towards goals / Updated goals:  Goal: Pt to be compliant with initial HEP to improve posture and LE strength.   Status at last note/certification: Established and reviewed with Pt  Current status: met - Pt reports compliance  Long Term Goals: To be accomplished in 5 weeks:  Goal: Pt to increase left hip strength to 4/5 grossly to negotiate 4 steps with reciprocal pattern without difficulty for ease entering home, community buildings. Status at last note/certification: 3+/5 grossly  Current status: reassess at next visit  Goal: Pt to report no difficulty with walking one block to increase activity tolerance within the community. Status at last note/certification: limited a lot in performing  Current status: not met - still limited a lot due to fatigue; needs SPC for support  Goal: Pt to perform 5 time sit to stand test in 30 seconds to show improved LE strength and endurance for ease of transfers and activity in home. Status at last note/certification: 1 minute 5 seconds  Current status: progressing - 38 seconds  Goal: Pt to report < 5/10 pain at worst to increase ease with ADLs.   Status at last note/certification: 10/10 pain at worst  Current status: progressing - 5/10 pain on average but has episodes of 10/10 but less frequent and doesn't last as long when occurs  Goal: Pt to report FOTO score of 56 pts to show improved function and quality of life.   Status at last note/certification: YDAL 40 CKP    Current status: reassess at MD note    PLAN  []  Upgrade activities as tolerated     [x]  Continue plan of care  []  Update interventions per flow sheet       []  Discharge due to:_  []  Other:_      Patrick Randolph, PT 10/8/2020  2:07 PM    Future Appointments   Date Time Provider Jaime Reeder   10/8/2020  2:15 PM Regina Lee, PT [facility]   10/12/2020  1:30 PM Ramez George, PT [facility]   10/15/2020 10:30 AM Lucretia Lentz, PT [facility]   10/26/2020  8:15 AM HAILEY Ruano BS AMB   11/20/2020  9:40 AM Vazquez Pittman NP [facility]   3/17/2021  8:30 AM Christie Stanford MD [facility] BS AMB

## 2020-10-12 ENCOUNTER — HOSPITAL ENCOUNTER (OUTPATIENT)
Dept: PHYSICAL THERAPY | Age: 70
Discharge: HOME | End: 2020-10-12
Payer: MEDICARE

## 2020-10-12 PROCEDURE — 97112 NEUROMUSCULAR REEDUCATION: CPT

## 2020-10-12 PROCEDURE — 97110 THERAPEUTIC EXERCISES: CPT

## 2020-10-12 NOTE — PROGRESS NOTES
PT DAILY TREATMENT NOTE 10-18    Patient Name: Favian Lopez  Date:10/12/2020  : 1950  [x]  Patient  Verified  Payor: Arminda Gonzalez / Plan: [facility] COMMUNITY PLAN MCR / Product Type: Managed Care Medicare /    In time:131  Out time:214  Total Treatment Time (min): 43  Visit #: 7 of 10    Medicare/BCBS Only   Total Timed Codes (min):  43 1:1 Treatment Time:  43       Treatment Area: Muscle weakness (generalized) [M62.81]  Malignant neoplasm of bladder, unspecified [C67.9]    SUBJECTIVE  Pain Level (0-10 scale): 3  Any medication changes, allergies to medications, adverse drug reactions, diagnosis change, or new procedure performed?: [x] No    [] Yes (see summary sheet for update)  Subjective functional status/changes:   [] No changes reported  It's not too bad right now. I was hurting some this morning.      OBJECTIVE    Modality rationale:    Min Type Additional Details    [] Estim:  []Unatt       []IFC  []Premod                        []Other:  []w/ice   []w/heat  Position:  Location:    [] Estim: []Att    []TENS instruct  []NMES                    []Other:  []w/US   []w/ice   []w/heat  Position:  Location:    []  Traction: [] Cervical       []Lumbar                       [] Prone          []Supine                       []Intermittent   []Continuous Lbs:  [] before manual  [] after manual    []  Ultrasound: []Continuous   [] Pulsed                           []1MHz   []3MHz W/cm2:  Location:    []  Iontophoresis with dexamethasone         Location: [] Take home patch   [] In clinic    []  Ice     []  heat  []  Ice massage  []  Laser   []  Anodyne Position:  Location:    []  Laser with stim  []  Other:  Position:  Location:    []  Vasopneumatic Device Pressure:       [] lo [] med [] hi   Temperature: [] lo [] med [] hi   [] Skin assessment post-treatment:  []intact []redness- no adverse reaction    []redness  adverse reaction:     25 min Therapeutic Exercise:  [x] See flow sheet :   Rationale: increase ROM and increase strength to improve the patients ability to improve ease of ADLs    18 min Neuromuscular Re-education:  [x]  See flow sheet :   Rationale: increase strength, improve coordination, improve balance and increase proprioception  to improve the patients ability to improve overall stability with ambulation, daily tasks in order to improve safety with functional activities        With   [] TE   [] TA   [] neuro   [] other: Patient Education: [x] Review HEP    [] Progressed/Changed HEP based on:   [] positioning   [] body mechanics   [] transfers   [] heat/ice application    [] other:      Other Objective/Functional Measures: see goals     Pain Level (0-10 scale) post treatment: 2    ASSESSMENT/Changes in Function: Patient reports good reduction in pain levels since starting therapy as well as Lyrica. He reports improved motivation with strengthening, and is getting ready to being comfortable discharging to independent management. We will plan to discharge next visit, and visit [facility] gym to determine appropriate routine for Patient to begin after discharge. Patient will continue to benefit from skilled PT services to modify and progress therapeutic interventions, address functional mobility deficits, address ROM deficits, address strength deficits, analyze and address soft tissue restrictions, analyze and cue movement patterns, analyze and modify body mechanics/ergonomics, assess and modify postural abnormalities, address imbalance/dizziness and instruct in home and community integration to attain remaining goals.      []  See Plan of Care  []  See progress note/recertification  []  See Discharge Summary         Progress towards goals / Updated goals:  Goal: Pt to be compliant with initial HEP to improve posture and LE strength.   Status at last note/certification: Established and reviewed with Pt  Current status: met - Pt reports compliance  Long Term Goals: To be accomplished in 5 weeks:  Goal: Pt to increase left hip strength to 4/5 grossly to negotiate 4 steps with reciprocal pattern without difficulty for ease entering home, community buildings. Status at last note/certification: 3+/5 grossly  Current status: met  Goal: Pt to report no difficulty with walking one block to increase activity tolerance within the community. Status at last note/certification: limited a lot in performing  Current status: progressing - improving stability but does use cane after about half a block  Goal: Pt to perform 5 time sit to stand test in 30 seconds to show improved LE strength and endurance for ease of transfers and activity in home. Status at last note/certification: 1 minute 5 seconds  Current status: met - 15 seconds  Goal: Pt to report < 5/10 pain at worst to increase ease with ADLs. Status at last note/certification: 10/10 pain at worst  Current status: progressing - 6/10 worst recently (since starting Lyrica)  Goal: Pt to report FOTO score of 56 pts to show improved function and quality of life.   Status at last note/certification: XPUX 25 WXQ    Current status: reassess at discharge     PLAN  [x]  Upgrade activities as tolerated     [x]  Continue plan of care  []  Update interventions per flow sheet       []  Discharge due to:_  []  Other:_      Alberto Castelan, PT 10/12/2020  1:33 PM    Future Appointments   Date Time Provider Jaime Reeder   10/15/2020 10:30 AM Ander Lentz, MAX [facility]   10/26/2020  8:15 AM HAILEY Tang BS AMB   11/20/2020  9:40 AM Christina Benson NP [facility]   1/20/2021  9:00 AM [facility]   3/17/2021  8:30 AM Anthony Garsia MD [facility] AMB

## 2020-10-13 ENCOUNTER — APPOINTMENT (OUTPATIENT)
Dept: PHYSICAL THERAPY | Age: 70
End: 2020-10-13
Payer: MEDICARE

## 2020-10-15 ENCOUNTER — HOSPITAL ENCOUNTER (OUTPATIENT)
Dept: PHYSICAL THERAPY | Age: 70
Discharge: HOME | End: 2020-10-15
Payer: MEDICARE

## 2020-10-15 PROCEDURE — 97110 THERAPEUTIC EXERCISES: CPT

## 2020-10-15 PROCEDURE — 97530 THERAPEUTIC ACTIVITIES: CPT

## 2020-10-15 PROCEDURE — 97112 NEUROMUSCULAR REEDUCATION: CPT

## 2020-10-15 NOTE — PROGRESS NOTES
Physical Therapy Discharge Instructions      [facility] BRONWYN KRISTOFERLacey BRAN [facility], [address] [place] [address]  [phone] fax    Patient: Rosa Scott  : 1950      Continue Home Exercise Program 1 times per day for 2 weeks, then decrease to 3 times per week      Continue with    [] Ice  as needed  times per day     [] Heat           Follow up with MD:     [] Upon completion of therapy     [x] As needed      Recommendations:     []   Return to activity with home program    [x]   Return to activity with the following modifications:       []Post Rehab Program    []Join Independent aquatic program     [x]Return to/join local gym    Additional Comments: It has been a pleasure working with you!!  I'm glad you are doing better. Continue with the exercises at the [facility] and please contact us if we can be of further assistance to you. Stay safe and healthy.       Bobo Lentz, PT 10/15/2020 10:36 AM

## 2020-10-15 NOTE — PROGRESS NOTES
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, speech; Visually impaired right eye - cataracts   Prior Level of Function: Lives in mobile home; functionally independent with modifications    Visits from Start of Care: 8    Missed Visits: 2    Reporting Period : 20 to 10/15/20    Date:10/15/2020  : 1950  [x]  Patient  Verified  Payor: [employer] MEDICARE / Plan: [employer] MCR / Product Type: Managed Care Medicare /    In time:10:30  Out time:11:13  Total Treatment Time (min): 43  Visit #: 8 of 10    Medicare/BCBS Only   Total Timed Codes (min):  43 1:1 Treatment Time:  43       SUBJECTIVE  Pain Level (0-10 scale): 7/10  Any medication changes, allergies to medications, adverse drug reactions, diagnosis change, or new procedure performed?: [x] No    [] Yes (see summary sheet for update)  Subjective functional status/changes:   [] No changes reported  \"The nerve pain is acting up this morning. \"    OBJECTIVE    23 min Therapeutic Exercise:  [] See flow sheet :   Rationale: increase ROM and increase strength to improve the patients ability to increase standing/amb tolerance for ease of walking program    10 min Therapeutic Activity:  []  See flow sheet : goals, FOTO   Rationale: increase ROM and increase strength  to improve the patients ability to increase ease of functional tasks, community activities     10 min Neuromuscular Re-education:  []  See flow sheet : Rationale: increase strength, improve coordination and improve balance  to improve the patients ability to improve stability with standing/amb to reduce falls risk          With   [] TE   [] TA   [] neuro   [] other: Patient Education: [x] Review HEP    [] Progressed/Changed HEP based on:   [] positioning   [] body mechanics   [] transfers   [] heat/ice application    [] other:      Other Objective/Functional Measures: Performed exercises per flow sheet. Reassessed goals for discharge. Pain Level (0-10 scale) post treatment: 4/10    Summary of Care:  Short Term Goals: To be accomplished in 1 week:  Goal: Pt to be compliant with initial HEP to improve posture and LE strength.   Status at last note/certification: Established and reviewed with Pt  Current status: met - Pt reports compliance  Long Term Goals: To be accomplished in 5 weeks:  Goal: Pt to increase left hip strength to 4/5 grossly to negotiate 4 steps with reciprocal pattern without difficulty for ease entering home, community buildings. Status at last note/certification: 3+/5 grossly  Current status: met - 4/5 left hip strength grossly  Goal: Pt to report no difficulty with walking one block to increase activity tolerance within the community. Status at last note/certification: limited a lot in performing  Current status: progressing - improving stability but does use cane after about half a block  Goal: Pt to perform 5 time sit to stand test in 30 seconds to show improved LE strength and endurance for ease of transfers and activity in home. Status at last note/certification: 1 minute 5 seconds  Current status: met - 15 seconds  Goal: Pt to report < 5/10 pain at worst to increase ease with ADLs. Status at last note/certification: 10/10 pain at worst  Current status: progressing - 6/10 worst recently (since starting Lyrica)  Goal: Pt to report FOTO score of 56 pts to show improved function and quality of life.   Status at last note/certification: FOTO 40 pts    Current status: progressing - FOTO 52 pts    ASSESSMENT/Changes in Function: Pt attended 8 visits consistently and made good progress with skilled physical therapy services. At time of last visit, Pt reported the following:  Functional Gains - Pt reports improved range of motion, improved standing balance; Functional Deficits - Pt has difficulty with bending and stooping, pain with increased activity levels, limited walking tolerance to 1 block; and 70% improvement since start of care. Pt has met or is progressing towards all goals and is compliant with comprehensive HEP. Pt is appropriate for D/C at this time to continue to manage care independently and maintain long term gains made with skilled therapy.   Thank you for this referral.      Thank you for this referral!      PLAN  [x]Discontinue therapy: [x]Patient has reached or is progressing toward set goals      []Patient is non-compliant or has abdicated      []Due to lack of appreciable progress towards set goals    Cheri Lentz, PT 10/15/2020  10:33 AM    [] I have read the above report and request that my patient continue therapy with the following changes/special instructions:  [] I have read the above report and request that my patient be discharged from therapy    Physician's Signature:____________Date:_________TIME:________    ** Signature, Date and Time must be completed for valid certification **

## 2020-10-26 ENCOUNTER — OFFICE VISIT (OUTPATIENT)
Dept: ORTHOPEDIC SURGERY | Age: 70
End: 2020-10-26
Payer: MEDICARE

## 2020-10-26 VITALS
HEIGHT: 71 IN | DIASTOLIC BLOOD PRESSURE: 97 MMHG | SYSTOLIC BLOOD PRESSURE: 183 MMHG | HEART RATE: 86 BPM | TEMPERATURE: 96.9 F | OXYGEN SATURATION: 98 % | WEIGHT: 215.8 LBS | BODY MASS INDEX: 30.21 KG/M2

## 2020-10-26 DIAGNOSIS — G62.9 NEUROPATHY: Primary | ICD-10-CM

## 2020-10-26 DIAGNOSIS — M48.062 SPINAL STENOSIS, LUMBAR REGION WITH NEUROGENIC CLAUDICATION: ICD-10-CM

## 2020-10-26 PROCEDURE — G8417 CALC BMI ABV UP PARAM F/U: HCPCS | Performed by: NURSE PRACTITIONER

## 2020-10-26 PROCEDURE — G8753 SYS BP > OR = 140: HCPCS | Performed by: NURSE PRACTITIONER

## 2020-10-26 PROCEDURE — G8536 NO DOC ELDER MAL SCRN: HCPCS | Performed by: NURSE PRACTITIONER

## 2020-10-26 PROCEDURE — 1101F PT FALLS ASSESS-DOCD LE1/YR: CPT | Performed by: NURSE PRACTITIONER

## 2020-10-26 PROCEDURE — 3017F COLORECTAL CA SCREEN DOC REV: CPT | Performed by: NURSE PRACTITIONER

## 2020-10-26 PROCEDURE — G8755 DIAS BP > OR = 90: HCPCS | Performed by: NURSE PRACTITIONER

## 2020-10-26 PROCEDURE — 99213 OFFICE O/P EST LOW 20 MIN: CPT | Performed by: NURSE PRACTITIONER

## 2020-10-26 PROCEDURE — G8427 DOCREV CUR MEDS BY ELIG CLIN: HCPCS | Performed by: NURSE PRACTITIONER

## 2020-10-26 PROCEDURE — G9717 DOC PT DX DEP/BP F/U NT REQ: HCPCS | Performed by: NURSE PRACTITIONER

## 2020-10-26 RX ORDER — LANOLIN ALCOHOL/MO/W.PET/CERES
500 CREAM (GRAM) TOPICAL DAILY
COMMUNITY

## 2020-10-26 RX ORDER — PREGABALIN 100 MG/1
100 CAPSULE ORAL 2 TIMES DAILY
Qty: 60 CAP | Refills: 2 | Status: SHIPPED | OUTPATIENT
Start: 2020-10-26 | End: 2021-05-27

## 2020-10-26 NOTE — PROGRESS NOTES
[address]  [address]  [place], [facility] [address]  Phone: [phone]  Fax: [phone]  PROGRESS NOTE  Patient: Mckinley Sheehan                MRN: [id number]       SSN: [id number]  YOB: 1950        AGE: 72 y.o. SEX: male  Body mass index is 30.1 kg/m². PCP: Sheng Blanco MD  10/26/20    Chief Complaint   Patient presents with    Back Pain       HISTORY OF PRESENT ILLNESS:  Daryn Harris a 72 y.o.  male with history of chronic LBP and BLE pain for 30+ years and radiation of pain to BLE in the L4-5 distribution. 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  He had an EMG 01/2017 that showed peripheral sensiromotor neuropathy and BLE Chronic L5 radiculopathy and possible L4 and S1 radiculopathy. He has failed a SCS trial.  At last OV I re-started his Lyrica and referred him to PM. Today, he is doing well today and reports improvement of his neuropathy w/ the Lyrica w/out SEs. He does do isometric exercises at the [facility] 3x a week. He is also having some L hip painpain is aching, burning and numbing, pain is worse with walking and affects standing and recreational activities. Pain is better with relaxation.     Denies bladder/bowel dysfunction, saddle paresthesia, weakness, gait disturbance, or other neurological deficits.      Medications: Lyrica 75mg BID.     PMHx: bladder cancer w/ surgery-has urostomy bag, opioid abuse-in remission x15 yrs, HTN, GFR 55 in 4/2020      ASSESSMENT   Diagnoses and all orders for this visit:    1. Neuropathy  -     pregabalin (LYRICA) 100 mg capsule; Take 1 Cap by mouth two (2) times a day.  Max Daily Amount: 200 mg.    2. Spinal stenosis, lumbar region with neurogenic claudication  -     pregabalin (LYRICA) 100 mg capsule; Take 1 Cap by mouth two (2) times a day. Max Daily Amount: 200 mg. IMPRESSION AND PLAN:  This is a pt with neuropathy and permanent nerve damage. He would like to increase his Lyrica, max dose for him would be 150mg BID due to his kidney function. > Pt was given information on Lyrica   > Increase Lyrica  > HEP  > Mr. Timo Gordon has a reminder for a \"due or due soon\" health maintenance. I have asked that he contact his primary care provider, Alyson Nance MD, for follow-up on this health maintenance.  > We have informed patient to notify us for immediate appointment if he has any worsening neurogical symptoms or if an emergency situation presents, then call 911  >  has been reviewed and is appropriate  > Pt will follow-up in 3mo w/ NP. Subjective    Pain Scale: 7/10    Pain Assessment  10/26/2020   Location of Pain Back   Pain Location Comment -   Location Modifiers -   Severity of Pain 7   Quality of Pain Throbbing; Sharp;Dull;Aching;Burning   Quality of Pain Comment -   Duration of Pain -   Frequency of Pain Constant   Date Pain First Started -   Aggravating Factors -   Aggravating Factors Comment -   Limiting Behavior -   Relieving Factors -   Relieving Factors Comment -   Result of Injury -         REVIEW OF SYSTEMS  Constitutional: Negative for fever, chills, or weight change. Respiratory: Negative for cough or shortness of breath. Cardiovascular: Negative for chest pain or palpitations. Gastrointestinal: Negative for incontinence, acid reflux, change in bowel habits, or constipation. Genitourinary: Negative for incontinence, dysuria and flank pain. Musculoskeletal: Positive for NECK, BACK, BLE pain. See HPI. Skin: Negative for rash. Neurological:BLE L5  radiculopathy. See HPI. Endo/Heme/Allergies: Negative. Psychiatric/Behavioral: Negative.       PHYSICAL EXAMINATION  Visit Vitals  BP (!) 183/97 (BP 1 Location: Right arm, BP Patient Position: Sitting)   Pulse 86   Temp 96.9 °F (36.1 °C) (Oral)   Ht 5' 11\" (1.803 m)   Wt 215 lb 12.8 oz (97.9 kg)   SpO2 98%   BMI 30.10 kg/m²         Accompanied by Self. Constitutional:  Well developed, well nourished, in no acute distress. Psychiatric: Affect and mood are appropriate. Integumentary: No rashes or abrasions noted on exposed areas. Cardiovascular/Peripheral Vascular: +2 radial & pedal pulses. No peripheral edema is noted. Lymphatic:  No evidence of lymphedema. No cervical lymphadenopathy. SPINE/MUSCULOSKELETAL EXAM  Cervical spine:  Neck is midline. Normal muscle tone. No focal atrophy is noted. Neck ROM decreased with flexion, extension, turning right, turning left. Shoulder ROM intact. Tenderness to palpation none. Negative Spurling's sign. Negative Tinel's sign. Negative Reyes's sign. Sensation grossly intact to light touch. Lumbar spine:  No rash, ecchymosis, or gross obliquity. No fasciculations. No focal atrophy is noted. Range of motion is decreased with flexion, extension. Tenderness to palpation bilateral low back and L hip. No tenderness to palpation at the sciatic notch. SI joints non-tender. Trochanters non tender. Straight leg raise neg  Hip Impingement neg    Sensation grossly intact to light touch. MOTOR:        Biceps  Triceps Deltoids Wrist Ext Wrist Flex Hand Intrin   Right +4/5 +4/5 +4/5 +4/5 +4/5 +4/5   Left +4/5 +4/5 +4/5 +4/5 +4/5 +4/5      Hip Flex Quads Hamstrings Ankle DF EHL Ankle PF   Right +4/5 +4/5 +4/5 +4/5 +4/5 +4/5   Left +4/5 +4/5 +4/5 +4/5 +4/5 +4/5       Ambulation with single point cane.  FWB.    + 's cart        PAST MEDICAL HISTORY   Past Medical History:   Diagnosis Date    Bilateral hip pain     Chronic pain     back; hx of opiod addiction    Depression     Diabetes ([address]     borderline, no meds-trying to control with diet    DJD (degenerative joint disease)     GERD (gastroesophageal reflux disease)     Hepatitis C 01/2015    +Harvoni rx    Hypertension     Kidney stones     Lymphadenopathy, generalized 12/05/2015    neg bx    Numbness of foot left    resolved per patient 1/29/16    S/P lumbar fusion 2/9/2016    L4/5 LAMINECTOMY/FUSION/TRANSFORAMINAL LUMBAR INTERBODY FUSION (TLIF) by Dr. Elgin Card on 2/8/16     Unspecified sleep apnea     no cpap machine-pt denies       Past Surgical History:   Procedure Laterality Date    HX APPENDECTOMY  as a child    HX BACK SURGERY  07/2015    HX CERVICAL FUSION  05/18/2017    ACDF C5/6 C6/7    HX COLONOSCOPY  2015    negative    HX LUMBAR FUSION  2/2016    HX ORTHOPAEDIC      denies    HX OTHER SURGICAL  05/2017    cervical fusion    HX TONSILLECTOMY  as a child    [facility]    Dr. Sameer Pennington   . MEDICATIONS      Current Outpatient Medications   Medication Sig Dispense Refill    multivit-min/FA/lycopen/lutein (CENTRUM SILVER MEN PO) Take  by mouth.  cyanocobalamin (Vitamin B-12) 500 mcg tablet Take 500 mcg by mouth daily.  pregabalin (LYRICA) 100 mg capsule Take 1 Cap by mouth two (2) times a day. Max Daily Amount: 200 mg. 60 Cap 2    terbinafine HCL (LamISIL) 250 mg tablet Take 250 mg by mouth daily.  omeprazole (PRILOSEC) 20 mg capsule Take 1 Cap by mouth daily. 90 Cap 6        ALLERGIES    Allergies   Allergen Reactions    Nicotine Contact Dermatitis     Nicotine Patch reaction - Contact dermatitis with numbness and tingling also occuring in the left arm and denuding of the skin.     Thimerosal Other (comments)     Contact lens solution, made eyes red and irrated          SOCIAL HISTORY    Social History     Socioeconomic History    Marital status:      Spouse name: Not on file    Number of children: Not on file    Years of education: Not on file    Highest education level: Not on file   Occupational History    Not on file   Social Needs    Financial resource strain: Not on file    Food insecurity     Worry: Not on file     Inability: Not on file    Transportation needs     Medical: Not on file     Non-medical: Not on file   Tobacco Use    Smoking status: Current Every Day Smoker     Packs/day: 1.00     Years: 40.00     Pack years: 40.00     Types: Cigarettes     Start date: 4/24/1966    Smokeless tobacco: Former User    Tobacco comment: 1 pack per day   Substance and Sexual Activity    Alcohol use: No     Alcohol/week: 0.0 standard drinks    Drug use: Yes     Types: Marijuana    Sexual activity: Never   Lifestyle    Physical activity     Days per week: Not on file     Minutes per session: Not on file    Stress: Not on file   Relationships    Social connections     Talks on phone: Not on file     Gets together: Not on file     Attends Scientologist service: Not on file     Active member of club or organization: Not on file     Attends meetings of clubs or organizations: Not on file     Relationship status: Not on file    Intimate partner violence     Fear of current or ex partner: Not on file     Emotionally abused: Not on file     Physically abused: Not on file     Forced sexual activity: Not on file   Other Topics Concern    Not on file   Social History Narrative    Not on file       FAMILY HISTORY    Family History   Problem Relation Age of Onset    Diabetes Sister     SLE Sister     Arthritis-osteo Sister     Heart Disease Sister          Santy Olivas NP

## 2020-10-26 NOTE — PATIENT INSTRUCTIONS
Pregabalin (By mouth)   Pregabalin (pre-GA-ba-sabiha)  Treats nerve and muscle pain, including fibromyalgia. Also treats seizures. Brand Name(s): Lyrica   There may be other brand names for this medicine. When This Medicine Should Not Be Used: This medicine is not right for everyone. Do not use it if you had an allergic reaction to pregabalin. How to Use This Medicine:   Capsule, Liquid  · Take your medicine as directed. Your dose may need to be changed several times to find what works best for you. · Measure the oral liquid medicine with a marked measuring spoon, oral syringe, or medicine cup. · This medicine should come with a Medication Guide. Ask your pharmacist for a copy if you do not have one. · Missed dose: Take a dose as soon as you remember. If it is almost time for your next dose, wait until then and take a regular dose. Do not take extra medicine to make up for a missed dose. · Store the medicine in a closed container at room temperature, away from heat, moisture, and direct light. Drugs and Foods to Avoid:   Ask your doctor or pharmacist before using any other medicine, including over-the-counter medicines, vitamins, and herbal products. · Some medicines can affect how pregabalin works. Tell your doctor if you are using any of the following:   ¨ Diabetes medicine that you take by mouth (pioglitazone, rosiglitazone)  ¨ An ACE inhibitor (benazepril, enalapril, lisinopril, quinapril, ramipril)  · Tell your doctor if you use anything else that makes you sleepy. Some examples are allergy medicine, narcotic pain medicine, and alcohol. Warnings While Using This Medicine:   · Tell your doctor if you are pregnant or breastfeeding, or if you have kidney disease, heart failure, heart rhythm problems, angioedema, a bleeding disorder, or a low blood platelet count. Tell your doctor if you have a history of alcohol or drug abuse, depression, or other mood problems.   · This medicine may cause the following problems:   ¨ Serious allergic reactions  ¨ Suicidal thoughts  · This medicine may make you dizzy or drowsy. It may also cause blurry or double vision. Do not drive or do anything that could be dangerous until you know how this medicine affects you. · Do not stop using this medicine suddenly. Your doctor will need to slowly decrease your dose before you stop it completely. · Your doctor will check your progress and the effects of this medicine at regular visits. Keep all appointments. · Keep all medicine out of the reach of children. Never share your medicine with anyone. Possible Side Effects While Using This Medicine:   Call your doctor right away if you notice any of these side effects:  · Allergic reaction: Itching or hives, swelling in your face or hands, swelling or tingling in your mouth or throat, chest tightness, trouble breathing  · Blistering, peeling, red skin rash  · Blurry or double vision  · Fever, chills, cough, sore throat, and body aches  · Muscle pain, tenderness, or weakness, fever, or general ill feeling  · Rapid weight gain, swelling in your hands, ankles, or feet  · Severe dizziness or drowsiness  · Sudden mood changes, unusual thoughts or behavior such as extreme happiness or depression  · Suicidal thoughts  · Swelling in your throat, head, or neck  · Uneven heartbeat  · Unusual bleeding, bruising, or weakness  If you notice these less serious side effects, talk with your doctor:   · Confusion, trouble concentrating  · Constipation  · Dry mouth  If you notice other side effects that you think are caused by this medicine, tell your doctor. Call your doctor for medical advice about side effects. You may report side effects to FDA at 7-982-EJX-9565  © 2017 [facility] Information is for End User's use only and may not be sold, redistributed or otherwise used for commercial purposes. The above information is an  only.  It is not intended as medical advice for individual conditions or treatments. Talk to your doctor, nurse or pharmacist before following any medical regimen to see if it is safe and effective for you.

## 2020-10-28 LAB — HBA1C MFR BLD HPLC: 6.3 %

## 2020-12-30 ENCOUNTER — HOSPITAL ENCOUNTER (EMERGENCY)
Age: 70
Discharge: STILL ADMITTED OR ADMINISTRATIVE | End: 2020-12-30
Attending: EMERGENCY MEDICINE

## 2021-01-04 ENCOUNTER — APPOINTMENT (OUTPATIENT)
Dept: GENERAL RADIOLOGY | Age: 71
End: 2021-01-04
Attending: PHYSICIAN ASSISTANT
Payer: MEDICARE

## 2021-01-04 ENCOUNTER — HOSPITAL ENCOUNTER (EMERGENCY)
Age: 71
Discharge: HOME | End: 2021-01-04
Attending: EMERGENCY MEDICINE | Admitting: EMERGENCY MEDICINE
Payer: MEDICARE

## 2021-01-04 VITALS
RESPIRATION RATE: 18 BRPM | HEART RATE: 82 BPM | OXYGEN SATURATION: 99 % | SYSTOLIC BLOOD PRESSURE: 161 MMHG | DIASTOLIC BLOOD PRESSURE: 78 MMHG | TEMPERATURE: 98.1 F

## 2021-01-04 DIAGNOSIS — R60.0 LOWER EXTREMITY EDEMA: Primary | ICD-10-CM

## 2021-01-04 LAB
ALBUMIN SERPL-MCNC: 3.6 G/DL (ref 3.4–5)
ALBUMIN/GLOB SERPL: 1 {RATIO} (ref 0.8–1.7)
ALP SERPL-CCNC: 85 U/L (ref 45–117)
ALT SERPL-CCNC: 23 U/L (ref 16–61)
ANION GAP SERPL CALC-SCNC: 5 MMOL/L (ref 3–18)
AST SERPL-CCNC: 15 U/L (ref 10–38)
BASOPHILS # BLD: 0 K/UL (ref 0–0.1)
BASOPHILS NFR BLD: 1 % (ref 0–2)
BILIRUB SERPL-MCNC: 0.4 MG/DL (ref 0.2–1)
BNP SERPL-MCNC: 514 PG/ML (ref 0–900)
BUN SERPL-MCNC: 20 MG/DL (ref 7–18)
BUN/CREAT SERPL: 13 (ref 12–20)
CALCIUM SERPL-MCNC: 9.1 MG/DL (ref 8.5–10.1)
CHLORIDE SERPL-SCNC: 109 MMOL/L (ref 100–111)
CK MB CFR SERPL CALC: 5.2 % (ref 0–4)
CK MB SERPL-MCNC: 11.8 NG/ML (ref 5–25)
CK SERPL-CCNC: 228 U/L (ref 39–308)
CO2 SERPL-SCNC: 28 MMOL/L (ref 21–32)
CREAT SERPL-MCNC: 1.56 MG/DL (ref 0.6–1.3)
DIFFERENTIAL METHOD BLD: ABNORMAL
EOSINOPHIL # BLD: 0.2 K/UL (ref 0–0.4)
EOSINOPHIL NFR BLD: 3 % (ref 0–5)
ERYTHROCYTE [DISTWIDTH] IN BLOOD BY AUTOMATED COUNT: 14 % (ref 11.6–14.5)
GLOBULIN SER CALC-MCNC: 3.6 G/DL (ref 2–4)
GLUCOSE SERPL-MCNC: 122 MG/DL (ref 74–99)
HCT VFR BLD AUTO: 39.3 % (ref 36–48)
HGB BLD-MCNC: 12.9 G/DL (ref 13–16)
LYMPHOCYTES # BLD: 1.7 K/UL (ref 0.9–3.6)
LYMPHOCYTES NFR BLD: 32 % (ref 21–52)
MCH RBC QN AUTO: 30.3 PG (ref 24–34)
MCHC RBC AUTO-ENTMCNC: 32.8 G/DL (ref 31–37)
MCV RBC AUTO: 92.3 FL (ref 74–97)
MONOCYTES # BLD: 0.7 K/UL (ref 0.05–1.2)
MONOCYTES NFR BLD: 13 % (ref 3–10)
NEUTS SEG # BLD: 2.7 K/UL (ref 1.8–8)
NEUTS SEG NFR BLD: 51 % (ref 40–73)
PLATELET # BLD AUTO: 144 K/UL (ref 135–420)
PMV BLD AUTO: 11.1 FL (ref 9.2–11.8)
POTASSIUM SERPL-SCNC: 3.9 MMOL/L (ref 3.5–5.5)
PROT SERPL-MCNC: 7.2 G/DL (ref 6.4–8.2)
RBC # BLD AUTO: 4.26 M/UL (ref 4.7–5.5)
SODIUM SERPL-SCNC: 142 MMOL/L (ref 136–145)
TROPONIN I SERPL-MCNC: <0.02 NG/ML (ref 0–0.04)
WBC # BLD AUTO: 5.2 K/UL (ref 4.6–13.2)

## 2021-01-04 PROCEDURE — 80053 COMPREHEN METABOLIC PANEL: CPT

## 2021-01-04 PROCEDURE — 82553 CREATINE MB FRACTION: CPT

## 2021-01-04 PROCEDURE — 83880 ASSAY OF NATRIURETIC PEPTIDE: CPT

## 2021-01-04 PROCEDURE — 71045 X-RAY EXAM CHEST 1 VIEW: CPT

## 2021-01-04 PROCEDURE — 99281 EMR DPT VST MAYX REQ PHY/QHP: CPT

## 2021-01-04 PROCEDURE — 85025 COMPLETE CBC W/AUTO DIFF WBC: CPT

## 2021-01-04 RX ORDER — FUROSEMIDE 20 MG/1
20 TABLET ORAL DAILY
Qty: 3 TAB | Refills: 0 | Status: SHIPPED | OUTPATIENT
Start: 2021-01-04 | End: 2021-01-07

## 2021-01-04 NOTE — ED TRIAGE NOTES
I performed a brief evaluation, including history and physical, of the patient here in triage and I have determined that pt will need further treatment and evaluation from the main side ER physician. I have placed initial orders to help in expediting patients care. January 04, 2021 at 2:17 PM - Allyson Villaseñor PA-C        There were no vitals taken for this visit.

## 2021-01-06 NOTE — ED PROVIDER NOTES
EMERGENCY DEPARTMENT HISTORY AND PHYSICAL EXAM    2:55 PM      Date: 1/4/2021  Patient Name: Annalee Becerril    History of Presenting Illness     Chief Complaint   Patient presents with    Leg Swelling         History Provided By: Patient    Chief Complaint: Bilateral lower extremity edema  Duration: 1-2 Weeks  Timing:  Acute  Location:   Quality: Tightness  Severity: Moderate  Modifying Factors: none  Associated Symptoms: denies any other associated signs or symptoms      Additional History (Context):Jefe Blevins is a 72 y.o. male with a pertinent history of hypertension, kidney stones, diabetes, chronic pain, depression, hepatitis C, GERD who presents to the emergency department for evaluation of bilateral lower extremity edema for the past 1 to 2 weeks. Pt denies any recent long distance travel, immobilization/surgery/trauma, exogenous hormone use, prior DVT/PE, known malignancy, hemoptysis, or leg swelling. No recent change in activity or history of similar. Patient is not on a diuretic medication. He denies any associated chest pain or shortness of breath. He has no significant pain with ambulation or weightbearing. There has been no recent trauma. No treatments prior to arrival.  Patient denies history of heart failure, kidney failure, or recent fever/chills, nausea, vomiting, diarrhea, or any other concerns. PCP:  Kameron Kirk MD      Current Outpatient Medications   Medication Sig Dispense Refill    furosemide (Lasix) 20 mg tablet Take 1 Tab by mouth daily for 3 days. 3 Tab 0    rosuvastatin (CRESTOR) 20 mg tablet 1 tablet      omeprazole (PRILOSEC) 20 mg capsule TAKE 1 CAPSULE BY MOUTH ONCE DAILY 90 Cap 3    multivit-min/FA/lycopen/lutein (CENTRUM SILVER MEN PO) Take  by mouth.  cyanocobalamin (Vitamin B-12) 500 mcg tablet Take 500 mcg by mouth daily.  pregabalin (LYRICA) 100 mg capsule Take 1 Cap by mouth two (2) times a day.  Max Daily Amount: 200 mg. 60 Cap 2    terbinafine HCL (LamISIL) 250 mg tablet Take 250 mg by mouth daily. Past History     Past Medical History:  Past Medical History:   Diagnosis Date    Bilateral hip pain     Chronic pain     back; hx of opiod addiction    Depression     Diabetes ([address]     borderline, no meds-trying to control with diet    DJD (degenerative joint disease)     GERD (gastroesophageal reflux disease)     Hepatitis C 01/2015    +Harvoni rx    Hypertension     Kidney stones     Lymphadenopathy, generalized 12/05/2015    neg bx    Numbness of foot left    resolved per patient 1/29/16    S/P lumbar fusion 2/9/2016    L4/5 LAMINECTOMY/FUSION/TRANSFORAMINAL LUMBAR INTERBODY FUSION (TLIF) by Dr. Declan Cornell on 2/8/16     Unspecified sleep apnea     no cpap machine-pt denies       Past Surgical History:  Past Surgical History:   Procedure Laterality Date    HX APPENDECTOMY  as a child    HX BACK SURGERY  07/2015    HX CERVICAL FUSION  05/18/2017    ACDF C5/6 C6/7    HX COLONOSCOPY  2015    negative    HX LUMBAR FUSION  2/2016    HX ORTHOPAEDIC      denies    HX OTHER SURGICAL  05/2017    cervical fusion    HX TONSILLECTOMY  as a child    REMOVE [facility] Right 3-10-16    Dr. Britney Moya       Family History:  Family History   Problem Relation Age of Onset    Diabetes Sister     SLE Sister     Arthritis-osteo Sister     Heart Disease Sister        Social History:  Social History     Tobacco Use    Smoking status: Current Every Day Smoker     Packs/day: 1.00     Years: 40.00     Pack years: 40.00     Types: Cigarettes     Start date: 4/24/1966    Smokeless tobacco: Former User    Tobacco comment: 1 pack per day   Substance Use Topics    Alcohol use: No     Alcohol/week: 0.0 standard drinks    Drug use: Yes     Types: Marijuana       Allergies:   Allergies   Allergen Reactions    Nicotine Contact Dermatitis     Nicotine Patch reaction - Contact dermatitis with numbness and tingling also occuring in the left arm and denuding of the skin.  Thimerosal Other (comments)     Contact lens solution, made eyes red and irrated         Review of Systems       Review of Systems   Constitutional: Negative for chills and fever. HENT: Negative for congestion, rhinorrhea and sore throat. Respiratory: Negative for cough and shortness of breath. Cardiovascular: Positive for leg swelling. Negative for chest pain. Gastrointestinal: Negative for abdominal pain, blood in stool, constipation, diarrhea, nausea and vomiting. Genitourinary: Negative for dysuria, frequency and hematuria. Musculoskeletal: Negative for back pain and myalgias. Skin: Negative for rash and wound. Neurological: Negative for dizziness and headaches. All other systems reviewed and are negative. Physical Exam     Visit Vitals  BP (!) 161/78   Pulse 82   Temp 98.1 °F (36.7 °C)   Resp 18   SpO2 99%       Physical Exam  Vitals signs and nursing note reviewed. Constitutional:       General: He is not in acute distress. Appearance: He is well-developed. He is not diaphoretic. Comments: Well-appearing, nontoxic   HENT:      Head: Normocephalic and atraumatic. Eyes:      Conjunctiva/sclera: Conjunctivae normal.   Neck:      Musculoskeletal: Normal range of motion and neck supple. Cardiovascular:      Rate and Rhythm: Normal rate and regular rhythm. Heart sounds: Normal heart sounds. Pulmonary:      Effort: Pulmonary effort is normal. No respiratory distress. Breath sounds: Normal breath sounds. No stridor. No wheezing, rhonchi or rales. Chest:      Chest wall: No tenderness. Musculoskeletal: Normal range of motion. General: No deformity. Right lower leg: Edema present. Left lower leg: Edema present. Comments: 1+ pitting edema noted to bilateral lower extremities. Patient is ambulatory, weightbearing, moving all extremities without difficulty.   Intact distal neurovascular status all extremities. Skin:     General: Skin is warm and dry. Neurological:      Mental Status: He is alert and oriented to person, place, and time. Diagnostic Study Results     Labs -  No results found for this or any previous visit (from the past 12 hour(s)). Radiologic Studies -   No results found. Medical Decision Making   I am the first provider for this patient. I reviewed the vital signs, available nursing notes, past medical history, past surgical history, family history and social history. Vital Signs-Reviewed the patient's vital signs. Pulse Oximetry Analysis -  99% on room air (Interpretation)    Records Reviewed: Nursing Notes and Old Medical Records (Time of Review: 2:55 PM)    ED Course: Progress Notes, Reevaluation, and Consults:    Provider Notes (Medical Decision Making):   differential diagnosis: Venous insufficiency, unlikely DVT, unlikely cellulitis, hypoalbuminemia    Plan: Patient presents ambulatory in no significant distress with mildly elevated blood pressure and otherwise normal vitals. Concerned about bilateral lower extremity edema for approximately 1 to 2 weeks. Patient denies any recent surgery, immobilization, trauma, history of DVT or PE, associated shortness of breath, cough, hemoptysis, exogenous hormone use, or malignancy/treatment. No recent fevers, chills, chest pain, nausea, vomiting, diarrhea. There is no associated rash. No recent change in activity. Essentially unremarkable work-up. Patient requesting to be discharged without further evaluation. Strict return precautions discussed with patient. Patient demonstrates understanding. Will discharge patient home with short course of Lasix. At this time, patient is stable and appropriate for discharge home. Patient demonstrates understanding of current diagnoses and is in agreement with the treatment plan.   They are advised that while the likelihood of serious underlying condition is low at this point given the evaluation performed today, we cannot fully rule it out. They are advised to immediately return with any new symptoms or worsening of current condition. All questions have been answered. Patient is given educational material regarding their diagnoses, including danger symptoms and when to return to the ED. Diagnosis     Clinical Impression:   1. Lower extremity edema        Disposition: DC Home    Follow-up Information     Follow up With Specialties Details Why Contact Info    Laura Conte MD Family Medicine Call  To discuss establishing primary care [place] [address] [facility] MANAGEMENT Case Management Call  For assistance with establishing primary care [address] [place] [address] [address] [address]  [phone]           Discharge Medication List as of 1/4/2021  4:11 PM      START taking these medications    Details   furosemide (Lasix) 20 mg tablet Take 1 Tab by mouth daily for 3 days. , Normal, Disp-3 Tab, R-0         CONTINUE these medications which have NOT CHANGED    Details   rosuvastatin (CRESTOR) 20 mg tablet 1 tablet, Historical Med      omeprazole (PRILOSEC) 20 mg capsule TAKE 1 CAPSULE BY MOUTH ONCE DAILY, Normal, Disp-90 Cap,R-3      multivit-min/FA/lycopen/lutein (CENTRUM SILVER MEN PO) Take  by mouth., Historical Med      cyanocobalamin (Vitamin B-12) 500 mcg tablet Take 500 mcg by mouth daily. , Historical Med      pregabalin (LYRICA) 100 mg capsule Take 1 Cap by mouth two (2) times a day. Max Daily Amount: 200 mg., Normal, Disp-60 Cap,R-2      terbinafine HCL (LamISIL) 250 mg tablet Take 250 mg by mouth daily. , Historical Med           _______________________________    This note was dictated utilizing voice recognition software which may lead to typographical errors. I apologize in advance if the situation occurs. If questions arise please do not hesitate to contact me or call our department.   Petey Morrow, JUDY

## 2021-03-21 PROBLEM — C67.9 MALIGNANT TUMOR OF URINARY BLADDER (HCC): Status: ACTIVE | Noted: 2019-09-11

## 2021-03-21 PROBLEM — Z91.81 FALLS INFREQUENTLY: Status: ACTIVE | Noted: 2021-03-21

## 2021-03-21 PROBLEM — H61.20 IMPACTED CERUMEN: Status: ACTIVE | Noted: 2021-03-21

## 2021-03-21 PROBLEM — M41.9 SCOLIOSIS: Status: ACTIVE | Noted: 2021-03-21

## 2021-03-21 PROBLEM — Z93.50 CYSTOSTOMY IN PLACE (HCC): Status: ACTIVE | Noted: 2021-03-21

## 2021-03-21 PROBLEM — M48.061 SPINAL STENOSIS OF LUMBAR REGION: Status: ACTIVE | Noted: 2018-03-19

## 2021-03-21 PROBLEM — F34.1 DYSTHYMIA: Status: ACTIVE | Noted: 2021-03-21

## 2021-03-21 PROBLEM — M25.519 SHOULDER PAIN: Status: ACTIVE | Noted: 2017-04-04

## 2021-04-01 ENCOUNTER — OFFICE VISIT (OUTPATIENT)
Dept: ORTHOPEDIC SURGERY | Age: 71
End: 2021-04-01
Payer: MEDICARE

## 2021-04-01 VITALS
TEMPERATURE: 98.2 F | SYSTOLIC BLOOD PRESSURE: 146 MMHG | OXYGEN SATURATION: 97 % | BODY MASS INDEX: 33.04 KG/M2 | HEIGHT: 71 IN | DIASTOLIC BLOOD PRESSURE: 87 MMHG | RESPIRATION RATE: 20 BRPM | WEIGHT: 236 LBS | HEART RATE: 74 BPM

## 2021-04-01 DIAGNOSIS — M54.42 CHRONIC BILATERAL LOW BACK PAIN WITH LEFT-SIDED SCIATICA: ICD-10-CM

## 2021-04-01 DIAGNOSIS — M54.2 NECK PAIN: ICD-10-CM

## 2021-04-01 DIAGNOSIS — G89.29 CHRONIC BILATERAL LOW BACK PAIN WITH LEFT-SIDED SCIATICA: ICD-10-CM

## 2021-04-01 DIAGNOSIS — G95.9 CERVICAL MYELOPATHY (HCC): ICD-10-CM

## 2021-04-01 DIAGNOSIS — R26.89 BALANCE DISORDER: ICD-10-CM

## 2021-04-01 DIAGNOSIS — G95.9 CERVICAL MYELOPATHY (HCC): Primary | ICD-10-CM

## 2021-04-01 PROCEDURE — G8417 CALC BMI ABV UP PARAM F/U: HCPCS | Performed by: NURSE PRACTITIONER

## 2021-04-01 PROCEDURE — G9717 DOC PT DX DEP/BP F/U NT REQ: HCPCS | Performed by: NURSE PRACTITIONER

## 2021-04-01 PROCEDURE — 99213 OFFICE O/P EST LOW 20 MIN: CPT | Performed by: NURSE PRACTITIONER

## 2021-04-01 PROCEDURE — G8753 SYS BP > OR = 140: HCPCS | Performed by: NURSE PRACTITIONER

## 2021-04-01 PROCEDURE — 72100 X-RAY EXAM L-S SPINE 2/3 VWS: CPT | Performed by: NURSE PRACTITIONER

## 2021-04-01 PROCEDURE — 72040 X-RAY EXAM NECK SPINE 2-3 VW: CPT | Performed by: NURSE PRACTITIONER

## 2021-04-01 PROCEDURE — G8754 DIAS BP LESS 90: HCPCS | Performed by: NURSE PRACTITIONER

## 2021-04-01 PROCEDURE — 1101F PT FALLS ASSESS-DOCD LE1/YR: CPT | Performed by: NURSE PRACTITIONER

## 2021-04-01 PROCEDURE — G8536 NO DOC ELDER MAL SCRN: HCPCS | Performed by: NURSE PRACTITIONER

## 2021-04-01 PROCEDURE — G8427 DOCREV CUR MEDS BY ELIG CLIN: HCPCS | Performed by: NURSE PRACTITIONER

## 2021-04-01 PROCEDURE — 3017F COLORECTAL CA SCREEN DOC REV: CPT | Performed by: NURSE PRACTITIONER

## 2021-04-01 RX ORDER — AMLODIPINE BESYLATE 10 MG/1
10 TABLET ORAL DAILY
COMMUNITY
Start: 2021-03-03

## 2021-04-01 NOTE — PROGRESS NOTES
Chief complaint   Chief Complaint   Patient presents with    Back Pain     Follow up increased low back pain    Leg Pain     lle    Neck Pain     neck pain       History of Present Illness:  Carlos Taveras is a  72 y.o.  male who comes in today stating he is having increasing problems with his neck pain his dexterity and his balance. In addition he has increased back pain with left greater than right leg pain down to his knee. He states that his balance is getting so bad he is falling multiple times even though he uses his cane. He states he is dropping things with his hands continuously now and he had to give up playing the guitar because his dexterity is so bad he cannot manipulate the strings anymore. In addition he has numbness in his fingertips. He is status post a ACDF at C5-6 C6-7 on May 19, 2017. He is also status post a redo L4-5 fusion back on February 8 of 2016. EMG has shown peripheral sensory neuropathy in his bilateral lower extremities and bilateral chronic L5 radiculopathy and possibly at L4 and S1. He has failed a spinal cord stimulator trial.  HAILEY Berg had him on Lyrica 100 mg twice a day. He states he has stopped it because it really does not help. He states he has been on 200 twice a diet on the past and that really did not help either. He has tried and failed gabapentin and Cymbalta in the past.  He states he really does not want to be on medication. He does admit to smoking pot. HAILEY Berg referred him to pain management and he was not accepted at Dr. Meek Westfall practice. He is retired. He has tried for disability a few times and was denied each time. He is smoking 1 and half pack cigarettes per day. He had bladder cancer a year ago and now has a urostomy. He denies fever or bowel dysfunction. Physical Exam: The patient is a 71-year-old male well-developed well-nourished who is alert and oriented with a normal mood and affect.   He has a full weightbearing nonantalgic gait.  He does use a single-point cane. He has 4 out of 5 strength of his bilateral lower extremities. Negative straight leg raise. He has 5 out of 5 strength of his bilateral upper extremities. Negative Mathieu's. Assessment and Plan: This is a patient who has lumbar spinal stenosis, neuropathy and cervical myelopathy. I feel like his cervical myelopathy is progressing. We did x-rays of his back and his neck. Were going to move onto a cervical MRI to assess his neck due to his balance and dexterity issues. We will see him back with one of the physicians for MRI follow-up. XRAY: 4/1/21   body part: Lumbar and cervical  side (rt/lt): bilateral  number of views taken:2 each  Findings: no acute finding    The x-ray will be officially read by Dr. Mary Jane Barnes and scanned into the chart.            Review of systems:    Past Medical History:   Diagnosis Date    Bilateral hip pain     Chronic pain     back; hx of opiod addiction    Depression     Diabetes (HCC)     borderline, no meds-trying to control with diet    DJD (degenerative joint disease)     GERD (gastroesophageal reflux disease)     Hepatitis C 01/2015    +Harvoni rx    Hypertension     Kidney stones     Lymphadenopathy, generalized 12/05/2015    neg bx    Numbness of foot left    resolved per patient 1/29/16    S/P lumbar fusion 2/9/2016    L4/5 LAMINECTOMY/FUSION/TRANSFORAMINAL LUMBAR INTERBODY FUSION (TLIF) by Dr. Ari Underwood on 2/8/16     Unspecified sleep apnea     no cpap machine-pt denies     Past Surgical History:   Procedure Laterality Date    HX APPENDECTOMY  as a child    HX BACK SURGERY  07/2015    HX CATARACT REMOVAL Bilateral     HX CERVICAL FUSION  05/18/2017    ACDF C5/6 C6/7    HX COLONOSCOPY  2015    negative    HX LUMBAR FUSION  2/2016    HX ORTHOPAEDIC      denies    HX OTHER SURGICAL  05/2017    cervical fusion    HX TONSILLECTOMY  as a child    [place] REMOVE Josette Right 3-10-16     Billy     Social History     Socioeconomic History    Marital status:      Spouse name: Not on file    Number of children: Not on file    Years of education: Not on file    Highest education level: Not on file   Occupational History    Not on file   Social Needs    Financial resource strain: Not on file    Food insecurity     Worry: Not on file     Inability: Not on file    Transportation needs     Medical: Not on file     Non-medical: Not on file   Tobacco Use    Smoking status: Current Every Day Smoker     Packs/day: 1.00     Years: 40.00     Pack years: 40.00     Types: Cigarettes     Start date: 4/24/1966    Smokeless tobacco: Former User    Tobacco comment: 1 pack per day   Substance and Sexual Activity    Alcohol use: No     Alcohol/week: 0.0 standard drinks    Drug use: Yes     Types: Marijuana    Sexual activity: Never   Lifestyle    Physical activity     Days per week: Not on file     Minutes per session: Not on file    Stress: Not on file   Relationships    Social connections     Talks on phone: Not on file     Gets together: Not on file     Attends Rastafarian service: Not on file     Active member of club or organization: Not on file     Attends meetings of clubs or organizations: Not on file     Relationship status: Not on file    Intimate partner violence     Fear of current or ex partner: Not on file     Emotionally abused: Not on file     Physically abused: Not on file     Forced sexual activity: Not on file   Other Topics Concern    Not on file   Social History Narrative    Not on file     Family History   Problem Relation Age of Onset    Diabetes Sister     SLE Sister     Arthritis-osteo Sister     Heart Disease Sister        Physical Exam:  Visit Vitals  BP (!) 146/87 (BP 1 Location: Right arm, BP Patient Position: Sitting)   Pulse 74   Temp 98.2 °F (36.8 °C)   Resp 20   Ht 5' 11\" (1.803 m)   Wt 236 lb (107 kg)   SpO2 97%   BMI 32.92 kg/m²     Pain Scale: 10 - Worst pain ever/10       has been . reviewed and is appropriate          Diagnoses and all orders for this visit:    1. Cervical myelopathy (HCC)  -     MRI CERV SPINE WO CONT; Future    2. Neck pain  -     AMB POC XRAY, SPINE, CERVICAL; 2 OR 3  -     MRI CERV SPINE WO CONT; Future    3. Balance disorder  -     MRI CERV SPINE WO CONT; Future    4. Chronic bilateral low back pain with left-sided sciatica  -     AMB POC XRAY, SPINE, LUMBOSACRAL; 2 O            Follow-up and Dispositions    · Return in about 4 weeks (around 4/29/2021) for with MD for MRI f/u.              We have informed Kumarnorris Warren to notify us for immediate appointment if he has any worsening neurogical symptoms or if an emergency situation presents, then call [phone]

## 2021-04-30 ENCOUNTER — HOSPITAL ENCOUNTER (OUTPATIENT)
Dept: MRI IMAGING | Age: 71
Discharge: HOME | End: 2021-04-30
Attending: NURSE PRACTITIONER

## 2021-04-30 NOTE — PROGRESS NOTES
4/30/2021 07:53 am Patient walked out of his MRI appointment (was seen on time) after getting upset when asked when his surgeries were done. Patient stated \" I don't know. I'm going to get dressed and get out of here\". If any questions please call.     Thank you,    Tueyt QUEEN (R) MR

## 2021-05-19 PROBLEM — M41.20 IDIOPATHIC SCOLIOSIS AND KYPHOSCOLIOSIS: Status: ACTIVE | Noted: 2021-05-19

## 2021-06-05 NOTE — ED PROVIDER NOTES
61-year-old male presents for evaluation of right eye. States he was \"wrestling\" with some tree branches yesterday and believes that he got a scratch in his eye. He now has some drainage from the eye. It is red but not particularly painful. There is some itching associated with the injury. He denies change in vision.            Past Medical History:   Diagnosis Date    Bilateral hip pain     Chronic pain     back; hx of opiod addiction    Depression     Diabetes ([address]     borderline, no meds-trying to control with diet    DJD (degenerative joint disease)     GERD (gastroesophageal reflux disease)     Hepatitis C 01/2015    +Harvoni rx    Hypertension     Kidney stones     Lymphadenopathy, generalized 12/05/2015    neg bx    Numbness of foot left    resolved per patient 1/29/16    S/P lumbar fusion 2/9/2016    L4/5 LAMINECTOMY/FUSION/TRANSFORAMINAL LUMBAR INTERBODY FUSION (TLIF) by Dr. Jason Maddox on 2/8/16     Unspecified sleep apnea     no cpap machine-pt denies       Past Surgical History:   Procedure Laterality Date    HX APPENDECTOMY  as a child    HX BACK SURGERY  07/2015    HX CERVICAL FUSION  05/18/2017    ACDF C5/6 C6/7    HX COLONOSCOPY  2015    negative    HX LUMBAR FUSION  2/2016    HX ORTHOPAEDIC      denies    HX OTHER SURGICAL  05/2017    cervical fusion    HX TONSILLECTOMY  as a child    REMOVE Josette Right 3-10-16    Dr. Judith Ford         Family History:   Problem Relation Age of Onset    Diabetes Sister     SLE Sister     Arthritis-osteo Sister     Heart Disease Sister        Social History     Socioeconomic History    Marital status:      Spouse name: Not on file    Number of children: Not on file    Years of education: Not on file    Highest education level: Not on file   Occupational History    Not on file   Social Needs    Financial resource strain: Not on file    Food insecurity:     Worry: Not on file     Inability: Not on file   [address] [place] [address] Ricky Transportation needs:     Medical: Not on file     Non-medical: Not on file   Tobacco Use    Smoking status: Current Every Day Smoker     Packs/day: 1.50     Years: 40.00     Pack years: 60.00     Types: Cigarettes     Last attempt to quit: 2017     Years since quittin.0    Smokeless tobacco: Former User   Substance and Sexual Activity    Alcohol use: No     Alcohol/week: 0.0 oz    Drug use: Yes     Types: Marijuana, Cocaine     Comment: stopped cocaine , marijuana 18    Sexual activity: Never   Lifestyle    Physical activity:     Days per week: Not on file     Minutes per session: Not on file    Stress: Not on file   Relationships    Social connections:     Talks on phone: Not on file     Gets together: Not on file     Attends Adventism service: Not on file     Active member of club or organization: Not on file     Attends meetings of clubs or organizations: Not on file     Relationship status: Not on file    Intimate partner violence:     Fear of current or ex partner: Not on file     Emotionally abused: Not on file     Physically abused: Not on file     Forced sexual activity: Not on file   Other Topics Concern    Not on file   Social History Narrative    Not on file         ALLERGIES: Nicotine and Thimerosal    Review of Systems   Constitutional: Negative for fever. Eyes: Positive for discharge, redness and itching. All other systems reviewed and are negative. Vitals:    19 0930   BP: 145/81   Pulse: 87   Resp: 18   Temp: 98.9 °F (37.2 °C)   SpO2: 98%   Weight: 102.1 kg (225 lb)   Height: 5' 11\" (1.803 m)            Physical Exam   Constitutional: He is oriented to person, place, and time. He appears well-developed and well-nourished. No distress. HENT:   Head: Normocephalic and atraumatic. Eyes: Pupils are equal, round, and reactive to light. EOM are normal. No scleral icterus. Visual acuity right eye 20/100, left eye 20/40.   There is bulbar conjunctival injection in the right eye with scant amount of purulent drainage medially. Proparacaine and floor seen placed into the right eye slit-lamp exam per myself. There is uptake consistent with corneal abrasions x2 transversely positioned on the anterior cornea just below the pupil. I do not discern any particulate matter within the anterior chamber. There is no hyphema and no cell or flare. Cardiovascular: Normal rate. Pulmonary/Chest: Effort normal.   Neurological: He is alert and oriented to person, place, and time. Skin: Skin is warm and dry. Psychiatric: He has a normal mood and affect. Nursing note and vitals reviewed. MDM  Number of Diagnoses or Management Options  Abrasion of right cornea, initial encounter:   Diagnosis management comments: Impression: Corneal abrasion x2 to the right eye. Ophthalmology follow-up for referral is provided given his diminished visual acuity in that eye. He will be started on antibiotic drops for the discharge. At this time I do not see a retained foreign body in the eye itself. Procedures    Diagnosis:   1. Abrasion of right cornea, initial encounter      1. Cool compresses for comfort. 2.  Cipro drops for secondary infection. 3.  Ophthalmology follow-up for recheck in 3 to 5 days if not better. 4.  Slit lamp exam demonstrates to transverse corneal abrasions to the mid cornea.     Disposition: home    Follow-up Information     Follow up With Specialties Details Why Ethan Espana MD Ophthalmology Schedule an appointment as soon as possible for a visit in 3 days As needed, for recheck of ongoing symptoms [address] [facility] [address] [address] [facility] DEPT Emergency Medicine  If symptoms worsen Lopez Gonzalez [id number] [phone]          Patient's Medications   Start Taking    CIPROFLOXACIN HCL (CILOXAN) 0.3 % OPHTHALMIC SOLUTION    Administer 1 Drop to right eye four (4) times daily for 7 days. Continue Taking    ALBUTEROL (PROVENTIL HFA, VENTOLIN HFA, PROAIR HFA) 90 MCG/ACTUATION INHALER    Take 1 Puff by inhalation every six (6) hours as needed for Wheezing. AMLODIPINE (NORVASC) 10 MG TABLET    Take 1 Tab by mouth daily. GABAPENTIN (NEURONTIN) 100 MG CAPSULE    Take one at night for 3 nights then take 3 at night  Indications: Neuropathic Pain    LISINOPRIL (PRINIVIL, ZESTRIL) 30 MG TABLET    Take 1 Tab by mouth daily. OMEPRAZOLE (PRILOSEC) 20 MG CAPSULE    Take 1 Cap by mouth daily. TAMSULOSIN (FLOMAX) 0.4 MG CAPSULE    Take 2 Caps by mouth daily. TIAGABINE (GABITRIL) 2 MG TABLET    Take 1 Tab by mouth two (2) times daily (with meals). These Medications have changed    No medications on file   Stop Taking    ESCITALOPRAM OXALATE (LEXAPRO) 10 MG TABLET    Take 10 mg by mouth daily. OXCARBAZEPINE (TRILEPTAL) 300 MG TABLET    Take 300 mg by mouth daily. TRAZODONE (DESYREL) 50 MG TABLET    Take 50 mg by mouth two (2) times a day. No

## 2021-06-09 ENCOUNTER — HOSPITAL ENCOUNTER (OUTPATIENT)
Dept: MRI IMAGING | Age: 71
Discharge: HOME | End: 2021-06-09
Attending: NURSE PRACTITIONER
Payer: MEDICARE

## 2021-06-09 ENCOUNTER — HOSPITAL ENCOUNTER (OUTPATIENT)
Dept: CT IMAGING | Age: 71
Discharge: HOME | End: 2021-06-09
Attending: NURSE PRACTITIONER
Payer: MEDICARE

## 2021-06-09 DIAGNOSIS — C67.9 MALIGNANT NEOPLASM OF URINARY BLADDER, UNSPECIFIED SITE (HCC): ICD-10-CM

## 2021-06-09 PROCEDURE — 72141 MRI NECK SPINE W/O DYE: CPT

## 2021-06-10 ENCOUNTER — OFFICE VISIT (OUTPATIENT)
Dept: ORTHOPEDIC SURGERY | Age: 71
End: 2021-06-10
Payer: MEDICARE

## 2021-06-10 VITALS
SYSTOLIC BLOOD PRESSURE: 137 MMHG | DIASTOLIC BLOOD PRESSURE: 84 MMHG | OXYGEN SATURATION: 98 % | HEART RATE: 96 BPM | WEIGHT: 222 LBS | TEMPERATURE: 97.6 F | RESPIRATION RATE: 18 BRPM | BODY MASS INDEX: 30.96 KG/M2

## 2021-06-10 DIAGNOSIS — Z98.1 HISTORY OF LUMBAR FUSION: ICD-10-CM

## 2021-06-10 DIAGNOSIS — Z98.1 HISTORY OF FUSION OF CERVICAL SPINE: ICD-10-CM

## 2021-06-10 DIAGNOSIS — M47.812 CERVICAL FACET JOINT SYNDROME: ICD-10-CM

## 2021-06-10 DIAGNOSIS — F11.11 HISTORY OF OPIOID ABUSE (HCC): ICD-10-CM

## 2021-06-10 DIAGNOSIS — C67.9 MALIGNANT NEOPLASM OF URINARY BLADDER, UNSPECIFIED SITE (HCC): ICD-10-CM

## 2021-06-10 DIAGNOSIS — R29.898 LEFT LEG WEAKNESS: ICD-10-CM

## 2021-06-10 DIAGNOSIS — G62.9 NEUROPATHY: ICD-10-CM

## 2021-06-10 DIAGNOSIS — R26.9 GAIT ABNORMALITY: ICD-10-CM

## 2021-06-10 DIAGNOSIS — Z93.50 CYSTOSTOMY IN PLACE (HCC): ICD-10-CM

## 2021-06-10 DIAGNOSIS — M62.838 MUSCLE SPASM: ICD-10-CM

## 2021-06-10 DIAGNOSIS — M48.02 CERVICAL SPINAL STENOSIS: ICD-10-CM

## 2021-06-10 DIAGNOSIS — M96.1 POST LAMINECTOMY SYNDROME: Primary | ICD-10-CM

## 2021-06-10 PROCEDURE — 99214 OFFICE O/P EST MOD 30 MIN: CPT | Performed by: PHYSICAL MEDICINE & REHABILITATION

## 2021-06-10 PROCEDURE — G8427 DOCREV CUR MEDS BY ELIG CLIN: HCPCS | Performed by: PHYSICAL MEDICINE & REHABILITATION

## 2021-06-10 PROCEDURE — 1101F PT FALLS ASSESS-DOCD LE1/YR: CPT | Performed by: PHYSICAL MEDICINE & REHABILITATION

## 2021-06-10 PROCEDURE — G8752 SYS BP LESS 140: HCPCS | Performed by: PHYSICAL MEDICINE & REHABILITATION

## 2021-06-10 PROCEDURE — G8417 CALC BMI ABV UP PARAM F/U: HCPCS | Performed by: PHYSICAL MEDICINE & REHABILITATION

## 2021-06-10 PROCEDURE — G9717 DOC PT DX DEP/BP F/U NT REQ: HCPCS | Performed by: PHYSICAL MEDICINE & REHABILITATION

## 2021-06-10 PROCEDURE — 3017F COLORECTAL CA SCREEN DOC REV: CPT | Performed by: PHYSICAL MEDICINE & REHABILITATION

## 2021-06-10 PROCEDURE — G8754 DIAS BP LESS 90: HCPCS | Performed by: PHYSICAL MEDICINE & REHABILITATION

## 2021-06-10 PROCEDURE — G8536 NO DOC ELDER MAL SCRN: HCPCS | Performed by: PHYSICAL MEDICINE & REHABILITATION

## 2021-06-10 RX ORDER — PREGABALIN 100 MG/1
CAPSULE ORAL
COMMUNITY
End: 2021-06-10 | Stop reason: ALTCHOICE

## 2021-06-10 RX ORDER — METHYLPREDNISOLONE 4 MG/1
TABLET ORAL
Qty: 1 DOSE PACK | Refills: 1 | Status: SHIPPED | OUTPATIENT
Start: 2021-06-10 | End: 2021-06-22 | Stop reason: ALTCHOICE

## 2021-06-10 RX ORDER — NORTRIPTYLINE HYDROCHLORIDE 25 MG/1
25-50 CAPSULE ORAL
Qty: 60 CAPSULE | Refills: 1 | Status: SHIPPED | OUTPATIENT
Start: 2021-06-10 | End: 2021-09-10

## 2021-06-10 NOTE — LETTER
6/13/2021    Patient: Bailey Starr   YOB: 1950   Date of Visit: 6/10/2021     Nicolasa Mata MD  [facility] [id number]  Via Fax: [phone]    Dear Nicolasa Mata MD,      Thank you for referring Mr. Bailey Starr to [address] [place] [address] for evaluation. My notes for this consultation are attached. If you have questions, please do not hesitate to call me. I look forward to following your patient along with you.       Sincerely,    Juan Jose De La Cruz MD

## 2021-06-10 NOTE — PATIENT INSTRUCTIONS
Neck Arthritis: Exercises  Introduction  Here are some examples of exercises for you to try. The exercises may be suggested for a condition or for rehabilitation. Start each exercise slowly. Ease off the exercises if you start to have pain. You will be told when to start these exercises and which ones will work best for you. How to do the exercises  Neck stretches to the side   1. This stretch works best if you keep your shoulder down as you lean away from it. To help you remember to do this, start by relaxing your shoulders and lightly holding on to your thighs or your chair. 2. Tilt your head toward your shoulder and hold for 15 to 30 seconds. Let the weight of your head stretch your muscles. 3. Repeat 2 to 4 times toward each shoulder. Chin tuck   1. Lie on the floor with a rolled-up towel under your neck. Your head should be touching the floor. 2. Slowly bring your chin toward your chest.  3. Hold for a count of 6, and then relax for up to 10 seconds. 4. Repeat 8 to 12 times. Active cervical rotation   1. Sit in a firm chair, or stand up straight. 2. Keeping your chin level, turn your head to the right, and hold for 15 to 30 seconds. 3. Turn your head to the left and hold for 15 to 30 seconds. 4. Repeat 2 to 4 times to each side. Shoulder blade squeeze   1. While standing, squeeze your shoulder blades together. 2. Do not raise your shoulders up as you are squeezing. 3. Hold for 6 seconds. 4. Repeat 8 to 12 times. Shoulder rolls   1. Sit comfortably with your feet shoulder-width apart. You can also do this exercise standing up. 2. Roll your shoulders up, then back, and then down in a smooth, circular motion. 3. Repeat 2 to 4 times. Follow-up care is a key part of your treatment and safety. Be sure to make and go to all appointments, and call your doctor if you are having problems. It's also a good idea to know your test results and keep a list of the medicines you take.   Where can you learn more? Go to http://www.gray.com/  Enter Y944 in the search box to learn more about \"Neck Arthritis: Exercises. \"  Current as of: November 16, 2020               Content Version: 12.8  © 3918-7378 [employer]. Care instructions adapted under license by [employer] (which disclaims liability or warranty for this information). If you have questions about a medical condition or this instruction, always ask your healthcare professional. [place] [address] any warranty or liability for your use of this information.

## 2021-06-22 ENCOUNTER — OFFICE VISIT (OUTPATIENT)
Dept: ORTHOPEDIC SURGERY | Age: 71
End: 2021-06-22
Payer: MEDICARE

## 2021-06-22 ENCOUNTER — TELEPHONE (OUTPATIENT)
Dept: ORTHOPEDIC SURGERY | Age: 71
End: 2021-06-22

## 2021-06-22 VITALS
HEART RATE: 93 BPM | OXYGEN SATURATION: 97 % | DIASTOLIC BLOOD PRESSURE: 82 MMHG | SYSTOLIC BLOOD PRESSURE: 145 MMHG | BODY MASS INDEX: 30.52 KG/M2 | WEIGHT: 218 LBS | HEIGHT: 71 IN | TEMPERATURE: 97.1 F

## 2021-06-22 DIAGNOSIS — G89.29 CHRONIC BILATERAL LOW BACK PAIN WITH LEFT-SIDED SCIATICA: ICD-10-CM

## 2021-06-22 DIAGNOSIS — R51.9 FREQUENT HEADACHES: ICD-10-CM

## 2021-06-22 DIAGNOSIS — R26.89 BALANCE PROBLEM: ICD-10-CM

## 2021-06-22 DIAGNOSIS — M50.00 HNP (HERNIATED NUCLEUS PULPOSUS) WITH MYELOPATHY, CERVICAL: Primary | ICD-10-CM

## 2021-06-22 DIAGNOSIS — Z85.9 HISTORY OF CANCER: ICD-10-CM

## 2021-06-22 DIAGNOSIS — M54.42 CHRONIC BILATERAL LOW BACK PAIN WITH LEFT-SIDED SCIATICA: ICD-10-CM

## 2021-06-22 DIAGNOSIS — M48.02 CERVICAL SPINAL STENOSIS: ICD-10-CM

## 2021-06-22 DIAGNOSIS — R42 DIZZINESS: ICD-10-CM

## 2021-06-22 DIAGNOSIS — G95.9 CERVICAL MYELOPATHY (HCC): ICD-10-CM

## 2021-06-22 PROCEDURE — 3017F COLORECTAL CA SCREEN DOC REV: CPT | Performed by: NURSE PRACTITIONER

## 2021-06-22 PROCEDURE — G8536 NO DOC ELDER MAL SCRN: HCPCS | Performed by: NURSE PRACTITIONER

## 2021-06-22 PROCEDURE — G8754 DIAS BP LESS 90: HCPCS | Performed by: NURSE PRACTITIONER

## 2021-06-22 PROCEDURE — G8427 DOCREV CUR MEDS BY ELIG CLIN: HCPCS | Performed by: NURSE PRACTITIONER

## 2021-06-22 PROCEDURE — 99213 OFFICE O/P EST LOW 20 MIN: CPT | Performed by: NURSE PRACTITIONER

## 2021-06-22 PROCEDURE — G8417 CALC BMI ABV UP PARAM F/U: HCPCS | Performed by: NURSE PRACTITIONER

## 2021-06-22 PROCEDURE — G8753 SYS BP > OR = 140: HCPCS | Performed by: NURSE PRACTITIONER

## 2021-06-22 PROCEDURE — G9717 DOC PT DX DEP/BP F/U NT REQ: HCPCS | Performed by: NURSE PRACTITIONER

## 2021-06-22 PROCEDURE — 1101F PT FALLS ASSESS-DOCD LE1/YR: CPT | Performed by: NURSE PRACTITIONER

## 2021-06-22 RX ORDER — MELATONIN
1000 DAILY
COMMUNITY

## 2021-06-22 NOTE — PROGRESS NOTES
Leonarda Gudino presents today for   Chief Complaint   Patient presents with    Back Pain     lower    Hip Pain     left    Leg Pain     left       Is someone accompanying this pt? no    Is the patient using any DME equipment during OV? Yes, cane    Depression Screening:  3 most recent PHQ Screens 8/28/2017   Little interest or pleasure in doing things Nearly every day   Feeling down, depressed, irritable, or hopeless Nearly every day   Total Score PHQ 2 6   Trouble falling or staying asleep, or sleeping too much -   Feeling tired or having little energy -   Poor appetite, weight loss, or overeating -   Feeling bad about yourself - or that you are a failure or have let yourself or your family down -   Trouble concentrating on things such as school, work, reading, or watching TV -   Moving or speaking so slowly that other people could have noticed; or the opposite being so fidgety that others notice -   Thoughts of being better off dead, or hurting yourself in some way -   PHQ 9 Score -   How difficult have these problems made it for you to do your work, take care of your home and get along with others -       Learning Assessment:  Learning Assessment 8/29/2016   PRIMARY LEARNER Patient   HIGHEST LEVEL OF EDUCATION - PRIMARY LEARNER  GRADUATED HIGH SCHOOL OR GED   BARRIERS PRIMARY LEARNER NONE   CO-LEARNER CAREGIVER No   PRIMARY LANGUAGE ENGLISH    NEED No   LEARNER PREFERENCE PRIMARY DEMONSTRATION     READING     LISTENING     PICTURES     VIDEOS   ANSWERED BY patient   RELATIONSHIP SELF       Abuse Screening:  Abuse Screening Questionnaire 8/29/2016   Do you ever feel afraid of your partner? N   Are you in a relationship with someone who physically or mentally threatens you? N   Is it safe for you to go home? Y       Fall Risk  Fall Risk Assessment, last 12 mths 4/1/2021   Able to walk? Yes   Fall in past 12 months? 1   Do you feel unsteady?  1   Number of falls in past 12 months 2   Fall with injury? 0       Coordination of Care:  1. Have you been to the ER, urgent care clinic since your last visit? no  Hospitalized since your last visit? no    2. Have you seen or consulted any other health care providers outside of the [address] [place] [address] since your last visit? no Include any pap smears or colon screening.  no

## 2021-06-22 NOTE — TELEPHONE ENCOUNTER
I contacted Mr. Garcia regarding the spine surgery referral. He was informed [facility], Dr Jeanine Pryor, does not participate with [facility]. (Neither does [facility].) Mr. Abdias Arrington was offered [facility] with providers in [place], [place] or [address] [place] [address]. Patient is confused and states he will need someone to sit with him to explain. Per our agreement, the spine surgery referral is placed on hold.

## 2021-06-22 NOTE — TELEPHONE ENCOUNTER
Please call pt and explain that we will need to send him for a cervical surgical consult with Dr Mehta at [place] due to his insurance.

## 2021-06-22 NOTE — PROGRESS NOTES
Chief complaint   Chief Complaint   Patient presents with    Back Pain     lower    Hip Pain     left    Leg Pain     left       History of Present Illness:  Shaun Lynch is a  72 y.o.  male is being seen for follow-up. He saw Dr. Toño Duval on Ana Cristina 10, 2021 for neck and back pain. His main complaint at the time is neck pain that had become progressively worse in the posterior lateral neck bilaterally that was causing headaches over the past 2 months. He also states he gets dizziness and blurred vision and that his balance is off. Dr. Toño Duval gave him a Medrol Dosepak which she states really did not help and put him on Pamelor 25 mg 2 capsules at nighttime. He does not feel like that really helps anything either. He is status post a ACDF C5-6 C6-7 on May 19, 2017 by Dr. Carmela Sheth. He did well following that surgery but over the last 2 months has had this neck pain with these myelopathic type symptoms. He has a history of bladder cancer and had his bladder removed last year. He states he had bilateral cataract surgery this year and had great vision afterwards until he is started with this blurry vision now. He states that his thinking is off and he cannot get his thoughts together very well. He also states he has a hard time controlling his emotions. He also has low back pain and states his legs will give out on him from time to time. He states the neck issues with the associated symptoms are much worse than his back pain. He had an MRI done that was reviewed by Dr. Toño Duval at the last visit. It did demonstrate a central disc protrusion type herniation mildly indenting the ventral aspect of the cord at C3-4 and at C4-5 there was some anterior subluxation that mildly flattens the ventral aspect of the cord. He denies fever bowel or bladder dysfunction. He is a retired .       Physical Exam: The patient is a 69-year-old male well-developed well-nourished who is alert and oriented with a normal mood and affect. He does have a urine bag. He has a full weightbearing slow slightly unsteady gait utilizing a single-point cane. He has 4 out of 5 strength bilateral upper and lower extremities. Negative straight leg raise. Negative Mathieu's. He is unable to do a tandem gait at all. Assessment and Plan: This is a patient who is having neck pain with a lot associated symptoms that would not normally be attributable to his neck issues such as his dizziness and blurred vision. I am concerned he may have some metastasis of his cancer to his brain. I will get a MRI with and without contrast to evaluate for this. He would like a referral to Dr. Mahad Finney to see if he would require further neck surgery. I have put that referral in. I will call him with the results of the MRI. I consulted with Dr. Leela Sarkar regarding the patient and she is agreeable with the MRI of the brain. Medications:  Current Outpatient Medications   Medication Sig Dispense Refill    cholecalciferol (Vitamin D3) (1000 Units /25 mcg) tablet Take 1,000 Units by mouth daily.  nortriptyline (Pamelor) 25 mg capsule Take 1-2 Capsules by mouth nightly. 60 Capsule 1    amLODIPine (NORVASC) 10 mg tablet Take 10 mg by mouth daily.  omeprazole (PRILOSEC) 20 mg capsule TAKE 1 CAPSULE BY MOUTH ONCE DAILY 90 Cap 3    cyanocobalamin (Vitamin B-12) 500 mcg tablet Take 500 mcg by mouth daily.       methylPREDNISolone (MEDROL DOSEPACK) 4 mg tablet Per dose pack instructions 1 Dose Pack 1           Review of systems:    Past Medical History:   Diagnosis Date    Bilateral hip pain     Chronic pain     back; hx of opiod addiction    Depression     Diabetes ([facility] [address]     borderline, no meds-trying to control with diet    DJD (degenerative joint disease)     GERD (gastroesophageal reflux disease)     Hepatitis C 01/2015    +Harvoni rx    Hypertension     Kidney stones     Lymphadenopathy, generalized 12/05/2015    neg bx    Numbness of foot left    resolved per patient 1/29/16    S/P lumbar fusion 2/9/2016    L4/5 LAMINECTOMY/FUSION/TRANSFORAMINAL LUMBAR INTERBODY FUSION (TLIF) by Dr. Jeanette Nicolas on 2/8/16     Unspecified sleep apnea     no cpap machine-pt denies     Past Surgical History:   Procedure Laterality Date    HX APPENDECTOMY  as a child    HX BACK SURGERY  07/2015    HX CATARACT REMOVAL Bilateral     HX CERVICAL FUSION  05/18/2017    ACDF C5/6 C6/7    HX COLONOSCOPY  2015    negative    HX LUMBAR FUSION  2/2016    HX ORTHOPAEDIC      denies    HX OTHER SURGICAL  05/2017    cervical fusion    HX TONSILLECTOMY  as a child    AR REMOVE GROIN LYMPH NODES SUPERF Right 3-10-16    Dr. Catherine Owens History     Socioeconomic History    Marital status:      Spouse name: Not on file    Number of children: Not on file    Years of education: Not on file    Highest education level: Not on file   Occupational History    Not on file   Tobacco Use    Smoking status: Current Every Day Smoker     Packs/day: 1.00     Years: 40.00     Pack years: 40.00     Types: Cigarettes     Start date: 4/24/1966    Smokeless tobacco: Former User    Tobacco comment: 1 pack per day   Substance and Sexual Activity    Alcohol use: No     Alcohol/week: 0.0 standard drinks    Drug use: Yes     Types: Marijuana    Sexual activity: Never   Other Topics Concern    Not on file   Social History Narrative    Not on file     Social Determinants of Health     Financial Resource Strain:     Difficulty of Paying Living Expenses:    Food Insecurity:     Worried About Running Out of Food in the Last Year:     Ran Out of Food in the Last Year:    Transportation Needs:     Lack of Transportation (Medical):      Lack of Transportation (Non-Medical):    Physical Activity:     Days of Exercise per Week:     Minutes of Exercise per Session:    Stress:     Feeling of Stress :    Social Connections:     Frequency of Communication with Friends and Family:     Frequency of Social Gatherings with Friends and Family:     Attends Congregation Services:     Active Member of Clubs or Organizations:     Attends Club or Organization Meetings:     Marital Status:    Intimate Partner Violence:     Fear of Current or Ex-Partner:     Emotionally Abused:     Physically Abused:     Sexually Abused:      Family History   Problem Relation Age of Onset    Diabetes Sister     SLE Sister     Arthritis-osteo Sister     Heart Disease Sister        Physical Exam:  Visit Vitals  BP (!) 145/82 (BP 1 Location: Left upper arm, BP Patient Position: Sitting, BP Cuff Size: Adult) Comment: pt asymptomatic, NP aware   Pulse 93   Temp 97.1 °F (36.2 °C) (Tympanic)   Ht 5' 11\" (1.803 m)   Wt 218 lb (98.9 kg)   SpO2 97% Comment: RA   BMI 30.40 kg/m²     Pain Scale: 7/10       has been . reviewed and is appropriate          Diagnoses and all orders for this visit:    1. HNP (herniated nucleus pulposus) with myelopathy, cervical  -     REFERRAL TO SPINE SURGERY    2. Chronic bilateral low back pain with left-sided sciatica    3. History of cancer  -     MRI BRAIN W WO CONT; Future    4. Dizziness  -     MRI BRAIN W WO CONT; Future    5. Frequent headaches  -     MRI BRAIN W WO CONT; Future    6. Balance problem  -     MRI BRAIN W WO CONT; Future            Follow-up and Dispositions    · Return for will call with MRI of brain results, fu after he sees Dr. Nilda Conley.              We have informed Kevin Zacarias to notify us for immediate appointment if he has any worsening neurogical symptoms or if an emergency situation presents, then call [phone]

## 2021-06-25 ENCOUNTER — TELEPHONE (OUTPATIENT)
Dept: ORTHOPEDIC SURGERY | Age: 71
End: 2021-06-25

## 2021-06-25 NOTE — TELEPHONE ENCOUNTER
I called and spoke to the pt. The pt was identified using 2 pt identifiers. He states that he has gotten everything straightened out and has been scheduled with Dr. Ac Mejia. He has no questions or concerns at this time. Pt was encouraged to contact the office again if he has any other questions or concerns.

## 2021-06-25 NOTE — TELEPHONE ENCOUNTER
Patient called in upset and confused about his treatment/diagnostic plan. Patient stated he received a call from someone to get an appointment set up(but because he was confused he disconnected the call because he did not understand) He was informed that a referral was sent over to [facility] and there's a possible change the office called to set up the consultation appointment . Patient was also informed that a MRI of the brain appointment is scheduled. Patient became irate and upset wibecause he really did not understand the information explained to him.  Patient was/is confused and disconnected the call      Please advise if possible at [phone]

## 2021-06-25 NOTE — TELEPHONE ENCOUNTER
Please call patient. Tj Benson have referred him to spine surgery (his insurance may not cover Dr. Alma Schaefer which is why it may have gone to [facility]). They also want an MRI of his brain.  They are both out of the office today

## 2021-07-01 ENCOUNTER — HOSPITAL ENCOUNTER (OUTPATIENT)
Dept: CT IMAGING | Age: 71
Discharge: HOME | End: 2021-07-01
Attending: NURSE PRACTITIONER
Payer: MEDICARE

## 2021-07-01 LAB — CREAT UR-MCNC: 1.6 MG/DL (ref 0.6–1.3)

## 2021-07-01 PROCEDURE — 74011000636 HC RX REV CODE- 636: Performed by: NURSE PRACTITIONER

## 2021-07-01 PROCEDURE — 74177 CT ABD & PELVIS W/CONTRAST: CPT

## 2021-07-01 PROCEDURE — 82565 ASSAY OF CREATININE: CPT

## 2021-07-01 RX ADMIN — IOPAMIDOL 80 ML: 612 INJECTION, SOLUTION INTRAVENOUS at 08:08

## 2021-07-08 ENCOUNTER — HOSPITAL ENCOUNTER (OUTPATIENT)
Dept: MRI IMAGING | Age: 71
Discharge: HOME | End: 2021-07-08
Attending: NURSE PRACTITIONER
Payer: MEDICARE

## 2021-07-08 VITALS — WEIGHT: 220 LBS | BODY MASS INDEX: 30.68 KG/M2

## 2021-07-08 PROCEDURE — 74011250636 HC RX REV CODE- 250/636: Performed by: NURSE PRACTITIONER

## 2021-07-08 PROCEDURE — 70553 MRI BRAIN STEM W/O & W/DYE: CPT

## 2021-07-08 PROCEDURE — A9577 INJ MULTIHANCE: HCPCS | Performed by: NURSE PRACTITIONER

## 2021-07-08 RX ADMIN — GADOBENATE DIMEGLUMINE 20 ML: 529 INJECTION, SOLUTION INTRAVENOUS at 17:22

## 2021-07-19 NOTE — PROGRESS NOTES
I called and spoke with the patient. He states he is seeing the neurosurgeon this Thursday and has a copy of both the cervical and brain MRIs to take with him to the appt.

## 2021-12-09 ENCOUNTER — TELEPHONE (OUTPATIENT)
Dept: ORTHOPEDIC SURGERY | Age: 71
End: 2021-12-09

## 2021-12-09 NOTE — TELEPHONE ENCOUNTER
I called and spoke to Mr. Garcia. The pt was identified using 2 pt identifiers. He was asked about his message and wanting to come back to the office. According to his last office note, he was supposed to make a follow up with the office after he saw the neurosurgeon. The pt states that he did see a neurosurgeon at [facility]. He states that he was told by the provider that is was just arthritis. The pt reports that he did not like the provider and felt like the provider made him feel like he was bothering him. The pt verbalized that he never made a follow up appt because he has been dealing with him emotions. He is seeing psychiatry to try and deal with this. He states that he has uncontrollable anger issues and crying. He can be fine one day and a mess the next. He reports having no one left in his life because of this issue. The pt is aware that the provider will not be back in the office until Monday. I can ask her if a follow up appt would be appropriate for the pt. I asked him if he was able to do a virtual visit and he is not sure how to work the technology. So it would be a telephone visit only. This may help with his transportation issue. He is ok with this and is not in a rush to be seen at this time. He is not sure what to do next.

## 2021-12-09 NOTE — TELEPHONE ENCOUNTER
Patient called to request provider call him and discuss his options. Patient states he wants to come back because the place he was referred to did not know what they were doing. Patient states provider needs to figure out what is going on and call him back ASAP.     Phn #: [phone]

## 2021-12-10 NOTE — TELEPHONE ENCOUNTER
I have reviewed the neurosurgeon's office note in [facility]. At the time the pt had a major complaint of vertigo and the doctor referred him to ENT. Pt also requested PT which was ordered. The doctor wanted his vertigo addressed before considering any surgery. \"I would like to further evaluate his major complaint of vertigo prior to any surgical intervention. The doctor does states the pt has a lot of spondylosis, but also states he has a HNP. If the pt has done the ordered PT and seen ENT then he should really return to the neurosurgeon for re-evaluation. If he does not want to go back to that provider then we can try to find another neurosurgeon.

## 2021-12-13 NOTE — TELEPHONE ENCOUNTER
I called and spoke to the pt. The pt was identified using 2 pt identifiers. He was informed of the provider's response to his recent call. The pt verbalized understanding. He reports that he has not done the PT that was recommended by the surgeon because he didn't think he was taken seriously by the provider. It was explained to the pt that, per the provider,he was supposed to get the physical therapy and dizziness taken care of and then return to the office for a surgical evaluation. Mr. Rey Duke verbalized understanding and states that he will contact their office back to see if he can be seen there again to get everything re-initiated. The pt will contact the office if he continues to have issues.

## 2021-12-13 NOTE — TELEPHONE ENCOUNTER
Patient called in regards to previous messages and requesting status update.      Phn #: [phone]

## 2021-12-15 ENCOUNTER — TELEPHONE (OUTPATIENT)
Dept: ORTHOPEDIC SURGERY | Age: 71
End: 2021-12-15

## 2021-12-15 NOTE — TELEPHONE ENCOUNTER
I called and spoke to the pt. The pt was identified using 2 pt identifiers. I asked the pt if he has been seeing a psychiatrist. The pt states that he has tried several times to get in somewhere but they are not returning his calls or taking patients. He states that he has been talking to his insurance company and they suggested that he use [facility] to get in with a pcp and get established. Mr. Sharon Shelby states that he is going to go by there in the morning so that he can be seen. He is out of his regular maintenance medications, like his bp meds. The pt is going to start here and see if he can get some help. Nothing further needed at this time. He will contact the office if he needs anything.

## 2021-12-15 NOTE — TELEPHONE ENCOUNTER
Patient called in stating Douglas Hatchet has 10 referral to different places and is not sure on which number to call\"    He asked \"if we are able to call on his behalf to a neurosurgeon because he is unable to get through to them, and to set up an appointment for him\"    He was informed we will not be able to assist him in scheduling an appointment on his behalf,    Patient continued to say Douglas Hatchet is not sure on what's going on and what to do, and needs a referral to see a psychiatrist\"    Patient was advised a message will be routed on his behalf    Patient's contact is [phone]

## 2021-12-15 NOTE — TELEPHONE ENCOUNTER
Call pt.   I was under the impression he was seeing a psychiatrist.  If he needs a referral then it should come from his PCP

## 2021-12-23 RX ORDER — OMEPRAZOLE 20 MG/1
CAPSULE, DELAYED RELEASE ORAL
Qty: 90 CAPSULE | Refills: 3 | Status: SHIPPED | OUTPATIENT
Start: 2021-12-23

## 2022-03-11 ENCOUNTER — HOSPITAL ENCOUNTER (OUTPATIENT)
Dept: LAB | Age: 72
Discharge: HOME | End: 2022-03-11

## 2022-03-11 LAB — XX-LABCORP SPECIMEN COL,LCBCF: NORMAL

## 2022-03-11 PROCEDURE — 99001 SPECIMEN HANDLING PT-LAB: CPT

## 2022-03-18 PROBLEM — R73.02 IMPAIRED GLUCOSE TOLERANCE: Status: ACTIVE | Noted: 2017-08-28

## 2022-03-18 PROBLEM — F32.A DEPRESSIVE DISORDER: Status: ACTIVE | Noted: 2017-08-28

## 2022-03-18 PROBLEM — M41.9 SCOLIOSIS: Status: ACTIVE | Noted: 2021-03-21

## 2022-03-18 PROBLEM — F34.1 DYSTHYMIA: Status: ACTIVE | Noted: 2021-03-21

## 2022-03-18 PROBLEM — Z93.50 CYSTOSTOMY IN PLACE (HCC): Status: ACTIVE | Noted: 2021-03-21

## 2022-03-18 PROBLEM — M54.16 LUMBAR RADICULOPATHY: Status: ACTIVE | Noted: 2018-03-19

## 2022-03-18 PROBLEM — F11.11 HISTORY OF OPIOID ABUSE (HCC): Status: ACTIVE | Noted: 2017-10-25

## 2022-03-18 PROBLEM — C67.9 MALIGNANT TUMOR OF URINARY BLADDER (HCC): Status: ACTIVE | Noted: 2019-09-11

## 2022-03-19 PROBLEM — D69.6 THROMBOCYTOPENIA (HCC): Status: ACTIVE | Noted: 2020-01-19

## 2022-03-19 PROBLEM — G95.9 CERVICAL MYELOPATHY (HCC): Status: ACTIVE | Noted: 2017-05-18

## 2022-03-19 PROBLEM — S39.012A LOW BACK STRAIN: Status: ACTIVE | Noted: 2018-04-26

## 2022-03-19 PROBLEM — C67.9 MALIGNANT NEOPLASM OF URINARY BLADDER (HCC): Status: ACTIVE | Noted: 2019-09-11

## 2022-03-19 PROBLEM — N40.1 BENIGN PROSTATIC HYPERPLASIA WITH WEAK URINARY STREAM: Status: ACTIVE | Noted: 2019-09-11

## 2022-03-19 PROBLEM — R31.0 FRANK HEMATURIA: Status: ACTIVE | Noted: 2019-09-11

## 2022-03-19 PROBLEM — F33.9 RECURRENT DEPRESSION (HCC): Status: ACTIVE | Noted: 2018-01-17

## 2022-03-19 PROBLEM — M25.519 SHOULDER PAIN: Status: ACTIVE | Noted: 2017-04-04

## 2022-03-19 PROBLEM — E86.0 DEHYDRATION: Status: ACTIVE | Noted: 2019-12-30

## 2022-03-19 PROBLEM — R78.81 BACTEREMIA: Status: ACTIVE | Noted: 2020-04-12

## 2022-03-19 PROBLEM — R11.0 NAUSEA: Status: ACTIVE | Noted: 2020-01-03

## 2022-03-19 PROBLEM — Z98.1 S/P CERVICAL SPINAL FUSION: Status: ACTIVE | Noted: 2017-06-01

## 2022-03-19 PROBLEM — Z91.81 FALLS INFREQUENTLY: Status: ACTIVE | Noted: 2021-03-21

## 2022-03-19 PROBLEM — R39.12 BENIGN PROSTATIC HYPERPLASIA WITH WEAK URINARY STREAM: Status: ACTIVE | Noted: 2019-09-11

## 2022-03-19 PROBLEM — H61.20 IMPACTED CERUMEN: Status: ACTIVE | Noted: 2021-03-21

## 2022-03-20 PROBLEM — M48.061 SPINAL STENOSIS OF LUMBAR REGION: Status: ACTIVE | Noted: 2018-03-19

## 2022-03-20 PROBLEM — M41.20 IDIOPATHIC SCOLIOSIS AND KYPHOSCOLIOSIS: Status: ACTIVE | Noted: 2021-05-19

## 2022-03-20 PROBLEM — N39.41 URGE INCONTINENCE OF URINE: Status: ACTIVE | Noted: 2019-10-22

## 2022-03-25 ENCOUNTER — HOSPITAL ENCOUNTER (OUTPATIENT)
Dept: CT IMAGING | Age: 72
Discharge: HOME | End: 2022-03-25
Attending: NURSE PRACTITIONER
Payer: MEDICARE

## 2022-03-25 DIAGNOSIS — C67.9 MALIGNANT NEOPLASM OF URINARY BLADDER, UNSPECIFIED SITE (HCC): ICD-10-CM

## 2022-03-25 LAB — CREAT UR-MCNC: 1.6 MG/DL (ref 0.6–1.3)

## 2022-03-25 PROCEDURE — 74177 CT ABD & PELVIS W/CONTRAST: CPT

## 2022-03-25 PROCEDURE — 74011000636 HC RX REV CODE- 636: Performed by: NURSE PRACTITIONER

## 2022-03-25 PROCEDURE — 82565 ASSAY OF CREATININE: CPT

## 2022-03-25 RX ADMIN — IOPAMIDOL 70 ML: 755 INJECTION, SOLUTION INTRAVENOUS at 09:52

## 2022-03-28 ENCOUNTER — VIRTUAL VISIT (OUTPATIENT)
Dept: ORTHOPEDIC SURGERY | Age: 72
End: 2022-03-28
Payer: MEDICARE

## 2022-03-28 DIAGNOSIS — M51.26 HNP (HERNIATED NUCLEUS PULPOSUS), LUMBAR: ICD-10-CM

## 2022-03-28 DIAGNOSIS — G89.29 CHRONIC BILATERAL LOW BACK PAIN WITH LEFT-SIDED SCIATICA: Primary | ICD-10-CM

## 2022-03-28 DIAGNOSIS — M54.16 LUMBAR RADICULOPATHY: ICD-10-CM

## 2022-03-28 DIAGNOSIS — M54.42 CHRONIC BILATERAL LOW BACK PAIN WITH LEFT-SIDED SCIATICA: Primary | ICD-10-CM

## 2022-03-28 PROCEDURE — 99442 PR PHYS/QHP TELEPHONE EVALUATION 11-20 MIN: CPT | Performed by: NURSE PRACTITIONER

## 2022-03-28 RX ORDER — BACLOFEN 10 MG/1
TABLET ORAL
COMMUNITY

## 2022-03-28 RX ORDER — TRAMADOL HYDROCHLORIDE 50 MG/1
TABLET ORAL
COMMUNITY

## 2022-03-28 RX ORDER — DULOXETIN HYDROCHLORIDE 30 MG/1
CAPSULE, DELAYED RELEASE ORAL
COMMUNITY
Start: 2022-03-04

## 2022-03-28 NOTE — PROGRESS NOTES
History of Present Illness:    Consent: Zoila Tam, who was seen by telephone call and/or his healthcare decision maker, is aware that this patient-initiated, Telehealth encounter on 3/28/2022 is a billable service, with coverage as determined by his insurance carrier. He is aware that he may receive a bill and has provided verbal consent to proceed: Yes. The provider was located at [facility] office and the patient was located at their home. No one else  participated in the visit with the patient. The platform used was telephone call    Patient is a 52-year-old male who is complaining today of low back pain that radiates to his left hip into his thigh and down to his foot. He gets numbness in both feet. He states his PCP recently started him on Cymbalta 30 mg and tramadol along with baclofen. He states the tramadol does help reduce his pain somewhat but it does not take it away completely. Last visit he was going to go see Dr. Karyna Stallworth to see if there is any surgeries that might help him but he states Dr. Noy Lester office does not take his insurance. He states he is in constant pain. Last x-ray showed severe disc space narrowing L1-S1. Previous MRI demonstrated persistent mass effect on the crossing left S1 nerve at L5-S1 from a chronic disc extrusion with several levels of notable foraminal stenoses particularly L5-S1 followed by L4-5. Patient has a history of a left L4-5 laminectomy in July 24, 2015 by Dr. Karyna Stallworth followed by L4-5 TLIF PLIF February 8, 2016. He has also had a ACDF C5-6 C6-7 May 18, 2017. Physical Exam: The patient is a 52-year-old male who is alert and oriented. He spoke of fluency. He did not appear to be in any distress. He did appear frustrated regarding his back pain. Assessment & Plan: This is a patient who is having back pain and radiating left hip and thigh pain down to his foot with numbness in his feet.   I was getting ready to offer an MRI of the lumbar spine to see if he would be a candidate for selective nerve root block. Patient  abruptly hung up the phone before I could even offered this. We will see him back as needed. Diagnoses and all orders for this visit:    1. Chronic bilateral low back pain with left-sided sciatica    2. HNP (herniated nucleus pulposus), lumbar    3. Lumbar radiculopathy          We discussed the expected course, resolution and complications of the diagnosis(es) in detail. Medication risks, benefits, costs, interactions, and alternatives were discussed as indicated. I advised him to contact the office if his condition worsens, changes or fails to improve as anticipated. For emergencies or worsening neurological symptoms the patient was instructed to call 911. He expressed understanding with the diagnosis(es) and plan. Follow-up and Dispositions    · Return if symptoms worsen or fail to improve. 1    Zechariah Vasques is a 80 y.o. male being evaluated by a video visit encounter for concerns as above. A caregiver was present when appropriate. Due to this being a TeleHealth encounter (During [facility] public health emergency), evaluation of the following organ systems was limited: Vitals/Constitutional/EENT/Resp/CV/GI//MS/Neuro/Skin/Heme-Lymph-Imm. Pursuant to the emergency declaration under the [facility], [facility] waiver authority and the [employer] Act, this Virtual  Visit was conducted, with patient's (and/or legal guardian's) consent, to reduce the patient's risk of exposure to COVID-19 and provide necessary medical care.              CPT Codes 22994-92385 for Established Patients may apply to this Telehealth Visit  Time-based coding, delete if not needed: I spent at least 15 minutes with this established patient, and >50% of the time was spent counseling and/or coordinating care regarding Back and leg pain  Start time: 11:50 AM and Stop time: 12:08 AM.        Due to this being a TeleHealth evaluation, many elements of the physical examination are unable to be assessed. Pursuant to the emergency declaration under the [address] [place] [address] waiver authority and the [employer] and [employer] Act, this Virtual  Visit was conducted, with patient's consent, to reduce the patient's risk of exposure to COVID-19 and provide continuity of care for an established patient. Services were provided through a video synchronous discussion virtually to substitute for in-person clinic visit.     Adrian Quick NP

## 2022-04-04 ENCOUNTER — OFFICE VISIT (OUTPATIENT)
Dept: ORTHOPEDIC SURGERY | Age: 72
End: 2022-04-04
Payer: MEDICARE

## 2022-04-04 VITALS
HEART RATE: 68 BPM | WEIGHT: 214 LBS | OXYGEN SATURATION: 99 % | HEIGHT: 71 IN | BODY MASS INDEX: 29.96 KG/M2 | SYSTOLIC BLOOD PRESSURE: 131 MMHG | TEMPERATURE: 97.5 F | DIASTOLIC BLOOD PRESSURE: 78 MMHG

## 2022-04-04 DIAGNOSIS — R29.898 LEFT LEG WEAKNESS: ICD-10-CM

## 2022-04-04 DIAGNOSIS — R29.6 FREQUENT FALLS: ICD-10-CM

## 2022-04-04 DIAGNOSIS — M54.42 CHRONIC LEFT-SIDED LOW BACK PAIN WITH LEFT-SIDED SCIATICA: ICD-10-CM

## 2022-04-04 DIAGNOSIS — M54.42 CHRONIC LEFT-SIDED LOW BACK PAIN WITH LEFT-SIDED SCIATICA: Primary | ICD-10-CM

## 2022-04-04 DIAGNOSIS — G89.29 CHRONIC LEFT-SIDED LOW BACK PAIN WITH LEFT-SIDED SCIATICA: Primary | ICD-10-CM

## 2022-04-04 DIAGNOSIS — G89.29 CHRONIC LEFT-SIDED LOW BACK PAIN WITH LEFT-SIDED SCIATICA: ICD-10-CM

## 2022-04-04 PROCEDURE — G8536 NO DOC ELDER MAL SCRN: HCPCS | Performed by: NURSE PRACTITIONER

## 2022-04-04 PROCEDURE — 72100 X-RAY EXAM L-S SPINE 2/3 VWS: CPT | Performed by: NURSE PRACTITIONER

## 2022-04-04 PROCEDURE — G9717 DOC PT DX DEP/BP F/U NT REQ: HCPCS | Performed by: NURSE PRACTITIONER

## 2022-04-04 PROCEDURE — G8752 SYS BP LESS 140: HCPCS | Performed by: NURSE PRACTITIONER

## 2022-04-04 PROCEDURE — 3017F COLORECTAL CA SCREEN DOC REV: CPT | Performed by: NURSE PRACTITIONER

## 2022-04-04 PROCEDURE — G8754 DIAS BP LESS 90: HCPCS | Performed by: NURSE PRACTITIONER

## 2022-04-04 PROCEDURE — G8417 CALC BMI ABV UP PARAM F/U: HCPCS | Performed by: NURSE PRACTITIONER

## 2022-04-04 PROCEDURE — 1101F PT FALLS ASSESS-DOCD LE1/YR: CPT | Performed by: NURSE PRACTITIONER

## 2022-04-04 PROCEDURE — G8427 DOCREV CUR MEDS BY ELIG CLIN: HCPCS | Performed by: NURSE PRACTITIONER

## 2022-04-04 PROCEDURE — 99213 OFFICE O/P EST LOW 20 MIN: CPT | Performed by: NURSE PRACTITIONER

## 2022-04-04 RX ORDER — PREGABALIN 100 MG/1
CAPSULE ORAL
COMMUNITY
Start: 2022-03-30

## 2022-04-04 NOTE — PROGRESS NOTES
Chief complaint   Chief Complaint   Patient presents with    Hip Pain     left    Back Pain     lower        History of Present Illness:  Johnny Graves is a  80 y.o.  male who comes in today of complaint of low back pain that radiates to his left hip, down his  thigh and down to his foot. He gets numbness in both feet. He continues to take Cymbalta 30 mg and tramadol along with baclofen. He is now also taking Lyrica 100 mg twice a day through his PCP. He states the tramadol does help reduce his pain somewhat but it does not take it away completely. He states he is in constant pain and his left leg is weak causing him to have frequent falls. Last fall was last week. Previous MRI demonstrated persistent mass effect on the crossing left S1 nerve at L5-S1 from a chronic disc extrusion with several levels of notable foraminal stenoses particularly L5-S1 followed by L4-5. Patient has a history of a left L4-5 laminectomy in July 24, 2015 by Dr. Aydee Diego followed by L4-5 TLIF PLIF February 8, 2016. He has also had a ACDF C5-6 C6-7 May 18, 2017. He has had to have his bladder removed due to cancer in the past.  He denies fever or bowel dysfunction. Physical Exam: Patient is a 81-year-old male well-developed well-nourished who is alert and oriented with a normal mood and affect. He has a full weightbearing slow but nonantalgic gait. He did use a single-point cane. He has 5 out of 5 strength of his right lower extremity and 3 out of 5 strength of the left. Assessment and Plan: This is a patient who has known lumbar spinal stenosis. We know he has a persistent disc extrusion at L5-S1 with mass-effect on the left S1 nerve root. His last MRI was years ago. I did a lumbar AP and lateral x-ray. We will get a new MRI of his lumbar spine.   He may be a candidate for injections or  surgery    XRAY: 04/04/22   body part: Lumbar  side (rt/lt): bilateral  number of views taken:2  Findings: no acute finding    The x-ray will be officially read by Dr. Kobi Mobley and scanned into the chart. Medications:  Current Outpatient Medications   Medication Sig Dispense Refill    pregabalin (LYRICA) 100 mg capsule TAKE 1 CAPSULE BY MOUTH 2 TIMES A DAY      DULoxetine (CYMBALTA) 30 mg capsule TAKE 1 CAPSULE BY MOUTH ONCE DAILY FOR MOOD AND PAIN      traMADoL (ULTRAM) 50 mg tablet tramadol 50 mg tablet   TAKE 1 TABLET BY MOUTH THREE TIMES A DAY AS NEEDED FOR PAIN FOR SEVERE PAIN      omeprazole (PRILOSEC) 20 mg capsule TAKE 1 CAPSULE BY MOUTH ONCE DAILY 90 Capsule 3    cholecalciferol (Vitamin D3) (1000 Units /25 mcg) tablet Take 1,000 Units by mouth daily.  amLODIPine (NORVASC) 10 mg tablet Take 10 mg by mouth daily.  cyanocobalamin (Vitamin B-12) 500 mcg tablet Take 500 mcg by mouth daily.       baclofen (LIORESAL) 10 mg tablet baclofen 10 mg tablet   TAKE 1 TABLET BY MOUTH TWICE A DAY AS NEEDED (Patient not taking: Reported on 4/4/2022)             Review of systems:    Past Medical History:   Diagnosis Date    Bilateral hip pain     Chronic pain     back; hx of opiod addiction    Depression     Diabetes ([facility] [address]     borderline, no meds-trying to control with diet    DJD (degenerative joint disease)     GERD (gastroesophageal reflux disease)     Hepatitis C 01/2015    +Harvoni rx    Hypertension     Kidney stones     Lymphadenopathy, generalized 12/05/2015    neg bx    Numbness of foot left    resolved per patient 1/29/16    S/P lumbar fusion 2/9/2016    L4/5 LAMINECTOMY/FUSION/TRANSFORAMINAL LUMBAR INTERBODY FUSION (TLIF) by Dr. Wendi Lau on 2/8/16     Unspecified sleep apnea     no cpap machine-pt denies     Past Surgical History:   Procedure Laterality Date    HX APPENDECTOMY  as a child    HX BACK SURGERY  07/2015    HX CATARACT REMOVAL Bilateral     HX CERVICAL FUSION  05/18/2017    ACDF C5/6 C6/7    HX COLONOSCOPY  2015    negative    HX LUMBAR FUSION  2/2016    HX ORTHOPAEDIC      denies    HX OTHER SURGICAL  05/2017    cervical fusion    HX TONSILLECTOMY  as a child    HI REMOVE GROIN LYMPH NODES SUPERF Right 3-10-16    Dr. Murray Rochester History     Socioeconomic History    Marital status:      Spouse name: Not on file    Number of children: Not on file    Years of education: Not on file    Highest education level: Not on file   Occupational History    Not on file   Tobacco Use    Smoking status: Current Every Day Smoker     Packs/day: 1.00     Years: 40.00     Pack years: 40.00     Types: Cigarettes     Start date: 4/24/1966    Smokeless tobacco: Former User    Tobacco comment: 1 pack per day   Substance and Sexual Activity    Alcohol use: No     Alcohol/week: 0.0 standard drinks    Drug use: Yes     Types: Marijuana    Sexual activity: Never   Other Topics Concern    Not on file   Social History Narrative    Not on file     Social Determinants of Health     Financial Resource Strain:     Difficulty of Paying Living Expenses: Not on file   Food Insecurity:     Worried About Running Out of Food in the Last Year: Not on file    Janis of Food in the Last Year: Not on file   Transportation Needs:     Lack of Transportation (Medical): Not on file    Lack of Transportation (Non-Medical):  Not on file   Physical Activity:     Days of Exercise per Week: Not on file    Minutes of Exercise per Session: Not on file   Stress:     Feeling of Stress : Not on file   Social Connections:     Frequency of Communication with Friends and Family: Not on file    Frequency of Social Gatherings with Friends and Family: Not on file    Attends Rastafari Services: Not on file    Active Member of Clubs or Organizations: Not on file    Attends Club or Organization Meetings: Not on file    Marital Status: Not on file   Intimate Partner Violence:     Fear of Current or Ex-Partner: Not on file    Emotionally Abused: Not on file    Physically Abused: Not on file    Sexually Abused: Not on file   Housing Stability:     Unable to Pay for Housing in the Last Year: Not on file    Number of Places Lived in the Last Year: Not on file    Unstable Housing in the Last Year: Not on file     Family History   Problem Relation Age of Onset    Diabetes Sister     SLE Sister     OSTEOARTHRITIS Sister     Heart Disease Sister        Physical Exam:  Visit Vitals  /78 (BP 1 Location: Right upper arm, BP Patient Position: Sitting, BP Cuff Size: Adult)   Pulse 68   Temp 97.5 °F (36.4 °C) (Temporal)   Ht 5' 11\" (1.803 m)   Wt 214 lb (97.1 kg)   SpO2 99% Comment: RA   BMI 29.85 kg/m²     Pain Scale: 7/10       has been . reviewed and is appropriate          Diagnoses and all orders for this visit:    1. Chronic left-sided low back pain with left-sided sciatica  -     AMB POC XRAY, SPINE, LUMBOSACRAL; 2 O  -     MRI LUMB SPINE W WO CONT; Future    2. Left leg weakness  -     MRI LUMB SPINE W WO CONT; Future    3.  Frequent falls  -     MRI LUMB SPINE W WO CONT; Future            Follow-up and Dispositions    · Return in about 4 weeks (around 5/2/2022) for with Dr. Joan Peoples for MRI fu.             We have informed Kaycee Oppenheim to notify us for immediate appointment if he has any worsening neurogical symptoms or if an emergency situation presents, then call [phone]

## 2022-04-04 NOTE — PROGRESS NOTES
Fatemeh Johnston presents today for   Chief Complaint   Patient presents with    Hip Pain     left    Back Pain     lower        Is someone accompanying this pt? no    Is the patient using any DME equipment during OV? Yes, cane    Depression Screening:  3 most recent PHQ Screens 8/28/2017   Little interest or pleasure in doing things Nearly every day   Feeling down, depressed, irritable, or hopeless Nearly every day   Total Score PHQ 2 6   Trouble falling or staying asleep, or sleeping too much -   Feeling tired or having little energy -   Poor appetite, weight loss, or overeating -   Feeling bad about yourself - or that you are a failure or have let yourself or your family down -   Trouble concentrating on things such as school, work, reading, or watching TV -   Moving or speaking so slowly that other people could have noticed; or the opposite being so fidgety that others notice -   Thoughts of being better off dead, or hurting yourself in some way -   PHQ 9 Score -   How difficult have these problems made it for you to do your work, take care of your home and get along with others -       Learning Assessment:  Learning Assessment 8/29/2016   PRIMARY LEARNER Patient   HIGHEST LEVEL OF EDUCATION - PRIMARY LEARNER  GRADUATED HIGH SCHOOL OR GED   BARRIERS PRIMARY LEARNER NONE   CO-LEARNER CAREGIVER No   PRIMARY LANGUAGE ENGLISH    NEED No   LEARNER PREFERENCE PRIMARY DEMONSTRATION     READING     LISTENING     PICTURES     VIDEOS   ANSWERED BY patient   RELATIONSHIP SELF       Abuse Screening:  Abuse Screening Questionnaire 4/4/2022   Do you ever feel afraid of your partner? N   Are you in a relationship with someone who physically or mentally threatens you? N   Is it safe for you to go home? Y       Fall Risk  Fall Risk Assessment, last 12 mths 4/4/2022   Able to walk? Yes   Fall in past 12 months? 1   Do you feel unsteady?  1   Are you worried about falling 1   Is the gait abnormal? 0   Number of falls in past 12 months 1   Fall with injury? 0         Coordination of Care:  1. Have you been to the ER, urgent care clinic since your last visit? no  Hospitalized since your last visit? no    2. Have you seen or consulted any other health care providers outside of the [address] [place] [address] since your last visit? no Include any pap smears or colon screening.  no

## 2022-05-09 ENCOUNTER — HOSPITAL ENCOUNTER (OUTPATIENT)
Dept: MRI IMAGING | Age: 72
Discharge: HOME | End: 2022-05-09
Attending: NURSE PRACTITIONER
Payer: MEDICARE

## 2022-05-09 LAB — CREAT UR-MCNC: 1.5 MG/DL (ref 0.6–1.3)

## 2022-05-09 PROCEDURE — A9577 INJ MULTIHANCE: HCPCS | Performed by: NURSE PRACTITIONER

## 2022-05-09 PROCEDURE — 72158 MRI LUMBAR SPINE W/O & W/DYE: CPT

## 2022-05-09 PROCEDURE — 82565 ASSAY OF CREATININE: CPT

## 2022-05-09 PROCEDURE — 74011250636 HC RX REV CODE- 250/636: Performed by: NURSE PRACTITIONER

## 2022-05-09 RX ADMIN — GADOBENATE DIMEGLUMINE 18 ML: 529 INJECTION, SOLUTION INTRAVENOUS at 16:44

## 2022-05-16 ENCOUNTER — OFFICE VISIT (OUTPATIENT)
Dept: ORTHOPEDIC SURGERY | Age: 72
End: 2022-05-16
Payer: MEDICARE

## 2022-05-16 VITALS
SYSTOLIC BLOOD PRESSURE: 139 MMHG | DIASTOLIC BLOOD PRESSURE: 77 MMHG | RESPIRATION RATE: 18 BRPM | BODY MASS INDEX: 29.26 KG/M2 | OXYGEN SATURATION: 95 % | WEIGHT: 209 LBS | HEART RATE: 78 BPM | HEIGHT: 71 IN

## 2022-05-16 DIAGNOSIS — M54.16 LUMBAR RADICULOPATHY: ICD-10-CM

## 2022-05-16 DIAGNOSIS — R79.89 ELEVATED SERUM CREATININE: ICD-10-CM

## 2022-05-16 DIAGNOSIS — M62.838 MUSCLE SPASM: ICD-10-CM

## 2022-05-16 DIAGNOSIS — R29.898 LEFT LEG WEAKNESS: Primary | ICD-10-CM

## 2022-05-16 DIAGNOSIS — M51.26 HNP (HERNIATED NUCLEUS PULPOSUS), LUMBAR: ICD-10-CM

## 2022-05-16 DIAGNOSIS — Z98.1 HISTORY OF LUMBAR FUSION: ICD-10-CM

## 2022-05-16 DIAGNOSIS — F11.11 HISTORY OF OPIOID ABUSE (HCC): ICD-10-CM

## 2022-05-16 DIAGNOSIS — Z85.9 HISTORY OF CANCER: ICD-10-CM

## 2022-05-16 DIAGNOSIS — R26.9 GAIT ABNORMALITY: ICD-10-CM

## 2022-05-16 PROCEDURE — 1101F PT FALLS ASSESS-DOCD LE1/YR: CPT | Performed by: PHYSICAL MEDICINE & REHABILITATION

## 2022-05-16 PROCEDURE — G8752 SYS BP LESS 140: HCPCS | Performed by: PHYSICAL MEDICINE & REHABILITATION

## 2022-05-16 PROCEDURE — G9717 DOC PT DX DEP/BP F/U NT REQ: HCPCS | Performed by: PHYSICAL MEDICINE & REHABILITATION

## 2022-05-16 PROCEDURE — 99214 OFFICE O/P EST MOD 30 MIN: CPT | Performed by: PHYSICAL MEDICINE & REHABILITATION

## 2022-05-16 PROCEDURE — G8536 NO DOC ELDER MAL SCRN: HCPCS | Performed by: PHYSICAL MEDICINE & REHABILITATION

## 2022-05-16 PROCEDURE — G8417 CALC BMI ABV UP PARAM F/U: HCPCS | Performed by: PHYSICAL MEDICINE & REHABILITATION

## 2022-05-16 PROCEDURE — 3017F COLORECTAL CA SCREEN DOC REV: CPT | Performed by: PHYSICAL MEDICINE & REHABILITATION

## 2022-05-16 PROCEDURE — G8754 DIAS BP LESS 90: HCPCS | Performed by: PHYSICAL MEDICINE & REHABILITATION

## 2022-05-16 PROCEDURE — G8427 DOCREV CUR MEDS BY ELIG CLIN: HCPCS | Performed by: PHYSICAL MEDICINE & REHABILITATION

## 2022-05-16 RX ORDER — METHYLPREDNISOLONE 4 MG/1
TABLET ORAL
Qty: 1 DOSE PACK | Refills: 1 | Status: SHIPPED | OUTPATIENT
Start: 2022-05-16

## 2022-05-16 RX ORDER — KETOROLAC TROMETHAMINE 30 MG/ML
30 INJECTION, SOLUTION INTRAMUSCULAR; INTRAVENOUS
Status: CANCELLED | OUTPATIENT
Start: 2022-05-16 | End: 2022-05-16

## 2022-05-16 NOTE — PATIENT INSTRUCTIONS
Herniated Disc: Exercises  Introduction  Here are some examples of exercises for you to try. The exercises may be suggested for a condition or for rehabilitation. Start each exercise slowly. Ease off the exercises if you start to have pain. You will be told when to start these exercises and which ones will work best for you. How to stay safe  These exercises can help you move easier and feel better. But when you first start doing them, you may have more pain in your back. This is normal. But it is important to pay close attention to your pain during and after each exercise. · Keep doing these exercises if your pain stays the same or moves from your leg and buttock more toward the middle of your spine. Pain moving out of your leg and buttock is a good sign. · Stop doing these exercises if your pain gets worse in your leg and buttock. Stop if you start to have pain in your leg and buttock that you didn't have before. Be sure to do these exercises in the order they appear. Note how your pain changes before you move to the next one. If your pain is much worse right after exercise and stays worse the next day, do not do any of these exercises. How to do the exercises  1. Rest on belly    1. Lie on your stomach, with your head turned to the side. 2. Try to relax your lower back muscles as much as you can. 3. Continue to lie on your stomach for 2 minutes. · Keep your arms beside your body. · If that position bothers your neck, place your hands, one on top of the other, underneath your forehead. This will help support your head and neck. If lying in this position causes or increases pain down your leg, stop this exercise and do not do the next exercises. 2. Press-up back extension    1. Lie on your stomach, with your face down and your elbows tucked into your sides and under your shoulders. 2. Press your elbows down into the floor to raise your upper back.   3. Hold this position for 15 to 30 seconds. 4. Repeat 2 to 4 times. · As you do this, relax your stomach muscles and allow your back to arch without using your back muscles. · Let your low back relax completely as you arch up. Don't let your hips or pelvis come off the floor. Then relax, and return to the start position. Over time, work up to staying in the press-up position for up to 2 minutes. If lying in this position causes or increases pain down your leg, stop this exercise and do not do the next exercises. 3. Full press-up back extension    1. Lie on your stomach with your face down, keeping your elbows tucked into your sides and under your shoulders. 2. Straighten your elbows, and push your upper body up as far as you can. 3. Hold this position for 5 seconds, and then relax. 4. Repeat 10 times. Allow your lower back to sag. Keep your hips, pelvis, and legs relaxed. Each time, try to raise your upper body a little higher and hold your arms a bit straighter. If lying in this position causes or increases pain down your leg, stop this exercise and do not do the next exercises. If you can't do this exercise, you may instead try the backward bend exercise that follows. 4. Backward bend    1. Stand with your feet hip-width apart, and don't lock your knees. 2. Place your hands in the small of your back. 3. Bend backward as far as you can, keeping your knees straight. 4. Repeat 2 to 4 times. Your toes should be pointing forward. Hold this position for 2 to 3 seconds. Then return to your starting position. Each time, try to bend backward a little farther, until you bend backward as far as you can. If standing in this position causes or increases pain down your leg, stop this exercise. Follow-up care is a key part of your treatment and safety. Be sure to make and go to all appointments, and call your doctor if you are having problems. It's also a good idea to know your test results and keep a list of the medicines you take.   Where can you learn more? Go to http://www.gray.com/  Enter Z594 in the search box to learn more about \"Herniated Disc: Exercises. \"  Current as of: July 1, 2021               Content Version: 13.2  © 2006-2022 [employer]. Care instructions adapted under license by [employer] (which disclaims liability or warranty for this information). If you have questions about a medical condition or this instruction, always ask your healthcare professional. [address] any warranty or liability for your use of this information. Herniated Disc: Exercises  Introduction  Here are some examples of exercises for you to try. The exercises may be suggested for a condition or for rehabilitation. Start each exercise slowly. Ease off the exercises if you start to have pain. You will be told when to start these exercises and which ones will work best for you. How to stay safe  These exercises can help you move easier and feel better. But when you first start doing them, you may have more pain in your back. This is normal. But it is important to pay close attention to your pain during and after each exercise. · Keep doing these exercises if your pain stays the same or moves from your leg and buttock more toward the middle of your spine. Pain moving out of your leg and buttock is a good sign. · Stop doing these exercises if your pain gets worse in your leg and buttock. Stop if you start to have pain in your leg and buttock that you didn't have before. Be sure to do these exercises in the order they appear. Note how your pain changes before you move to the next one. If your pain is much worse right after exercise and stays worse the next day, do not do any of these exercises. How to do the exercises  1. Rest on belly    4. Lie on your stomach, with your head turned to the side. 5. Try to relax your lower back muscles as much as you can.   6. Continue to lie on your stomach for 2 minutes. · Keep your arms beside your body. · If that position bothers your neck, place your hands, one on top of the other, underneath your forehead. This will help support your head and neck. If lying in this position causes or increases pain down your leg, stop this exercise and do not do the next exercises. 2. Press-up back extension    5. Lie on your stomach, with your face down and your elbows tucked into your sides and under your shoulders. 6. Press your elbows down into the floor to raise your upper back. 7. Hold this position for 15 to 30 seconds. 8. Repeat 2 to 4 times. · As you do this, relax your stomach muscles and allow your back to arch without using your back muscles. · Let your low back relax completely as you arch up. Don't let your hips or pelvis come off the floor. Then relax, and return to the start position. Over time, work up to staying in the press-up position for up to 2 minutes. If lying in this position causes or increases pain down your leg, stop this exercise and do not do the next exercises. 3. Full press-up back extension    5. Lie on your stomach with your face down, keeping your elbows tucked into your sides and under your shoulders. 6. Straighten your elbows, and push your upper body up as far as you can. 7. Hold this position for 5 seconds, and then relax. 8. Repeat 10 times. Allow your lower back to sag. Keep your hips, pelvis, and legs relaxed. Each time, try to raise your upper body a little higher and hold your arms a bit straighter. If lying in this position causes or increases pain down your leg, stop this exercise and do not do the next exercises. If you can't do this exercise, you may instead try the backward bend exercise that follows. 4. Backward bend    5. Stand with your feet hip-width apart, and don't lock your knees. 6. Place your hands in the small of your back.   7. Bend backward as far as you can, keeping your knees straight. 8. Repeat 2 to 4 times. Your toes should be pointing forward. Hold this position for 2 to 3 seconds. Then return to your starting position. Each time, try to bend backward a little farther, until you bend backward as far as you can. If standing in this position causes or increases pain down your leg, stop this exercise. Follow-up care is a key part of your treatment and safety. Be sure to make and go to all appointments, and call your doctor if you are having problems. It's also a good idea to know your test results and keep a list of the medicines you take. Where can you learn more? Go to http://www.gray.com/  Enter Z594 in the search box to learn more about \"Herniated Disc: Exercises. \"  Current as of: July 1, 2021               Content Version: 13.2  © 0026-9619 [employer]. Care instructions adapted under license by [employer] (which disclaims liability or warranty for this information). If you have questions about a medical condition or this instruction, always ask your healthcare professional. [address] any warranty or liability for your use of this information.

## 2022-05-16 NOTE — PROGRESS NOTES
[facility]  [address] [place], [address]  Phone: [phone]  Fax: [phone]    Pt's YOB: 1950    ASSESSMENT   Diagnoses and all orders for this visit:    1. Left leg weakness  -     REFERRAL TO SPINE SURGERY    2. HNP (herniated nucleus pulposus), lumbar  -     REFERRAL TO SPINE SURGERY  -     methylPREDNISolone (MEDROL DOSEPACK) 4 mg tablet; Per dose pack instructions    3. Lumbar radiculopathy  -     REFERRAL TO SPINE SURGERY    4. Muscle spasm    5. Elevated serum creatinine    6. Gait abnormality    7. History of lumbar fusion    8. History of cancer    9. History of opioid abuse [facility])         IMPRESSION AND PLAN:  Lisa Hernandez is a 80 y.o. male with history of cervical and lumbar pain. He complains of continued severe intermittent lumbar pain radiating down the left lower extremity to the lateral and plantar foot. Pt is taking Cymbalta 30 mg 1 cap daily, Lyrica 100 mg 1 cap BID, Ultram 50 mg 1 tab TID as needed, and baclofen 10 mg as needed. 1) Pt was given information on herniated disc exercises. 2) Pt was referred to Dr. Salvatore Baumann for surgical evaluation-- pt declines injections and wants this \"to be fixed\"  3) Pt was initially offered  a 30 mg Toradol injection in the office but this was cancelled after his chart revealed an elevated creatinine  4) Pt was advised to discuss vitamin D3 and zinc supplementation with his PCP. 5) Mr. Jason Martinez has a reminder for a \"due or due soon\" health maintenance. I have asked that he contact his primary care provider, None, for follow-up on this health maintenance. 6)  demonstrated consistency with prescribing. Follow-up and Dispositions    · Return in about 6 weeks (around 6/27/2022) for with James Erwin            HISTORY OF PRESENT ILLNESS:  Lisa Hernandez is a 80 y.o. male with history of cervical and lumbar pain and presents to the office today for MRI review and medication follow up. Pt complains of continued severe intermittent lumbar pain radiating down the left lower extremity to the lateral and plantar foot. He states that his pain has been severe but denies any inciting injury with acute exacerbation of his pain. He also admits that his activity and functionality are considerably hindered by his pain. Pt is taking Cymbalta 30 mg 1 cap daily, Lyrica 100 mg 1 cap BID, Ultram 50 mg 1 tab TID as needed, and baclofen 10 mg as needed. He notes that he has undergone 2 lumbar surgeries, including one fusion, a cervical surgery, and cystectomy in 2020 secondary to bladder cancer. Labs from 05/09/2022 were reviewed and demonstrated a creatinine level of 1.5 mg/dL and an eGFR of 46. He also states that he would consider surgical intervention for his pain. Pt at this time desires to proceed with surgical intervention. Of note, pt presents to today's office visit ambulating with the assistance of a single point cane.  He states he has received the booster dose of the COVID-19 vaccine    Pain Scale: /10    PCP: None     Past Medical History:   Diagnosis Date    Bilateral hip pain     Chronic pain     back; hx of opiod addiction    Depression     Diabetes ([facility] [address]     borderline, no meds-trying to control with diet    DJD (degenerative joint disease)     GERD (gastroesophageal reflux disease)     Hepatitis C 01/2015    +Harvoni rx    Hypertension     Kidney stones     Lymphadenopathy, generalized 12/05/2015    neg bx    Numbness of foot left    resolved per patient 1/29/16    S/P lumbar fusion 2/9/2016    L4/5 LAMINECTOMY/FUSION/TRANSFORAMINAL LUMBAR INTERBODY FUSION (TLIF) by Dr. Fred Sloan on 2/8/16     Unspecified sleep apnea     no cpap machine-pt denies        Social History     Socioeconomic History    Marital status:      Spouse name: Not on file    Number of children: Not on file    Years of education: Not on file    Highest education level: Not on file   Occupational History    Not on file   Tobacco Use    Smoking status: Current Every Day Smoker     Packs/day: 1.00     Years: 40.00     Pack years: 40.00     Types: Cigarettes     Start date: 4/24/1966    Smokeless tobacco: Former User    Tobacco comment: 1 pack per day   Substance and Sexual Activity    Alcohol use: No     Alcohol/week: 0.0 standard drinks    Drug use: Yes     Types: Marijuana    Sexual activity: Never   Other Topics Concern    Not on file   Social History Narrative    Not on file     Social Determinants of Health     Financial Resource Strain:     Difficulty of Paying Living Expenses: Not on file   Food Insecurity:     Worried About Running Out of Food in the Last Year: Not on file    Janis of Food in the Last Year: Not on file   Transportation Needs:     Lack of Transportation (Medical): Not on file    Lack of Transportation (Non-Medical):  Not on file   Physical Activity:     Days of Exercise per Week: Not on file    Minutes of Exercise per Session: Not on file   Stress:     Feeling of Stress : Not on file   Social Connections:     Frequency of Communication with Friends and Family: Not on file    Frequency of Social Gatherings with Friends and Family: Not on file    Attends Hoahaoism Services: Not on file    Active Member of [address] [place] [address] [employer] or Organizations: Not on file    Attends Club or Organization Meetings: Not on file    Marital Status: Not on file   Intimate Partner Violence:     Fear of Current or Ex-Partner: Not on file    Emotionally Abused: Not on file    Physically Abused: Not on file    Sexually Abused: Not on file   Housing Stability:     Unable to Pay for Housing in the Last Year: Not on file    Number of [place] in the Last Year: Not on file    Unstable Housing in the Last Year: Not on file       Current Outpatient Medications   Medication Sig Dispense Refill    methylPREDNISolone (MEDROL DOSEPACK) 4 mg tablet Per dose pack instructions 1 Dose Pack 1    pregabalin (LYRICA) 100 mg capsule TAKE 1 CAPSULE BY MOUTH 2 TIMES A DAY      DULoxetine (CYMBALTA) 30 mg capsule TAKE 1 CAPSULE BY MOUTH ONCE DAILY FOR MOOD AND PAIN      traMADoL (ULTRAM) 50 mg tablet tramadol 50 mg tablet   TAKE 1 TABLET BY MOUTH THREE TIMES A DAY AS NEEDED FOR PAIN FOR SEVERE PAIN      omeprazole (PRILOSEC) 20 mg capsule TAKE 1 CAPSULE BY MOUTH ONCE DAILY 90 Capsule 3    amLODIPine (NORVASC) 10 mg tablet Take 10 mg by mouth daily.  baclofen (LIORESAL) 10 mg tablet baclofen 10 mg tablet   TAKE 1 TABLET BY MOUTH TWICE A DAY AS NEEDED (Patient not taking: Reported on 4/4/2022)      cholecalciferol (Vitamin D3) (1000 Units /25 mcg) tablet Take 1,000 Units by mouth daily.  cyanocobalamin (Vitamin B-12) 500 mcg tablet Take 500 mcg by mouth daily. Allergies   Allergen Reactions    Nicotine Contact Dermatitis     Nicotine Patch reaction - Contact dermatitis with numbness and tingling also occuring in the left arm and denuding of the skin.  Thimerosal Other (comments)     Contact lens solution, made eyes red and irrated         REVIEW OF SYSTEMS    Constitutional: Negative for fever, chills, or weight change. Respiratory: Negative for cough or shortness of breath. Cardiovascular: Negative for chest pain or palpitations. Gastrointestinal: Negative for acid reflux, change in bowel habits, or constipation. Genitourinary: Negative for dysuria and flank pain. Musculoskeletal: Positive for lumbar and left lower extremity pain. Skin: Negative for rash. Neurological: Negative for headaches, dizziness, or numbness. Endo/Heme/Allergies: Negative for increased bruising. Psychiatric/Behavioral: Negative for difficulty with sleep.     As per HPI    PHYSICAL EXAMINATION  Visit Vitals  /77 (BP 1 Location: Right arm, BP Patient Position: Sitting)   Pulse 78   Resp 18   Ht 5' 11\" (1.803 m)   Wt 209 lb (94.8 kg)   SpO2 95%   BMI 29.15 kg/m²       Constitutional: Awake, alert, and in no acute distress. Neurological: Sensation to light touch is intact. Skin: warm, dry, and intact. Musculoskeletal: Limited range of motion with active extension of the left knee. Moderate pain with extension, axial loading, and less with forward flexion. No pain with internal or external rotation of his hips. Positive straight leg raise bilaterally. Patient ambulates with the assistance of a single point cane. Hip Flex  Quads Hamstrings Ankle DF EHL Ankle PF   Right +4/5 +4/5 +4/5 +4/5 +4/5 +4/5   Left -4/5  4/5 +4/5 +4/5 +4/5 +4/5           IMAGING:    Lumbar spine MRI from 05/09/2022 was personally reviewed with the patient and demonstrated:    Straightening of lumbar lordosis with mild retrolisthesis of L3 and L2. Posterior fusion with pedicle screws and rods at L4-L5. No compression  deformity. Discogenic endplate fatty changes at L5-S1. Conus medullaris ends at  L1-2 with normal morphology and signal intensity.     Post contrast axial images are degraded by motion artifacts. Nondiagnostic. Postcontrast sagittal images show no abnormal enhancement.     Partially imaged cystic mass in the right adnexal area.     Sagittal images show central and left paracentral disc protrusion at T11-T12  with mild central stenosis but no cord contact. With facet hypertrophy,  moderately severe bilateral foraminal stenosis.     L1-L2: Mild posterior disc bulge. No central stenosis. Facet hypertrophy with  mild foraminal stenosis.     L2-L3: Loss of discal height. Posterior lateral corner disc protrusion. No  significant central stenosis. Moderate bilateral foraminal stenosis with facet  hypertrophy, slightly worse on the left. Mild facet inflammation.     L3-L4: Posterior disc bulge. No central stenosis. Facet and ligamentous  hypertrophy with moderately severe right foraminal stenosis. Facet contacts  right exiting L3 nerve root.  Moderate left foraminal stenosis.     L4-L5: Posterior disc bulge and small central disc protrusion. Mild central  stenosis. Mild left and moderate right foraminal stenosis. Mild compression of  right exiting L4 nerve root possible.     L5-S1: Left posterior lateral disc protrusion, with extruded disc fragment,  migrating inferiorly effacing the left lateral recess and likely compressing the  left descending S1 nerve root. Facet and ligamentous hypertrophy with severe  bilateral foraminal stenosis, compression of exiting L5 nerve roots.     Edema within the enlarged spinous processes in the lower lumbar spine, potential  Baastrup's disease.     IMPRESSION  1. Multilevel degenerative disc and joint disease with central stenosis,  foraminal stenosis and nerve root compression, grossly stable- as described. 2. Partially imaged cystic mass in the right adnexal area. Further evaluation  with pelvic ultrasound recommended    Cervical spine MRI from 4/30/2021 was  personally reviewed with the patient and demonstrated:    FINDINGS:     Alignment: Intact lordosis  Vertebral body height: Normal  Marrow signal: Unremarkable  Disc spaces: Preserved height and signal intensity     Cervicomedullary Junction: Patent  Cervical cord:  No cord expansion or atrophy.     On axial imaging, findings at each level are as follows:     C2/C3: Patent canal and foramina.     C3/C4: There is a central protrusion-type herniation present mildly indents the  ventral aspect of the cord this is unchanged the neuroforamina right and left  side remaining patent     C4/C5: Anterior subluxation 0.31 cm C4 on C5 remains the cord is mildly  flattened along its ventral aspect by the annulus at this level     Neuroforamina appear spared canal dimensions remains reduced measuring on  today's exam 0.81 cm suggestive of mild canal stenosis     C5/C6: This is an operative level anterior fusion mass has been placed with  improvement in the mass effect upon the cord     The neuroforamina remain with osteophytic encroachment bilaterally Central canal  dimensions have improved with visualization now CSF anterior and posterior to  the cord central canal now measuring 0.95 versus 0.69 cm measurements  preoperatively     C6/C7: Anterior fusion has been placed at this level the right and left  neuroforamina demonstrate mild annular bulge encroachment into the neuroforamina  the exiting nerve roots are not compressed     C7/T1: Mild intradiscal degenerative changes and disc space narrowing present  mild annular bulge into the right left neuroforamina identified     Cord is not compressed central canal neuroforamina otherwise unremarkable.     Other structures: There is medialization of the right internal carotid mildly at  about the level of the mid to lower oropharyngeal space     Impression  Surgical changes with improvement central canal reduction of mass effect on the  cord noted C5-6 and C6-7 levels     Persistent degenerative subluxation C4 on C5-6 borderline central canal stenosis     Central herniation C3-4 mild mass effect on the cord, no interval change    Cervical spine x-rays from 08/16/2021 were personally reviewed with the patient and demonstrated:     FINDINGS:   There is anterior fusion at C5/C6 and C6/C7 with interbody disc spacers. Fusion hardware appears intact. Grade 1 anterolisthesis at C4 which is stable with neutral flexion and extension positioning. Straightening of the cervical lordosis. Moderate multilevel facet joint arthropathy. .     IMPRESSION     1. Fusion C5-C7. 2. Grade 1 anterolisthesis of C4 without evidence for instability during this exam.   3. Multilevel facet joint arthropathy. Lumbar spine x-rays from 04/04/2022 were personally reviewed with the patient and demonstrated:     Posterior decompression and fusion at L4-L5. Fusion appears to be solid. Hardware intact. Slight retrolisthesis of L2 on L3 and L3 on L4. Moderate disk space narrowing at L1-L2, L2-L3, L3-L4, and L5-S1.     CT Chest and ABD PELV W CONT from 03/25/2022 was personally reviewed and demonstrated:  CT CHEST ABD PELV W CONT: Patient Communication    Edit Comments  Not seen      No significant abnormalities seen on the recent CT imaging. Written by Edilson Mendez MD on 4/27/2022  9:33 AM EDT      Study Result    Narrative & Impression   EXAM: CT of the chest, abdomen and pelvis with contrast     INDICATION:  Malignant neoplasm of the bladder.     TECHNIQUE: CT of the chest, abdomen and pelvis with contrast. Sagittal and  coronal reformations obtained.     All CT scans at this facility are performed using dose optimization technique as  appropriate to a performed exam, to include automated exposure control,  adjustment of the mA and/or kV according to patient size (including appropriate  matching for site specific examination) or use of iterative reconstruction  technique. IV contrast: 100 cc of Isovue 300     Enteric contrast: None     COMPARISON: 7/1/2021 and CT of the chest dated 8/6/2019.     FINDINGS:  Thyroid: Unremarkable.     Mediastinum: Multiple mediastinal lymph nodes are noted. These are grossly  unchanged compared with prior imaging. No mediastinal fluid collection. No  mediastinal mass.     Heart: Unremarkable.     Pericardium: Unremarkable.     Coronary arteries: Moderate coronary artery calcifications are noted.     Thoracic aorta: No evidence of aneurysm.     Pulmonary arteries: Grossly unremarkable.      Trachea and bronchi: Unremarkable     Pleura: No pneumothorax. No pleural effusion identified.     Lungs: Mild chronic interstitial changes are noted. In the right middle lobe,  there are 2 4 mm pulmonary nodules. These are unchanged.     Axilla/Chest Wall: The axilla are unremarkable.     Peritoneum: No free air identified. No free fluid appreciated. No fluid  collections identified.     Liver: Unremarkable     Biliary/gallbladder: No intrahepatic or extra hepatic biliary duct dilatation  identified.  The gallbladder is unremarkable. No pericholecystic inflammation.     Spleen: Unremarkable     Pancreas: No peripancreatic inflammation appreciated. No pancreatic ductal  dilatation identified. No pancreatic lesions identified.     : The adrenal nodule with calcification is again noted and unchanged. No  perinephric fat stranding identified. There is normal enhancement of the  kidneys. No evidence of hydroureteronephrosis. The bladder is surgically  absent. An ileal conduit is noted in the right lower quadrant. No evidence of  obstruction. No complication identified.     GI: The stomach is unremarkable. The small bowel is without evidence of  obstruction. No dilated loops appreciated. No small bowel wall thickening  identified. The mesentery is without evidence of adenopathy or inflammation. The appendix is unremarkable. No pericolonic inflammatory changes appreciated. No colonic wall thickening identified.     Abdominal aorta and retroperitoneum: The abdominal aorta is without evidence of  aneurysm. No periaortic adenopathy identified. This anterior to the right  iliopsoas adjacent several surgical clips, there is a 3.1 x 5.1 x 6 cm fluid  collection. This is unchanged.     Abdominal wall/Inguinal region: Right lower quadrant ileostomy for the ileal  conduit. No complication. Changes in the abdomen consistent with prior midline  incision. Small umbilical fat hernia.     Musculoskeletal: Postoperative changes noted at L4-L5. Multilevel degenerative  disc disease and facet arthropathy.     IMPRESSION  1. Posterior to changes of cystectomy and right lower quadrant ileal conduit  without evidence of complication. No evidence of metastatic disease.     2.  Likely seroma anterior to the right iliopsoas without evidence of interval  growth or complication.     3.  Pulmonary nodules in the right middle lobe which are unchanged.            Written by Cherie Mercado, as dictated by Trinity Arreola MD.  I, Dr. Nyasia Fonseca confirm that all documentation is accurate.

## 2022-05-27 ENCOUNTER — TELEPHONE (OUTPATIENT)
Dept: ORTHOPEDIC SURGERY | Age: 72
End: 2022-05-27

## 2022-05-27 ENCOUNTER — DOCUMENTATION ONLY (OUTPATIENT)
Dept: ORTHOPEDIC SURGERY | Age: 72
End: 2022-05-27

## 2022-05-27 NOTE — TELEPHONE ENCOUNTER
Patient called again stating he had an appt with Dr. Kushal Bird and when he arrived they had changed his appt time and they wanted him to wait 3 hours. He said he just called them again to reschedule, but he cannot communicate with the girl there. I asked what he would like me to do, and he replied, well you referred me so make me another appt. I simply agreed I would place a message. I am not sure what he would like us to do.

## 2022-05-27 NOTE — TELEPHONE ENCOUNTER
Patient called to apologize for call on 6/26/22 (details in specialty comments) - he requested that his appointment for 6/28 that was canceled yesterday be put back on the schedule. Original appointment was at 11:45 with [facility], however, that slot was not available. Patient was put on at 2:15 6/28. He disconnected before being given appointment information so a voicemail was left providing the details and it was requested that the patient call back if he cannot make this appointment. If we have any changes that need to be made, please advise patient at [phone].

## 2022-05-27 NOTE — TELEPHONE ENCOUNTER
Patient called stating Dr. Alma Fields office does not accept his insurance. Patient is requesting for his referral to be sent to another office. Patient can be reached at [phone].

## 2022-05-27 NOTE — PROGRESS NOTES
5/27/22 - ERICK, patient called to apologize about yesterday's call and said it's due to his medication, req appt be put back on for 6/28, rafi    5/26/22 - ERICK, patient called to advise that he no longer wants to utilize our practice, upcoming appt has been canceled, rafi

## 2022-05-27 NOTE — TELEPHONE ENCOUNTER
Patient called stating that he is out of tramadol and stated that it did not really help his pain. He was wondering if he could get something else for pain and if not can he get another prescription of Tramadol sent to [facility].  Please advise patient at [phone]

## 2022-05-27 NOTE — TELEPHONE ENCOUNTER
I reviewed the pt's chart. It was noted under the referral notes that his insurance is not in network with Dr. Teddy Singletary. On 5/18/22, the pt was made aware of this by the referral coordinator and the pt selected [facility], who is in his network. The referral and info needed was faxed that day. Mr. Leroy Tomlinson was made aware of this and reminded of this conversation. He states he has no recollection of this. I provided the phone number to [facility] for the pt to call and check on the status of his referral. He was advised to contact the office if they tell him that they have not yet received the referral. I will fax it myself if this is the case. He verbalized understanding and has no questions or concerns at this time.

## 2022-05-31 NOTE — TELEPHONE ENCOUNTER
Patient called me this morning stating he had a message from me. While I attempted to read through this message he said never mind, never mind then hung up before I could relay the message.

## 2022-05-31 NOTE — TELEPHONE ENCOUNTER
Attempted to contact the pt about his request. He was not able to be reached. A message was left for the pt letting him know that his request for tramadol has been denied. He was encouraged to request this from the last prescriber that wrote the prescription. I also asked the pt if he was able to reach anyone at [facility] and schedule an appt with a surgeon for evaluation. The number to the office was provided in the message.

## 2022-06-02 NOTE — TELEPHONE ENCOUNTER
Patient called back and said he was returning a call from [address] [place] [address]. Patient is asking to please call him back at tel. [phone].

## 2022-06-02 NOTE — TELEPHONE ENCOUNTER
I called and spoke to Mr. Jhon Espinoza. The pt was identified using 2 pt identifiers. He was asked if he was able to listen to the message that I left him the other day. The pt reports that he did. He seemed confused about the part in the message regarding the spine surgery referral. I asked the pt if he was able to contact [facility] to see if his referral was received and if he can schedule an appt for a consult. The pt verbalized that he does not really remember this or if he did. He states that he has a bad memory issue and could not remember if he called. The pt asked for the number to [facility] again so that he can call them to get scheduled. The pt was encouraged to do this after we get off the phone so that he does not forget. The pt was asked to call the office back if his referral was never received by their office. We will re-fax it if it is necessary. The pt verbalized understanding and has no questions or concerns at this time.

## 2022-06-14 DIAGNOSIS — M51.26 HNP (HERNIATED NUCLEUS PULPOSUS), LUMBAR: Primary | ICD-10-CM

## 2022-06-14 RX ORDER — METHYLPREDNISOLONE 4 MG/1
TABLET ORAL
Qty: 1 DOSE PACK | Refills: 0 | Status: SHIPPED | OUTPATIENT
Start: 2022-06-14

## 2022-06-28 ENCOUNTER — TELEPHONE (OUTPATIENT)
Dept: ORTHOPEDIC SURGERY | Age: 72
End: 2022-06-28

## 2022-08-01 PROBLEM — N17.9 AKI (ACUTE KIDNEY INJURY) (HCC): Status: ACTIVE | Noted: 2022-08-01

## 2022-08-25 ENCOUNTER — APPOINTMENT (OUTPATIENT)
Dept: CT IMAGING | Age: 72
End: 2022-08-25
Attending: EMERGENCY MEDICINE
Payer: MEDICARE

## 2022-08-25 ENCOUNTER — HOSPITAL ENCOUNTER (EMERGENCY)
Age: 72
Discharge: HOME | End: 2022-08-25
Attending: EMERGENCY MEDICINE
Payer: MEDICARE

## 2022-08-25 VITALS
BODY MASS INDEX: 28 KG/M2 | TEMPERATURE: 97.4 F | HEIGHT: 71 IN | DIASTOLIC BLOOD PRESSURE: 68 MMHG | WEIGHT: 200 LBS | HEART RATE: 75 BPM | OXYGEN SATURATION: 96 % | SYSTOLIC BLOOD PRESSURE: 144 MMHG | RESPIRATION RATE: 19 BRPM

## 2022-08-25 DIAGNOSIS — R42 LIGHTHEADEDNESS: Primary | ICD-10-CM

## 2022-08-25 LAB
AMORPH CRY URNS QL MICRO: POSITIVE
ANION GAP SERPL CALC-SCNC: 7 MMOL/L (ref 3–18)
APPEARANCE UR: ABNORMAL
ATRIAL RATE: 75 BPM
BACTERIA URNS QL MICRO: ABNORMAL /HPF
BASE DEFICIT BLDV-SCNC: 0.6 MMOL/L
BASOPHILS # BLD: 0.1 K/UL (ref 0–0.1)
BASOPHILS NFR BLD: 1 % (ref 0–2)
BILIRUB UR QL: NEGATIVE
BUN SERPL-MCNC: 23 MG/DL (ref 7–18)
BUN/CREAT SERPL: 14 (ref 12–20)
CALCIUM SERPL-MCNC: 8.8 MG/DL (ref 8.5–10.1)
CALCULATED P AXIS, ECG09: 11 DEGREES
CALCULATED R AXIS, ECG10: 19 DEGREES
CALCULATED T AXIS, ECG11: 4 DEGREES
CHLORIDE SERPL-SCNC: 107 MMOL/L (ref 100–111)
CO2 SERPL-SCNC: 25 MMOL/L (ref 21–32)
COLOR UR: YELLOW
CREAT SERPL-MCNC: 1.62 MG/DL (ref 0.6–1.3)
DIAGNOSIS, 93000: NORMAL
DIFFERENTIAL METHOD BLD: NORMAL
EOSINOPHIL # BLD: 0.2 K/UL (ref 0–0.4)
EOSINOPHIL NFR BLD: 3 % (ref 0–5)
EPITH CASTS URNS QL MICRO: ABNORMAL /LPF (ref 0–5)
ERYTHROCYTE [DISTWIDTH] IN BLOOD BY AUTOMATED COUNT: 12.9 % (ref 11.6–14.5)
GLUCOSE SERPL-MCNC: 115 MG/DL (ref 74–99)
GLUCOSE UR STRIP.AUTO-MCNC: NEGATIVE MG/DL
HCO3 BLDV-SCNC: 23.9 MMOL/L (ref 23–28)
HCT VFR BLD AUTO: 40.9 % (ref 36–48)
HGB BLD-MCNC: 14.6 G/DL (ref 13–16)
HGB UR QL STRIP: ABNORMAL
IMM GRANULOCYTES # BLD AUTO: 0 K/UL (ref 0–0.04)
IMM GRANULOCYTES NFR BLD AUTO: 0 % (ref 0–0.5)
KETONES UR QL STRIP.AUTO: NEGATIVE MG/DL
LEUKOCYTE ESTERASE UR QL STRIP.AUTO: ABNORMAL
LYMPHOCYTES # BLD: 1.5 K/UL (ref 0.9–3.6)
LYMPHOCYTES NFR BLD: 21 % (ref 21–52)
MAGNESIUM SERPL-MCNC: 1.7 MG/DL (ref 1.6–2.6)
MCH RBC QN AUTO: 31.9 PG (ref 24–34)
MCHC RBC AUTO-ENTMCNC: 35.7 G/DL (ref 31–37)
MCV RBC AUTO: 89.3 FL (ref 78–100)
MONOCYTES # BLD: 0.6 K/UL (ref 0.05–1.2)
MONOCYTES NFR BLD: 9 % (ref 3–10)
NEUTS SEG # BLD: 4.5 K/UL (ref 1.8–8)
NEUTS SEG NFR BLD: 66 % (ref 40–73)
NITRITE UR QL STRIP.AUTO: POSITIVE
NRBC # BLD: 0 K/UL (ref 0–0.01)
NRBC BLD-RTO: 0 PER 100 WBC
P-R INTERVAL, ECG05: 172 MS
PCO2 BLDV: 38.2 MMHG (ref 41–51)
PH BLDV: 7.37 [PH] (ref 7.32–7.42)
PH BLDV: 7.4 [PH] (ref 7.32–7.42)
PH UR STRIP: 6.5 [PH] (ref 5–8)
PLATELET # BLD AUTO: 177 K/UL (ref 135–420)
PMV BLD AUTO: 10.4 FL (ref 9.2–11.8)
PO2 BLDV: 51 MMHG (ref 25–40)
POTASSIUM SERPL-SCNC: 3.5 MMOL/L (ref 3.5–5.5)
PROT UR STRIP-MCNC: 30 MG/DL
Q-T INTERVAL, ECG07: 370 MS
QRS DURATION, ECG06: 72 MS
QTC CALCULATION (BEZET), ECG08: 413 MS
RBC # BLD AUTO: 4.58 M/UL (ref 4.35–5.65)
RBC #/AREA URNS HPF: ABNORMAL /HPF (ref 0–5)
SAO2 % BLDV: 86.1 % (ref 65–88)
SERVICE CMNT-IMP: ABNORMAL
SODIUM SERPL-SCNC: 139 MMOL/L (ref 136–145)
SP GR UR REFRACTOMETRY: 1.01 (ref 1–1.03)
SPECIMEN TYPE: ABNORMAL
TROPONIN-HIGH SENSITIVITY: 11 NG/L (ref 0–78)
UROBILINOGEN UR QL STRIP.AUTO: 2 EU/DL (ref 0.2–1)
VENTRICULAR RATE, ECG03: 75 BPM
WBC # BLD AUTO: 6.8 K/UL (ref 4.6–13.2)
WBC URNS QL MICRO: ABNORMAL /HPF (ref 0–4)

## 2022-08-25 PROCEDURE — 82803 BLOOD GASES ANY COMBINATION: CPT

## 2022-08-25 PROCEDURE — 87086 URINE CULTURE/COLONY COUNT: CPT

## 2022-08-25 PROCEDURE — 93005 ELECTROCARDIOGRAM TRACING: CPT

## 2022-08-25 PROCEDURE — 70450 CT HEAD/BRAIN W/O DYE: CPT

## 2022-08-25 PROCEDURE — 85025 COMPLETE CBC W/AUTO DIFF WBC: CPT

## 2022-08-25 PROCEDURE — 81001 URINALYSIS AUTO W/SCOPE: CPT

## 2022-08-25 PROCEDURE — 82800 BLOOD PH: CPT

## 2022-08-25 PROCEDURE — 99284 EMERGENCY DEPT VISIT MOD MDM: CPT

## 2022-08-25 PROCEDURE — 84484 ASSAY OF TROPONIN QUANT: CPT

## 2022-08-25 PROCEDURE — 83735 ASSAY OF MAGNESIUM: CPT

## 2022-08-25 PROCEDURE — 80048 BASIC METABOLIC PNL TOTAL CA: CPT

## 2022-08-25 NOTE — ED NOTES
Assumed care of patient from [facility]. VSS. NAD. Patient has no needs at this time.

## 2022-08-25 NOTE — ED PROVIDER NOTES
EMERGENCY DEPARTMENT HISTORY AND PHYSICAL EXAM    10:19 AM patient seen at this time in room 16      Date: 8/25/2022  Patient Name: Cris Mccullough    History of Presenting Illness     Chief Complaint   Patient presents with    Syncope    Dizziness         History Provided By: patient    Additional History (Context): Cris Mccullough is a 71 y.o. male presents with poor historian unclear medical history, has episodes of lightheadedness which can occur at rest but mostly with standing. Numerous syncopal episodes. Woke up on the floor at night this morning does not know what happened. Denies any pain or injury. No recent illness. 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 Has been avoiding food and drink because he says it just does not taste right and he wants to spit it out. No diarrhea or vomiting illness. Charito Camacho     PCP: None    Chief Complaint:   Duration:    Timing:    Location:   Quality:   Severity:   Modifying Factors:   Associated Symptoms:       Current Outpatient Medications   Medication Sig Dispense Refill    methylPREDNISolone (MEDROL DOSEPACK) 4 mg tablet Per dose pack instructions (Patient not taking: Reported on 8/1/2022) 1 Dose Pack 0    methylPREDNISolone (MEDROL DOSEPACK) 4 mg tablet Per dose pack instructions (Patient not taking: Reported on 8/1/2022) 1 Dose Pack 1    pregabalin (LYRICA) 100 mg capsule TAKE 1 CAPSULE BY MOUTH 2 TIMES A DAY (Patient not taking: Reported on 8/1/2022)      baclofen (LIORESAL) 10 mg tablet baclofen 10 mg tablet   TAKE 1 TABLET BY MOUTH TWICE A DAY AS NEEDED (Patient not taking: No sig reported)      DULoxetine (CYMBALTA) 30 mg capsule TAKE 1 CAPSULE BY MOUTH ONCE DAILY FOR MOOD AND PAIN (Patient not taking: Reported on 8/1/2022)      traMADoL (ULTRAM) 50 mg tablet tramadol 50 mg tablet   TAKE 1 TABLET BY MOUTH THREE TIMES A DAY AS NEEDED FOR PAIN FOR SEVERE PAIN (Patient not taking: Reported on 8/1/2022)      omeprazole (PRILOSEC) 20 mg capsule TAKE 1 CAPSULE BY MOUTH ONCE DAILY 90 Capsule 3    cholecalciferol (VITAMIN D3) (1000 Units /25 mcg) tablet Take 1,000 Units by mouth daily. amLODIPine (NORVASC) 10 mg tablet Take 10 mg by mouth daily. cyanocobalamin (VITAMIN B12) 500 mcg tablet Take 500 mcg by mouth daily.          Past History     Past Medical History:  Past Medical History:   Diagnosis Date    Bilateral hip pain     Chronic pain     back; hx of opiod addiction    Depression     Diabetes ([address]     borderline, no meds-trying to control with diet    DJD (degenerative joint disease)     GERD (gastroesophageal reflux disease)     Hepatitis C 01/2015    +Harvoni rx    Hypertension     Kidney stones     Lymphadenopathy, generalized 12/05/2015    neg bx    Numbness of foot left    resolved per patient 1/29/16    S/P lumbar fusion 2/9/2016    L4/5 LAMINECTOMY/FUSION/TRANSFORAMINAL LUMBAR INTERBODY FUSION (TLIF) by Dr. Zeinab Buckley on 2/8/16     Unspecified sleep apnea     no cpap machine-pt denies       Past Surgical History:  Past Surgical History:   Procedure Laterality Date    HX APPENDECTOMY  as a child    HX BACK SURGERY  07/2015    HX CATARACT REMOVAL Bilateral     HX CERVICAL FUSION  05/18/2017    ACDF C5/6 C6/7    HX COLONOSCOPY  2015    negative    HX LUMBAR FUSION  2/2016    HX ORTHOPAEDIC      denies    HX OTHER SURGICAL  05/2017    cervical fusion    HX TONSILLECTOMY  as a child    [place] REMOVE GROIN LYMPH NODES SUPERF Right 3-10-16    Dr. Ana Davies       Family History:  Family History   Problem Relation Age of Onset    Diabetes Sister     SLE Sister     OSTEOARTHRITIS Sister     Heart Disease Sister        Social History:  Social History     Tobacco Use    Smoking status: Every Day     Packs/day: 1.00     Years: 40.00     Pack years: 40.00     Types: Cigarettes     Start date: 4/24/1966    Smokeless tobacco: Former    Tobacco comments:     1 pack per day   Substance Use Topics    Alcohol use: No     Alcohol/week: 0.0 standard drinks    Drug use: Yes     Types: Marijuana       Allergies: Allergies   Allergen Reactions    Nicotine Contact Dermatitis     Nicotine Patch reaction - Contact dermatitis with numbness and tingling also occuring in the left arm and denuding of the skin. Thimerosal Other (comments)     Contact lens solution, made eyes red and irrated         Review of Systems     Review of Systems   Constitutional:  Negative for diaphoresis and fever. HENT:  Negative for congestion and sore throat. Eyes:  Negative for pain and itching. Respiratory:  Negative for cough and shortness of breath. Cardiovascular:  Negative for chest pain and palpitations. Gastrointestinal:  Negative for abdominal pain and diarrhea. Endocrine: Negative for polydipsia and polyuria. Genitourinary:  Negative for dysuria and hematuria. Musculoskeletal:  Negative for arthralgias and myalgias. Skin:  Negative for rash and wound. Neurological:  Positive for syncope and light-headedness. Negative for seizures. Hematological:  Does not bruise/bleed easily. Psychiatric/Behavioral:  Negative for agitation and hallucinations. Physical Exam       Patient Vitals for the past 12 hrs:   Temp Pulse Resp BP SpO2   08/25/22 0938 97.4 °F (36.3 °C) 78 27 (!) 156/80 97 %       IPVITALS  Patient Vitals for the past 24 hrs:   BP Temp Pulse Resp SpO2 Height Weight   08/25/22 0939 -- -- -- -- -- 5' 11\" (1.803 m) 90.7 kg (200 lb)   08/25/22 0938 (!) 156/80 97.4 °F (36.3 °C) 78 27 97 % -- --       Physical Exam  Vitals and nursing note reviewed. Constitutional:       General: He is not in acute distress. Appearance: He is well-developed. HENT:      Head: Normocephalic and atraumatic. Eyes:      General: No scleral icterus. Conjunctiva/sclera: Conjunctivae normal.   Neck:      Vascular: No JVD. Cardiovascular:      Rate and Rhythm: Normal rate and regular rhythm. Heart sounds: Normal heart sounds.       Comments: 4 intact extremity pulses  Pulmonary: Effort: Pulmonary effort is normal.      Breath sounds: Normal breath sounds. Comments: A bit tachypneic  Abdominal:      Palpations: Abdomen is soft. There is no mass. Tenderness: There is no abdominal tenderness. Comments: Ileal conduit bag with translucent yellow urine   Musculoskeletal:         General: Normal range of motion. Cervical back: Normal range of motion and neck supple. Lymphadenopathy:      Cervical: No cervical adenopathy. Skin:     General: Skin is warm and dry. Neurological:      Mental Status: He is alert. Diagnostic Study Results   Labs -  Recent Results (from the past 24 hour(s))   EKG, 12 LEAD, INITIAL    Collection Time: 08/25/22  9:44 AM   Result Value Ref Range    Ventricular Rate 75 BPM    Atrial Rate 75 BPM    P-R Interval 172 ms    QRS Duration 72 ms    Q-T Interval 370 ms    QTC Calculation (Bezet) 413 ms    Calculated P Axis 11 degrees    Calculated R Axis 19 degrees    Calculated T Axis 4 degrees    Diagnosis       Normal sinus rhythm  Possible Anterior infarct , age undetermined  Abnormal ECG  When compared with ECG of 31-DEC-2019 11:30,  No significant change was found         Radiologic Studies -   CT HEAD WO CONT    (Results Pending)     No results found. Medications ordered:   Medications - No data to display      Medical Decision Making   Initial Medical Decision Making and DDx:  EKG sinus rhythm at a rate of 75 narrow complex    Evaluate for dehydration DKA, slightly elevated respiratory rate possible acidosis. MEI is possible    ED Course: Progress Notes, Reevaluation, and Consults:     2:20 PM Pt reevaluated at this time. Discussed results and findings, as well as, diagnosis and plan for discharge. Follow up with doctors/services listed. Return to the emergency department for alarming symptoms. Pt verbalizes understanding and agreement with plan. All questions addressed.     No alarming metabolic findings kidney failure profound dehydration anemia ACS. No alarming findings on head CT. Patient is asking for discharge benign condition ambulates without difficulty. I do not think this is alarming cardiac arrhythmia. Encouraged to eat and drink normal amounts    I am the first provider for this patient. I reviewed the vital signs, available nursing notes, past medical history, past surgical history, family history and social history. Patient Vitals for the past 12 hrs:   Temp Pulse Resp BP SpO2   08/25/22 0938 97.4 °F (36.3 °C) 78 27 (!) 156/80 97 %       Vital Signs-Reviewed the patient's vital signs. Pulse Oximetry Analysis, Cardiac Monitor, 12 lead ekg:      Interpreted by the EP. Records Reviewed: Nursing notes reviewed (Time of Review: 10:19 AM)    Procedures:   Critical Care Time:   Aspirin: (was aspirin given for stroke?)    Diagnosis     Clinical Impression: No diagnosis found. Disposition:       Follow-up Information    None          Patient's Medications   Start Taking    No medications on file   Continue Taking    AMLODIPINE (NORVASC) 10 MG TABLET    Take 10 mg by mouth daily. BACLOFEN (LIORESAL) 10 MG TABLET    baclofen 10 mg tablet   TAKE 1 TABLET BY MOUTH TWICE A DAY AS NEEDED    CHOLECALCIFEROL (VITAMIN D3) (1000 UNITS /25 MCG) TABLET    Take 1,000 Units by mouth daily. CYANOCOBALAMIN (VITAMIN B12) 500 MCG TABLET    Take 500 mcg by mouth daily.     DULOXETINE (CYMBALTA) 30 MG CAPSULE    TAKE 1 CAPSULE BY MOUTH ONCE DAILY FOR MOOD AND PAIN    METHYLPREDNISOLONE (MEDROL DOSEPACK) 4 MG TABLET    Per dose pack instructions    METHYLPREDNISOLONE (MEDROL DOSEPACK) 4 MG TABLET    Per dose pack instructions    OMEPRAZOLE (PRILOSEC) 20 MG CAPSULE    TAKE 1 CAPSULE BY MOUTH ONCE DAILY    PREGABALIN (LYRICA) 100 MG CAPSULE    TAKE 1 CAPSULE BY MOUTH 2 TIMES A DAY    TRAMADOL (ULTRAM) 50 MG TABLET    tramadol 50 mg tablet   TAKE 1 TABLET BY MOUTH THREE TIMES A DAY AS NEEDED FOR PAIN FOR SEVERE PAIN   These Medications have changed    No medications on file   Stop Taking    No medications on file     _______________________________    Notes:    Mary Sykes MD using Dragon dictation      _______________________________

## 2022-08-25 NOTE — ED TRIAGE NOTES
Patient arrived via [address] [place] [address] after repeated syncopal episodes with falls this morning. Denies hitting head, denies thinners. Unaware of how long he lost consciousness. Patient states that he has been having syncope and dizziness for the past month. Denies CP, abdominal pain, n/v/d. Reports increased SOB. Patient alert and oriented x 4. Speaking in full sentences.

## 2022-08-27 LAB
BACTERIA SPEC CULT: NORMAL
CC UR VC: NORMAL
SERVICE CMNT-IMP: NORMAL

## 2023-01-26 NOTE — ROUTINE PROCESS
Patient AOX4, states no pain , am comfortable right now. denies SOB, pedal pulses palpable, cap refill < 3 sec, sensation present, denies tingling, no edema noted, dressing to the anterior neck is clean , dry and intact. Drain is intact and charged. Some drainage in the tube but 0 cc output in the ena collecting  Ball. resting in bed in low position. No sign and symptoms of distress. Call bell in reach. Area L Indication Text: Tumors in this location are included in Area L (trunk and extremities).  Mohs surgery is indicated for larger tumors, or tumors with aggressive histologic features, in these anatomic locations.

## 2023-03-19 ENCOUNTER — APPOINTMENT (OUTPATIENT)
Facility: HOSPITAL | Age: 73
End: 2023-03-19
Payer: MEDICARE

## 2023-03-19 ENCOUNTER — HOSPITAL ENCOUNTER (EMERGENCY)
Facility: HOSPITAL | Age: 73
Discharge: HOME | End: 2023-03-19
Attending: EMERGENCY MEDICINE
Payer: MEDICARE

## 2023-03-19 VITALS
WEIGHT: 200 LBS | HEART RATE: 67 BPM | TEMPERATURE: 97.8 F | OXYGEN SATURATION: 98 % | RESPIRATION RATE: 19 BRPM | BODY MASS INDEX: 28 KG/M2 | DIASTOLIC BLOOD PRESSURE: 77 MMHG | SYSTOLIC BLOOD PRESSURE: 142 MMHG | HEIGHT: 71 IN

## 2023-03-19 DIAGNOSIS — R42 DIZZINESS: Primary | ICD-10-CM

## 2023-03-19 DIAGNOSIS — N30.00 ACUTE CYSTITIS WITHOUT HEMATURIA: ICD-10-CM

## 2023-03-19 LAB
ALBUMIN SERPL-MCNC: 3.8 G/DL (ref 3.4–5)
ALBUMIN/GLOB SERPL: 1.2 (ref 0.8–1.7)
ALP SERPL-CCNC: 108 U/L (ref 45–117)
ALT SERPL-CCNC: 15 U/L (ref 16–61)
ANION GAP SERPL CALC-SCNC: 4 MMOL/L (ref 3–18)
APPEARANCE UR: ABNORMAL
AST SERPL-CCNC: 11 U/L (ref 10–38)
BACTERIA URNS QL MICRO: ABNORMAL /HPF
BASOPHILS # BLD: 0.1 K/UL (ref 0–0.1)
BASOPHILS NFR BLD: 1 % (ref 0–2)
BILIRUB SERPL-MCNC: 0.7 MG/DL (ref 0.2–1)
BILIRUB UR QL: NEGATIVE
BUN SERPL-MCNC: 14 MG/DL (ref 7–18)
BUN/CREAT SERPL: 8 (ref 12–20)
CALCIUM SERPL-MCNC: 8.8 MG/DL (ref 8.5–10.1)
CHLORIDE SERPL-SCNC: 107 MMOL/L (ref 100–111)
CO2 SERPL-SCNC: 24 MMOL/L (ref 21–32)
COLOR UR: YELLOW
CREAT SERPL-MCNC: 1.66 MG/DL (ref 0.6–1.3)
DIFFERENTIAL METHOD BLD: ABNORMAL
EKG ATRIAL RATE: 78 BPM
EKG DIAGNOSIS: NORMAL
EKG P AXIS: 41 DEGREES
EKG P-R INTERVAL: 162 MS
EKG Q-T INTERVAL: 382 MS
EKG QRS DURATION: 74 MS
EKG QTC CALCULATION (BAZETT): 435 MS
EKG R AXIS: 41 DEGREES
EKG T AXIS: 37 DEGREES
EKG VENTRICULAR RATE: 78 BPM
EOSINOPHIL # BLD: 0.1 K/UL (ref 0–0.4)
EOSINOPHIL NFR BLD: 2 % (ref 0–5)
EPITH CASTS URNS QL MICRO: NEGATIVE /LPF (ref 0–5)
ERYTHROCYTE [DISTWIDTH] IN BLOOD BY AUTOMATED COUNT: 13 % (ref 11.6–14.5)
GLOBULIN SER CALC-MCNC: 3.2 G/DL (ref 2–4)
GLUCOSE SERPL-MCNC: 138 MG/DL (ref 74–99)
GLUCOSE UR STRIP.AUTO-MCNC: NEGATIVE MG/DL
HCT VFR BLD AUTO: 46.1 % (ref 36–48)
HGB BLD-MCNC: 16.2 G/DL (ref 13–16)
HGB UR QL STRIP: ABNORMAL
IMM GRANULOCYTES # BLD AUTO: 0 K/UL (ref 0–0.04)
IMM GRANULOCYTES NFR BLD AUTO: 0 % (ref 0–0.5)
KETONES UR QL STRIP.AUTO: NEGATIVE MG/DL
LEUKOCYTE ESTERASE UR QL STRIP.AUTO: ABNORMAL
LYMPHOCYTES # BLD: 1.7 K/UL (ref 0.9–3.6)
LYMPHOCYTES NFR BLD: 30 % (ref 21–52)
MAGNESIUM SERPL-MCNC: 2 MG/DL (ref 1.6–2.6)
MCH RBC QN AUTO: 32.5 PG (ref 24–34)
MCHC RBC AUTO-ENTMCNC: 35.1 G/DL (ref 31–37)
MCV RBC AUTO: 92.6 FL (ref 78–100)
MONOCYTES # BLD: 0.5 K/UL (ref 0.05–1.2)
MONOCYTES NFR BLD: 9 % (ref 3–10)
NEUTS SEG # BLD: 3.2 K/UL (ref 1.8–8)
NEUTS SEG NFR BLD: 57 % (ref 40–73)
NITRITE UR QL STRIP.AUTO: POSITIVE
NRBC # BLD: 0 K/UL (ref 0–0.01)
NRBC BLD-RTO: 0 PER 100 WBC
NT PRO BNP: 214 PG/ML (ref 0–900)
PH UR STRIP: 6.5 (ref 5–8)
PLATELET # BLD AUTO: 134 K/UL (ref 135–420)
PMV BLD AUTO: 10.2 FL (ref 9.2–11.8)
POTASSIUM SERPL-SCNC: 4 MMOL/L (ref 3.5–5.5)
PROT SERPL-MCNC: 7 G/DL (ref 6.4–8.2)
PROT UR STRIP-MCNC: 100 MG/DL
RBC # BLD AUTO: 4.98 M/UL (ref 4.35–5.65)
RBC #/AREA URNS HPF: ABNORMAL /HPF (ref 0–5)
SODIUM SERPL-SCNC: 135 MMOL/L (ref 136–145)
SP GR UR REFRACTOMETRY: 1.01 (ref 1–1.03)
TROPONIN I SERPL HS-MCNC: 11 NG/L (ref 0–78)
UROBILINOGEN UR QL STRIP.AUTO: 1 EU/DL (ref 0.2–1)
WBC # BLD AUTO: 5.7 K/UL (ref 4.6–13.2)
WBC URNS QL MICRO: ABNORMAL /HPF (ref 0–4)

## 2023-03-19 PROCEDURE — 83735 ASSAY OF MAGNESIUM: CPT

## 2023-03-19 PROCEDURE — 87086 URINE CULTURE/COLONY COUNT: CPT

## 2023-03-19 PROCEDURE — 83880 ASSAY OF NATRIURETIC PEPTIDE: CPT

## 2023-03-19 PROCEDURE — 71045 X-RAY EXAM CHEST 1 VIEW: CPT

## 2023-03-19 PROCEDURE — 96374 THER/PROPH/DIAG INJ IV PUSH: CPT

## 2023-03-19 PROCEDURE — 81001 URINALYSIS AUTO W/SCOPE: CPT

## 2023-03-19 PROCEDURE — 2580000003 HC RX 258: Performed by: EMERGENCY MEDICINE

## 2023-03-19 PROCEDURE — 84484 ASSAY OF TROPONIN QUANT: CPT

## 2023-03-19 PROCEDURE — 99285 EMERGENCY DEPT VISIT HI MDM: CPT

## 2023-03-19 PROCEDURE — 80053 COMPREHEN METABOLIC PANEL: CPT

## 2023-03-19 PROCEDURE — 93005 ELECTROCARDIOGRAM TRACING: CPT | Performed by: EMERGENCY MEDICINE

## 2023-03-19 PROCEDURE — 6360000002 HC RX W HCPCS: Performed by: EMERGENCY MEDICINE

## 2023-03-19 PROCEDURE — 70450 CT HEAD/BRAIN W/O DYE: CPT

## 2023-03-19 PROCEDURE — 85025 COMPLETE CBC W/AUTO DIFF WBC: CPT

## 2023-03-19 PROCEDURE — 93010 ELECTROCARDIOGRAM REPORT: CPT | Performed by: INTERNAL MEDICINE

## 2023-03-19 RX ORDER — MECLIZINE HYDROCHLORIDE 25 MG/1
25 TABLET ORAL 3 TIMES DAILY PRN
Qty: 30 TABLET | Refills: 0 | Status: SHIPPED | OUTPATIENT
Start: 2023-03-19 | End: 2023-03-29

## 2023-03-19 RX ORDER — CEPHALEXIN 500 MG/1
500 CAPSULE ORAL 4 TIMES DAILY
Qty: 28 CAPSULE | Refills: 0 | Status: SHIPPED | OUTPATIENT
Start: 2023-03-19 | End: 2023-03-26

## 2023-03-19 RX ADMIN — WATER 1000 MG: 1 INJECTION INTRAMUSCULAR; INTRAVENOUS; SUBCUTANEOUS at 12:44

## 2023-03-19 ASSESSMENT — PAIN - FUNCTIONAL ASSESSMENT: PAIN_FUNCTIONAL_ASSESSMENT: NONE - DENIES PAIN

## 2023-03-19 NOTE — ED NOTES
Pt appears irritable, removed monitoring devices and expressed his wish to go home. Attempted to explain that the hospital staff is here to help him. Pt refuses to listen. Pt stated, \"I will leave here if they will take me back from where they took me. \" Explained he can ask someone to come pick him up in case he decides to leave AMA. Pt stated, \"You know what, I'm just gonna fucking shut up right know 'cause I might say some more cuss words. \"     Preferred to sit on the chair and refuses to be placed back on monitor.          Rocio Kendrick RN  03/19/23 2444

## 2023-03-19 NOTE — ED PROVIDER NOTES
EMERGENCY DEPARTMENT HISTORY AND PHYSICAL EXAM      Patient Name: Nicki Tierney  MRN: [id number]  YOB: 1950  Provider: Coy Mcleod MD  PCP: None None   Time/Date of evaluation: 11:42 AM EDT on 3/19/23    History of Presenting Illness     Chief Complaint   Patient presents with    Dizziness       History Provided By: Patient     History Lennoxmarily Mcduffie):   Nicki Tierney is a 71 y.o. male with a PMHX of chronic pain, depression, diabetes, GERD, hepatitis C, hypertension, and neuropathy  who presents to the emergency department  by EMS C/O dizziness. He states that it has been going on for \"quite some time. \"  He states that he has difficulty keeping track of time. He feels both like the world is spinning and that he is going to pass out. Today when he got out of bed he fell down. He denies any other symptoms at this time.         Past History     Past Medical History:  Past Medical History:   Diagnosis Date    Bilateral hip pain     Chronic pain     back; hx of opiod addiction    Depression     Diabetes ([address]     borderline, no meds-trying to control with diet    DJD (degenerative joint disease)     GERD (gastroesophageal reflux disease)     Hepatitis C 01/2015    +Harvoni rx    Hypertension     Kidney stones     Lymphadenopathy, generalized 12/05/2015    neg bx    Numbness of foot left    resolved per patient 1/29/16    S/P lumbar fusion 2/9/2016    L4/5 LAMINECTOMY/FUSION/TRANSFORAMINAL LUMBAR INTERBODY FUSION (TLIF) by Dr. Charla Felty on 2/8/16     Unspecified sleep apnea     no cpap machine-pt denies       Past Surgical History:  Past Surgical History:   Procedure Laterality Date    APPENDECTOMY  as a child    BACK SURGERY  07/2015    CATARACT REMOVAL Bilateral     CERVICAL FUSION  05/18/2017    ACDF C5/6 C6/7    COLONOSCOPY  2015    negative    LUMBAR FUSION  2/2016    ORTHOPEDIC SURGERY      denies    OTHER SURGICAL HISTORY  05/2017    cervical fusion    REMOVE GROIN LYMPH NODES SUPERF Right of Health:  Social Determinants of Health     Tobacco Use: High Risk    Smoking Tobacco Use: Every Day    Smokeless Tobacco Use: Former    Passive Exposure: Not on file   Alcohol Use: Not on file   Financial Resource Strain: Not on file   Food Insecurity: Not on file   Transportation Needs: Not on file   Physical Activity: Not on file   Stress: Not on file   Social Connections: Not on file   Intimate Partner Violence: Not on file   Depression: Not on file   Housing Stability: Not on file       Review of Systems     Negative except as listed above in HPI. Physical Exam     Vitals:    03/19/23 1030 03/19/23 1045 03/19/23 1100 03/19/23 1115   BP: (!) 144/94  (!) 142/77    Pulse: 70 67 70 67   Resp: 20 13 19   Temp:       TempSrc:       SpO2:       Weight:       Height:           Physical Exam  Vitals and nursing note reviewed. Constitutional:       Appearance: He is ill-appearing. HENT:      Head: Normocephalic and atraumatic. Right Ear: External ear normal.      Left Ear: External ear normal.      Nose:      Comments: rhinophyma     Mouth/Throat:      Mouth: Mucous membranes are moist.      Pharynx: Oropharynx is clear. Eyes:      Extraocular Movements: Extraocular movements intact. Conjunctiva/sclera: Conjunctivae normal.      Pupils: Pupils are equal, round, and reactive to light. Cardiovascular:      Rate and Rhythm: Normal rate and regular rhythm. Pulses: Normal pulses. Heart sounds: Normal heart sounds. Pulmonary:      Effort: Pulmonary effort is normal.      Breath sounds: Normal breath sounds. Abdominal:      General: Abdomen is flat. Bowel sounds are normal.      Palpations: Abdomen is soft. Musculoskeletal:         General: Normal range of motion. Cervical back: Normal range of motion and neck supple. Skin:     General: Skin is dry. Capillary Refill: Capillary refill takes 2 to 3 seconds. Coloration: Skin is pale.    Neurological:      Mental Status: He is

## 2023-03-19 NOTE — ED NOTES
Pt medicated per [facility] for primary RN  Tolerated well  Pt resting on stretcher  NAD noted       Minerva Enciso, RN  03/19/23 4900

## 2023-03-19 NOTE — ED TRIAGE NOTES
Pt came via EMS stretcher from home, c/o dizziness and lightheadedness which started when pt woke up. Pt stated that it is \"reoccurring\". Denies chest pain. Pt ha hx of HTN. Noted with urostomy bag.     Placed on monitor. A&Ox4.

## 2023-03-22 LAB
BACTERIA SPEC CULT: NORMAL
CC UR VC: NORMAL
SERVICE CMNT-IMP: NORMAL

## 2023-04-26 NOTE — PROGRESS NOTES
Bladder mass  Unchanged rt adrenal nodule  Will proceed as planned for bladder biopsy and tumor resection
Quality 110: Preventive Care And Screening: Influenza Immunization: Influenza Immunization Administered during Influenza season
Quality 111:Pneumonia Vaccination Status For Older Adults: Pneumococcal vaccine (PPSV23) administered on or after patient’s 60th birthday and before the end of the measurement period
Quality 130: Documentation Of Current Medications In The Medical Record: Current Medications Documented
Detail Level: Detailed

## 2024-09-13 NOTE — ED NOTES
I have reviewed discharge instructions with the patient. The patient verbalized understanding. Medication: atenolol 25 mg.passed protocol.   Last office visit date: 8/21/24  Next appointment scheduled?: Yes 10/7/24  Number of refills given: 3

## (undated) DEVICE — (D)NDL SPNE 22GX15CM -- DISC BY MFR W/NO SUB

## (undated) DEVICE — SYRINGE MED 3ML NDL 22GA L1 1/2IN REG BVL SFGLDE

## (undated) DEVICE — SUTURE VCRL SZ 3-0 L27IN ABSRB UD L26MM SH 1/2 CIR J416H

## (undated) DEVICE — NDL SPNE MANAN 22GX6IN --

## (undated) DEVICE — KIT CLN UP BON SECOURS MARYV

## (undated) DEVICE — STOCKING COMPR M L31-34IN LNG 19MMHG ANK 8-9IN CALF 12-15IN

## (undated) DEVICE — DERMABOND SKIN ADH 0.7ML -- DERMABOND ADVANCED 12/BX

## (undated) DEVICE — INSULATED BLADE ELECTRODE: Brand: EDGE

## (undated) DEVICE — INTENDED FOR TISSUE SEPARATION, AND OTHER PROCEDURES THAT REQUIRE A SHARP SURGICAL BLADE TO PUNCTURE OR CUT.: Brand: BARD-PARKER SAFETY BLADES SIZE 10, STERILE

## (undated) DEVICE — KENDALL SCD EXPRESS SLEEVES, KNEE LENGTH, MEDIUM: Brand: KENDALL SCD

## (undated) DEVICE — SOLUTION IV 1000ML 0.9% SOD CHL

## (undated) DEVICE — (D)BNDG ADHESIVE FABRIC 3/4X3 -- DISC BY MFR USE ITEM 357960

## (undated) DEVICE — TELFA NON-ADHERENT PADS PREPAK: Brand: TELFA

## (undated) DEVICE — SPONGE DISSECT PNUT SM 3/8IN -- 5/PK

## (undated) DEVICE — COLLAR CERV L H3.25X23IN M DENS FOAM COT STOCK CVR LO

## (undated) DEVICE — (D)PREP SKN CHLRAPRP APPL 26ML -- CONVERT TO ITEM 371833

## (undated) DEVICE — MEDIA CONTRAST 10ML 200MG/ML 41%

## (undated) DEVICE — REM POLYHESIVE ADULT PATIENT RETURN ELECTRODE: Brand: VALLEYLAB

## (undated) DEVICE — GAUZE SPONGES,USP TYPE VII GAUZE, 12 PLY: Brand: CURITY

## (undated) DEVICE — 3.0MM PRECISION NEURO (MATCH HEAD)

## (undated) DEVICE — Device

## (undated) DEVICE — 3M™ BAIR PAWS FLEX™ WARMING GOWN, STANDARD, 20 PER CASE 81003: Brand: BAIR PAWS™

## (undated) DEVICE — FLEX ADVANTAGE 3000CC: Brand: FLEX ADVANTAGE

## (undated) DEVICE — STERILE LATEX POWDER-FREE SURGICAL GLOVESWITH NITRILE COATING: Brand: PROTEXIS

## (undated) DEVICE — SUTURE MCRYL SZ 4-0 L18IN ABSRB UD L19MM PS-2 3/8 CIR PRIM Y496G

## (undated) DEVICE — 3M™ TEGADERM™ TRANSPARENT FILM DRESSING FRAME STYLE, 1626W, 4 IN X 4-3/4 IN (10 CM X 12 CM), 50/CT 4CT/CASE: Brand: 3M™ TEGADERM™

## (undated) DEVICE — TRAY MYEL SFTY +

## (undated) DEVICE — (D)DRSG BORD MPLX SCRM 18X18CM -- DISC BY MFR USE ITEM 346511

## (undated) DEVICE — (D)SYR 10ML 1/5ML GRAD NSAF -- PKGING CHANGE USE ITEM 338027

## (undated) DEVICE — DRAIN SURG W7MMXL20CM SIL FULL PERF HUBLESS FLAT RADPQ STRP